# Patient Record
Sex: FEMALE | Race: WHITE | Employment: FULL TIME | URBAN - METROPOLITAN AREA
[De-identification: names, ages, dates, MRNs, and addresses within clinical notes are randomized per-mention and may not be internally consistent; named-entity substitution may affect disease eponyms.]

---

## 2017-01-18 ENCOUNTER — HOSPITAL ENCOUNTER (OUTPATIENT)
Dept: OTHER | Age: 52
Discharge: HOME | End: 2017-01-31
Attending: PSYCHIATRY & NEUROLOGY | Admitting: PSYCHIATRY & NEUROLOGY

## 2017-01-23 ENCOUNTER — HOSPITAL ENCOUNTER (OUTPATIENT)
Dept: PHYSICAL THERAPY | Age: 52
Discharge: HOME | End: 2017-01-23
Admitting: PSYCHIATRY & NEUROLOGY

## 2017-01-25 ENCOUNTER — HOSPITAL ENCOUNTER (OUTPATIENT)
Dept: PHYSICAL THERAPY | Age: 52
Discharge: HOME | End: 2017-01-25
Admitting: PSYCHIATRY & NEUROLOGY

## 2017-01-30 ENCOUNTER — HOSPITAL ENCOUNTER (OUTPATIENT)
Dept: PHYSICAL THERAPY | Age: 52
Discharge: HOME | End: 2017-01-30
Admitting: PSYCHIATRY & NEUROLOGY

## 2017-02-06 ENCOUNTER — HOSPITAL ENCOUNTER (OUTPATIENT)
Dept: PHYSICAL THERAPY | Age: 52
Discharge: HOME | End: 2017-02-06
Admitting: PSYCHIATRY & NEUROLOGY

## 2017-02-08 ENCOUNTER — HOSPITAL ENCOUNTER (OUTPATIENT)
Dept: PHYSICAL THERAPY | Age: 52
Discharge: HOME | End: 2017-02-08
Admitting: PSYCHIATRY & NEUROLOGY

## 2017-02-17 ENCOUNTER — HOSPITAL ENCOUNTER (OUTPATIENT)
Dept: NON INVASIVE DIAGNOSTICS | Age: 52
Discharge: HOME | End: 2017-02-17
Attending: INTERNAL MEDICINE | Admitting: INTERNAL MEDICINE

## 2017-02-18 LAB — VITAMIN D 25-HYDROXY: 18.9 NG/ML

## 2017-02-20 ENCOUNTER — HOSPITAL ENCOUNTER (OUTPATIENT)
Dept: NON INVASIVE DIAGNOSTICS | Age: 52
Discharge: HOME | End: 2017-02-20
Attending: FAMILY MEDICINE | Admitting: FAMILY MEDICINE

## 2017-02-20 ENCOUNTER — OFFICE VISIT (OUTPATIENT)
Dept: FAMILY MEDICINE CLINIC | Age: 52
End: 2017-02-20

## 2017-02-20 VITALS
HEART RATE: 92 BPM | SYSTOLIC BLOOD PRESSURE: 110 MMHG | TEMPERATURE: 98 F | BODY MASS INDEX: 37.15 KG/M2 | DIASTOLIC BLOOD PRESSURE: 72 MMHG | WEIGHT: 250.8 LBS | HEIGHT: 69 IN

## 2017-02-20 DIAGNOSIS — G89.29 CHRONIC PAIN OF RIGHT KNEE: ICD-10-CM

## 2017-02-20 DIAGNOSIS — M25.561 CHRONIC PAIN OF RIGHT KNEE: Primary | ICD-10-CM

## 2017-02-20 DIAGNOSIS — M25.561 CHRONIC PAIN OF RIGHT KNEE: ICD-10-CM

## 2017-02-20 DIAGNOSIS — G89.29 CHRONIC PAIN OF RIGHT KNEE: Primary | ICD-10-CM

## 2017-02-20 PROCEDURE — 99213 OFFICE O/P EST LOW 20 MIN: CPT | Performed by: FAMILY MEDICINE

## 2017-02-20 RX ORDER — PANTOPRAZOLE SODIUM 40 MG/1
TABLET, DELAYED RELEASE ORAL
Refills: 5 | COMMUNITY
Start: 2017-02-14 | End: 2018-09-21

## 2017-02-20 RX ORDER — GABAPENTIN 300 MG/1
CAPSULE ORAL
Refills: 1 | COMMUNITY
Start: 2017-02-01 | End: 2021-07-01 | Stop reason: ALTCHOICE

## 2017-02-20 RX ORDER — TRAMADOL HYDROCHLORIDE 50 MG/1
TABLET ORAL
Refills: 0 | COMMUNITY
Start: 2017-01-16 | End: 2017-12-21 | Stop reason: HOSPADM

## 2017-02-20 ASSESSMENT — ENCOUNTER SYMPTOMS
CHEST TIGHTNESS: 0
ABDOMINAL PAIN: 0
SHORTNESS OF BREATH: 0

## 2017-02-22 ENCOUNTER — TELEPHONE (OUTPATIENT)
Dept: ORTHOPEDIC SURGERY | Age: 52
End: 2017-02-22

## 2017-02-24 ENCOUNTER — OFFICE VISIT (OUTPATIENT)
Dept: ORTHOPEDIC SURGERY | Age: 52
End: 2017-02-24

## 2017-02-24 VITALS
BODY MASS INDEX: 37.44 KG/M2 | HEART RATE: 68 BPM | WEIGHT: 247 LBS | SYSTOLIC BLOOD PRESSURE: 90 MMHG | HEIGHT: 68 IN | DIASTOLIC BLOOD PRESSURE: 61 MMHG

## 2017-02-24 DIAGNOSIS — M17.11 PRIMARY OSTEOARTHRITIS OF RIGHT KNEE: Primary | ICD-10-CM

## 2017-02-24 PROCEDURE — 20611 DRAIN/INJ JOINT/BURSA W/US: CPT | Performed by: ORTHOPAEDIC SURGERY

## 2017-02-24 PROCEDURE — 99203 OFFICE O/P NEW LOW 30 MIN: CPT | Performed by: ORTHOPAEDIC SURGERY

## 2017-03-14 RX ORDER — LEVOTHYROXINE SODIUM 0.1 MG/1
TABLET ORAL
Qty: 90 TABLET | Refills: 0 | Status: SHIPPED | OUTPATIENT
Start: 2017-03-14 | End: 2017-06-18 | Stop reason: SDUPTHER

## 2017-03-15 ENCOUNTER — OFFICE VISIT (OUTPATIENT)
Dept: FAMILY MEDICINE CLINIC | Age: 52
End: 2017-03-15

## 2017-03-15 VITALS
DIASTOLIC BLOOD PRESSURE: 70 MMHG | TEMPERATURE: 97.6 F | WEIGHT: 265 LBS | HEART RATE: 72 BPM | SYSTOLIC BLOOD PRESSURE: 96 MMHG | HEIGHT: 68 IN | BODY MASS INDEX: 40.16 KG/M2

## 2017-03-15 DIAGNOSIS — J01.00 ACUTE MAXILLARY SINUSITIS, RECURRENCE NOT SPECIFIED: Primary | ICD-10-CM

## 2017-03-15 PROCEDURE — 99213 OFFICE O/P EST LOW 20 MIN: CPT | Performed by: FAMILY MEDICINE

## 2017-03-15 RX ORDER — AMOXICILLIN AND CLAVULANATE POTASSIUM 875; 125 MG/1; MG/1
1 TABLET, FILM COATED ORAL 2 TIMES DAILY
Qty: 20 TABLET | Refills: 0 | Status: SHIPPED | OUTPATIENT
Start: 2017-03-15 | End: 2017-10-11 | Stop reason: SDUPTHER

## 2017-03-15 ASSESSMENT — ENCOUNTER SYMPTOMS
COUGH: 0
WHEEZING: 0
SHORTNESS OF BREATH: 0
RHINORRHEA: 1
SINUS PRESSURE: 1
CHEST TIGHTNESS: 0

## 2017-04-28 ENCOUNTER — TELEPHONE (OUTPATIENT)
Dept: FAMILY MEDICINE CLINIC | Age: 52
End: 2017-04-28

## 2017-05-08 RX ORDER — FUROSEMIDE 20 MG/1
TABLET ORAL
Qty: 90 TABLET | Refills: 0 | Status: SHIPPED | OUTPATIENT
Start: 2017-05-08 | End: 2018-08-09

## 2017-06-19 RX ORDER — LEVOTHYROXINE SODIUM 0.1 MG/1
TABLET ORAL
Qty: 90 TABLET | Refills: 0 | Status: SHIPPED | OUTPATIENT
Start: 2017-06-19 | End: 2017-09-20 | Stop reason: SDUPTHER

## 2017-09-20 DIAGNOSIS — E78.5 HYPERLIPIDEMIA, UNSPECIFIED HYPERLIPIDEMIA TYPE: Primary | ICD-10-CM

## 2017-09-20 DIAGNOSIS — R73.9 HYPERGLYCEMIA: ICD-10-CM

## 2017-09-20 DIAGNOSIS — E03.9 ACQUIRED HYPOTHYROIDISM: ICD-10-CM

## 2017-09-20 DIAGNOSIS — Z11.4 ENCOUNTER FOR SCREENING FOR HIV: ICD-10-CM

## 2017-09-20 RX ORDER — LEVOTHYROXINE SODIUM 0.1 MG/1
TABLET ORAL
Qty: 30 TABLET | Refills: 0 | Status: SHIPPED | OUTPATIENT
Start: 2017-09-20 | End: 2017-09-25 | Stop reason: SDUPTHER

## 2017-09-21 DIAGNOSIS — R73.9 HYPERGLYCEMIA: ICD-10-CM

## 2017-09-21 DIAGNOSIS — E78.5 HYPERLIPIDEMIA, UNSPECIFIED HYPERLIPIDEMIA TYPE: ICD-10-CM

## 2017-09-21 DIAGNOSIS — E03.9 ACQUIRED HYPOTHYROIDISM: ICD-10-CM

## 2017-09-21 DIAGNOSIS — Z11.4 ENCOUNTER FOR SCREENING FOR HIV: ICD-10-CM

## 2017-09-21 LAB
A/G RATIO: 1.3 (ref 1.1–2.2)
ALBUMIN SERPL-MCNC: 4 G/DL (ref 3.4–5)
ALP BLD-CCNC: 99 U/L (ref 40–129)
ALT SERPL-CCNC: 49 U/L (ref 10–40)
ANION GAP SERPL CALCULATED.3IONS-SCNC: 15 MMOL/L (ref 3–16)
AST SERPL-CCNC: 34 U/L (ref 15–37)
BILIRUB SERPL-MCNC: 0.4 MG/DL (ref 0–1)
BUN BLDV-MCNC: 12 MG/DL (ref 7–20)
CALCIUM SERPL-MCNC: 10.3 MG/DL (ref 8.3–10.6)
CHLORIDE BLD-SCNC: 99 MMOL/L (ref 99–110)
CHOLESTEROL, TOTAL: 226 MG/DL (ref 0–199)
CO2: 25 MMOL/L (ref 21–32)
CREAT SERPL-MCNC: 0.7 MG/DL (ref 0.6–1.1)
ESTIMATED AVERAGE GLUCOSE: 114 MG/DL
GFR AFRICAN AMERICAN: >60
GFR NON-AFRICAN AMERICAN: >60
GLOBULIN: 3.2 G/DL
GLUCOSE BLD-MCNC: 88 MG/DL (ref 70–99)
HBA1C MFR BLD: 5.6 %
HDLC SERPL-MCNC: 50 MG/DL (ref 40–60)
LDL CHOLESTEROL CALCULATED: 138 MG/DL
POTASSIUM SERPL-SCNC: 4.1 MMOL/L (ref 3.5–5.1)
SODIUM BLD-SCNC: 139 MMOL/L (ref 136–145)
TOTAL PROTEIN: 7.2 G/DL (ref 6.4–8.2)
TRIGL SERPL-MCNC: 190 MG/DL (ref 0–150)
TSH REFLEX: 3.53 UIU/ML (ref 0.27–4.2)
VLDLC SERPL CALC-MCNC: 38 MG/DL

## 2017-09-22 LAB — HIV-1 AND HIV-2 ANTIBODIES: NORMAL

## 2017-09-25 ENCOUNTER — OFFICE VISIT (OUTPATIENT)
Dept: FAMILY MEDICINE CLINIC | Age: 52
End: 2017-09-25

## 2017-09-25 VITALS
SYSTOLIC BLOOD PRESSURE: 100 MMHG | HEIGHT: 68 IN | TEMPERATURE: 97.8 F | HEART RATE: 60 BPM | BODY MASS INDEX: 40.22 KG/M2 | WEIGHT: 265.4 LBS | DIASTOLIC BLOOD PRESSURE: 66 MMHG

## 2017-09-25 DIAGNOSIS — Z00.00 ANNUAL PHYSICAL EXAM: Primary | ICD-10-CM

## 2017-09-25 DIAGNOSIS — Z23 NEED FOR PNEUMOCOCCAL VACCINATION: ICD-10-CM

## 2017-09-25 DIAGNOSIS — J45.909 UNCOMPLICATED ASTHMA, UNSPECIFIED ASTHMA SEVERITY: ICD-10-CM

## 2017-09-25 DIAGNOSIS — Z23 NEED FOR TDAP VACCINATION: ICD-10-CM

## 2017-09-25 DIAGNOSIS — M79.7 FIBROMYALGIA: ICD-10-CM

## 2017-09-25 DIAGNOSIS — E03.4 HYPOTHYROIDISM DUE TO ACQUIRED ATROPHY OF THYROID: ICD-10-CM

## 2017-09-25 PROCEDURE — 90732 PPSV23 VACC 2 YRS+ SUBQ/IM: CPT | Performed by: FAMILY MEDICINE

## 2017-09-25 PROCEDURE — 90472 IMMUNIZATION ADMIN EACH ADD: CPT | Performed by: FAMILY MEDICINE

## 2017-09-25 PROCEDURE — 99396 PREV VISIT EST AGE 40-64: CPT | Performed by: FAMILY MEDICINE

## 2017-09-25 PROCEDURE — 90715 TDAP VACCINE 7 YRS/> IM: CPT | Performed by: FAMILY MEDICINE

## 2017-09-25 PROCEDURE — 90471 IMMUNIZATION ADMIN: CPT | Performed by: FAMILY MEDICINE

## 2017-09-25 RX ORDER — LEVOTHYROXINE SODIUM 0.1 MG/1
TABLET ORAL
Qty: 90 TABLET | Refills: 1 | Status: SHIPPED | OUTPATIENT
Start: 2017-09-25 | End: 2018-06-20 | Stop reason: SDUPTHER

## 2017-09-25 RX ORDER — ONDANSETRON 4 MG/1
24 TABLET, FILM COATED ORAL EVERY 12 HOURS PRN
Refills: 0 | COMMUNITY
Start: 2017-07-31 | End: 2018-06-20 | Stop reason: DRUGHIGH

## 2017-09-25 ASSESSMENT — ENCOUNTER SYMPTOMS
RHINORRHEA: 0
BLOOD IN STOOL: 0
EYE PAIN: 0
CONSTIPATION: 0
ABDOMINAL PAIN: 0
COUGH: 0
EYE DISCHARGE: 0
WHEEZING: 0
CHEST TIGHTNESS: 0
COLOR CHANGE: 0
VOMITING: 0
EYE REDNESS: 0
SHORTNESS OF BREATH: 0
NAUSEA: 0
DIARRHEA: 0
SINUS PRESSURE: 0

## 2017-09-25 ASSESSMENT — PATIENT HEALTH QUESTIONNAIRE - PHQ9
2. FEELING DOWN, DEPRESSED OR HOPELESS: 0
SUM OF ALL RESPONSES TO PHQ9 QUESTIONS 1 & 2: 0
SUM OF ALL RESPONSES TO PHQ QUESTIONS 1-9: 0
1. LITTLE INTEREST OR PLEASURE IN DOING THINGS: 0

## 2017-10-11 ENCOUNTER — OFFICE VISIT (OUTPATIENT)
Dept: FAMILY MEDICINE CLINIC | Age: 52
End: 2017-10-11

## 2017-10-11 VITALS
HEART RATE: 76 BPM | TEMPERATURE: 97.6 F | HEIGHT: 68 IN | SYSTOLIC BLOOD PRESSURE: 132 MMHG | DIASTOLIC BLOOD PRESSURE: 82 MMHG | BODY MASS INDEX: 40.89 KG/M2 | WEIGHT: 269.8 LBS

## 2017-10-11 DIAGNOSIS — J02.9 SORE THROAT: Primary | ICD-10-CM

## 2017-10-11 LAB — S PYO AG THROAT QL: NORMAL

## 2017-10-11 PROCEDURE — 87880 STREP A ASSAY W/OPTIC: CPT | Performed by: FAMILY MEDICINE

## 2017-10-11 PROCEDURE — 99213 OFFICE O/P EST LOW 20 MIN: CPT | Performed by: FAMILY MEDICINE

## 2017-10-11 RX ORDER — AMOXICILLIN AND CLAVULANATE POTASSIUM 875; 125 MG/1; MG/1
1 TABLET, FILM COATED ORAL 2 TIMES DAILY
Qty: 20 TABLET | Refills: 0 | Status: SHIPPED | OUTPATIENT
Start: 2017-10-11 | End: 2017-12-13 | Stop reason: SDUPTHER

## 2017-10-11 ASSESSMENT — ENCOUNTER SYMPTOMS
VOICE CHANGE: 1
COUGH: 1
RHINORRHEA: 1
SINUS PRESSURE: 1
WHEEZING: 0
SHORTNESS OF BREATH: 0
CHEST TIGHTNESS: 0
SORE THROAT: 1

## 2017-10-13 ENCOUNTER — OFFICE VISIT (OUTPATIENT)
Dept: ORTHOPEDIC SURGERY | Age: 52
End: 2017-10-13

## 2017-10-13 VITALS
HEART RATE: 86 BPM | HEIGHT: 68 IN | WEIGHT: 260 LBS | SYSTOLIC BLOOD PRESSURE: 114 MMHG | DIASTOLIC BLOOD PRESSURE: 70 MMHG | BODY MASS INDEX: 39.4 KG/M2

## 2017-10-13 DIAGNOSIS — M65.9 SYNOVITIS OF LEFT KNEE: ICD-10-CM

## 2017-10-13 DIAGNOSIS — M25.562 ACUTE PAIN OF LEFT KNEE: ICD-10-CM

## 2017-10-13 DIAGNOSIS — M17.12 PRIMARY OSTEOARTHRITIS OF LEFT KNEE: Primary | ICD-10-CM

## 2017-10-13 PROBLEM — M65.962 SYNOVITIS OF LEFT KNEE: Status: ACTIVE | Noted: 2017-10-13

## 2017-10-13 PROCEDURE — 20610 DRAIN/INJ JOINT/BURSA W/O US: CPT | Performed by: ORTHOPAEDIC SURGERY

## 2017-10-13 PROCEDURE — 99213 OFFICE O/P EST LOW 20 MIN: CPT | Performed by: ORTHOPAEDIC SURGERY

## 2017-10-13 NOTE — PATIENT INSTRUCTIONS
Impression:   1. Synovitis with aggravation of pre-existing moderate arthritis. No exam evidence of ligamentous injury  2. Lack of blood within her left knee makes avulsion injury highly unlikely. 3. Obesity      Plan/Treatment:   1. 1. Aspiration  was recommended and Laurel agreed. The site of aspiration was cleaned with alcohol, anesthetized with  2 cc of 1% Carbocaine. After waiting sufficient time for analgesia  the left knee was aspirated obtaining  8 c ml of  Yellow clear fluid. 2. This was followed by an injection of 2 cc of 40 mg Depomedrol. 3. Physical therapy was recommended. Prescription was done. 4. Weight loss was recommended. 5. If she shows no improvement MRI scan would be indicated. Jessika Casillas returned as an emergency  approximately 5 hours after leaving the office. She states that she had sudden onset of pain and swelling in the left knee soon after she left the office. She had difficulty weightbearing. She called the office and was told to return immediately. Since then she did take a tramadol for pain and states that her pain has been improving and she is now able to weight-bear. She complained of pain radiating from her knee down to her ankle and for knee up to her thigh. Examination shows mild swelling at the aspiration site and mild intra-articular effusion. There is no erythema. There is no drainage and no apparent vascular compromise. I did do a limited ultrasound evaluation of the left knee with the [employer]  linear 18/4 ultrasound transducer. There was noted to be a mild intra-articular effusion present there was a normal-appearing quadriceps tendon and tendon insertion at the patella. There was noted be no evidence of hematoma at the injection site. She was told that she had a flareup from the injection possibly reacting to the Depo-Medrol. She was told to use ice elevation and limited use of her ibuprofen which she takes occasionally.  She will return here

## 2017-10-13 NOTE — PROGRESS NOTES
Right                                       Left  Swelling  moderate   Effusion   mild   Ecchymosis  neg   Errythemia  neg   Warmth  neg   Deformity  neg   Crepitus  neg   Patellar Subluxation  neg   Tenderness   Medial joint   Log Roll  neg   Patellar Apprehension  neg   Patellar Grind  positive   Extension Lag neg neg     Neurovascular Status: intact  Range of Motion Extension Flexion Pules (0-4) Dorsalis  Pedis Posterior  Tibialis   Right 0     134     Degrees Right       Left   0     106     Degrees Left  3+                         Test Including Ligamentous Stability/ Positive or Negative                                       Test          Right         Left   Valgus                  neg                 Varus  neg   Lachman  neg   Anterior Drawer  neg   Posterior Drawer  neg   Fernando     Pivot Shift     Quadraceps Active     Atrophy                    cm                   cm       X-Ray Findings taken in Office: 3 views of the left knee were read by myself and shows Moderate osteoarthritic changes tricompartmental in the left knee. There is questionable small defect superior pole of her patella of the left knee which could represent cystic changes. No evidence of acute fracture        Impression:   1. Synovitis with aggravation of pre-existing moderate arthritis. No exam evidence of ligamentous injury  2. Lack of blood within her left knee makes avulsion injury highly unlikely. 3. Obesity      Plan/Treatment:   1. 1. Aspiration  was recommended and Laurel agreed. The site of aspiration was cleaned with alcohol, anesthetized with  2 cc of 1% Carbocaine. After waiting sufficient time for analgesia  the left knee was aspirated obtaining  8 c ml of  Yellow clear fluid. 2. This was followed by an injection of 2 cc of 40 mg Depomedrol. 3. Physical therapy was recommended. Prescription was done. 4. Weight loss was recommended.   5. If she shows no improvement MRI scan would be

## 2017-10-16 ENCOUNTER — TELEPHONE (OUTPATIENT)
Dept: SURGERY | Age: 52
End: 2017-10-16

## 2017-10-16 ENCOUNTER — HOSPITAL ENCOUNTER (OUTPATIENT)
Dept: WOMENS IMAGING | Age: 52
Discharge: HOME | End: 2017-10-16
Attending: FAMILY MEDICINE | Admitting: FAMILY MEDICINE

## 2017-10-16 DIAGNOSIS — N63.10 BREAST MASS, RIGHT: Primary | ICD-10-CM

## 2017-10-16 DIAGNOSIS — N63.0 LUMP OR MASS IN BREAST: ICD-10-CM

## 2017-10-16 DIAGNOSIS — N64.4 BREAST PAIN: ICD-10-CM

## 2017-10-16 DIAGNOSIS — Z12.31 VISIT FOR SCREENING MAMMOGRAM: ICD-10-CM

## 2017-10-16 DIAGNOSIS — R92.8 ABNORMAL MAMMOGRAM: ICD-10-CM

## 2017-10-16 NOTE — PROGRESS NOTES
[facility] AT [address]. [place] [address]  [phone]        ULTRASOUND BIOPSY EDUCATION    NAME:  Dorota Tavera   YOB: 1965   MEDICAL RECORD NUMBER:  [id number]   TODAY'S DATE:  10/16/2017    TODAY'S DATE:      What is an Ultrasound Guided Breast Biopsy? Ultrasound guided breast biopsy is a test that uses ultrasound to find an area of your breast where a tissue sample will be taken. The sample is then looked at under a microscope to check for signs of breast cancer. Why is it done? An Ultrasound biopsy is usually done to check for cancer in a lump or cyst found during a mammogram or ultrasound. Preparing for the test?    · Take your medications as prescribed    You may eat and drink fluids before the test    Take a shower the evening or morning before the biopsy. What happens before the test?  Images are taken to find the exact site to be biopsied.   Your skin is washed with an alcohol prep.   You will be given an injection of medication to numb your breast.     What happens during the test?     Once your breast is numb, a small cut (incision) is made.   Using the imaging, the doctor will guide the needle into the biopsy area.   A sample of breast tissue is taken through the needle.   A small \"Clip\" or Marker is inserted into your breast to cesario the biopsy site.   The needle is removed and pressure put on the needle site to stop any bleeding.   A bandage will be placed over the site.   A post mammogram picture will be taken to document the clip placement. How long does the test take? Approximately 60 minutes. Most of the time is spent finding the area for the biopsy. What happens after the test? Lab results take about 2-3 business days. You may ask the Nurse Navigator or your doctor to call you.      The nurse will review your post biopsy instructions which include:        Placing an ice pack in your bra.    Please wear a firm fitting bra.   You may use Tylenol (Acetaminiphen) for discomfort. What are the risks?   Bleeding: You may have some bleeding which can cause bruising, swelling,    or a bleeding under your skin.   Infection:   Signs of infection are redness, swelling, heat, or increasing pain    at the biopsy Site.   Sample size not adequate: This would require repeating the biopsy                   ULTRASOUND BIOPSY PROCEDURE:      DATE:     [x] Take all your other medications on your normal routine schedule. [x] You may eat and drink as normal before your biopsy. [x] You may drive yourself. [x] Take a shower the night before or the morning of the biopsy. [x] No heavy lifting or exercise for 48 hours after the biopsy. [x] A printed Pre Ultrasound Guided Biopsy Instructions were given and reviewed with patient and all questions were answered. [x] A list of 911 [facility] has been provided to the patient. [facility] is open:  Monday - Friday  8:00 am - 4:30 pm.  Should you experience any significant changes in your health or have questions about your care, please contact the Nurse Navigator at [phone]  During our regular business hours. If you need help with your care outside these hours and cannot wait until the next business day, when we are available,  please contact your physician or go to the hospital emergency room.

## 2017-10-16 NOTE — PLAN OF CARE
Dr. Parr spoke with pt concerning recommendation for U/S right breast biopsy. Procedure and risk expalined, pt verbalized understanding. Dr. Monreal Bound office called for order.

## 2017-10-18 ENCOUNTER — HOSPITAL ENCOUNTER (OUTPATIENT)
Dept: WOMENS IMAGING | Age: 52
Discharge: HOME | End: 2017-10-18

## 2017-10-18 VITALS
OXYGEN SATURATION: 97 % | RESPIRATION RATE: 18 BRPM | SYSTOLIC BLOOD PRESSURE: 115 MMHG | DIASTOLIC BLOOD PRESSURE: 66 MMHG | HEART RATE: 86 BPM

## 2017-10-18 DIAGNOSIS — N63.10 BREAST MASS, RIGHT: ICD-10-CM

## 2017-10-18 DIAGNOSIS — R92.8 ABNORMAL MAMMOGRAM: ICD-10-CM

## 2017-10-18 NOTE — PROGRESS NOTES
Breast Biopsy Dressing Change    NAME:  Chon Vann  YOB: 1965  GENDER: female  MEDICAL RECORD NUMBER:  [id number]  EPISODE DATE:  10/18/2017    Hemostasis achieved:  pressure     Biopsy site cleansed with alcohol      Dressing applied to right breast biopsy site. Location of biopsy 9 o'clock site. Applied with small amount of bacitracin-neomycin polymixin then Coverderm     Response to treatment:  Well tolerated by patient.      Electronically signed by Candelario Sears RN on 10/18/2017 at 2:09 PM

## 2017-10-20 ENCOUNTER — OFFICE VISIT (OUTPATIENT)
Dept: SURGERY | Age: 52
End: 2017-10-20

## 2017-10-20 VITALS
WEIGHT: 259 LBS | DIASTOLIC BLOOD PRESSURE: 78 MMHG | HEIGHT: 69 IN | BODY MASS INDEX: 38.36 KG/M2 | HEART RATE: 80 BPM | SYSTOLIC BLOOD PRESSURE: 128 MMHG

## 2017-10-20 DIAGNOSIS — N63.10 BREAST MASS, RIGHT: Primary | ICD-10-CM

## 2017-10-20 PROCEDURE — 1036F TOBACCO NON-USER: CPT | Performed by: SURGERY

## 2017-10-20 PROCEDURE — 3017F COLORECTAL CA SCREEN DOC REV: CPT | Performed by: SURGERY

## 2017-10-20 PROCEDURE — G8427 DOCREV CUR MEDS BY ELIG CLIN: HCPCS | Performed by: SURGERY

## 2017-10-20 PROCEDURE — G8417 CALC BMI ABV UP PARAM F/U: HCPCS | Performed by: SURGERY

## 2017-10-20 PROCEDURE — 3014F SCREEN MAMMO DOC REV: CPT | Performed by: SURGERY

## 2017-10-20 PROCEDURE — 99213 OFFICE O/P EST LOW 20 MIN: CPT | Performed by: SURGERY

## 2017-10-20 PROCEDURE — G8484 FLU IMMUNIZE NO ADMIN: HCPCS | Performed by: SURGERY

## 2017-10-20 ASSESSMENT — ENCOUNTER SYMPTOMS
COLOR CHANGE: 0
DIARRHEA: 0
RECTAL PAIN: 0
NAUSEA: 0
COUGH: 0
CONSTIPATION: 0
BACK PAIN: 0
ABDOMINAL DISTENTION: 0
SHORTNESS OF BREATH: 0
TROUBLE SWALLOWING: 0
BLOOD IN STOOL: 0
VOMITING: 0
ABDOMINAL PAIN: 0

## 2017-10-20 NOTE — PROGRESS NOTES
Subjective:      Patient ID: Jordan Ritter is a 47 y.o. female. HPI   Chief Complaint   Patient presents with    Follow Up After Procedure     Patient presents follow up right breast biopsy. She had felt a possible right retroareolar mass, also has some right upper breast discomfort. She was noted to have a 5 mm oval mass 9:00 right breast, 4 cm from the nipple. No retroareolar abnormalities noted. Had core biopsy of the mass, benign. Review of Systems   Constitutional: Negative for activity change, appetite change, chills, fatigue, fever and unexpected weight change. HENT: Negative for trouble swallowing. Eyes: Negative for visual disturbance. Respiratory: Negative for cough and shortness of breath. Cardiovascular: Negative for chest pain and leg swelling. Gastrointestinal: Negative for abdominal distention, abdominal pain, blood in stool, constipation, diarrhea, nausea, rectal pain and vomiting. Genitourinary: Negative for difficulty urinating, dysuria, flank pain, frequency and hematuria. Musculoskeletal: Negative for arthralgias, back pain, neck pain and neck stiffness. Skin: Negative for color change, rash and wound. Neurological: Negative for dizziness, weakness, numbness and headaches. Hematological: Negative for adenopathy. Does not bruise/bleed easily. Psychiatric/Behavioral: Negative for confusion and sleep disturbance. All other systems reviewed and are negative. Objective:   Physical Exam   Constitutional: She is oriented to person, place, and time. She appears well-developed and well-nourished. No distress. HENT:   Head: Normocephalic and atraumatic. Right Ear: External ear normal.   Left Ear: External ear normal.   Nose: Nose normal.   Mouth/Throat: No oropharyngeal exudate. Eyes: Conjunctivae and EOM are normal.   Neck: Normal range of motion. Neck supple. No tracheal deviation present. No thyromegaly present. Cardiovascular: Normal rate. Pulmonary/Chest: Effort normal. No respiratory distress. Abdominal: Soft. She exhibits no distension. Musculoskeletal: Normal range of motion. She exhibits no edema or tenderness. Lymphadenopathy:     She has no cervical adenopathy. She has no axillary adenopathy. Neurological: She is alert and oriented to person, place, and time. Coordination normal.   Skin: Skin is warm and dry. No rash noted. She is not diaphoretic. No erythema. Psychiatric: She has a normal mood and affect. Her behavior is normal. Judgment and thought content normal.   Vitals reviewed. right breast - ecchymosis at biopsy site, no masses, no axillary adenopathy  Left breast - no masses    Assessment:      1. Breast mass, right             Plan:      Biopsy ok. No masses on exam. Continue monthly SBE, yearly mammogram. F/u as needed.

## 2017-10-24 ENCOUNTER — TELEPHONE (OUTPATIENT)
Dept: SURGERY | Age: 52
End: 2017-10-24

## 2017-11-01 ENCOUNTER — OFFICE VISIT (OUTPATIENT)
Dept: FAMILY MEDICINE CLINIC | Age: 52
End: 2017-11-01

## 2017-11-01 VITALS
BODY MASS INDEX: 40.32 KG/M2 | HEART RATE: 84 BPM | SYSTOLIC BLOOD PRESSURE: 130 MMHG | TEMPERATURE: 97.4 F | HEIGHT: 69 IN | DIASTOLIC BLOOD PRESSURE: 82 MMHG | WEIGHT: 272.2 LBS

## 2017-11-01 DIAGNOSIS — Z01.419 ENCOUNTER FOR GYNECOLOGICAL EXAMINATION WITHOUT ABNORMAL FINDING: Primary | ICD-10-CM

## 2017-11-01 DIAGNOSIS — Z12.4 SCREENING FOR MALIGNANT NEOPLASM OF CERVIX: ICD-10-CM

## 2017-11-01 PROCEDURE — 99396 PREV VISIT EST AGE 40-64: CPT | Performed by: FAMILY MEDICINE

## 2017-11-01 ASSESSMENT — ENCOUNTER SYMPTOMS
CHEST TIGHTNESS: 0
EYE REDNESS: 0
EYE DISCHARGE: 0
COUGH: 0
SINUS PRESSURE: 0
RHINORRHEA: 0
VOMITING: 0
ABDOMINAL PAIN: 0
EYE PAIN: 0
DIARRHEA: 0
BLOOD IN STOOL: 0
CONSTIPATION: 0
SHORTNESS OF BREATH: 0
WHEEZING: 0

## 2017-11-01 NOTE — PROGRESS NOTES
Respiratory Therapy Supplies (NEBULIZER/TUBING/MOUTHPIECE) KIT 1 kit by Does not apply route daily as needed 1 kit 0    budesonide (PULMICORT) 0.5 MG/2ML nebulizer suspension TAKE 1 VIAL VIA NEBULIZER TWICE DAILY 360 mL 6     No current facility-administered medications for this visit. Facility-Administered Medications Ordered in Other Visits   Medication Dose Route Frequency Provider Last Rate Last Dose    technetium sulfur colloid egg (NYCOMED-SC) solution 500 micro curie  500 micro curie Intravenous ONCE PRN Inell MD Brooklynn         Allergies:  Other  Past Medical History:   Diagnosis Date    Anxiety     Asthma     currently was instructed to stop inhalers d/t to collapse of vocal cords    Breast mass in female 7-    right    Chronic back pain     COPD (chronic obstructive pulmonary disease) (HCC)     Diaphragm paralysis     right side    Dysphonia, spasmodic     Enlarged liver     Fibromyalgia     GERD (gastroesophageal reflux disease)     Headache(784.0)     History of ulcer disease     Kidney infection     Osteoarthritis     Rheumatoid arthritis (HCC)     Spasmodic dysphonia     voice very hoarse    Thyroid disease     2015 found spot on left side of thyroid, right side thyroid was removed     Past Surgical History:   Procedure Laterality Date    APPENDECTOMY  age 17   Rosibel Reddish BREAST SURGERY Right needle localization right breast mass    BREAST SURGERY Right 10/22/15    riught breast mass excision/needle loc    CARPAL TUNNEL RELEASE Right 5/7/15    CARPAL TUNNEL RELEASE Left 5/28/15    Left carpal tunnel release      COLONOSCOPY      ENDOSCOPY, COLON, DIAGNOSTIC      THYROID SURGERY      removed right side of thyroid    TUBAL LIGATION  1992     Family History   Problem Relation Age of Onset    Asthma Father     Heart Disease Father     Emphysema Father     Breast Cancer Maternal Aunt      breast    Breast Cancer Maternal Aunt      breast    Migraines Mother     Cancer Mother 61     multiple mylenoma    Diabetes Sister     Migraines Sister     Breast Cancer Paternal Uncle      colon    Breast Cancer Maternal Cousin      breast     Social History   Substance Use Topics    Smoking status: Former Smoker     Packs/day: 1.00     Years: 15.00     Types: Cigarettes     Quit date: 1/1/2003    Smokeless tobacco: Never Used    Alcohol use No     Vitals:    11/01/17 0834   BP: 130/82   Pulse: 84   Temp: 97.4 °F (36.3 °C)   TempSrc: Oral   Weight: 272 lb 3.2 oz (123.5 kg)   Height: 5' 8.5\" (1.74 m)     Body mass index is 40.79 kg/m². Wt Readings from Last 3 Encounters:   11/01/17 272 lb 3.2 oz (123.5 kg)   10/20/17 259 lb (117.5 kg)   10/13/17 260 lb (117.9 kg)     BP Readings from Last 3 Encounters:   11/01/17 130/82   10/20/17 128/78   10/18/17 115/66        Review of Systems   Constitutional: Negative for fatigue, fever and unexpected weight change. HENT: Negative for ear discharge, ear pain, hearing loss, rhinorrhea, sinus pressure and tinnitus. Eyes: Negative for pain, discharge, redness and visual disturbance. Respiratory: Negative for cough, chest tightness, shortness of breath and wheezing. Cardiovascular: Negative for chest pain and palpitations. Gastrointestinal: Negative for abdominal pain, blood in stool, constipation, diarrhea and vomiting. Genitourinary: Negative for difficulty urinating, dysuria and hematuria. Neurological: Negative for dizziness, seizures, syncope and headaches. Psychiatric/Behavioral: Negative for dysphoric mood and sleep disturbance. The patient is not nervous/anxious. Objective:   Physical Exam   Constitutional: She is oriented to person, place, and time. She appears well-developed and well-nourished. No distress. HENT:   Head: Normocephalic. Mouth/Throat: Oropharynx is clear and moist. No oropharyngeal exudate. Neck: Neck supple. Cardiovascular: Normal rate, regular rhythm and normal heart sounds. Used                           Order Specific Question:   Collection Type     Answer: Thin Prep     Order Specific Question:   Prior Abnormal Pap Test     Answer:   No     Order Specific Question:   Screening or Diagnostic     Answer:   Screening     Order Specific Question:   HPV Requested?      Answer:   Yes - If Abnormal Reflex HPV     Order Specific Question:   High Risk Patient     Answer:   N/A

## 2017-11-06 ENCOUNTER — HOSPITAL ENCOUNTER (OUTPATIENT)
Dept: OTHER | Age: 52
Discharge: HOME | End: 2017-11-06
Attending: INTERNAL MEDICINE | Admitting: INTERNAL MEDICINE

## 2017-11-09 NOTE — PROGRESS NOTES
kg)   06/12/15 251 lb (113.9 kg)   06/05/15 251 lb (113.9 kg)   05/28/15 247 lb (112 kg)   05/28/15 251 lb 6.4 oz (114 kg)   05/27/15 247 lb (112 kg)   05/15/15 256 lb (116.1 kg)   05/13/15 250 lb 12.8 oz (113.8 kg)   05/07/15 247 lb (112 kg)   05/06/15 256 lb 6.4 oz (116.3 kg)   05/06/15 257 lb (116.6 kg)   04/20/15 257 lb (116.6 kg)   03/23/15 257 lb 6.4 oz (116.8 kg)   03/04/15 252 lb 3.2 oz (114.4 kg)   01/29/15 253 lb 3.2 oz (114.9 kg)   01/28/15 247 lb (112 kg)   01/15/15 246 lb 3.2 oz (111.7 kg)   11/20/14 247 lb 12.8 oz (112.4 kg)   09/10/14 258 lb 9.6 oz (117.3 kg)   08/08/14 251 lb (113.9 kg)   07/29/14 254 lb (115.2 kg)   07/24/14 247 lb (112 kg)   07/21/14 254 lb 3.2 oz (115.3 kg)   07/17/14 245 lb (111.1 kg)   07/14/14 245 lb (111.1 kg)   07/09/14 258 lb (117 kg)   07/01/14 256 lb (116.1 kg)   06/26/14 263 lb 12.8 oz (119.7 kg)   06/23/14 256 lb 3.2 oz (116.2 kg)   06/11/14 265 lb 6.4 oz (120.4 kg)   05/23/14 260 lb (117.9 kg)       Comparative Standards   Weight Used: 64 kg, IBW  Estimated Calorie Needs: 5856-5283 kcal (based on 25 - 30 kcal/kg)  Estimated Protein Needs: 118-142 gm (based on 1.0 - 1.2 gm/kg of actual bw, 118 kg)  Estimated Fluid Needs: 0740-2451 ml ( based on 30-35 ml/kg, IBW)     Nutrition-focused Physical Findings           Bowel Pattern: once/day, small loose,but formed yellow  Skin:  no issues noted  Chewing / Swallowing:   no issues reported  Edema: issues with fluid retention, when has swelling takes lasix  Sleep: Goes to sleep between 7-9 am, wakes up 4pm-9pm. Wakes up 1-2 times to pee and drink water.      Food/Nutrition-Related History  Home Diet: general   Home Supplements / Herbals: none reported   Food Restrictions / Cultural Requests: avoids chocolate-\"burns chest\", reflux   Food Allergies: none    Smoker: no  Consumes Alcohol: no     Physical Activity  None, limited currently SOB and arthritis, back pain    24 Hour Diet Recall    She stated she often skips 4pm snack and 7 pm dinner (will grab a banana before goes to work) because she is sleeping)    This day she stated she had only eating 5 popcorn chicken bites (appointment was at 3 pm on 11/6)    Usual Breakfast 5:92IF  4-5 slices rayo or sausage  2 eggs  Water  Occasionally coffee  Or turkey sandwich, wheat bread with cardoza 1- 2 tbsp    4pm snack (often skips because she is sleeping)  Pretzels with peanut buttter  Or 1/2 sandwich turkey wheat bread with 1-2 Tbsp may, occasionally cheese or a banana  Water    Dinner 7 pm  Roast, 3 oz, carrots 1/2 cup or 3 oz steak, 1/2 cup fried potatoes or baked chicken (have this often)     Lunch 12:30-1am  Work provides meal (hot and cold buffet)    Salad with light ranch dressing  Or hot meat and vegetable or turkey and cheese and crackers  Always eats a vegetable    Drinks 5-6 17 oz bottled grimaldo per day ( oz)  Likes sweet tea   Likes vegetables, some fruits banana, oranges, peaches, sometimes strawberries   Eats chicken often     NUTRITION DIAGNOSIS and GOAL  P: Overweight/obesity  E Limited physical activity/excess of calories   S: BMI greater than 40.0        Nutrition Intervention:  - Nutrition Education/counseling: Importance of eating regular meals, eating every 4-5 hours. Discussed foods to include (f/v, lean protein, chicken, eggs, fish-likes tilapia, salmon, shrimp, low fat dairy. Also discussed portion control. Discussed limiting high fat, high sugar foods related to fatty liver. Discussed cutting back on added salt to foods and flavoring foods with spices/herbs instead. Importance of regular physical activity when breathing improved. Education Methods used: Gave handouts  From [employer] on weight loss tips, Heart healthy eating foods to include/avoid, seasoning tips, Every step counts, simple yoga stretch's for home. Patient understanding: [employer]      Individualized Treament Goals:  1. Will eat at least 3 times per day  2. Will cut back on added salt to food  3.  Snack ideas:

## 2017-11-21 ENCOUNTER — TELEPHONE (OUTPATIENT)
Dept: ORTHOPEDIC SURGERY | Age: 52
End: 2017-11-21

## 2017-11-21 ENCOUNTER — OFFICE VISIT (OUTPATIENT)
Dept: ORTHOPEDIC SURGERY | Age: 52
End: 2017-11-21

## 2017-11-21 VITALS
DIASTOLIC BLOOD PRESSURE: 73 MMHG | HEART RATE: 76 BPM | BODY MASS INDEX: 38.66 KG/M2 | HEIGHT: 69 IN | SYSTOLIC BLOOD PRESSURE: 115 MMHG | WEIGHT: 261 LBS

## 2017-11-21 DIAGNOSIS — M65.9 SYNOVITIS OF LEFT KNEE: Primary | ICD-10-CM

## 2017-11-21 DIAGNOSIS — M17.12 PRIMARY OSTEOARTHRITIS OF LEFT KNEE: ICD-10-CM

## 2017-11-21 PROCEDURE — G8427 DOCREV CUR MEDS BY ELIG CLIN: HCPCS | Performed by: ORTHOPAEDIC SURGERY

## 2017-11-21 PROCEDURE — 3014F SCREEN MAMMO DOC REV: CPT | Performed by: ORTHOPAEDIC SURGERY

## 2017-11-21 PROCEDURE — 1036F TOBACCO NON-USER: CPT | Performed by: ORTHOPAEDIC SURGERY

## 2017-11-21 PROCEDURE — G8417 CALC BMI ABV UP PARAM F/U: HCPCS | Performed by: ORTHOPAEDIC SURGERY

## 2017-11-21 PROCEDURE — 99213 OFFICE O/P EST LOW 20 MIN: CPT | Performed by: ORTHOPAEDIC SURGERY

## 2017-11-21 PROCEDURE — G8484 FLU IMMUNIZE NO ADMIN: HCPCS | Performed by: ORTHOPAEDIC SURGERY

## 2017-11-21 PROCEDURE — 3017F COLORECTAL CA SCREEN DOC REV: CPT | Performed by: ORTHOPAEDIC SURGERY

## 2017-11-21 RX ORDER — DIAZEPAM 5 MG/1
TABLET ORAL
Qty: 2 TABLET | Refills: 0 | Status: SHIPPED | OUTPATIENT
Start: 2017-11-21 | End: 2017-12-13 | Stop reason: ALTCHOICE

## 2017-11-21 RX ORDER — METHYLPREDNISOLONE 4 MG/1
TABLET ORAL
Qty: 1 KIT | Refills: 0 | Status: SHIPPED | OUTPATIENT
Start: 2017-11-21 | End: 2017-11-27

## 2017-11-21 NOTE — TELEPHONE ENCOUNTER
Pt is calling because she would like for you to give her some pain medication so she can go to work  pls call to advise

## 2017-12-01 ENCOUNTER — HOSPITAL ENCOUNTER (OUTPATIENT)
Dept: MRI IMAGING | Age: 52
Discharge: HOME | End: 2017-12-01
Attending: ORTHOPAEDIC SURGERY | Admitting: ORTHOPAEDIC SURGERY

## 2017-12-01 DIAGNOSIS — M17.12 PRIMARY OSTEOARTHRITIS OF LEFT KNEE: ICD-10-CM

## 2017-12-01 DIAGNOSIS — M65.9 SYNOVITIS OF LEFT KNEE: ICD-10-CM

## 2017-12-08 ENCOUNTER — OFFICE VISIT (OUTPATIENT)
Dept: ORTHOPEDIC SURGERY | Age: 52
End: 2017-12-08

## 2017-12-08 VITALS
BODY MASS INDEX: 39.71 KG/M2 | DIASTOLIC BLOOD PRESSURE: 66 MMHG | HEART RATE: 70 BPM | WEIGHT: 262 LBS | SYSTOLIC BLOOD PRESSURE: 109 MMHG | HEIGHT: 68 IN

## 2017-12-08 DIAGNOSIS — S83.242A OTHER TEAR OF MEDIAL MENISCUS OF LEFT KNEE AS CURRENT INJURY, INITIAL ENCOUNTER: Primary | ICD-10-CM

## 2017-12-08 PROCEDURE — G8484 FLU IMMUNIZE NO ADMIN: HCPCS | Performed by: ORTHOPAEDIC SURGERY

## 2017-12-08 PROCEDURE — G8427 DOCREV CUR MEDS BY ELIG CLIN: HCPCS | Performed by: ORTHOPAEDIC SURGERY

## 2017-12-08 PROCEDURE — 3014F SCREEN MAMMO DOC REV: CPT | Performed by: ORTHOPAEDIC SURGERY

## 2017-12-08 PROCEDURE — 1036F TOBACCO NON-USER: CPT | Performed by: ORTHOPAEDIC SURGERY

## 2017-12-08 PROCEDURE — 99213 OFFICE O/P EST LOW 20 MIN: CPT | Performed by: ORTHOPAEDIC SURGERY

## 2017-12-08 PROCEDURE — G8417 CALC BMI ABV UP PARAM F/U: HCPCS | Performed by: ORTHOPAEDIC SURGERY

## 2017-12-08 PROCEDURE — 3017F COLORECTAL CA SCREEN DOC REV: CPT | Performed by: ORTHOPAEDIC SURGERY

## 2017-12-08 NOTE — PROGRESS NOTES
FOLLOW-UP EVALUATION OF KNEE    12/8/2017    Rusty Cabrera Neno      1965      Joao Griffin is seen for Follow-up (Left knee LOV 11/21/17  Here to go over the MRI done on 12/2/17)        She returns for review of her MRI scan of her left knee. She continues to be markedly symptomatic for left knee pain including sharp catching pain which occurs with any twisting movement. Reviewing history, she did injure the knee stepping out of a pickup truck approximately 2 months ago. Her pain is sharp pain which is 6 at rest and up to 10 with activities aggravated by walking or prolonged standing. The pain is primarily along the medial aspect of her knee. Despite her pain she is able to continue her job at [address] items are noted in HPI  Review of systems reviewed from Patient History Form dated on 2/24/17 and available in the patient's chart under the Media tab. The patient's past medical history, medications, allergies, family history, social history, HPI have been updated reviewed, dated, and recorded in the chart.      /[birth year]   Pulse 70   Ht 5' 8\" (1.727 m)   Wt 262 lb (118.8 kg)   LMP 02/18/2016 (Exact Date)   BMI 39.84 kg/m²     Physical examination:  General Appearance: no acute distress, alert, oriented x 3  Limps when walking: yes - marked,                                                                                                                                                              Right                                         Left       Swelling  mild   Effusion  mild   Ecchymosis  neg   Errythemia  neg   Warmth   neg   Deformity  neg   Crepitus  neg   Patellar Subluxation  neg   Tenderness  Medial joint   Log Roll  neg   Patellar Apprehension  neg   Patellar Grind  positive   Extension Lag  neg     Neurovascular Status:  intact    Range of Motion Extension Flexion Pules (0-4) Dorsalis  Pedis Posterior  Tibialis   Right 0 128     Degrees Right       Left   0    115      Degrees Left         Test Including Ligamentous Stability/ Positive or Negative                                       Test          Right         Left   Valgus  neg   Varus                    neg             Lachman  neg   Anterior Drawer  neg   Posterior Drawer  neg   Fernando     Pivot Shift     Quadraceps Active     Atrophy           MRI Findings: of left knee done on 12/2/17 showsA tear of her medial meniscus with a fragment subluxed into the medial gutter. There is associated mild chondral damage of the medial femoral condyle. She has more significant chondral damage associated with cystic formation of the patella. In addition there is a cleavage type tear of the lateral meniscus without displaced fragment. Anterior cruciate ligament and PCL appear intact. Impression:   1. Tear of medial meniscus with subluxation of fragment of meniscus. This MRI scan finding is the primary cause of her current pain related to her injury 2 months ago. 2. 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 There is no evidence of avulsion of articular cartilage from the patella. 3. Prednisone cleavage tear of lateral meniscus. This does not appear to be symptomatic at this time. Plan/Treatment:   1. I've discussed the meniscal tear with her indicating that she will require arthroscopic surgery for removal of the loose fragment. 2.She is referred to Dr. Jacque Britton for consideration for the operation. 3. She will need to see her primary care for H&P   4. She continues to work her regular job for now. Beverly Thomas MD  12/8/2017    This dictation was done with Dragon dictation and may contain mechanical errors related to translation.

## 2017-12-08 NOTE — PATIENT INSTRUCTIONS
Impression:   1. Tear of medial meniscus with subluxation of fragment of meniscus. This MRI scan finding is the primary cause of her current pain related to her injury 2 months ago. 2. 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 There is no evidence of avulsion of articular cartilage from the patella. 3. Prednisone cleavage tear of lateral meniscus. This does not appear to be symptomatic at this time. Plan/Treatment:   1. I've discussed the meniscal tear with her indicating that she will require arthroscopic surgery for removal of the loose fragment. 2.She is referred to Dr. Laya Ventura for consideration for the operation. 3. She will need to see her primary care for H&P   4. She continues to work her regular job for now.         Tiesha Cancino MD  12/8/2017

## 2017-12-12 ENCOUNTER — OFFICE VISIT (OUTPATIENT)
Dept: ORTHOPEDIC SURGERY | Age: 52
End: 2017-12-12

## 2017-12-12 VITALS
RESPIRATION RATE: 16 BRPM | WEIGHT: 262 LBS | HEART RATE: 78 BPM | DIASTOLIC BLOOD PRESSURE: 72 MMHG | SYSTOLIC BLOOD PRESSURE: 108 MMHG | HEIGHT: 68 IN | BODY MASS INDEX: 39.71 KG/M2

## 2017-12-12 DIAGNOSIS — S83.272D COMPLEX TEAR OF LATERAL MENISCUS OF LEFT KNEE AS CURRENT INJURY, SUBSEQUENT ENCOUNTER: ICD-10-CM

## 2017-12-12 DIAGNOSIS — M17.12 PRIMARY LOCALIZED OSTEOARTHROSIS OF LEFT LOWER LEG: ICD-10-CM

## 2017-12-12 DIAGNOSIS — S83.232D COMPLEX TEAR OF MEDIAL MENISCUS OF LEFT KNEE AS CURRENT INJURY, SUBSEQUENT ENCOUNTER: Primary | ICD-10-CM

## 2017-12-12 PROCEDURE — 1036F TOBACCO NON-USER: CPT | Performed by: ORTHOPAEDIC SURGERY

## 2017-12-12 PROCEDURE — G8427 DOCREV CUR MEDS BY ELIG CLIN: HCPCS | Performed by: ORTHOPAEDIC SURGERY

## 2017-12-12 PROCEDURE — G8484 FLU IMMUNIZE NO ADMIN: HCPCS | Performed by: ORTHOPAEDIC SURGERY

## 2017-12-12 PROCEDURE — G8417 CALC BMI ABV UP PARAM F/U: HCPCS | Performed by: ORTHOPAEDIC SURGERY

## 2017-12-12 PROCEDURE — 3017F COLORECTAL CA SCREEN DOC REV: CPT | Performed by: ORTHOPAEDIC SURGERY

## 2017-12-12 PROCEDURE — 99214 OFFICE O/P EST MOD 30 MIN: CPT | Performed by: ORTHOPAEDIC SURGERY

## 2017-12-12 PROCEDURE — 3014F SCREEN MAMMO DOC REV: CPT | Performed by: ORTHOPAEDIC SURGERY

## 2017-12-12 NOTE — PROGRESS NOTES
ORTHOPAEDIC CONSULTATION NOTE    Chief Complaint   Patient presents with    Knee Pain     left knee pain        HPI  47 y.o. female seen in consultation at the request of Dr Caitlyn Huddleston MD for evaluation of left knee meniscus tear. Adele Busby reports intermittent symptoms over the year, including hx of steroid injections. She was stepping down from her truck a month ago when she twisted her left knee. She reports pain is getting worse, described medially and anterior. She described locking and giving way. Repeat injection did not help. Right knee is baseline painful, holding up for now. I have reviewed and discussed the below pain assessment findings with the patient. Pain Assessment  Location of Pain: Knee  Location Modifiers: Left  Severity of Pain: 6  Quality of Pain:  (shooting)  Duration of Pain: Persistent  Frequency of Pain: Constant  Date Pain First Started:  (nov 2017)  Aggravating Factors: Walking, Stairs, Kneeling, Squatting  Limiting Behavior: Some  Relieving Factors: Rest  Result of Injury: No  Work-Related Injury: No  Are there other pain locations you wish to document?: No    ROS  I have read over the ROS from the Patient History Form dated on 12/12/2017  Pertinent positives include headaches, sinus trouble, hx of thyroidectomy, SOB, peripheral edema, poor circulation, fatty liver, hemorrhoids, breat masses, back pain, chronic pain, fibromyalgia  Rest of 13 point ROS otherwise negative except per HPI, and scanned into the patient's chart under the Media tab. Allergies   Allergen Reactions    Other Hives     With using gloves at work. She is not sure what they are made out of. Pt states is a cloth glove not latex        Current Outpatient Prescriptions   Medication Sig Dispense Refill    diazepam (VALIUM) 5 MG tablet Take 1 tablet 30 minutes before procedure and take the other one if still needed.  2 tablet 0    ondansetron (ZOFRAN) 4 MG tablet   0    levothyroxine (SYNTHROID) 100 MCG tablet TAKE 1 TABLET BY MOUTH EVERY DAY 90 tablet 1    furosemide (LASIX) 20 MG tablet TAKE 1 TABLET BY MOUTH DAILY 90 tablet 0    gabapentin (NEURONTIN) 300 MG capsule TWICE IN AM ONCE IN PM  1    traMADol (ULTRAM) 50 MG tablet TK 1 T PO  TID PRN  0    pantoprazole (PROTONIX) 40 MG tablet TK 1 T PO BID  5    nortriptyline (PAMELOR) 25 MG capsule Take 1 capsule by mouth nightly 30 capsule 3    Respiratory Therapy Supplies (NEBULIZER/TUBING/MOUTHPIECE) KIT 1 kit by Does not apply route daily as needed 1 kit 0    budesonide (PULMICORT) 0.5 MG/2ML nebulizer suspension TAKE 1 VIAL VIA NEBULIZER TWICE DAILY 360 mL 6     No current facility-administered medications for this visit.       Facility-Administered Medications Ordered in Other Visits   Medication Dose Route Frequency Provider Last Rate Last Dose    technetium sulfur colloid egg (NYCOMED-SC) solution 500 micro curie  500 micro curie Intravenous ONCE PRN Ancelmo Perez MD           Past Medical History:   Diagnosis Date    Anxiety     Asthma     currently was instructed to stop inhalers d/t to collapse of vocal cords    Breast mass in female 7-    right    Chronic back pain     COPD (chronic obstructive pulmonary disease) (HCC)     Diaphragm paralysis     right side    Dysphonia, spasmodic     Enlarged liver     Fibromyalgia     GERD (gastroesophageal reflux disease)     Headache(784.0)     History of ulcer disease     Kidney infection     Osteoarthritis     Rheumatoid arthritis (HCC)     Spasmodic dysphonia     voice very hoarse    Thyroid disease     2015 found spot on left side of thyroid, right side thyroid was removed        Past Surgical History:   Procedure Laterality Date    APPENDECTOMY  age 17   [employer] BREAST SURGERY Right needle localization right breast mass    BREAST SURGERY Right 10/22/15    [facility] breast mass excision/needle loc    CARPAL TUNNEL RELEASE Right 5/7/15    CARPAL TUNNEL RELEASE Left 5/28/15    Left carpal tunnel release      COLONOSCOPY      ENDOSCOPY, COLON, DIAGNOSTIC      THYROID SURGERY      removed right side of thyroid    TUBAL LIGATION  1992       Family History   Problem Relation Age of Onset    Asthma Father     Heart Disease Father     Emphysema Father     Breast Cancer Maternal Aunt      breast    Breast Cancer Maternal Aunt      breast    Migraines Mother     Cancer Mother 61     multiple mylenoma    Diabetes Sister     Migraines Sister     Breast Cancer Paternal Uncle      colon    Breast Cancer Maternal Cousin      breast       Social History     Social History    Marital status:      Spouse name: N/A    Number of children: N/A    Years of education: N/A     Occupational History    room leader      [employer]     Social History Main Topics    Smoking status: Former Smoker     Packs/day: 1.00     Years: 15.00     Types: Cigarettes     Quit date: 1/1/2003    Smokeless tobacco: Never Used    Alcohol use No    Drug use: No    Sexual activity: Yes     Partners: Male     Other Topics Concern    Not on file     Social History Narrative    No narrative on file        Vitals:    12/12/17 0916   BP: 108/72   Pulse: 78   Resp: 16   Weight: 262 lb (118.8 kg)   Height: 5' 8\" (1.727 m)       Physical Exam  Constitutional  well-groomed, well-nourished, obese  Psychiatric  pleasant, normal mood & affect, oriented to place, person, and situation  Cardiovascular  RRR, positive peripheral edema  Respiratory  respirations even and unlabored  Gastrointestinal  abdomen soft NDNT  Skin  no rashes, wounds, or lesions seen  Neck/Spine - negative SLR  Neurological - SILT SP/DP/T/sural/saphenous nerve distributions; EHL/TA/GS intact  Left knee:   mild varus alignment    moderate effusion noted   Severe tenderness to palpation medial joint line   Mild tenderness to palpation lateral joint line   Range of Motion:    Extension:  0    Flexion:  90   Special tests:    positive Fernando exam positive crepitus with ROM    positive patellar grind, painful palpable click   Ligamentous testing:    Varus stress stable    Valgus stress stable    Lachman stable    Posterior Drawer stable        Imaging:  Images were personally reviewed by myself and discussed with the patient  Bilateral knee 1 views performed today in clinic supplementing previously performed knee plain films  - moderate to severe patellofemoral osteoarthritis, right greater than left, with associated osteophytes and lateral tilt. EXAMINATION:   MRI OF THE LEFT KNEE WITHOUT CONTRAST, 12/1/2017 10:34 am       TECHNIQUE:   Multiplanar multisequence MRI of the left knee was performed without the   administration of intravenous contrast.       COMPARISON:   None.       HISTORY:   ORDERING PHYSICIAN PROVIDED HISTORY: Synovitis of left knee   TECHNOLOGIST PROVIDED HISTORY:   Technologist Provided Reason for Exam: Primary osteoarthritis of left knee   Acuity: Acute   Type of Encounter: Initial   Mechanism of Injury: She returns after 5 weeks for reevaluation of her left   knee. At last office visit she was seen for a twisting injury to her left   knee. X-rays taken on that date showed moderate osteoarthritic changes with a   small defect appearance at the superior third of the articular surface of her   patella. Aspiration of her left knee was performed showing no blood so she   was treated with a steroid injection for synovitis. She states that she did   have about 3 weeks of partial relief of her left knee pain but the pain is   now returned and is quite severe. She complains of constant sharp pain 6 at   rest and 10 with activities. The pain is aggravated by getting up and   walking. She complains of catching sensation in her left knee with any   twisting movements. The pain is primarily anteromedial. She states that   tramadol has not been relieving her pain.  She is unable to take nonsteroidal   anti-inflammatory medications because of a pre-existing mild liver elevated   enzymes. Despite her pain she continues to work night shift at [employer]. Because of her schedule she was unable to attend physical therapy.       Initial evaluation of acute traumatic left knee pain after twisting injury.       FINDINGS:   MENISCI: Truncated appearance of the inner margin of the body medial meniscus   is compatible with a tear.  There is likely a displaced fragment into the   medial gutter.  The tear extends into the body-posterior horn junction.  A   horizontal tear is present extending to the undersurface of the   body-posterior horn of the lateral meniscus, without displaced fragment.       CRUCIATE LIGAMENTS:  ACL and PCL are intact.       EXTENSOR MECHANISM:  Quadriceps and patellar tendons are intact.       LATERAL COLLATERAL LIGAMENT COMPLEX: The popliteus tendon, biceps femoris   tendon, fibular collateral ligament and iliotibial band are intact.       MEDIAL COLLATERAL LIGAMENT COMPLEX: Medial collateral ligament is intact.       KNEE JOINT: Extensive partial and full-thickness cartilage loss in the   patella predominantly involving the apex and lateral patellar facet.  Medial   and lateral compartment articular cartilage is maintained.  No joint   effusion.  No popliteal cyst or intra-articular body.       BONE MARROW: No fracture or marrow replacing lesion.           Impression   1. Moderate patellofemoral degenerative changes. 2. Medial meniscus tear with fragment in the medial gutter. 3. Lateral meniscus tear. Assessment & Plan:  47 y.o. female who presents with   1. Complex tear of medial meniscus of left knee as current injury, subsequent encounter     2. Complex tear of lateral meniscus of left knee as current injury, subsequent encounter     3. Primary localized osteoarthrosis of left lower leg  XR KNEE LEFT (1-2 VIEWS)       No orders of the defined types were placed in this encounter.     I explained the role of the menisci as it

## 2017-12-12 NOTE — ADDENDUM NOTE
Encounter addended by: Berhane Shah MA on: 12/12/2017 10:37 AM<BR>    Actions taken: Letter status changed

## 2017-12-12 NOTE — LETTER
Dr Gross [place] [phone] F: [phone]  Surgery Scheduling Form:  DEMOGRAPHICS:                                                                                                              .  Patient Name:  Mathew Jett    Patient :  1965   Patient SS#:  [id number]      Patient Phone:  [phone] (home) [phone] (work)     Patient Address:  [address] [place] [address]  Insurance:   Payor/Plan Subscr  Sex Relation Sub. Ins. ID Effective Group Num   1. [address] 1965 Female  [id number] 1/1/15 [id number]                                   [id number]     DIAGNOSIS & PROCEDURE:                                                                                            .    Diagnosis:  Medial and lateral meniscus tear- left knee S83.232D, S83.272D    Operation:  Arthroscopy medial and lateral meniscectomy & chondroplasty- left knee    Location:  [facility]    Surgeon:  Diann Hodgkin    SCHEDULING INFORMATION:                                                                                         .    Requested Date: 17  OR Time: 7:30am  Patient Arrival Time: 6:00am    OR Time Required:  45  Minutes    Anesthesia:  General    SA Required:  Yes    Equipment:  none    Status:  outpatient PAT Required:  Yes    Latex Allergy:  no Defibrilator or Pacemaker:  no    Isolation Precautions:  no                       Shawn Taylor MD     17   BILLING INFORMATION:                                                                                                    .                                 CPT Code Modifier                                                                Pre-Admission Testing Order Set   In accordance with our formulary system, a generic equivalent drug may be dispensed and administered unless a D.A. W. is written with the medication order  All orders without checkboxes will processed automatically unless crossed out.  Orders with checkboxes MUST be checked in order to be carried out. Ian Lazcano                                                        1965  Date of Procedure/Surgery: 12/21/17  1. H & P for ALL procedure/surgery completed within 30 days, to be updated day of procedure/surgery  2. [ ]Consults: PT/OT evaluation  3. [X ]Defer to Anesthesia for Pre-Admission testing orders  4. Diagnostic Tests:  [ ]EKG (if not completed in last 3 months) IF: (check all that apply)   Other:  [ Selvin Dillon (if not completed in last 6 months) IF: (check all that apply)   Hx Malignancy   Hx CVS/Thoracic Surg   CVS Disease   Hx Pneumonia last 3 months   Chronic Pulm Disease  Other:  [ ]Radiology   Knee Right Left Standing AP knee, hip to ankle on scoliosis film   Other:  5. Labs  [ ]Basic Metabolic Profile  IF: (check all that apply)  [ ]Albumin    Other:     __________________________________________________________________  [ ]CBC without diff   Pre op exam      __________________________________________________________________  [ ]PT/INR  PTT   Pre op exam         __________________________________________________________________  [ ]Type & Screen   Surg with potiential for signif.  blood loss  [ ]Type & cross match 2 units         __________________________________________________________________  [ ]Urine routine reflex to culture (UAR) If cultures positive, repeat on                  admission [ Natalie Tony catch urine  [ ]Nasal culture for MRSA   [ ]Nasal MRSA culture  __________________________________________________________________  6. [ ]Other:      TO: ___________________________________________ Date: ____________ Time: _________    Physician Signature:            12/12/2017 10:36 AM                   Pre-operative Physician Prophylaxis Orders      Ian Lazcano  1965    All orders without checkboxes will be processed automatically unless

## 2017-12-13 ENCOUNTER — OFFICE VISIT (OUTPATIENT)
Dept: FAMILY MEDICINE CLINIC | Age: 52
End: 2017-12-13

## 2017-12-13 VITALS
TEMPERATURE: 97.3 F | BODY MASS INDEX: 41.43 KG/M2 | HEART RATE: 76 BPM | DIASTOLIC BLOOD PRESSURE: 82 MMHG | WEIGHT: 273.4 LBS | OXYGEN SATURATION: 98 % | HEIGHT: 68 IN | SYSTOLIC BLOOD PRESSURE: 116 MMHG

## 2017-12-13 DIAGNOSIS — J01.00 ACUTE MAXILLARY SINUSITIS, RECURRENCE NOT SPECIFIED: Primary | ICD-10-CM

## 2017-12-13 PROCEDURE — G8417 CALC BMI ABV UP PARAM F/U: HCPCS | Performed by: FAMILY MEDICINE

## 2017-12-13 PROCEDURE — G8427 DOCREV CUR MEDS BY ELIG CLIN: HCPCS | Performed by: FAMILY MEDICINE

## 2017-12-13 PROCEDURE — 99213 OFFICE O/P EST LOW 20 MIN: CPT | Performed by: FAMILY MEDICINE

## 2017-12-13 PROCEDURE — G8484 FLU IMMUNIZE NO ADMIN: HCPCS | Performed by: FAMILY MEDICINE

## 2017-12-13 PROCEDURE — 3017F COLORECTAL CA SCREEN DOC REV: CPT | Performed by: FAMILY MEDICINE

## 2017-12-13 PROCEDURE — 1036F TOBACCO NON-USER: CPT | Performed by: FAMILY MEDICINE

## 2017-12-13 PROCEDURE — 3014F SCREEN MAMMO DOC REV: CPT | Performed by: FAMILY MEDICINE

## 2017-12-13 RX ORDER — AMOXICILLIN AND CLAVULANATE POTASSIUM 875; 125 MG/1; MG/1
1 TABLET, FILM COATED ORAL 2 TIMES DAILY
Qty: 20 TABLET | Refills: 0 | Status: SHIPPED | OUTPATIENT
Start: 2017-12-13 | End: 2017-12-23

## 2017-12-13 ASSESSMENT — ENCOUNTER SYMPTOMS
CHEST TIGHTNESS: 0
SHORTNESS OF BREATH: 0
WHEEZING: 0
SINUS PRESSURE: 1
RHINORRHEA: 1
COUGH: 0

## 2017-12-13 NOTE — PROGRESS NOTES
Subjective:      Patient ID: Fina Carlos is a 47 y.o. female. HPI  Chief Complaint   Patient presents with    URI     c/o cough, chest/nasal congestion, sinus pressure, headaches, hoarse, and sore throat x 2 days. has taken otc allergy relief and nasal spray. Here for c/o cough, chest congestion and sinus drainage and ST x 2 days  No fever. Used otc meds. + yellow drainage  States has surgery next week  Vitals:    12/13/17 0829   BP: 116/82   Pulse: 76   Temp: 97.3 °F (36.3 °C)   TempSrc: Oral   SpO2: 98%   Weight: 273 lb 6.4 oz (124 kg)   Height: 5' 8\" (1.727 m)     Body mass index is 41.57 kg/m². Wt Readings from Last 3 Encounters:   12/13/17 273 lb 6.4 oz (124 kg)   12/12/17 262 lb (118.8 kg)   12/08/17 262 lb (118.8 kg)     BP Readings from Last 3 Encounters:   12/13/17 116/82   12/12/17 108/72   12/08/17 109/66        Review of Systems   Constitutional: Negative for chills and fever. HENT: Positive for congestion, postnasal drip, rhinorrhea and sinus pressure. Respiratory: Negative for cough, chest tightness, shortness of breath and wheezing. Cardiovascular: Negative for chest pain. Objective:   Physical Exam   Constitutional: She is oriented to person, place, and time. She appears well-developed and well-nourished. No distress. HENT:   Head: Normocephalic. Right Ear: External ear normal.   Left Ear: External ear normal.   ++nasal edema and erythema   Post pharynx red   Eyes: EOM are normal. Pupils are equal, round, and reactive to light. Right eye exhibits no discharge. Left eye exhibits no discharge. Neck: Neck supple. Cardiovascular: Normal rate, regular rhythm and normal heart sounds. Pulmonary/Chest: Effort normal and breath sounds normal. No respiratory distress. She has no wheezes. Musculoskeletal: Normal range of motion. Neurological: She is alert and oriented to person, place, and time.    Psychiatric: Judgment normal.         Assessment:      Sinusitis

## 2017-12-19 ENCOUNTER — OFFICE VISIT (OUTPATIENT)
Dept: FAMILY MEDICINE CLINIC | Age: 52
End: 2017-12-19

## 2017-12-19 ENCOUNTER — PAT TELEPHONE (OUTPATIENT)
Dept: PREADMISSION TESTING | Age: 52
End: 2017-12-19

## 2017-12-19 VITALS
HEART RATE: 80 BPM | DIASTOLIC BLOOD PRESSURE: 74 MMHG | BODY MASS INDEX: 40.68 KG/M2 | TEMPERATURE: 98.3 F | WEIGHT: 268.4 LBS | HEIGHT: 68 IN | SYSTOLIC BLOOD PRESSURE: 118 MMHG

## 2017-12-19 VITALS — WEIGHT: 268 LBS | BODY MASS INDEX: 39.69 KG/M2 | HEIGHT: 69 IN

## 2017-12-19 DIAGNOSIS — S83.242A OTHER TEAR OF MEDIAL MENISCUS OF LEFT KNEE AS CURRENT INJURY, INITIAL ENCOUNTER: ICD-10-CM

## 2017-12-19 DIAGNOSIS — Z01.818 PREOP EXAMINATION: Primary | ICD-10-CM

## 2017-12-19 PROCEDURE — G8427 DOCREV CUR MEDS BY ELIG CLIN: HCPCS | Performed by: FAMILY MEDICINE

## 2017-12-19 PROCEDURE — 99213 OFFICE O/P EST LOW 20 MIN: CPT | Performed by: FAMILY MEDICINE

## 2017-12-19 PROCEDURE — 3017F COLORECTAL CA SCREEN DOC REV: CPT | Performed by: FAMILY MEDICINE

## 2017-12-19 PROCEDURE — G8417 CALC BMI ABV UP PARAM F/U: HCPCS | Performed by: FAMILY MEDICINE

## 2017-12-19 PROCEDURE — 3014F SCREEN MAMMO DOC REV: CPT | Performed by: FAMILY MEDICINE

## 2017-12-19 PROCEDURE — G8484 FLU IMMUNIZE NO ADMIN: HCPCS | Performed by: FAMILY MEDICINE

## 2017-12-19 ASSESSMENT — ENCOUNTER SYMPTOMS
COUGH: 0
EYE REDNESS: 0
CHEST TIGHTNESS: 0
ABDOMINAL PAIN: 0
EYE DISCHARGE: 0
COLOR CHANGE: 0
SINUS PRESSURE: 0
VOMITING: 0
WHEEZING: 0
RHINORRHEA: 0
NAUSEA: 0
CONSTIPATION: 0
EYE PAIN: 0
SHORTNESS OF BREATH: 0
BLOOD IN STOOL: 0
DIARRHEA: 0

## 2017-12-19 ASSESSMENT — PAIN DESCRIPTION - LOCATION: LOCATION: KNEE

## 2017-12-19 ASSESSMENT — PAIN DESCRIPTION - DESCRIPTORS: DESCRIPTORS: ACHING;CONSTANT

## 2017-12-19 ASSESSMENT — PAIN DESCRIPTION - ORIENTATION: ORIENTATION: LEFT

## 2017-12-19 ASSESSMENT — PAIN - FUNCTIONAL ASSESSMENT: PAIN_FUNCTIONAL_ASSESSMENT: 0-10

## 2017-12-19 ASSESSMENT — PAIN SCALES - GENERAL: PAINLEVEL_OUTOF10: 6

## 2017-12-19 NOTE — PATIENT INSTRUCTIONS
Please call your pharmacy if you need any refills of your medication(s). Please call our office at [phone] if you don't hear from us about your test results. Bring an accurate list of your medications with you at every appointment to ensure that we have the correct information. Our office hours are: Monday - Friday 7 am- 5 pm; Saturdays vary on doctor working.     Phone lines turn on at 8 am.

## 2017-12-19 NOTE — PROGRESS NOTES
TUBAL LIGATION  1992     Family History   Problem Relation Age of Onset    Asthma Father     Heart Disease Father     Emphysema Father     Breast Cancer Maternal Aunt      breast    Breast Cancer Maternal Aunt      breast    Migraines Mother     Cancer Mother 61     multiple mylenoma    Diabetes Sister     Migraines Sister     Breast Cancer Paternal Uncle      colon    Breast Cancer Maternal Cousin      breast     Social History   Substance Use Topics    Smoking status: Former Smoker     Packs/day: 1.00     Years: 15.00     Types: Cigarettes     Quit date: 1/1/2003    Smokeless tobacco: Never Used    Alcohol use No     Vitals:    12/19/17 1132   BP: 118/74   Pulse: 80   Temp: 98.3 °F (36.8 °C)   TempSrc: Oral   Weight: 268 lb 6.4 oz (121.7 kg)   Height: 5' 8\" (1.727 m)     Body mass index is 40.81 kg/m². Wt Readings from Last 3 Encounters:   12/19/17 268 lb 6.4 oz (121.7 kg)   12/13/17 273 lb 6.4 oz (124 kg)   12/12/17 262 lb (118.8 kg)     BP Readings from Last 3 Encounters:   12/19/17 118/74   12/13/17 116/82   12/12/17 108/72        Review of Systems   Constitutional: Negative for chills, fatigue, fever and unexpected weight change. HENT: Negative for ear discharge, ear pain, hearing loss, rhinorrhea, sinus pressure and tinnitus. Eyes: Negative for pain, discharge, redness and visual disturbance. Respiratory: Negative for cough, chest tightness, shortness of breath and wheezing. Cardiovascular: Negative for chest pain and palpitations. Gastrointestinal: Negative for abdominal pain, blood in stool, constipation, diarrhea, nausea and vomiting. Genitourinary: Negative for difficulty urinating, dysuria and hematuria. Musculoskeletal: Positive for arthralgias. Negative for joint swelling and myalgias. Skin: Negative for color change and rash. Neurological: Negative for dizziness, seizures, syncope and headaches.    Psychiatric/Behavioral: Negative for dysphoric mood and sleep disturbance. The patient is not nervous/anxious. Objective:   Physical Exam   Constitutional: She is oriented to person, place, and time. She appears well-developed and well-nourished. No distress. HENT:   Head: Normocephalic. Mouth/Throat: Oropharynx is clear and moist. No oropharyngeal exudate. Eyes: Conjunctivae and EOM are normal. Pupils are equal, round, and reactive to light. No scleral icterus. Neck: Neck supple. No thyromegaly present. Cardiovascular: Normal rate, regular rhythm, normal heart sounds and intact distal pulses. No murmur heard. Pulmonary/Chest: Effort normal and breath sounds normal. She has no wheezes. Abdominal: Soft. Bowel sounds are normal. She exhibits no distension. There is no tenderness. There is no rebound and no guarding. Musculoskeletal: Normal range of motion. She exhibits no edema or tenderness. Lymphadenopathy:     She has no cervical adenopathy. Neurological: She is alert and oriented to person, place, and time. No cranial nerve deficit. Coordination normal.   Skin: Skin is warm and dry. Psychiatric: She has a normal mood and affect.  Her behavior is normal. Judgment and thought content normal.       Assessment:      preop  Left knee meniscal tear      Plan:      Patient appears to low risk for planned procedure and may proceed without further evaluation  preop note will be faxed to Dr. Peng Noguera and [facility]

## 2017-12-19 NOTE — PRE-PROCEDURE INSTRUCTIONS
[facility] time____0600________        Surgery time___0730_________    Take the following medications with a sip of water: PULMICORT, PROTONIX, LEVOTHYROXINE    Do not eat or drink anything after 12:00 midnight prior to your surgery. This includes water chewing gum, mints and ice chips. You may brush your teeth and gargle the morning of your surgery, but do not swallow the water     Please see your family doctor/pediatrician for a history and physical and/or concerning medications. Bring any test results/reports from your physicians office. If you are under the care of a heart doctor or specialist doctor, please be aware that you may be asked to them for clearance    You may be asked to stop blood thinners such as Coumadin, Plavix, Fragmin, Lovenox, etc., or any anti-inflammatories such as:  Aspirin, Ibuprofen, Advil, Naproxen prior to your surgery. We also ask that you stop any OTC medications such as fish oil, vitamin E, glucosamine, garlic, Multivitamins, COQ 10, etc.    We ask that you do not smoke 24 hours prior to surgery  We ask that you do not  drink any alcoholic beverages 24 hours prior to surgery     You must make arrangements for a responsible adult to take you home after your surgery. For your safety you will not be allowed to leave alone or drive yourself home. Your surgery will be cancelled if you do not have a ride home. Also for your safety, it is strongly suggested that someone stay with you the first 24 hours after your surgery. A parent or legal guardian must accompany a child scheduled for surgery and plan to stay at the hospital until the child is discharged. Please do not bring other children with you. For your comfort, please wear simple loose fitting clothing to the hospital.  Please do not bring valuables.     Do not wear any make-up or nail polish on your fingers or toes      For your safety, please do not wear any jewelry or body piercing's on the day of surgery. All jewelry must be removed. If you have dentures, they will be removed before going to operating room. For your convenience, we will provide you with a container. If you wear contact lenses or glasses, they will be removed, please bring a case for them. If you have a living will and a durable power of  for healthcare, please bring in a copy. As part of our patient safety program to minimize surgical site infections, we ask you to do the following:    · Please notify your surgeon if you develop any illness between         now and the  day of your surgery. · This includes a cough, cold, fever, sore throat, nausea,         or vomiting, and diarrhea, etc.  ·  Please notify your surgeon if you experience dizziness, shortness         of breath or blurred vision between now and the time of your surgery. Do not shave your operative site 96 hours prior to surgery. For face and neck surgery, men may use an electric razor 48 hours   prior to surgery. You may shower the night before surgery or the morning of   your surgery with an antibacterial soap. You will need to bring a photo ID and insurance card    [facility] has an onsite pharmacy, would you like to utilize our pharmacy     If you will be staying overnight and use a C-pap machine, please bring   your C-pap to hospital     Our goal is to provide you with excellent care, therefore, visitors will be limited to two(2) in the room at a time so that we may focus on providing this care for you. Please contact pre-admission testing if you have any further questions. [facility] phone number:  [phone] [facility] fax number:  [phone]  Please note these are generalized instructions for all surgical cases, you may be provided with more specific instructions according to your surgery.

## 2017-12-21 ENCOUNTER — HOSPITAL ENCOUNTER (OUTPATIENT)
Dept: SURGERY | Age: 52
Discharge: HOME | End: 2017-12-21
Attending: ORTHOPAEDIC SURGERY | Admitting: ORTHOPAEDIC SURGERY

## 2017-12-21 VITALS
OXYGEN SATURATION: 96 % | RESPIRATION RATE: 20 BRPM | SYSTOLIC BLOOD PRESSURE: 116 MMHG | HEART RATE: 66 BPM | TEMPERATURE: 97.6 F | DIASTOLIC BLOOD PRESSURE: 77 MMHG

## 2017-12-21 DIAGNOSIS — S83.242D OTHER TEAR OF MEDIAL MENISCUS OF LEFT KNEE AS CURRENT INJURY, SUBSEQUENT ENCOUNTER: Primary | ICD-10-CM

## 2017-12-21 DIAGNOSIS — M17.12 PRIMARY OSTEOARTHRITIS OF LEFT KNEE: ICD-10-CM

## 2017-12-21 LAB
ANION GAP SERPL CALCULATED.3IONS-SCNC: 9 MMOL/L (ref 3–16)
BUN BLDV-MCNC: 13 MG/DL (ref 7–20)
CALCIUM SERPL-MCNC: 10 MG/DL (ref 8.3–10.6)
CHLORIDE BLD-SCNC: 105 MMOL/L (ref 99–110)
CO2: 25 MMOL/L (ref 21–32)
CREAT SERPL-MCNC: <0.5 MG/DL (ref 0.6–1.1)
GFR AFRICAN AMERICAN: >60
GFR NON-AFRICAN AMERICAN: >60
GLUCOSE BLD-MCNC: 106 MG/DL (ref 70–99)
POTASSIUM SERPL-SCNC: 4.6 MMOL/L (ref 3.5–5.1)
SODIUM BLD-SCNC: 139 MMOL/L (ref 136–145)

## 2017-12-21 PROCEDURE — 29880 ARTHRS KNE SRG MNISECTMY M&L: CPT | Performed by: ORTHOPAEDIC SURGERY

## 2017-12-21 RX ORDER — FENTANYL CITRATE 50 UG/ML
25 INJECTION, SOLUTION INTRAMUSCULAR; INTRAVENOUS EVERY 5 MIN PRN
Status: DISCONTINUED | OUTPATIENT
Start: 2017-12-21 | End: 2017-12-22 | Stop reason: HOSPADM

## 2017-12-21 RX ORDER — OXYCODONE HYDROCHLORIDE AND ACETAMINOPHEN 5; 325 MG/1; MG/1
1 TABLET ORAL EVERY 6 HOURS PRN
Qty: 25 TABLET | Refills: 0 | Status: SHIPPED | OUTPATIENT
Start: 2017-12-21 | End: 2017-12-28

## 2017-12-21 RX ORDER — OXYCODONE HYDROCHLORIDE AND ACETAMINOPHEN 5; 325 MG/1; MG/1
1 TABLET ORAL PRN
Status: COMPLETED | OUTPATIENT
Start: 2017-12-21 | End: 2017-12-21

## 2017-12-21 RX ORDER — SODIUM CHLORIDE 9 MG/ML
INJECTION, SOLUTION INTRAVENOUS CONTINUOUS
Status: DISCONTINUED | OUTPATIENT
Start: 2017-12-21 | End: 2017-12-22 | Stop reason: HOSPADM

## 2017-12-21 RX ORDER — ONDANSETRON 2 MG/ML
4 INJECTION INTRAMUSCULAR; INTRAVENOUS
Status: ACTIVE | OUTPATIENT
Start: 2017-12-21 | End: 2017-12-21

## 2017-12-21 RX ORDER — MORPHINE SULFATE 2 MG/ML
2 INJECTION, SOLUTION INTRAMUSCULAR; INTRAVENOUS EVERY 5 MIN PRN
Status: DISCONTINUED | OUTPATIENT
Start: 2017-12-21 | End: 2017-12-22 | Stop reason: HOSPADM

## 2017-12-21 RX ORDER — SODIUM CHLORIDE 0.9 % (FLUSH) 0.9 %
10 SYRINGE (ML) INJECTION EVERY 12 HOURS SCHEDULED
Status: DISCONTINUED | OUTPATIENT
Start: 2017-12-21 | End: 2017-12-22 | Stop reason: HOSPADM

## 2017-12-21 RX ORDER — MEPERIDINE HYDROCHLORIDE 25 MG/ML
12.5 INJECTION INTRAMUSCULAR; INTRAVENOUS; SUBCUTANEOUS EVERY 5 MIN PRN
Status: DISCONTINUED | OUTPATIENT
Start: 2017-12-21 | End: 2017-12-22 | Stop reason: HOSPADM

## 2017-12-21 RX ORDER — FENTANYL CITRATE 50 UG/ML
50 INJECTION, SOLUTION INTRAMUSCULAR; INTRAVENOUS EVERY 5 MIN PRN
Status: DISCONTINUED | OUTPATIENT
Start: 2017-12-21 | End: 2017-12-22 | Stop reason: HOSPADM

## 2017-12-21 RX ORDER — MORPHINE SULFATE 2 MG/ML
1 INJECTION, SOLUTION INTRAMUSCULAR; INTRAVENOUS EVERY 5 MIN PRN
Status: DISCONTINUED | OUTPATIENT
Start: 2017-12-21 | End: 2017-12-22 | Stop reason: HOSPADM

## 2017-12-21 RX ORDER — OXYCODONE HYDROCHLORIDE AND ACETAMINOPHEN 5; 325 MG/1; MG/1
2 TABLET ORAL PRN
Status: COMPLETED | OUTPATIENT
Start: 2017-12-21 | End: 2017-12-21

## 2017-12-21 RX ORDER — SODIUM CHLORIDE 0.9 % (FLUSH) 0.9 %
10 SYRINGE (ML) INJECTION PRN
Status: DISCONTINUED | OUTPATIENT
Start: 2017-12-21 | End: 2017-12-22 | Stop reason: HOSPADM

## 2017-12-21 RX ADMIN — OXYCODONE HYDROCHLORIDE AND ACETAMINOPHEN 1 TABLET: 5; 325 TABLET ORAL at 09:57

## 2017-12-21 RX ADMIN — FENTANYL CITRATE 50 MCG: 50 INJECTION, SOLUTION INTRAMUSCULAR; INTRAVENOUS at 08:47

## 2017-12-21 RX ADMIN — FENTANYL CITRATE 25 MCG: 50 INJECTION, SOLUTION INTRAMUSCULAR; INTRAVENOUS at 08:52

## 2017-12-21 ASSESSMENT — PAIN SCALES - GENERAL
PAINLEVEL_OUTOF10: 5
PAINLEVEL_OUTOF10: 6
PAINLEVEL_OUTOF10: 9

## 2017-12-21 ASSESSMENT — LIFESTYLE VARIABLES: SMOKING_STATUS: 0

## 2017-12-21 ASSESSMENT — PAIN - FUNCTIONAL ASSESSMENT: PAIN_FUNCTIONAL_ASSESSMENT: 0-10

## 2017-12-21 ASSESSMENT — ENCOUNTER SYMPTOMS: SHORTNESS OF BREATH: 1

## 2017-12-21 NOTE — BRIEF OP NOTE
Brief Postoperative Note    Marquis Alvarado  YOB: 1965  [id number]    Pre-operative Diagnosis:  Left knee medial and lateral menisci tears, osteoarthritis    Post-operative Diagnosis: Same    Procedure:  Left knee arthroscopy, partial medial and lateral meniscectomy, chondroplasty of the medial femoral condyle, lateral plateau, patella    Anesthesia: General    Surgeons/Assistants: Reinaldo    Estimated Blood Loss: minimal    Complications: None    Specimens: Was Not Obtained    Tourniquet time:  25 minutes at 300 mmHg    Findings: per formal op report    Electronically signed by Chiquita Altman MD on 12/21/2017 at 7:40 AM

## 2017-12-21 NOTE — ANESTHESIA PRE-OP
APPENDECTOMY  age 17    BREAST SURGERY Right needle localization right breast mass    BREAST SURGERY Right 10/22/15    riught breast mass excision/needle loc    CARPAL TUNNEL RELEASE Right 5/7/15    CARPAL TUNNEL RELEASE Left 5/28/15    Left carpal tunnel release      COLONOSCOPY      ENDOSCOPY, COLON, DIAGNOSTIC      THYROID SURGERY      removed right side of thyroid    TUBAL LIGATION  1992     Social History   Substance Use Topics    Smoking status: Former Smoker     Packs/day: 1.00     Years: 15.00     Types: Cigarettes     Quit date: 1/1/2003    Smokeless tobacco: Never Used    Alcohol use No     Medications  Current Outpatient Prescriptions on File Prior to Encounter   Medication Sig Dispense Refill    amoxicillin-clavulanate (AUGMENTIN) 875-125 MG per tablet Take 1 tablet by mouth 2 times daily for 10 days 20 tablet 0    levothyroxine (SYNTHROID) 100 MCG tablet TAKE 1 TABLET BY MOUTH EVERY DAY 90 tablet 1    pantoprazole (PROTONIX) 40 MG tablet TK 1 T PO BID  5    budesonide (PULMICORT) 0.5 MG/2ML nebulizer suspension TAKE 1 VIAL VIA NEBULIZER TWICE DAILY 360 mL 6    ondansetron (ZOFRAN) 4 MG tablet Take 24 mg by mouth every 12 hours as needed   0    furosemide (LASIX) 20 MG tablet TAKE 1 TABLET BY MOUTH DAILY (Patient taking differently: TAKE 1 TABLET BY MOUTH DAILYAS NEEDED FOR SWELLING) 90 tablet 0    gabapentin (NEURONTIN) 300 MG capsule TWICE IN AM ONCE IN PM  1    traMADol (ULTRAM) 50 MG tablet TK 1 T PO  TID PRN  0    nortriptyline (PAMELOR) 25 MG capsule Take 1 capsule by mouth nightly (Patient taking differently: Take 25 mg by mouth nightly as needed ) 30 capsule 3    Respiratory Therapy Supplies (NEBULIZER/TUBING/MOUTHPIECE) KIT 1 kit by Does not apply route daily as needed 1 kit 0     Current Facility-Administered Medications on File Prior to Encounter   Medication Dose Route Frequency Provider Last Rate Last Dose    technetium sulfur colloid egg (NYCOMED-SC) solution 500 micro curie  500 micro curie Intravenous ONCE PRN Mingo Martinez MD         Current Outpatient Prescriptions   Medication Sig Dispense Refill    amoxicillin-clavulanate (AUGMENTIN) 875-125 MG per tablet Take 1 tablet by mouth 2 times daily for 10 days 20 tablet 0    levothyroxine (SYNTHROID) 100 MCG tablet TAKE 1 TABLET BY MOUTH EVERY DAY 90 tablet 1    pantoprazole (PROTONIX) 40 MG tablet TK 1 T PO BID  5    budesonide (PULMICORT) 0.5 MG/2ML nebulizer suspension TAKE 1 VIAL VIA NEBULIZER TWICE DAILY 360 mL 6    ondansetron (ZOFRAN) 4 MG tablet Take 24 mg by mouth every 12 hours as needed   0    furosemide (LASIX) 20 MG tablet TAKE 1 TABLET BY MOUTH DAILY (Patient taking differently: TAKE 1 TABLET BY MOUTH DAILYAS NEEDED FOR SWELLING) 90 tablet 0    gabapentin (NEURONTIN) 300 MG capsule TWICE IN AM ONCE IN PM  1    traMADol (ULTRAM) 50 MG tablet TK 1 T PO  TID PRN  0    nortriptyline (PAMELOR) 25 MG capsule Take 1 capsule by mouth nightly (Patient taking differently: Take 25 mg by mouth nightly as needed ) 30 capsule 3    Respiratory Therapy Supplies (NEBULIZER/TUBING/MOUTHPIECE) KIT 1 kit by Does not apply route daily as needed 1 kit 0     Current Facility-Administered Medications   Medication Dose Route Frequency Provider Last Rate Last Dose    0.9 % sodium chloride infusion   Intravenous Continuous Nirali Garza MD        sodium chloride flush 0.9 % injection 10 mL  10 mL Intravenous 2 times per day Nirali Garza MD        sodium chloride flush 0.9 % injection 10 mL  10 mL Intravenous PRN Nirali Garza MD        ceFAZolin (ANCEF) 3 g in dextrose 5 % 100 mL IVPB  3 g Intravenous Once Cassi Cazares MD         Facility-Administered Medications Ordered in Other Encounters   Medication Dose Route Frequency Provider Last Rate Last Dose    technetium sulfur colloid egg (NYCOMED-SC) solution 500 micro curie  500 micro curie Intravenous ONCE PRN Mingo Martinez MD         Vital Signs (Current)   Vitals:    17   BP: 118/73   Pulse: 62   Resp: 14   Temp: 96.9 °F (36.1 °C)   SpO2: 97%     Vital Signs Statistics (for past 48 hrs)     Temp  Av.9 °F (36.1 °C)  Min: 96.9 °F (36.1 °C)   Min taken time: 17  Max: 96.9 °F (36.1 °C)   Max taken time: 17  Pulse  Av  Min: 58   Min taken time: 17  Max: 62   Max taken time: 17  Resp  Av  Min: 14   Min taken time: 17  Max: 14   Max taken time: 17  BP  Min: 118/73   Min taken time: 17  Max: 118/73   Max taken time: 17  SpO2  Av %  Min: 97 %   Min taken time: 17  Max: 97 %   Max taken time: 17    BP Readings from Last 3 Encounters:   17 118/73   17 118/74   17 116/82     BMI  There is no height or weight on file to calculate BMI. Estimated body mass index is 40.16 kg/m² as calculated from the following:    Height as of 17: 5' 8.5\" (1.74 m). Weight as of 17: 268 lb (121.6 kg).     CBC   Lab Results   Component Value Date    WBC 7.6 2016    RBC 4.81 2016    HGB 12.7 2016    HCT 39.2 2016    MCV 81.4 2016    RDW 15.6 2016     2016     CMP    Lab Results   Component Value Date     2017    K 4.1 2017    CL 99 2017    CO2 25 2017    BUN 12 2017    CREATININE 0.7 2017    GFRAA >60 2017    GFRAA >60 2012    AGRATIO 1.3 2017    LABGLOM >60 2017    GLUCOSE 88 2017    PROT 7.2 2017    CALCIUM 10.3 2017    BILITOT 0.4 2017    ALKPHOS 99 2017    AST 34 2017    ALT 49 2017     BMP    Lab Results   Component Value Date     2017    K 4.1 2017    CL 99 2017    CO2 25 2017    BUN 12 2017    CREATININE 0.7 2017    CALCIUM 10.3 2017    GFRAA >60 2017    GFRAA >60 2012    LABGLOM >60 2017 GLUCOSE 88 09/21/2017     POCGlucose  No results for input(s): GLUCOSE in the last 72 hours. Coags    Lab Results   Component Value Date    PROTIME 10.7 04/04/2014    INR 0.95 93/58/4964     HCG (If Applicable)   Lab Results   Component Value Date    PREGTESTUR Negative 06/23/2016      ABGs No results found for: PHART, PO2ART, MAM6RHR, GRU9GLW, BEART, Z8XQYLPP   Type & Screen (If Applicable)  No results found for: LABABO, [address]  Surgeon:    Procedure:    Medications prior to admission:   Prior to Admission medications    Medication Sig Start Date End Date Taking?  Authorizing Provider   amoxicillin-clavulanate (AUGMENTIN) 875-125 MG per tablet Take 1 tablet by mouth 2 times daily for 10 days 12/13/17 12/23/17 Yes Elisabet Quinones DO   levothyroxine (SYNTHROID) 100 MCG tablet TAKE 1 TABLET BY MOUTH EVERY DAY 9/25/17  Yes Elisabet Quinones DO   pantoprazole (PROTONIX) 40 MG tablet TK 1 T PO BID 2/14/17  Yes Historical Provider, MD   budesonide (PULMICORT) 0.5 MG/2ML nebulizer suspension TAKE 1 VIAL VIA NEBULIZER TWICE DAILY 12/3/15  Yes Gaetano Beasley MD   ondansetron (ZOFRAN) 4 MG tablet Take 24 mg by mouth every 12 hours as needed  7/31/17   Historical Provider, MD   furosemide (LASIX) 20 MG tablet TAKE 1 TABLET BY MOUTH DAILY  Patient taking differently: TAKE 1 TABLET BY MOUTH DAILYAS NEEDED FOR SWELLING 5/8/17   Elisabet Quinones DO   gabapentin (NEURONTIN) 300 MG capsule TWICE IN AM ONCE IN PM 2/1/17   Historical Provider, MD   traMADol (ULTRAM) 50 MG tablet TK 1 T PO  TID PRN 1/16/17   Historical Provider, MD   nortriptyline (PAMELOR) 25 MG capsule Take 1 capsule by mouth nightly  Patient taking differently: Take 25 mg by mouth nightly as needed  6/23/16   Fabien Anne MD   Respiratory Therapy Supplies (NEBULIZER/TUBING/MOUTHPIECE) KIT 1 kit by Does not apply route daily as needed 12/11/15   Gaetano Beasley MD       Current medications:    Current Outpatient Prescriptions   Medication Sig Dispense Refill    amoxicillin-clavulanate (AUGMENTIN) 875-125 MG per tablet Take 1 tablet by mouth 2 times daily for 10 days 20 tablet 0    levothyroxine (SYNTHROID) 100 MCG tablet TAKE 1 TABLET BY MOUTH EVERY DAY 90 tablet 1    pantoprazole (PROTONIX) 40 MG tablet TK 1 T PO BID  5    budesonide (PULMICORT) 0.5 MG/2ML nebulizer suspension TAKE 1 VIAL VIA NEBULIZER TWICE DAILY 360 mL 6    ondansetron (ZOFRAN) 4 MG tablet Take 24 mg by mouth every 12 hours as needed   0    furosemide (LASIX) 20 MG tablet TAKE 1 TABLET BY MOUTH DAILY (Patient taking differently: TAKE 1 TABLET BY MOUTH DAILYAS NEEDED FOR SWELLING) 90 tablet 0    gabapentin (NEURONTIN) 300 MG capsule TWICE IN AM ONCE IN PM  1    traMADol (ULTRAM) 50 MG tablet TK 1 T PO  TID PRN  0    nortriptyline (PAMELOR) 25 MG capsule Take 1 capsule by mouth nightly (Patient taking differently: Take 25 mg by mouth nightly as needed ) 30 capsule 3    Respiratory Therapy Supplies (NEBULIZER/TUBING/MOUTHPIECE) KIT 1 kit by Does not apply route daily as needed 1 kit 0     Current Facility-Administered Medications   Medication Dose Route Frequency Provider Last Rate Last Dose    0.9 % sodium chloride infusion   Intravenous Continuous Hailee Phillips MD        sodium chloride flush 0.9 % injection 10 mL  10 mL Intravenous 2 times per day Hailee Phillips MD        sodium chloride flush 0.9 % injection 10 mL  10 mL Intravenous PRN Hailee Phillips MD        ceFAZolin (ANCEF) 3 g in dextrose 5 % 100 mL IVPB  3 g Intravenous Once Pamala Boast, MD         Facility-Administered Medications Ordered in Other Encounters   Medication Dose Route Frequency Provider Last Rate Last Dose    technetium sulfur colloid egg (NYCOMED-SC) solution 500 micro curie  500 micro curie Intravenous ONCE PRN Eulogio Webster MD           Allergies:     Allergies   Allergen Reactions    Other Hives     Cloth gloves---       Problem List:    Patient Active Problem List side of thyroid    TUBAL LIGATION  1992       Social History:    Social History   Substance Use Topics    Smoking status: Former Smoker     Packs/day: 1.00     Years: 15.00     Types: Cigarettes     Quit date: 1/1/2003    Smokeless tobacco: Never Used    Alcohol use No                                Counseling given: Not Answered      Vital Signs (Current):   Vitals:    12/21/17 0626   BP: 118/73   Pulse: 62   Resp: 14   Temp: 96.9 °F (36.1 °C)   TempSrc: Temporal   SpO2: 97%                                              BP Readings from Last 3 Encounters:   12/21/17 118/73   12/19/17 118/74   12/13/17 116/82       NPO Status:  mn+, see mar for am meds                                                                               BMI:   Wt Readings from Last 3 Encounters:   12/19/17 268 lb (121.6 kg)   12/19/17 268 lb 6.4 oz (121.7 kg)   12/13/17 273 lb 6.4 oz (124 kg)     There is no height or weight on file to calculate BMI. Anesthesia Evaluation  Patient summary reviewed no history of anesthetic complications:   Airway: Mallampati: II  TM distance: >3 FB   Neck ROM: full  Mouth opening: > = 3 FB Dental:    (+) upper dentures and lower dentures      Pulmonary: breath sounds clear to auscultation  (+) shortness of breath (paralyzes r diaphram):  asthma:     (-) recent URI, sleep apnea and not a current smoker                           Cardiovascular:    (+) BORGES:,       ECG reviewed  Rhythm: regular  Rate: normal           Beta Blocker:  Not on Beta Blocker         Neuro/Psych:   (+) neuromuscular disease (b cts):, headaches:, depression/anxiety  (anx.)            GI/Hepatic/Renal:   (+) GERD:, PUD, liver disease (fatty):, morbid obesity          Endo/Other:    (+) hypothyroidism (s/p surg.): arthritis: rheumatoid. , .                 Abdominal:   (+) obese,     Abdomen: soft. Vascular:                                        Anesthesia Plan      general     ASA 3       Induction: intravenous.     MIPS:

## 2017-12-21 NOTE — OP NOTE
fragments. Next, we entered the lateral compartment in a figure-of-four position. There was extensive degeneration in the lateral compartment, more advanced  than the medial joint, with complex tearing of the lateral meniscus. Using  the arthroscopic shaver and biter, the lateral meniscus was also debrided  down to stable edges and a gentle chondroplasty was performed on the  lateral plateau. Finally, we entered the patellofemoral compartment and a  gentle chondroplasty of the undersurface of the patella was performed. At this point, the knee was thoroughly irrigated with normal saline to  remove any loose debris and the knee was then infiltrated with Marcaine  without epinephrine to provide postoperative analgesia. The arthroscopic  instruments were then removed, and the portal sites were then closed with  inverted 3-0 Monocryl stitches, cleaned with wet and dry's, and then  dressed with Steri-Strips, Xeroform, sterile gauze, ABD pads, sterile  Webril. The drapes were removed and a double 6-inch Ace wrap was then  applied. She was then extubated and transferred onto the hospital stretcher to the  PACU.         Pardeep Baig MD    D: 12/21/2017 8:42:56       T: 12/21/2017 8:46:27     [email or web address]  Job#: [id number]     Doc#: [id number]    CC:

## 2017-12-26 ENCOUNTER — HOSPITAL ENCOUNTER (OUTPATIENT)
Dept: PHYSICAL THERAPY | Age: 52
Discharge: HOME | End: 2017-12-31
Admitting: ORTHOPAEDIC SURGERY

## 2017-12-26 NOTE — PROGRESS NOTES
when she will return to work. Pt will have to walk a lot. Leisure & Hobbies: none  Objective  Observation: Gait: with 1 crutch, crutch on L side (incorrect side), slow and guarded, step to pattern, knee remained flexed. (PT educated proper gait with 1 crutch on the R side and PT adjusted crutch to proper height.)  Body Mechanics: Stair climbin rails, ascended with R LE first and descended with L LE first.  Edema: Superior patellar pole L 51.7 cm, R 49.5; inferior patellar pole L 44.0, R 41.8. ROM L knee flexion: AROM 3-0-61; PROM 4-0-63. Tone L LE: quads poor, VMO poor. Strength hip flexors 3-/5, quads 3-/5, hamstrings 4/5. B ankles 5/5. HEP was educated: sitting HS stretch and towel gastroc stretch; heel slides, add set, quad set, AAROM LAQ. Assessment   Conditions Requiring Skilled Therapeutic Intervention  Body structures, Functions, Activity limitations: Decreased ADL status; Decreased functional mobility ; Decreased ROM; Decreased strength  Assessment: Pt presented 5 days post op A scope L knee. Treatment Diagnosis: Decreased functional mobility  Prognosis: Good  Decision Making: Medium Complexity  History: Prior level of function: independent and active. Fall risk: minimal.  REQUIRES PT FOLLOW UP: Yes  Activity Tolerance  Activity Tolerance: Patient limited by pain         Plan   Plan  Times per week: PT 2-3x/week for 4-6 weeks    G-Code  PT G-Codes  Functional Assessment Tool Used: LEFS  Score: Raw = 19; 66.25% of full function  Functional Limitation: Mobility: Walking and moving around  Mobility: Walking and Moving Around Current Status (): At least 60 percent but less than 80 percent impaired, limited or restricted  Mobility: Walking and Moving Around Goal Status ():  At least 20 percent but less than 40 percent impaired, limited or restricted    OutComes Score  LEFS Total Score: 19    Goals  Short term goals  Time Frame for Short term goals: 1-2 weeks  Short term goal 1: Pt will be independent with HEP. Long term goals  Time Frame for Long term goals : 4-6 weeks  Long term goal 1: Pt's goal: to walk far enough to return to work. Long term goal 2: Pt's goal: to walk up and down stairs with reciprocal pattern. Long term goal 3: AROM L knee flexion 5-0-130 degrees. Long term goal 4: Strength L hip flexors and quads 5/5. Long term goal 5: Pain will reduce from3-8/10 to 0-4/10.        Therapy Time   Individual Concurrent Group Co-treatment   Time In 6968         Time Out 1455         Minutes 60         Timed Code Treatment Minutes: [address] [facility] Pod

## 2017-12-27 ENCOUNTER — HOSPITAL ENCOUNTER (OUTPATIENT)
Dept: OTHER | Age: 52
Discharge: HOME | End: 2018-01-03
Attending: ORTHOPAEDIC SURGERY | Admitting: ORTHOPAEDIC SURGERY

## 2018-01-01 ENCOUNTER — HOSPITAL ENCOUNTER (OUTPATIENT)
Dept: OTHER | Age: 53
Discharge: HOME | End: 2018-01-31
Attending: ORTHOPAEDIC SURGERY | Admitting: ORTHOPAEDIC SURGERY

## 2018-01-03 ENCOUNTER — OFFICE VISIT (OUTPATIENT)
Dept: ORTHOPEDIC SURGERY | Age: 53
End: 2018-01-03

## 2018-01-03 ENCOUNTER — HOSPITAL ENCOUNTER (OUTPATIENT)
Dept: PHYSICAL THERAPY | Age: 53
Discharge: HOME | End: 2018-01-04
Admitting: ORTHOPAEDIC SURGERY

## 2018-01-03 VITALS — RESPIRATION RATE: 16 BRPM | HEIGHT: 68 IN | WEIGHT: 268 LBS | BODY MASS INDEX: 40.62 KG/M2

## 2018-01-03 DIAGNOSIS — Z98.890 STATUS POST ARTHROSCOPIC PARTIAL MEDIAL MENISCECTOMY: Primary | ICD-10-CM

## 2018-01-03 DIAGNOSIS — Z98.890 STATUS POST ARTHROSCOPIC PARTIAL LATERAL MENISCECTOMY: ICD-10-CM

## 2018-01-03 DIAGNOSIS — M17.12 PRIMARY OSTEOARTHRITIS OF LEFT KNEE: ICD-10-CM

## 2018-01-03 PROCEDURE — 99024 POSTOP FOLLOW-UP VISIT: CPT | Performed by: ORTHOPAEDIC SURGERY

## 2018-01-08 ENCOUNTER — HOSPITAL ENCOUNTER (OUTPATIENT)
Dept: PHYSICAL THERAPY | Age: 53
Discharge: HOME | End: 2018-01-09
Admitting: ORTHOPAEDIC SURGERY

## 2018-01-08 NOTE — FLOWSHEET NOTE
Physical Therapy Daily Treatment Note  Date:  2018    Patient Name:  Abdulaziz Jung    :  1965  MRN: [id number]  Restrictions/Precautions:    Pertinent Medical History:  Medical/Treatment Diagnosis Information:  · Diagnosis: Primary OA of L knee (M17.12); Other tear of medial meniscus of L knee as current injury, subsequent encounter (V46.597Q)  · Treatment Diagnosis: Decreased functional mobility  Insurance/Certification information:  PT Insurance Information: [facility]  Physician Information:  Referring Practitioner: Dr. Oni Perry of care signed (Y/N):  Sent to in basket on 17  Visit# / total visits:    Pain level: 3-8/10     G-Code (if applicable):      Date / Visit # G-Code Applied:  17  PT G-Codes  Functional Assessment Tool Used: LEFS  Score: Raw = 19; 66.25% of full function  Functional Limitation: Mobility: Walking and moving around  Mobility: Walking and Moving Around Current Status (): At least 60 percent but less than 80 percent impaired, limited or restricted  Mobility: Walking and Moving Around Goal Status (): At least 20 percent but less than 40 percent impaired, limited or restricted    Progress Note: []  Yes  []  No  Next due by: Visit #10      History of Injury:Surgery performed per Dr. Unruly Dee operative note on 17: Left knee arthroscopy; partial medial and lateral meniscectomies; chondroplasty of the medial femoral condyle, lateral tibial plateau, and undersurface of the patella. Pt had L knee scope on 17. Pt stated she removed her bandages yesterday. Pt stated she was using 2 crutches until yesterday and started using 1 crutch last night. Pt rated her L knee pain 3-8/10 \"until it locks up and then its about a 15. \"  Knee locked up one time \"when I tried to stand up without crutches. .. but it was locking up all the time before I had surgery. \"  Pt has difficulty sleeping due to knee pain and bursitis in both hips, \"but the right one's

## 2018-01-10 ENCOUNTER — HOSPITAL ENCOUNTER (OUTPATIENT)
Dept: PHYSICAL THERAPY | Age: 53
Discharge: HOME | End: 2018-01-11
Admitting: ORTHOPAEDIC SURGERY

## 2018-01-10 NOTE — FLOWSHEET NOTE
the worst.\"  Pt stated she has not tested herself to see how far she can walk, but primarily walks \"from the chair to the bedroom. \"      Subjective:   No new c/o's. It has been doing a lot better  18 Pt stated she will return to work 18 and does not plan on returning to MD for follow up. Pt stated her doctor does not need to see her again unless she has problems. Pt has minimal medial knee pain and inferior patellar pain. 1/10/18  Pt reports pain with bending knee and descending stairs  Objective:  Observation:     Stair climbing: ascended with R LE first, descended with L LE first  PF [facility]  Test measurements:    17  Observation: Gait: with 1 crutch, crutch on L side (incorrect side), slow and guarded, step to pattern, knee remained flexed. Body Mechanics: Stair climbin rails, ascended with R LE first and descended with L LE first.  Edema: Superior patellar pole L 51.7 cm, R 49.5; inferior patellar pole L 44.0, R 41.8. Knee ROM L LE 17   AROM 3-0-61 3-0-122   PROM 4-0-63 4-0-125   Strength     Quads 3-/5 4/5   Hamstrings 4/5 5/5   Hip flexors 3-/5 4/5   Tone     quads poor fair   VMO poor fair   Swelling (cm)     Superior patellar pole 51.7 49.5   Inferior patellar pole 44.0 43.8   Flexibility     Hamstrings fair    Gastrocs fair          Exercises:PMH: partially paralyzed diaphragm, per pt report, and chronic low back pain. Pt does not prefer supine.     Exercise/Equipment Resistance/Repetitions Other comments   Nu Step 5', R = 0 Seat/arm #11   KENIA  Knee flexion  Knee extension   Setting 8 and 9, 10x  Setting 8 and 9, 10x    Stretches  Gastroc incline  Hamstring   2x30\"  2x30\"    Concentric  Quads  Hamstrings   Bar only, 10x  Bar only, 10x    Leg Press          Step ups 4\", 2x10                    PROM L knee  Flexion  Extension   10x  10x Pt reclined on wedge                    Other Therapeutic Activities:     17 PT educated proper gait

## 2018-01-17 ENCOUNTER — HOSPITAL ENCOUNTER (OUTPATIENT)
Dept: PHYSICAL THERAPY | Age: 53
Discharge: HOME | End: 2018-01-18
Admitting: ORTHOPAEDIC SURGERY

## 2018-01-17 NOTE — FLOWSHEET NOTE
the worst.\"  Pt stated she has not tested herself to see how far she can walk, but primarily walks \"from the chair to the bedroom. \"      Subjective:   No new c/o's. It has been doing a lot better  18 Pt stated she will return to work 18 and does not plan on returning to MD for follow up. Pt stated her doctor does not need to see her again unless she has problems. Pt has minimal medial knee pain and inferior patellar pain. 1/10/18  Pt reports pain with bending knee and descending stairs  18  Just gets really stiff if she doesn't keep it moving  Objective:  Observation:     Stair climbing: ascended with R LE first, descended with L LE first  PF joint accessory motion [facility]  Test measurements:    17  Observation: Gait: with 1 crutch, crutch on L side (incorrect side), slow and guarded, step to pattern, knee remained flexed. Body Mechanics: Stair climbin rails, ascended with R LE first and descended with L LE first.  Edema: Superior patellar pole L 51.7 cm, R 49.5; inferior patellar pole L 44.0, R 41.8. Knee ROM L LE 17   AROM 3-0-61 3-0-122   PROM 4-0-63 4-0-125   Strength     Quads 3-/5 4/5   Hamstrings 4/5 5/5   Hip flexors 3-/5 4/5   Tone     quads poor fair   VMO poor fair   Swelling (cm)     Superior patellar pole 51.7 49.5   Inferior patellar pole 44.0 43.8   Flexibility     Hamstrings fair    Gastrocs fair          Exercises:PMH: partially paralyzed diaphragm, per pt report, and chronic low back pain. Pt does not prefer supine.     Exercise/Equipment Resistance/Repetitions Other comments   Nu Step 5', R = 0 Seat/arm #11   KENIA  Knee flexion  Knee extension   Setting 8 and 9, 10x  Setting 8 and 9, 10x    Stretches  Gastroc incline  Hamstring   2x30\"  2x30\"    Concentric  Quads  Hamstrings   Bar only, 10x  Bar only, 10x    Leg Press 60# 2x15 B         Step ups 4\", 2x10                    PROM L knee  Flexion  Extension   10x  10x Pt reclined on wedge Other Therapeutic Activities:     12/26/17 PT educated proper gait with 1 crutch on the R side and PT adjusted crutch to proper height    Home Exercise Program:    12/26/17 HEP was instructed and included sitting HS stretch and towel gastroc stretch; heel slides, add set, quad set, AAROM LAQ. Pt was given written hand outs and demonstrated exercises correctly. Pt expressed understanding of exercises. Manual Treatments:  Patellar mobs    Modalities:  Pt sitting, leg supported on a second chair with pillow and CP under knee- IFC L knee 15' with CP.     Timed Code Treatment Minutes:  30    Total Treatment Minutes:  50    Treatment/Activity Tolerance:  [] Patient tolerated treatment well [] Patient limited by fatigue  [x] Patient limited by pain  [] Patient limited by other medical complications  [] Other:     Prognosis: [x] Good [] Fair  [] Poor    Patient Requires Follow-up: [x] Yes  [] No    Plan:   [] Continue per plan of care [] Alter current plan (see comments)  [x] Plan of care initiated [] Hold pending MD visit [] Discharge  Plan for Next Session:   Begin KENIA, patellar mobs, stretching    Electronically signed by:  Umberto Grimes PTA [id number]

## 2018-01-31 ENCOUNTER — HOSPITAL ENCOUNTER (OUTPATIENT)
Dept: PHYSICAL THERAPY | Age: 53
Discharge: HOME | End: 2018-02-01
Admitting: ORTHOPAEDIC SURGERY

## 2018-02-01 ENCOUNTER — HOSPITAL ENCOUNTER (OUTPATIENT)
Dept: OTHER | Age: 53
Discharge: HOME | End: 2018-02-14
Attending: ORTHOPAEDIC SURGERY | Admitting: ORTHOPAEDIC SURGERY

## 2018-02-05 ENCOUNTER — HOSPITAL ENCOUNTER (OUTPATIENT)
Dept: PHYSICAL THERAPY | Age: 53
Discharge: HOME | End: 2018-02-06
Admitting: ORTHOPAEDIC SURGERY

## 2018-02-06 ENCOUNTER — OFFICE VISIT (OUTPATIENT)
Dept: PULMONOLOGY | Age: 53
End: 2018-02-06

## 2018-02-06 VITALS
SYSTOLIC BLOOD PRESSURE: 122 MMHG | HEART RATE: 76 BPM | RESPIRATION RATE: 16 BRPM | BODY MASS INDEX: 41.83 KG/M2 | HEIGHT: 68 IN | WEIGHT: 276 LBS | TEMPERATURE: 97.4 F | DIASTOLIC BLOOD PRESSURE: 76 MMHG | OXYGEN SATURATION: 99 %

## 2018-02-06 DIAGNOSIS — R06.02 SOB (SHORTNESS OF BREATH): Primary | ICD-10-CM

## 2018-02-06 DIAGNOSIS — J98.6 DIAPHRAGM PARALYSIS: ICD-10-CM

## 2018-02-06 DIAGNOSIS — J98.4 RESTRICTIVE AIRWAY DISEASE: ICD-10-CM

## 2018-02-06 DIAGNOSIS — E66.01 OBESITY, CLASS III, BMI 40-49.9 (MORBID OBESITY) (HCC): ICD-10-CM

## 2018-02-06 PROCEDURE — 3014F SCREEN MAMMO DOC REV: CPT | Performed by: INTERNAL MEDICINE

## 2018-02-06 PROCEDURE — 3017F COLORECTAL CA SCREEN DOC REV: CPT | Performed by: INTERNAL MEDICINE

## 2018-02-06 PROCEDURE — G8484 FLU IMMUNIZE NO ADMIN: HCPCS | Performed by: INTERNAL MEDICINE

## 2018-02-06 PROCEDURE — G8417 CALC BMI ABV UP PARAM F/U: HCPCS | Performed by: INTERNAL MEDICINE

## 2018-02-06 PROCEDURE — 1036F TOBACCO NON-USER: CPT | Performed by: INTERNAL MEDICINE

## 2018-02-06 PROCEDURE — 99213 OFFICE O/P EST LOW 20 MIN: CPT | Performed by: INTERNAL MEDICINE

## 2018-02-06 PROCEDURE — G8427 DOCREV CUR MEDS BY ELIG CLIN: HCPCS | Performed by: INTERNAL MEDICINE

## 2018-02-06 RX ORDER — FLUTICASONE FUROATE AND VILANTEROL 100; 25 UG/1; UG/1
1 POWDER RESPIRATORY (INHALATION) DAILY
Qty: 1 EACH | Refills: 5 | Status: SHIPPED | OUTPATIENT
Start: 2018-02-06 | End: 2018-08-09

## 2018-02-06 NOTE — PROGRESS NOTES
(SYNTHROID) 100 MCG tablet TAKE 1 TABLET BY MOUTH EVERY DAY 90 tablet 1    furosemide (LASIX) 20 MG tablet TAKE 1 TABLET BY MOUTH DAILY (Patient taking differently: TAKE 1 TABLET BY MOUTH DAILYAS NEEDED FOR SWELLING) 90 tablet 0    gabapentin (NEURONTIN) 300 MG capsule TWICE IN AM ONCE IN PM  1    pantoprazole (PROTONIX) 40 MG tablet TK 1 T PO BID  5    nortriptyline (PAMELOR) 25 MG capsule Take 1 capsule by mouth nightly (Patient taking differently: Take 25 mg by mouth nightly as needed ) 30 capsule 3    Respiratory Therapy Supplies (NEBULIZER/TUBING/MOUTHPIECE) KIT 1 kit by Does not apply route daily as needed 1 kit 0    budesonide (PULMICORT) 0.5 MG/2ML nebulizer suspension TAKE 1 VIAL VIA NEBULIZER TWICE DAILY 360 mL 6     Current Facility-Administered Medications on File Prior to Visit   Medication Dose Route Frequency Provider Last Rate Last Dose    technetium sulfur colloid egg (NYCOMED-SC) solution 500 micro curie  500 micro curie Intravenous ONCE PRN Ran Medina MD         Data Reviewed:   CBC and Renal reviewed  Last CBC  Lab Results   Component Value Date    WBC 7.6 09/27/2016    RBC 4.81 09/27/2016    HGB 12.7 09/27/2016    MCV 81.4 09/27/2016     09/27/2016     Last Renal  Lab Results   Component Value Date     12/21/2017    K 4.6 12/21/2017     12/21/2017    CO2 25 12/21/2017    CO2 25 09/21/2017    CO2 23 09/27/2016    BUN 13 12/21/2017    CREATININE <0.5 12/21/2017    GLUCOSE 106 12/21/2017    CALCIUM 10.0 12/21/2017       Last ABG  POC Blood Gas:   No results found for: POCPH  No results for input(s): PH, PCO2, PO2, HCO3, BE, O2SAT in the last 72 hours. Assessment:     ·  Obstructive airways disease  · Obesity  · Dyspnea  · Dysphonia, hx spasmatic dysphonia   · Paralyzed right diaphram  · Allergic Rhinitis  · Thyroid nodule    Plan:       1. SOB (shortness of breath)  shortness of breath is likely secondary to spasmatic dysphonia but will try Breo.    She is to

## 2018-02-07 ENCOUNTER — HOSPITAL ENCOUNTER (OUTPATIENT)
Dept: PHYSICAL THERAPY | Age: 53
Discharge: HOME | End: 2018-02-08
Admitting: ORTHOPAEDIC SURGERY

## 2018-02-07 RX ORDER — FLUTICASONE FUROATE AND VILANTEROL 100; 25 UG/1; UG/1
1 POWDER RESPIRATORY (INHALATION) DAILY
Qty: 1 EACH | Refills: 0 | COMMUNITY
Start: 2018-02-07 | End: 2018-06-20 | Stop reason: SDUPTHER

## 2018-02-12 ENCOUNTER — HOSPITAL ENCOUNTER (OUTPATIENT)
Dept: PHYSICAL THERAPY | Age: 53
Discharge: HOME | End: 2018-02-13
Admitting: ORTHOPAEDIC SURGERY

## 2018-02-14 ENCOUNTER — HOSPITAL ENCOUNTER (OUTPATIENT)
Dept: PHYSICAL THERAPY | Age: 53
Discharge: HOME | End: 2018-02-15
Admitting: ORTHOPAEDIC SURGERY

## 2018-02-14 NOTE — FLOWSHEET NOTE
Physical Therapy Daily Treatment Note  Date:  2018    Patient Name:  Jacek Mathew    :  1965  MRN: [id number]  Restrictions/Precautions:    Pertinent Medical History:  Medical/Treatment Diagnosis Information:  · Diagnosis: Primary OA of L knee (M17.12); Other tear of medial meniscus of L knee as current injury, subsequent encounter (F23.505R)  · Treatment Diagnosis: Decreased functional mobility  Insurance/Certification information:  PT Insurance Information: [facility]  Physician Information:  Referring Practitioner: Dr. Terrence Martin of care signed (Y/N):  Sent to in basket on 17  Visit# / total visits:      Pain level: 3-8/10     G-Code (if applicable):      Date / Visit # G-Code Applied:  17  PT G-Codes  Functional Assessment Tool Used: LEFS  Score: Raw = 19; 66.25% of full function  Functional Limitation: Mobility: Walking and moving around  Mobility: Walking and Moving Around Current Status (): At least 60 percent but less than 80 percent impaired, limited or restricted  Mobility: Walking and Moving Around Goal Status (): At least 20 percent but less than 40 percent impaired, limited or restricted    Progress Note: []  Yes  []  No  Next due by: Visit #10      History of Injury:Surgery performed per Dr. Colón Reading operative note on 17: Left knee arthroscopy; partial medial and lateral meniscectomies; chondroplasty of the medial femoral condyle, lateral tibial plateau, and undersurface of the patella. Pt had L knee scope on 17. Pt stated she removed her bandages yesterday. Pt stated she was using 2 crutches until yesterday and started using 1 crutch last night. Pt rated her L knee pain 3-8/10 \"until it locks up and then its about a 15. \"  Knee locked up one time \"when I tried to stand up without crutches. .. but it was locking up all the time before I had surgery. \"  Pt has difficulty sleeping due to knee pain and bursitis in both hips, \"but the right met  Long term goal 2: Pt's goal: to walk up and down stairs with reciprocal pattern. met  Long term goal 3: AROM L knee flexion 5-0-130 degrees. met  Long term goal 4: Strength L hip flexors and quads 5/5. met  Long term goal 5: Pain will reduce from3-8/10 to 0-4/10. Partially met  Long term goal 3: AROM L knee flexion 5-0-130 degrees. met  Long term goal 4: Strength L hip flexors and quads 5/5. met  Long term goal 5: Pain will reduce from 3-8/10 to 0-4/10. Partially met    Home Exercise Program:    12/26/17 HEP was instructed and included sitting HS stretch and towel gastroc stretch; heel slides, add set, quad set, AAROM LAQ. Pt was given written hand outs and demonstrated exercises correctly. Pt expressed understanding of exercises.       Manual Treatments:      Modalities:      Timed Code Treatment Minutes:  30    Total Treatment Minutes:  30    Treatment/Activity Tolerance:  [] Patient tolerated treatment well [] Patient limited by fatigue  [x] Patient limited by pain  [] Patient limited by other medical complications  [] Other:     Prognosis: [x] Good [] Fair  [] Poor    Patient Requires Follow-up: [x] Yes  [] No    Plan:   [] Continue per plan of care [] Alter current plan (see comments)  [] Plan of care initiated [] Hold pending MD visit [x] Discharge  Plan for Next Session:       Electronically signed by:  Max March PT

## 2018-06-20 ENCOUNTER — OFFICE VISIT (OUTPATIENT)
Dept: INTERNAL MEDICINE CLINIC | Age: 53
End: 2018-06-20

## 2018-06-20 VITALS
TEMPERATURE: 97.6 F | SYSTOLIC BLOOD PRESSURE: 110 MMHG | WEIGHT: 248 LBS | DIASTOLIC BLOOD PRESSURE: 64 MMHG | HEIGHT: 68 IN | BODY MASS INDEX: 37.59 KG/M2

## 2018-06-20 DIAGNOSIS — R73.03 PRE-DIABETES: ICD-10-CM

## 2018-06-20 DIAGNOSIS — M79.7 FIBROMYALGIA: ICD-10-CM

## 2018-06-20 DIAGNOSIS — G62.9 NEUROPATHY: Primary | ICD-10-CM

## 2018-06-20 DIAGNOSIS — K75.81 STEATOHEPATITIS, NONALCOHOLIC: ICD-10-CM

## 2018-06-20 DIAGNOSIS — E89.0 POSTOPERATIVE HYPOTHYROIDISM: ICD-10-CM

## 2018-06-20 DIAGNOSIS — R29.898 WEAKNESS OF BOTH LOWER EXTREMITIES: ICD-10-CM

## 2018-06-20 DIAGNOSIS — E55.9 VITAMIN D DEFICIENCY: ICD-10-CM

## 2018-06-20 DIAGNOSIS — R11.0 NAUSEA: ICD-10-CM

## 2018-06-20 DIAGNOSIS — E78.5 BORDERLINE HYPERLIPIDEMIA: ICD-10-CM

## 2018-06-20 PROBLEM — S83.242A TEAR OF MEDIAL MENISCUS OF LEFT KNEE, CURRENT: Status: RESOLVED | Noted: 2017-12-08 | Resolved: 2018-06-20

## 2018-06-20 PROBLEM — M65.962 SYNOVITIS OF LEFT KNEE: Status: RESOLVED | Noted: 2017-10-13 | Resolved: 2018-06-20

## 2018-06-20 PROBLEM — M65.9 SYNOVITIS OF LEFT KNEE: Status: RESOLVED | Noted: 2017-10-13 | Resolved: 2018-06-20

## 2018-06-20 PROCEDURE — 99203 OFFICE O/P NEW LOW 30 MIN: CPT | Performed by: INTERNAL MEDICINE

## 2018-06-20 PROCEDURE — 3017F COLORECTAL CA SCREEN DOC REV: CPT | Performed by: INTERNAL MEDICINE

## 2018-06-20 PROCEDURE — G8427 DOCREV CUR MEDS BY ELIG CLIN: HCPCS | Performed by: INTERNAL MEDICINE

## 2018-06-20 PROCEDURE — 1036F TOBACCO NON-USER: CPT | Performed by: INTERNAL MEDICINE

## 2018-06-20 PROCEDURE — G8417 CALC BMI ABV UP PARAM F/U: HCPCS | Performed by: INTERNAL MEDICINE

## 2018-06-20 RX ORDER — ONDANSETRON 4 MG/1
4 TABLET, FILM COATED ORAL DAILY PRN
Qty: 30 TABLET | Refills: 0 | Status: SHIPPED | OUTPATIENT
Start: 2018-06-20 | End: 2018-12-05 | Stop reason: SDUPTHER

## 2018-06-20 RX ORDER — LEVOTHYROXINE SODIUM 0.1 MG/1
TABLET ORAL
Qty: 90 TABLET | Refills: 1 | Status: SHIPPED | OUTPATIENT
Start: 2018-06-20 | End: 2018-10-24 | Stop reason: DRUGHIGH

## 2018-06-20 RX ORDER — MULTIVIT-MIN/IRON/FOLIC ACID/K 18-600-40
CAPSULE ORAL DAILY
COMMUNITY

## 2018-06-20 ASSESSMENT — ENCOUNTER SYMPTOMS
VOMITING: 0
WHEEZING: 0
COUGH: 0
BLOOD IN STOOL: 0
NAUSEA: 0
CONSTIPATION: 0
SHORTNESS OF BREATH: 0
ROS SKIN COMMENTS: NO SKIN LESIONS SHE IS CONCERNED ABOUT
ABDOMINAL PAIN: 1
DIARRHEA: 0
BACK PAIN: 1

## 2018-07-18 ENCOUNTER — TELEPHONE (OUTPATIENT)
Dept: INTERNAL MEDICINE CLINIC | Age: 53
End: 2018-07-18

## 2018-07-18 RX ORDER — AZITHROMYCIN 250 MG/1
TABLET, FILM COATED ORAL
Qty: 1 PACKET | Refills: 0 | Status: SHIPPED | OUTPATIENT
Start: 2018-07-18 | End: 2018-07-22

## 2018-07-31 ENCOUNTER — HOSPITAL ENCOUNTER (OUTPATIENT)
Dept: NEUROLOGY | Age: 53
Discharge: HOME | End: 2018-07-31
Attending: INTERNAL MEDICINE | Admitting: INTERNAL MEDICINE

## 2018-07-31 DIAGNOSIS — G62.9 POLYNEUROPATHY: ICD-10-CM

## 2018-07-31 NOTE — PROCEDURES
Electrodiagnostic Report  [address] Radha Sullivan MD, Ambika oV DO, Marie Prieto MD  Phone: [phone]  Fax: [phone]    07/31/18    Mary Lazcano  47 y.o.  1965  [id number]  Referring Provider: Pérez Crespo MD    Clinical Problem:  46 y/o with history of bilateral leg pain, weakness, onset years ago worse over past years, worse with walking up stairs.  PMH: + hypothyroidism, + paralyzed diaphragm no back or leg surgery PE: Reflexes trace, normal strength    EMG SUMMARY TABLE RIGHT/LEFT LOWER       Spontaneous     MUAP   Recruitment    Insertional Activity Fibrillation Potential Positive Sharp Waves   Fasiculation High Frequency Potentials   Amp Duration PPP Pattern   Vastus Medialis L2-4 Femoral normal none none none none normal normal normal normal   Anterior Tibialis L4,5 Deep Peroneal normal none none none none normal normal normal normal   Gluteus Medius L4-S1 Superior Gut normal none none none none normal normal normal normal   Peroneus Longus L5-S1 Sup Peroneal normal none none none none normal normal normal normal   Posterior Tibialis L5-S1 Tibial normal none none none none normal normal normal normal   Medial Gastroc S1,2 Tibial normal none none none none normal normal normal normal   Extensor Hallicis Z2-U0 Peroneal normal none none none none normal normal normal normal   Extensor Dig Brevis L5-S1 Peroneal normal none none none none normal normal normal normal   LS Paraspinals Posterior Rami       normal none none none none normal normal normal normal       Nerve Conduction Studies     Nerve Sensory Distal Latency (msec) Sensory Distal Latency (msec) Amp uv Amp uv Motor Distal Latency (msec) Motor Distal Latency (msec) Amp uv Amp uv Motor NCV (m/sec) Motor NCV (m/sec)    Right Left Right Left Right Left Right Left Right Left   Sural 4.0 (n<4.2) 4.1 (n<4.2) 5 5         Peroneal     5.1 (n<5.5) 4.8 (n<5.5)

## 2018-08-10 ENCOUNTER — HOSPITAL ENCOUNTER (OUTPATIENT)
Dept: ENDOSCOPY | Age: 53
Discharge: HOME | End: 2018-08-13
Attending: ANESTHESIOLOGY | Admitting: ANESTHESIOLOGY

## 2018-08-10 VITALS
HEART RATE: 67 BPM | HEIGHT: 69 IN | RESPIRATION RATE: 14 BRPM | OXYGEN SATURATION: 97 % | BODY MASS INDEX: 36.58 KG/M2 | TEMPERATURE: 97 F | SYSTOLIC BLOOD PRESSURE: 107 MMHG | DIASTOLIC BLOOD PRESSURE: 60 MMHG | WEIGHT: 247 LBS

## 2018-08-10 DIAGNOSIS — M54.13 RADICULOPATHY OF CERVICOTHORACIC REGION: ICD-10-CM

## 2018-08-10 LAB — PREGNANCY, URINE: NEGATIVE

## 2018-08-10 RX ORDER — LIDOCAINE HYDROCHLORIDE 10 MG/ML
5 INJECTION, SOLUTION EPIDURAL; INFILTRATION; INTRACAUDAL; PERINEURAL ONCE
Status: DISCONTINUED | OUTPATIENT
Start: 2018-08-10 | End: 2018-08-14 | Stop reason: HOSPADM

## 2018-08-10 RX ORDER — METHYLPREDNISOLONE ACETATE 80 MG/ML
80 INJECTION, SUSPENSION INTRA-ARTICULAR; INTRALESIONAL; INTRAMUSCULAR; SOFT TISSUE ONCE
Status: COMPLETED | OUTPATIENT
Start: 2018-08-10 | End: 2018-08-10

## 2018-08-10 RX ADMIN — METHYLPREDNISOLONE ACETATE 80 MG: 80 INJECTION, SUSPENSION INTRA-ARTICULAR; INTRALESIONAL; INTRAMUSCULAR; SOFT TISSUE at 10:59

## 2018-08-10 ASSESSMENT — PAIN DESCRIPTION - LOCATION
LOCATION: BACK;SHOULDER
LOCATION: BACK;SHOULDER

## 2018-08-10 ASSESSMENT — PAIN DESCRIPTION - PAIN TYPE
TYPE: CHRONIC PAIN
TYPE: CHRONIC PAIN

## 2018-08-10 ASSESSMENT — PAIN DESCRIPTION - DESCRIPTORS: DESCRIPTORS: STABBING;SHOOTING

## 2018-08-10 ASSESSMENT — PAIN - FUNCTIONAL ASSESSMENT: PAIN_FUNCTIONAL_ASSESSMENT: 0-10

## 2018-08-10 ASSESSMENT — PAIN SCALES - GENERAL
PAINLEVEL_OUTOF10: 4
PAINLEVEL_OUTOF10: 4

## 2018-08-10 NOTE — OP NOTE
Patient:  Tiffanie Elizabeth  YOB: 1965  Medical Record #:  [id number]   Place: [address] [facility]  Date:  8/10/2018   Physician:  Paul Sotelo MD      CERVICAL EPIDURAL STEROID INJECTION (68507)    PRE-PROCEDURE DIAGNOSIS:  M54.13    POST-PROCEDURE DIAGNOSIS:  M54.13    PROCEDURE:  Midline interlaminar Cervical Epidural Steroid Injection with fluoroscopy and epidurography. BRIEF HISTORY:  The patient presents today to [facility] for a scheduled cervical epidural steroid injection procedure. The patient was re-evaluated today and is clinically unchanged as compared to my previous evaluation, the clinical details of which are documented on the pre-procedure assessment sheet which is considered an integral portion of this dictation. The patient is clinically stable to proceed with the procedure. PROCEDURE NOTE:  The procedure was again explained to the patient, and the previously distributed pre-procedure literature was reviewed. The options, rationale, and benefits of the procedure including pain relief, functional improvement, and increase mobility, as well as the risks of the procedure including but not limited to infection, bleeding, paresthesia, pain, failure to relieve pain, increased pain, headache, allergic reaction and neurological impairment, local anesthetic toxicity, and side effects and the potential side effects of corticosteroids were discussed with the patient and informed written consent was obtained from the patient. The patient was positioned in the prone position on the fluoroscopy table. The skin overlying the cervical vertebrae was prepped using Chloraprep and draped in the usual sterile fashion. The C7-T1 cervical intervertebral level was identified using intermittent AP fluoroscopy. The previously identified projection of overlying skin was anesthetized using 2 cc of buffered 1% lidocaine with a 27 gauge needle.   A 3 1/2 inch 22 gauge Tuohy needle was advanced through a small skin nick in the AP view towards the interlaminar and epidura. The epidural space was easily identified using loss of resistance to saline technique. No difficulty, paresthesia or occurrence of pain was encountered. Careful aspiration was negative for CSF and blood. A total of 1 cc Isovue 300 was injected yielding an epidurogram.      FLUOROSCOPY:  A fluoroscopy unit was utilized to obtained fluoroscopic images for intra-procedural use and assistance. Fluoroscopy was utilized to identify anatomic and radiographic landmarks for the accompanying procedure. The anatomic radiographic landmarks were visualized, as outlined in the accompanying procedure note. No abnormalities were noted otherwise, although it should be noted that fluoroscopy was utilized for assistance in this procedure and not as a primary diagnostic tool. After negative aspiration 4 cc of therapeutic injectate containing 1 cc of Depo-Medrol 80 mg/ml for a total of 80 mg 3 ml of preservative free normal saline was injected slowly in aliquots while clinically observing and monitoring the patient with negative aspiration demonstrated between aliquots of injections. At this time no paresthesia or occurrence of pain was present. The needle was then removed, the area was cleansed and a Band-Aid was placed over the injection site. There were no complications. The patient tolerated the procedure well. The procedure was performed using local anesthesia. The patient was transferred by wheelchair with accompaniment to the recovery area and was monitored per protocol. The vital signs remained stable. There was no sensory or motor blockade in the upper or lower extremities and the patient was discharged in stable condition accompanied by an escort with written instructions after fulfilling the standard discharge criteria.   Written follow-up instructions were given to the patient and are as

## 2018-09-21 ENCOUNTER — HOSPITAL ENCOUNTER (OUTPATIENT)
Dept: ENDOSCOPY | Age: 53
Discharge: HOME | End: 2018-09-22
Attending: ANESTHESIOLOGY | Admitting: ANESTHESIOLOGY

## 2018-09-21 VITALS
HEART RATE: 66 BPM | DIASTOLIC BLOOD PRESSURE: 59 MMHG | HEIGHT: 68 IN | BODY MASS INDEX: 37.44 KG/M2 | RESPIRATION RATE: 14 BRPM | SYSTOLIC BLOOD PRESSURE: 112 MMHG | WEIGHT: 247 LBS | OXYGEN SATURATION: 94 % | TEMPERATURE: 97.6 F

## 2018-09-21 DIAGNOSIS — M25.562 PAIN IN LEFT KNEE: ICD-10-CM

## 2018-09-21 RX ORDER — METHYLPREDNISOLONE ACETATE 80 MG/ML
80 INJECTION, SUSPENSION INTRA-ARTICULAR; INTRALESIONAL; INTRAMUSCULAR; SOFT TISSUE ONCE
Status: COMPLETED | OUTPATIENT
Start: 2018-09-21 | End: 2018-09-21

## 2018-09-21 RX ORDER — LIDOCAINE HYDROCHLORIDE 10 MG/ML
5 INJECTION, SOLUTION EPIDURAL; INFILTRATION; INTRACAUDAL; PERINEURAL ONCE
Status: COMPLETED | OUTPATIENT
Start: 2018-09-21 | End: 2018-09-21

## 2018-09-21 RX ADMIN — LIDOCAINE HYDROCHLORIDE 5 ML: 10 INJECTION, SOLUTION EPIDURAL; INFILTRATION; INTRACAUDAL; PERINEURAL at 11:26

## 2018-09-21 RX ADMIN — METHYLPREDNISOLONE ACETATE 80 MG: 80 INJECTION, SUSPENSION INTRA-ARTICULAR; INTRALESIONAL; INTRAMUSCULAR; SOFT TISSUE at 11:27

## 2018-09-21 ASSESSMENT — PAIN SCALES - GENERAL
PAINLEVEL_OUTOF10: 0
PAINLEVEL_OUTOF10: 0

## 2018-09-21 NOTE — OP NOTE
increments while monitoring the patient. The patient tolerated the procedure well. There were no complications. The patient complained of no paresthesia during the injection. The needle was removed, the area was cleansed, and a Band-Aid was placed over the injection site. There were no complications. The patient tolerated the procedure well. The procedure was performed using local anesthesia. The patient was transferred with accompaniment to the ecovery area where the vital signs remained stable. There were no sensory or motor changes in the lower extremity. The patient was discharged accompanied with an escort with written instructions after fulfilling standard discharge criteria. Follow-up instructions were given to the patient and are as follows:        Plan:  Follow up in 2-4 weeks. Has lumbar radicular symptoms. Cervical radicular pain >50% ongoing improvement.     Office: [phone]

## 2018-09-28 ENCOUNTER — HOSPITAL ENCOUNTER (OUTPATIENT)
Dept: INTERVENTIONAL RADIOLOGY/VASCULAR | Age: 53
Discharge: HOME | End: 2018-09-28
Attending: ANESTHESIOLOGY
Payer: COMMERCIAL

## 2018-09-28 ENCOUNTER — HOSPITAL ENCOUNTER (OUTPATIENT)
Age: 53
Setting detail: OUTPATIENT SURGERY
Discharge: HOME | End: 2018-09-28
Attending: ANESTHESIOLOGY | Admitting: ANESTHESIOLOGY
Payer: COMMERCIAL

## 2018-09-28 VITALS
DIASTOLIC BLOOD PRESSURE: 88 MMHG | SYSTOLIC BLOOD PRESSURE: 139 MMHG | TEMPERATURE: 97.1 F | RESPIRATION RATE: 18 BRPM | HEART RATE: 65 BPM | OXYGEN SATURATION: 99 % | WEIGHT: 246 LBS | HEIGHT: 69 IN | BODY MASS INDEX: 36.43 KG/M2

## 2018-09-28 PROCEDURE — 6360000002 HC RX W HCPCS: Performed by: ANESTHESIOLOGY

## 2018-09-28 PROCEDURE — 3610000054 HC PAIN LEVEL 3 BASE (NON-OR): Performed by: ANESTHESIOLOGY

## 2018-09-28 PROCEDURE — 3600000054 HC PAIN LEVEL 3 BASE: Performed by: ANESTHESIOLOGY

## 2018-09-28 RX ORDER — SODIUM CHLORIDE 9 MG/ML
INJECTION, SOLUTION INTRAVENOUS CONTINUOUS
Status: DISCONTINUED | OUTPATIENT
Start: 2018-09-28 | End: 2018-09-28

## 2018-09-28 RX ORDER — TRAMADOL HYDROCHLORIDE 50 MG/1
50 TABLET ORAL 2 TIMES DAILY
COMMUNITY
End: 2020-11-26

## 2018-09-28 RX ORDER — SODIUM CHLORIDE 0.9 % (FLUSH) 0.9 %
10 SYRINGE (ML) INJECTION PRN
Status: DISCONTINUED | OUTPATIENT
Start: 2018-09-28 | End: 2018-09-28

## 2018-09-28 RX ORDER — METHYLPREDNISOLONE ACETATE 80 MG/ML
80 INJECTION, SUSPENSION INTRA-ARTICULAR; INTRALESIONAL; INTRAMUSCULAR; SOFT TISSUE ONCE
Status: COMPLETED | OUTPATIENT
Start: 2018-09-28 | End: 2018-09-28

## 2018-09-28 RX ORDER — LIDOCAINE HYDROCHLORIDE 10 MG/ML
1 INJECTION, SOLUTION EPIDURAL; INFILTRATION; INTRACAUDAL; PERINEURAL
Status: DISCONTINUED | OUTPATIENT
Start: 2018-09-28 | End: 2018-09-28

## 2018-09-28 RX ORDER — LIDOCAINE HYDROCHLORIDE 10 MG/ML
5 INJECTION, SOLUTION EPIDURAL; INFILTRATION; INTRACAUDAL; PERINEURAL ONCE
Status: DISCONTINUED | OUTPATIENT
Start: 2018-09-28 | End: 2018-09-28 | Stop reason: HOSPADM

## 2018-09-28 RX ORDER — SODIUM CHLORIDE 0.9 % (FLUSH) 0.9 %
10 SYRINGE (ML) INJECTION EVERY 12 HOURS SCHEDULED
Status: DISCONTINUED | OUTPATIENT
Start: 2018-09-28 | End: 2018-09-28

## 2018-09-28 ASSESSMENT — PAIN - FUNCTIONAL ASSESSMENT: PAIN_FUNCTIONAL_ASSESSMENT: 0-10

## 2018-09-28 ASSESSMENT — PAIN DESCRIPTION - DESCRIPTORS
DESCRIPTORS: TINGLING;BURNING

## 2018-09-28 ASSESSMENT — PAIN DESCRIPTION - LOCATION
LOCATION: NECK
LOCATION: NECK

## 2018-09-28 ASSESSMENT — PAIN DESCRIPTION - ORIENTATION
ORIENTATION: MID
ORIENTATION: MID

## 2018-09-28 ASSESSMENT — PAIN DESCRIPTION - ONSET
ONSET: AWAKENED FROM SLEEP
ONSET: AWAKENED FROM SLEEP

## 2018-09-28 ASSESSMENT — PAIN DESCRIPTION - PAIN TYPE
TYPE: CHRONIC PAIN
TYPE: CHRONIC PAIN

## 2018-09-28 ASSESSMENT — PAIN SCALES - GENERAL
PAINLEVEL_OUTOF10: 4
PAINLEVEL_OUTOF10: 4

## 2018-09-28 ASSESSMENT — PAIN DESCRIPTION - FREQUENCY
FREQUENCY: CONTINUOUS
FREQUENCY: CONTINUOUS

## 2018-09-28 NOTE — OP NOTE
identified using loss of resistance to saline technique. No difficulty, paresthesia or occurrence of pain was encountered. Careful aspiration was negative for CSF and blood. A total of 1 cc Isovue 300 was injected yielding an epidurogram.      FLUOROSCOPY:  A fluoroscopy unit was utilized to obtained fluoroscopic images for intra-procedural use and assistance. Fluoroscopy was utilized to identify anatomic and radiographic landmarks for the accompanying procedure. The anatomic radiographic landmarks were visualized, as outlined in the accompanying procedure note. No abnormalities were noted otherwise, although it should be noted that fluoroscopy was utilized for assistance in this procedure and not as a primary diagnostic tool. After negative aspiration 4 cc of therapeutic injectate containing 1 cc of Depo-Medrol 80 mg/ml for a total of 80 mg 3 ml of preservative free normal saline was injected slowly in aliquots while clinically observing and monitoring the patient with negative aspiration demonstrated between aliquots of injections. At this time no paresthesia or occurrence of pain was present. The needle was then removed, the area was cleansed and a Band-Aid was placed over the injection site. There were no complications. The patient tolerated the procedure well. The procedure was performed using local anesthesia. The patient was transferred by wheelchair with accompaniment to the recovery area and was monitored per protocol. The vital signs remained stable. There was no sensory or motor blockade in the upper or lower extremities and the patient was discharged in stable condition accompanied by an escort with written instructions after fulfilling the standard discharge criteria.   Written follow-up instructions were given to the patient and are as follows    PLAN  Repeat injection in 3 months

## 2018-10-03 ENCOUNTER — OFFICE VISIT (OUTPATIENT)
Dept: ORTHOPEDIC SURGERY | Age: 53
End: 2018-10-03
Payer: COMMERCIAL

## 2018-10-03 VITALS
HEIGHT: 69 IN | SYSTOLIC BLOOD PRESSURE: 111 MMHG | HEART RATE: 74 BPM | WEIGHT: 247 LBS | BODY MASS INDEX: 36.58 KG/M2 | DIASTOLIC BLOOD PRESSURE: 69 MMHG

## 2018-10-03 DIAGNOSIS — D36.10 NEUROMA: Primary | ICD-10-CM

## 2018-10-03 DIAGNOSIS — M25.561 RIGHT KNEE PAIN, UNSPECIFIED CHRONICITY: ICD-10-CM

## 2018-10-03 DIAGNOSIS — M17.11 ARTHRITIS OF RIGHT KNEE: ICD-10-CM

## 2018-10-03 PROCEDURE — G8427 DOCREV CUR MEDS BY ELIG CLIN: HCPCS | Performed by: ORTHOPAEDIC SURGERY

## 2018-10-03 PROCEDURE — 3017F COLORECTAL CA SCREEN DOC REV: CPT | Performed by: ORTHOPAEDIC SURGERY

## 2018-10-03 PROCEDURE — G8484 FLU IMMUNIZE NO ADMIN: HCPCS | Performed by: ORTHOPAEDIC SURGERY

## 2018-10-03 PROCEDURE — G8417 CALC BMI ABV UP PARAM F/U: HCPCS | Performed by: ORTHOPAEDIC SURGERY

## 2018-10-03 PROCEDURE — 1036F TOBACCO NON-USER: CPT | Performed by: ORTHOPAEDIC SURGERY

## 2018-10-03 PROCEDURE — 99213 OFFICE O/P EST LOW 20 MIN: CPT | Performed by: ORTHOPAEDIC SURGERY

## 2018-10-03 PROCEDURE — 20610 DRAIN/INJ JOINT/BURSA W/O US: CPT | Performed by: ORTHOPAEDIC SURGERY

## 2018-10-03 NOTE — PROGRESS NOTES
INITIAL EVALUATION OF KNEE                                                                    10/3/2018  Danette Lazcano     1965       History of Present Illness:    Sophie Hall is seen for Knee Pain (RT Knee pain)    Returns for new problem, now 47years of age she is seen for a painful knot on the anterior aspect of her right knee. The pain started a few weeks ago without trauma. She has an intermittent ache and occasional sharp pain over the anterior aspect of her knee aggravated by anything touching the area. When this happens she'll feel a tingling the anterolateral aspect of her leg. She does complain of some associated weakness and is limited in her ambulatory distances. She states about 3 weeks ago she saw her pain management physician and had a steroid injection in both knees. Significant past history includes a left knee medial meniscus tear which I diagnosed on 12/8/17. She was sent to Dr. Codie Allen and underwent arthroscopic surgery on 12/21/17. Pertinent items are noted in HPI  Review of systems reviewed from Patient History Form dated on 12/12/17 and available in the patient's chart under the Media tab. The patient's past medical history, medications, allergies, family history, social history, HPI have been reviewed, dated, and recorded in the chart.      /69   Pulse 74   Ht 5' 8.5\" (1.74 m)   Wt 247 lb (112 kg)   LMP 02/18/2016 (Exact Date)   BMI 37.01 kg/m²     Physical examination:    General Appearance: no acute distress, alert, oriented x 3, appropriate mood and affect, overweight  Limps when walking: yes - moderate gluteus medias lurch,  [place]: Neg Bilateral                                           + Tinel over mass                       Right                                       Left  Swelling mild    Effusion  neg    Ecchymosis neg    Errythemia neg    Warmth neg    Deformity Small mass 2

## 2018-10-19 ENCOUNTER — HOSPITAL ENCOUNTER (OUTPATIENT)
Dept: MRI IMAGING | Age: 53
Discharge: HOME | End: 2018-10-19
Payer: COMMERCIAL

## 2018-10-19 DIAGNOSIS — M54.16 LUMBAR RADICULOPATHY: ICD-10-CM

## 2018-10-19 PROCEDURE — 72148 MRI LUMBAR SPINE W/O DYE: CPT

## 2018-10-24 ENCOUNTER — OFFICE VISIT (OUTPATIENT)
Dept: INTERNAL MEDICINE CLINIC | Age: 53
End: 2018-10-24
Payer: COMMERCIAL

## 2018-10-24 VITALS
BODY MASS INDEX: 40.21 KG/M2 | WEIGHT: 256.2 LBS | HEIGHT: 67 IN | DIASTOLIC BLOOD PRESSURE: 70 MMHG | TEMPERATURE: 97.6 F | RESPIRATION RATE: 16 BRPM | OXYGEN SATURATION: 96 % | HEART RATE: 70 BPM | SYSTOLIC BLOOD PRESSURE: 108 MMHG

## 2018-10-24 DIAGNOSIS — E78.5 BORDERLINE HYPERLIPIDEMIA: ICD-10-CM

## 2018-10-24 DIAGNOSIS — M18.11 DEGENERATIVE ARTHRITIS OF THUMB, RIGHT: ICD-10-CM

## 2018-10-24 DIAGNOSIS — K75.81 STEATOHEPATITIS, NONALCOHOLIC: ICD-10-CM

## 2018-10-24 DIAGNOSIS — E55.9 VITAMIN D DEFICIENCY: ICD-10-CM

## 2018-10-24 DIAGNOSIS — Z00.00 ROUTINE GENERAL MEDICAL EXAMINATION AT A HEALTH CARE FACILITY: Primary | ICD-10-CM

## 2018-10-24 DIAGNOSIS — E89.0 POSTOPERATIVE HYPOTHYROIDISM: ICD-10-CM

## 2018-10-24 DIAGNOSIS — R73.03 PRE-DIABETES: ICD-10-CM

## 2018-10-24 DIAGNOSIS — G62.9 NEUROPATHY: ICD-10-CM

## 2018-10-24 DIAGNOSIS — N63.10 BREAST MASS, RIGHT: ICD-10-CM

## 2018-10-24 DIAGNOSIS — E66.01 CLASS 3 SEVERE OBESITY DUE TO EXCESS CALORIES WITHOUT SERIOUS COMORBIDITY WITH BODY MASS INDEX (BMI) OF 40.0 TO 44.9 IN ADULT (HCC): ICD-10-CM

## 2018-10-24 DIAGNOSIS — R10.11 RIGHT UPPER QUADRANT ABDOMINAL PAIN: ICD-10-CM

## 2018-10-24 LAB
A/G RATIO: 1.3 (ref 1.1–2.2)
ALBUMIN SERPL-MCNC: 4.2 G/DL (ref 3.4–5)
ALP BLD-CCNC: 92 U/L (ref 40–129)
ALT SERPL-CCNC: 55 U/L (ref 10–40)
ANION GAP SERPL CALCULATED.3IONS-SCNC: 16 MMOL/L (ref 3–16)
AST SERPL-CCNC: 36 U/L (ref 15–37)
BILIRUB SERPL-MCNC: 0.4 MG/DL (ref 0–1)
BUN BLDV-MCNC: 20 MG/DL (ref 7–20)
CALCIUM SERPL-MCNC: 11.1 MG/DL (ref 8.3–10.6)
CHLORIDE BLD-SCNC: 101 MMOL/L (ref 99–110)
CHOLESTEROL, TOTAL: 205 MG/DL (ref 0–199)
CO2: 21 MMOL/L (ref 21–32)
CREAT SERPL-MCNC: 0.6 MG/DL (ref 0.6–1.1)
ESTIMATED AVERAGE GLUCOSE: 114 MG/DL
GFR AFRICAN AMERICAN: >60
GFR NON-AFRICAN AMERICAN: >60
GLOBULIN: 3.2 G/DL
GLUCOSE BLD-MCNC: 94 MG/DL (ref 70–99)
HBA1C MFR BLD: 5.6 %
HCT VFR BLD CALC: 41.3 % (ref 36–48)
HDLC SERPL-MCNC: 51 MG/DL (ref 40–60)
HEMOGLOBIN: 13.4 G/DL (ref 12–16)
LDL CHOLESTEROL CALCULATED: 130 MG/DL
MCH RBC QN AUTO: 28.1 PG (ref 26–34)
MCHC RBC AUTO-ENTMCNC: 32.4 G/DL (ref 31–36)
MCV RBC AUTO: 86.8 FL (ref 80–100)
PDW BLD-RTO: 15.1 % (ref 12.4–15.4)
PLATELET # BLD: 277 K/UL (ref 135–450)
PMV BLD AUTO: 9.4 FL (ref 5–10.5)
POTASSIUM SERPL-SCNC: 4.1 MMOL/L (ref 3.5–5.1)
RBC # BLD: 4.75 M/UL (ref 4–5.2)
SODIUM BLD-SCNC: 138 MMOL/L (ref 136–145)
TOTAL PROTEIN: 7.4 G/DL (ref 6.4–8.2)
TRIGL SERPL-MCNC: 121 MG/DL (ref 0–150)
TSH SERPL DL<=0.05 MIU/L-ACNC: 6.46 UIU/ML (ref 0.27–4.2)
VITAMIN B-12: 494 PG/ML (ref 211–911)
VITAMIN D 25-HYDROXY: 25.7 NG/ML
VLDLC SERPL CALC-MCNC: 24 MG/DL
WBC # BLD: 8.3 K/UL (ref 4–11)

## 2018-10-24 PROCEDURE — G8482 FLU IMMUNIZE ORDER/ADMIN: HCPCS | Performed by: INTERNAL MEDICINE

## 2018-10-24 PROCEDURE — 36415 COLL VENOUS BLD VENIPUNCTURE: CPT | Performed by: INTERNAL MEDICINE

## 2018-10-24 PROCEDURE — 99396 PREV VISIT EST AGE 40-64: CPT | Performed by: INTERNAL MEDICINE

## 2018-10-24 RX ORDER — POLYETHYLENE GLYCOL 3350 17 G/17G
17 POWDER, FOR SOLUTION ORAL DAILY PRN
Qty: 527 G | Refills: 1 | Status: SHIPPED | OUTPATIENT
Start: 2018-10-24 | End: 2018-11-23

## 2018-10-24 RX ORDER — LEVOTHYROXINE SODIUM 0.12 MG/1
125 TABLET ORAL DAILY
Qty: 90 TABLET | Refills: 1 | Status: SHIPPED | OUTPATIENT
Start: 2018-10-24 | End: 2019-08-06 | Stop reason: SDUPTHER

## 2018-10-24 ASSESSMENT — ENCOUNTER SYMPTOMS
COUGH: 0
SHORTNESS OF BREATH: 0
NAUSEA: 1
BACK PAIN: 1
BLOOD IN STOOL: 0
ROS SKIN COMMENTS: NO CONCERNING SKIN LESIONS
WHEEZING: 0
VOMITING: 0
SINUS PRESSURE: 0
CONSTIPATION: 1
ABDOMINAL PAIN: 1
DIARRHEA: 1

## 2018-10-24 ASSESSMENT — PATIENT HEALTH QUESTIONNAIRE - PHQ9
1. LITTLE INTEREST OR PLEASURE IN DOING THINGS: 0
SUM OF ALL RESPONSES TO PHQ QUESTIONS 1-9: 0
SUM OF ALL RESPONSES TO PHQ QUESTIONS 1-9: 0
SUM OF ALL RESPONSES TO PHQ9 QUESTIONS 1 & 2: 0
2. FEELING DOWN, DEPRESSED OR HOPELESS: 0

## 2018-10-24 NOTE — PROGRESS NOTES
levthyroizSUBJECTIVE:  Urszula Fischer is a 47 y.o. female being evaluated for:    Chief Complaint   Patient presents with    Annual Exam     Patient here for an Annual Exam. Blood work was done this morning at the lab. HPI   Here for annual exam  Feeling tired  Forgets to take her thyroid pills at time Asthma doing well  Short winded if walks too far but better  Stomach issues Pain across upper stomach  Nauseated Constipation and diarrhea intermittanly Hemhroroids. Leg pain is getting worse  Seeing Dr Annalee Mar feel worn out and wont stop Lots of back pain Just had mri and has appointment to discuss with him. Pain in right wrist and thumb  TOld has bad arthritis there and has seeen Dr Gina Cervantes remotely and wishes to return     Allergies   Allergen Reactions    Other Hives     Cloth gloves---     Current Outpatient Prescriptions   Medication Sig Dispense Refill    levothyroxine (SYNTHROID) 125 MCG tablet Take 1 tablet by mouth Daily 90 tablet 1    polyethylene glycol (GLYCOLAX) packet Take 17 g by mouth daily as needed for Constipation 527 g 1    traMADol (ULTRAM) 50 MG tablet Take 50 mg by mouth 2 times daily. Hina Pollock Cholecalciferol (VITAMIN D) 2000 units CAPS capsule Take by mouth      ondansetron (ZOFRAN) 4 MG tablet Take 1 tablet by mouth daily as needed for Nausea or Vomiting 30 tablet 0    gabapentin (NEURONTIN) 300 MG capsule TWICE IN AM ONCE IN PM  1    Respiratory Therapy Supplies (NEBULIZER/TUBING/MOUTHPIECE) KIT 1 kit by Does not apply route daily as needed 1 kit 0    budesonide (PULMICORT) 0.5 MG/2ML nebulizer suspension TAKE 1 VIAL VIA NEBULIZER TWICE DAILY 360 mL 6    vitamin D (ERGOCALCIFEROL) 48452 units CAPS capsule Take 1 capsule by mouth once a week 12 capsule 0     No current facility-administered medications for this visit.       Facility-Administered Medications Ordered in Other Visits   Medication Dose Route Frequency Provider Last Rate Last Dose    technetium sulfur colloid egg (NYCOMED-SC) solution 500 micro curie  500 micro curie Intravenous ONCE PRN Bridget Gomez MD             Social History     Social History    Marital status:      Spouse name: N/A    Number of children: N/A    Years of education: N/A     Occupational History    room leader      [employer]     Social History Main Topics    Smoking status: Former Smoker     Packs/day: 1.00     Years: 15.00     Types: Cigarettes     Quit date: 1/1/2003    Smokeless tobacco: Never Used    Alcohol use No    Drug use: No    Sexual activity: No     Other Topics Concern    Not on file     Social History Narrative    No narrative on file      Past Medical History:   Diagnosis Date    Anxiety     Asthma     currently was instructed to stop inhalers d/t to collapse of vocal cords    Diaphragm paralysis     right side    Enlarged liver     Fibromyalgia     GERD (gastroesophageal reflux disease)     Headache(784.0)     neck pain     History of ulcer disease     Kidney infection     Osteoarthritis     CHRONIC BACK PAIN/FIBROMYALGIA    Rheumatoid arthritis (HCC)     Spasmodic dysphonia     voice very hoarse    Thyroid disease     2015 found spot on left side of thyroid, right side thyroid was removed     Past Surgical History:   Procedure Laterality Date    APPENDECTOMY  age 17   [employer] BREAST BIOPSY Right     x3  benign lesions     BREAST SURGERY Right needle localization right breast mass    BREAST SURGERY Right 10/22/15    riught breast mass excision/needle loc    CARPAL TUNNEL RELEASE Right 5/7/15    CARPAL TUNNEL RELEASE Left 5/28/15    Left carpal tunnel release      COLONOSCOPY      ENDOSCOPY, COLON, DIAGNOSTIC      KNEE ARTHROSCOPY Left 12/21/2017    KS NJX DX/THER SBST INTRLMNR CRV/THRC W/IMG GDN N/A 9/28/2018    EPIDURAL STEROID INJECTION C7-T1 performed by Oni Friedman MD at [facility]      removed right side of thyroid    THYROIDECTOMY, PARTIAL  TUBAL LIGATION  1992       Review of Systems   Constitutional: Positive for fatigue and unexpected weight change (lost and gaining again ). Negative for activity change. HENT: Negative for congestion, nosebleeds, postnasal drip and sinus pressure. Respiratory: Negative for cough, shortness of breath and wheezing. Cardiovascular: Positive for palpitations (heart palpates ). Negative for chest pain and leg swelling. Gastrointestinal: Positive for abdominal pain, constipation, diarrhea and nausea. Negative for blood in stool and vomiting. Endocrine: Negative for cold intolerance and heat intolerance. Genitourinary: Negative for dysuria and vaginal bleeding. Musculoskeletal: Positive for arthralgias, back pain, myalgias, neck pain and neck stiffness. Skin:        No concerning skin lesions    Neurological: Positive for dizziness (with standing up to quickly ) and headaches. Negative for light-headedness. Psychiatric/Behavioral: Positive for sleep disturbance. Negative for dysphoric mood and suicidal ideas. OBJECTIVE:  /70 (Site: Right Upper Arm, Position: Sitting, Cuff Size: Large Adult)   Pulse 70   Temp 97.6 °F (36.4 °C) (Oral)   Resp 16   Ht 5' 7\" (1.702 m)   Wt 256 lb 3.2 oz (116.2 kg)   LMP 02/18/2016 (Exact Date)   SpO2 96%   BMI 40.13 kg/m²      Body mass index is 40.13 kg/m². Physical Exam   Constitutional: She is oriented to person, place, and time. She appears well-developed. No distress. obese   HENT:   Head: Normocephalic and atraumatic. Right Ear: External ear normal.   Left Ear: External ear normal.   Mouth/Throat: Oropharynx is clear and moist.   Tympanic membranes clear, throat clear   Eyes: Pupils are equal, round, and reactive to light. Conjunctivae and EOM are normal.   Neck: Neck supple. No thyromegaly present. Cardiovascular: Normal rate, regular rhythm and normal heart sounds. Exam reveals no gallop and no friction rub.     No murmur

## 2018-10-24 NOTE — PATIENT INSTRUCTIONS
Please call your pharmacy if you need any refills of your medication(s). Please call our office at [phone] if you don't hear from us about your test results. Bring an accurate list of your medications with you at every appointment to ensure that we have the correct information.     Our office hours are: Monday - Friday 8:30 am- 5 pm    Phone lines turn on at 8:30 am

## 2018-10-25 DIAGNOSIS — E03.9 HYPOTHYROIDISM, UNSPECIFIED TYPE: Primary | ICD-10-CM

## 2018-10-25 DIAGNOSIS — E55.9 VITAMIN D DEFICIENCY: ICD-10-CM

## 2018-10-25 RX ORDER — ERGOCALCIFEROL 1.25 MG/1
50000 CAPSULE ORAL WEEKLY
Qty: 12 CAPSULE | Refills: 0 | Status: SHIPPED | OUTPATIENT
Start: 2018-10-25 | End: 2019-05-07

## 2018-10-29 ENCOUNTER — OFFICE VISIT (OUTPATIENT)
Dept: ORTHOPEDIC SURGERY | Age: 53
End: 2018-10-29
Payer: COMMERCIAL

## 2018-10-29 ENCOUNTER — HOSPITAL ENCOUNTER (OUTPATIENT)
Dept: ULTRASOUND IMAGING | Age: 53
Discharge: HOME | End: 2018-10-29
Payer: COMMERCIAL

## 2018-10-29 VITALS — HEIGHT: 67 IN | RESPIRATION RATE: 16 BRPM | WEIGHT: 256 LBS | BODY MASS INDEX: 40.18 KG/M2

## 2018-10-29 DIAGNOSIS — R10.11 RIGHT UPPER QUADRANT ABDOMINAL PAIN: ICD-10-CM

## 2018-10-29 DIAGNOSIS — M79.645 BILATERAL THUMB PAIN: Primary | ICD-10-CM

## 2018-10-29 DIAGNOSIS — M19.049 LOCALIZED PRIMARY CARPOMETACARPAL OSTEOARTHROSIS, UNSPECIFIED LATERALITY: ICD-10-CM

## 2018-10-29 DIAGNOSIS — M79.644 BILATERAL THUMB PAIN: Primary | ICD-10-CM

## 2018-10-29 PROCEDURE — 3017F COLORECTAL CA SCREEN DOC REV: CPT | Performed by: PHYSICIAN ASSISTANT

## 2018-10-29 PROCEDURE — 1036F TOBACCO NON-USER: CPT | Performed by: PHYSICIAN ASSISTANT

## 2018-10-29 PROCEDURE — 90688 IIV4 VACCINE SPLT 0.5 ML IM: CPT | Performed by: INTERNAL MEDICINE

## 2018-10-29 PROCEDURE — MISCCMC2 OTS BREG CMC THUMB GUARD: Performed by: PHYSICIAN ASSISTANT

## 2018-10-29 PROCEDURE — G8417 CALC BMI ABV UP PARAM F/U: HCPCS | Performed by: PHYSICIAN ASSISTANT

## 2018-10-29 PROCEDURE — 76705 ECHO EXAM OF ABDOMEN: CPT

## 2018-10-29 PROCEDURE — 90471 IMMUNIZATION ADMIN: CPT | Performed by: INTERNAL MEDICINE

## 2018-10-29 PROCEDURE — G8427 DOCREV CUR MEDS BY ELIG CLIN: HCPCS | Performed by: PHYSICIAN ASSISTANT

## 2018-10-29 PROCEDURE — G8482 FLU IMMUNIZE ORDER/ADMIN: HCPCS | Performed by: PHYSICIAN ASSISTANT

## 2018-10-29 PROCEDURE — 99243 OFF/OP CNSLTJ NEW/EST LOW 30: CPT | Performed by: PHYSICIAN ASSISTANT

## 2018-10-29 PROCEDURE — L3908 WHO COCK-UP NONMOLDE PRE OTS: HCPCS | Performed by: PHYSICIAN ASSISTANT

## 2018-11-09 ENCOUNTER — HOSPITAL ENCOUNTER (OUTPATIENT)
Age: 53
Setting detail: OUTPATIENT SURGERY
Discharge: HOME | End: 2018-11-09
Attending: ANESTHESIOLOGY | Admitting: ANESTHESIOLOGY
Payer: COMMERCIAL

## 2018-11-09 ENCOUNTER — APPOINTMENT (OUTPATIENT)
Dept: INTERVENTIONAL RADIOLOGY/VASCULAR | Age: 53
End: 2018-11-09
Attending: ANESTHESIOLOGY
Payer: COMMERCIAL

## 2018-11-09 VITALS
RESPIRATION RATE: 16 BRPM | WEIGHT: 258 LBS | HEART RATE: 57 BPM | SYSTOLIC BLOOD PRESSURE: 115 MMHG | OXYGEN SATURATION: 96 % | DIASTOLIC BLOOD PRESSURE: 62 MMHG | BODY MASS INDEX: 39.1 KG/M2 | HEIGHT: 68 IN | TEMPERATURE: 97 F

## 2018-11-09 PROCEDURE — 2709999900 HC NON-CHARGEABLE SUPPLY: Performed by: ANESTHESIOLOGY

## 2018-11-09 PROCEDURE — 3610000054 HC PAIN LEVEL 3 BASE (NON-OR): Performed by: ANESTHESIOLOGY

## 2018-11-09 PROCEDURE — 6360000002 HC RX W HCPCS: Performed by: ANESTHESIOLOGY

## 2018-11-09 PROCEDURE — 6360000004 HC RX CONTRAST MEDICATION: Performed by: ANESTHESIOLOGY

## 2018-11-09 RX ORDER — IBUPROFEN 200 MG
400 TABLET ORAL EVERY 6 HOURS PRN
COMMUNITY
End: 2020-06-17 | Stop reason: SINTOL

## 2018-11-09 RX ORDER — METHYLPREDNISOLONE ACETATE 80 MG/ML
80 INJECTION, SUSPENSION INTRA-ARTICULAR; INTRALESIONAL; INTRAMUSCULAR; SOFT TISSUE ONCE
Status: COMPLETED | OUTPATIENT
Start: 2018-11-09 | End: 2018-11-09

## 2018-11-09 ASSESSMENT — PAIN DESCRIPTION - LOCATION
LOCATION: NECK
LOCATION: BACK

## 2018-11-09 ASSESSMENT — PAIN DESCRIPTION - DESCRIPTORS: DESCRIPTORS: ACHING

## 2018-11-09 ASSESSMENT — PAIN - FUNCTIONAL ASSESSMENT: PAIN_FUNCTIONAL_ASSESSMENT: 0-10

## 2018-11-09 ASSESSMENT — PAIN DESCRIPTION - PAIN TYPE
TYPE: CHRONIC PAIN
TYPE: CHRONIC PAIN

## 2018-11-09 ASSESSMENT — PAIN SCALES - GENERAL
PAINLEVEL_OUTOF10: 4
PAINLEVEL_OUTOF10: 4

## 2018-11-09 NOTE — OP NOTE
paresthesia or occurrence of pain was encountered. Careful aspiration was negative for CSF and blood. A total of 1 cc Isovue 300 was injected yielding an epidurogram.    FLUOROSCOPY:  A fluoroscopy unit was utilized to obtain fluoroscopic images for intra-procedural use and assistance. Fluoroscopy was utilized to identify anatomic and radiographic landmarks for the accompanying procedure guidance and not for diagnostic purposes. After negative aspiration 4 cc of therapeutic injectate containing 1 cc of Depo-Medrol 80 mg/cc and 3 ml of lidocaine 1% was injected slowly in aliquots while clinically observing and monitoring the patient with negative aspiration demonstrated between aliquots of injections. At this time no paresthesia or occurrence of pain was present. The needle was then removed, the area was cleansed and a Band-Aid was placed over the injection site. There were no complications. The patient tolerated the procedure well. The procedure was performed using local anesthesia. The patient was transferred by wheelchair with accompaniment to the  Recovery Area and was monitored per protocol. The vital signs remained stable. There were no sensory or motor blockades in the lower extremities and the patient was discharged in stable condition accompanied by an escort with written instructions after fulfilling the standard discharge criteria. Written follow up instructions were given to the patient and are as follows:    Plan:  Repeat VIJAY in 6 weeks. Prior VIJAY provided >50% reduction in pain lasting >6 weeks. A repeat injection is medically appropriate and necessary.       Office: [phone]

## 2018-11-09 NOTE — H&P
Resp 16   Ht 5' 8\" (1.727 m)   Wt 258 lb (117 kg)   LMP 02/18/2016 (Exact Date)   SpO2 97%   BMI 39.23 kg/m²  I            ASSESSMENT AND PLAN:    1. Procedure. options, risks and benefits reviewed with patient and expresses understanding.

## 2018-11-13 ENCOUNTER — TELEPHONE (OUTPATIENT)
Dept: INTERNAL MEDICINE CLINIC | Age: 53
End: 2018-11-13

## 2018-11-13 RX ORDER — AMOXICILLIN 875 MG/1
875 TABLET, COATED ORAL 2 TIMES DAILY
Qty: 20 TABLET | Refills: 0 | Status: SHIPPED | OUTPATIENT
Start: 2018-11-13 | End: 2018-11-23

## 2018-11-13 NOTE — TELEPHONE ENCOUNTER
Jenniffer (mom/on [facility]) calling b/c pt is hoarse,has a cough and yellow/green phlegm. Pt wants to know if meds can be prescribed? Ari Garay doesn't know which pharmacy pt uses.

## 2018-12-17 ENCOUNTER — HOSPITAL ENCOUNTER (OUTPATIENT)
Dept: WOMENS IMAGING | Age: 53
Discharge: HOME | End: 2018-12-17
Payer: COMMERCIAL

## 2018-12-17 ENCOUNTER — HOSPITAL ENCOUNTER (OUTPATIENT)
Dept: ULTRASOUND IMAGING | Age: 53
Discharge: HOME | End: 2018-12-17
Payer: COMMERCIAL

## 2018-12-17 DIAGNOSIS — N63.10 BREAST MASS, RIGHT: ICD-10-CM

## 2018-12-17 DIAGNOSIS — N64.4 BREAST TENDERNESS: ICD-10-CM

## 2018-12-17 PROCEDURE — 76642 ULTRASOUND BREAST LIMITED: CPT

## 2018-12-17 PROCEDURE — G0279 TOMOSYNTHESIS, MAMMO: HCPCS

## 2018-12-18 DIAGNOSIS — R92.0 ABNORMAL MAMMOGRAM WITH MICROCALCIFICATION: ICD-10-CM

## 2018-12-18 DIAGNOSIS — R92.8 ABNORMAL MAMMOGRAM OF RIGHT BREAST: Primary | ICD-10-CM

## 2019-01-04 ENCOUNTER — HOSPITAL ENCOUNTER (OUTPATIENT)
Dept: WOMENS IMAGING | Age: 54
Discharge: HOME | End: 2019-01-04
Payer: COMMERCIAL

## 2019-01-04 VITALS — HEART RATE: 81 BPM | OXYGEN SATURATION: 97 % | SYSTOLIC BLOOD PRESSURE: 98 MMHG | DIASTOLIC BLOOD PRESSURE: 47 MMHG

## 2019-01-04 DIAGNOSIS — R92.8 ABNORMAL MAMMOGRAM: ICD-10-CM

## 2019-01-04 PROCEDURE — 88305 TISSUE EXAM BY PATHOLOGIST: CPT

## 2019-01-04 PROCEDURE — 19081 BX BREAST 1ST LESION STRTCTC: CPT

## 2019-01-04 PROCEDURE — 88342 IMHCHEM/IMCYTCHM 1ST ANTB: CPT

## 2019-01-04 PROCEDURE — 77065 DX MAMMO INCL CAD UNI: CPT

## 2019-01-04 PROCEDURE — 76098 X-RAY EXAM SURGICAL SPECIMEN: CPT

## 2019-01-08 ENCOUNTER — TELEPHONE (OUTPATIENT)
Dept: SURGERY | Age: 54
End: 2019-01-08

## 2019-01-08 ENCOUNTER — TELEPHONE (OUTPATIENT)
Dept: INTERNAL MEDICINE CLINIC | Age: 54
End: 2019-01-08

## 2019-01-08 RX ORDER — AZITHROMYCIN 250 MG/1
250 TABLET, FILM COATED ORAL SEE ADMIN INSTRUCTIONS
Qty: 6 TABLET | Refills: 0 | Status: SHIPPED | OUTPATIENT
Start: 2019-01-08 | End: 2019-01-13

## 2019-01-10 ENCOUNTER — TELEPHONE (OUTPATIENT)
Dept: INTERNAL MEDICINE CLINIC | Age: 54
End: 2019-01-10

## 2019-01-14 ENCOUNTER — OFFICE VISIT (OUTPATIENT)
Dept: INTERNAL MEDICINE CLINIC | Age: 54
End: 2019-01-14
Payer: COMMERCIAL

## 2019-01-14 VITALS
HEART RATE: 72 BPM | SYSTOLIC BLOOD PRESSURE: 118 MMHG | RESPIRATION RATE: 18 BRPM | TEMPERATURE: 97.7 F | HEIGHT: 68 IN | BODY MASS INDEX: 40.13 KG/M2 | WEIGHT: 264.8 LBS | DIASTOLIC BLOOD PRESSURE: 78 MMHG

## 2019-01-14 DIAGNOSIS — J45.21 MILD INTERMITTENT ASTHMATIC BRONCHITIS WITH ACUTE EXACERBATION: Primary | ICD-10-CM

## 2019-01-14 PROCEDURE — G8482 FLU IMMUNIZE ORDER/ADMIN: HCPCS | Performed by: INTERNAL MEDICINE

## 2019-01-14 PROCEDURE — G8427 DOCREV CUR MEDS BY ELIG CLIN: HCPCS | Performed by: INTERNAL MEDICINE

## 2019-01-14 PROCEDURE — 99213 OFFICE O/P EST LOW 20 MIN: CPT | Performed by: INTERNAL MEDICINE

## 2019-01-14 PROCEDURE — 3017F COLORECTAL CA SCREEN DOC REV: CPT | Performed by: INTERNAL MEDICINE

## 2019-01-14 PROCEDURE — 1036F TOBACCO NON-USER: CPT | Performed by: INTERNAL MEDICINE

## 2019-01-14 PROCEDURE — 3014F SCREEN MAMMO DOC REV: CPT | Performed by: INTERNAL MEDICINE

## 2019-01-14 PROCEDURE — G8417 CALC BMI ABV UP PARAM F/U: HCPCS | Performed by: INTERNAL MEDICINE

## 2019-01-14 RX ORDER — PREDNISONE 20 MG/1
20 TABLET ORAL DAILY
Qty: 10 TABLET | Refills: 0 | Status: SHIPPED | OUTPATIENT
Start: 2019-01-14 | End: 2019-01-24

## 2019-01-14 RX ORDER — DIAZEPAM 5 MG/1
5 TABLET ORAL
COMMUNITY
End: 2019-01-30

## 2019-01-14 RX ORDER — AZITHROMYCIN 250 MG/1
250 TABLET, FILM COATED ORAL SEE ADMIN INSTRUCTIONS
Qty: 6 TABLET | Refills: 0 | Status: SHIPPED | OUTPATIENT
Start: 2019-01-14 | End: 2019-01-19

## 2019-01-16 ENCOUNTER — TELEPHONE (OUTPATIENT)
Dept: BREAST CENTER | Age: 54
End: 2019-01-16

## 2019-01-16 ENCOUNTER — OFFICE VISIT (OUTPATIENT)
Dept: BREAST CENTER | Age: 54
End: 2019-01-16
Payer: COMMERCIAL

## 2019-01-16 VITALS
BODY MASS INDEX: 40.16 KG/M2 | DIASTOLIC BLOOD PRESSURE: 74 MMHG | HEIGHT: 68 IN | SYSTOLIC BLOOD PRESSURE: 129 MMHG | HEART RATE: 88 BPM | WEIGHT: 265 LBS

## 2019-01-16 DIAGNOSIS — R92.8 ABNORMAL MAMMOGRAM OF RIGHT BREAST: Primary | ICD-10-CM

## 2019-01-16 PROCEDURE — 3014F SCREEN MAMMO DOC REV: CPT | Performed by: SURGERY

## 2019-01-16 PROCEDURE — G8427 DOCREV CUR MEDS BY ELIG CLIN: HCPCS | Performed by: SURGERY

## 2019-01-16 PROCEDURE — 3017F COLORECTAL CA SCREEN DOC REV: CPT | Performed by: SURGERY

## 2019-01-16 PROCEDURE — G8482 FLU IMMUNIZE ORDER/ADMIN: HCPCS | Performed by: SURGERY

## 2019-01-16 PROCEDURE — G8417 CALC BMI ABV UP PARAM F/U: HCPCS | Performed by: SURGERY

## 2019-01-16 PROCEDURE — 99213 OFFICE O/P EST LOW 20 MIN: CPT | Performed by: SURGERY

## 2019-01-16 PROCEDURE — 1036F TOBACCO NON-USER: CPT | Performed by: SURGERY

## 2019-01-19 ASSESSMENT — ENCOUNTER SYMPTOMS
VOICE CHANGE: 1
VOMITING: 0
WHEEZING: 1
BLOOD IN STOOL: 0
NAUSEA: 0
SHORTNESS OF BREATH: 1
CONSTIPATION: 0
COUGH: 1
ABDOMINAL PAIN: 0
SORE THROAT: 1
SINUS PRESSURE: 0
DIARRHEA: 0

## 2019-01-22 ENCOUNTER — HOSPITAL ENCOUNTER (EMERGENCY)
Age: 54
Discharge: HOME | End: 2019-01-22
Attending: EMERGENCY MEDICINE
Payer: COMMERCIAL

## 2019-01-22 VITALS
OXYGEN SATURATION: 96 % | WEIGHT: 270.73 LBS | HEART RATE: 77 BPM | TEMPERATURE: 98 F | HEIGHT: 68 IN | BODY MASS INDEX: 41.03 KG/M2 | DIASTOLIC BLOOD PRESSURE: 67 MMHG | RESPIRATION RATE: 16 BRPM | SYSTOLIC BLOOD PRESSURE: 124 MMHG

## 2019-01-22 DIAGNOSIS — S61.210D LACERATION OF RIGHT INDEX FINGER WITHOUT FOREIGN BODY WITHOUT DAMAGE TO NAIL, SUBSEQUENT ENCOUNTER: Primary | ICD-10-CM

## 2019-01-22 PROCEDURE — 99282 EMERGENCY DEPT VISIT SF MDM: CPT

## 2019-01-22 PROCEDURE — 4500000022 HC ED LEVEL 2 PROCEDURE

## 2019-01-22 ASSESSMENT — PAIN DESCRIPTION - PAIN TYPE: TYPE: ACUTE PAIN

## 2019-01-22 ASSESSMENT — PAIN DESCRIPTION - ORIENTATION: ORIENTATION: RIGHT

## 2019-01-22 ASSESSMENT — PAIN DESCRIPTION - DESCRIPTORS: DESCRIPTORS: BURNING

## 2019-01-22 ASSESSMENT — PAIN SCALES - GENERAL: PAINLEVEL_OUTOF10: 4

## 2019-01-22 ASSESSMENT — PAIN DESCRIPTION - LOCATION: LOCATION: HAND

## 2019-02-01 ENCOUNTER — APPOINTMENT (OUTPATIENT)
Dept: INTERVENTIONAL RADIOLOGY/VASCULAR | Age: 54
End: 2019-02-01
Attending: ANESTHESIOLOGY
Payer: COMMERCIAL

## 2019-02-01 ENCOUNTER — HOSPITAL ENCOUNTER (OUTPATIENT)
Age: 54
Setting detail: OUTPATIENT SURGERY
Discharge: HOME | End: 2019-02-01
Attending: ANESTHESIOLOGY | Admitting: ANESTHESIOLOGY
Payer: COMMERCIAL

## 2019-02-01 ENCOUNTER — HOSPITAL ENCOUNTER (EMERGENCY)
Age: 54
Discharge: HOME | End: 2019-02-01
Attending: EMERGENCY MEDICINE

## 2019-02-01 VITALS
HEIGHT: 68 IN | BODY MASS INDEX: 39.99 KG/M2 | RESPIRATION RATE: 18 BRPM | WEIGHT: 263.89 LBS | HEART RATE: 91 BPM | SYSTOLIC BLOOD PRESSURE: 115 MMHG | OXYGEN SATURATION: 96 % | DIASTOLIC BLOOD PRESSURE: 72 MMHG | TEMPERATURE: 97.6 F

## 2019-02-01 VITALS
SYSTOLIC BLOOD PRESSURE: 102 MMHG | HEART RATE: 65 BPM | HEIGHT: 68 IN | BODY MASS INDEX: 40.01 KG/M2 | WEIGHT: 264 LBS | TEMPERATURE: 97.2 F | RESPIRATION RATE: 18 BRPM | OXYGEN SATURATION: 98 % | DIASTOLIC BLOOD PRESSURE: 60 MMHG

## 2019-02-01 PROCEDURE — 2709999900 HC NON-CHARGEABLE SUPPLY: Performed by: ANESTHESIOLOGY

## 2019-02-01 PROCEDURE — 4500000002 HC ER NO CHARGE

## 2019-02-01 PROCEDURE — 6360000004 HC RX CONTRAST MEDICATION: Performed by: ANESTHESIOLOGY

## 2019-02-01 PROCEDURE — 6360000002 HC RX W HCPCS: Performed by: ANESTHESIOLOGY

## 2019-02-01 PROCEDURE — 3610000054 HC PAIN LEVEL 3 BASE (NON-OR): Performed by: ANESTHESIOLOGY

## 2019-02-01 RX ORDER — METHYLPREDNISOLONE ACETATE 80 MG/ML
INJECTION, SUSPENSION INTRA-ARTICULAR; INTRALESIONAL; INTRAMUSCULAR; SOFT TISSUE
Status: COMPLETED | OUTPATIENT
Start: 2019-02-01 | End: 2019-02-01

## 2019-02-01 ASSESSMENT — PAIN SCALES - GENERAL
PAINLEVEL_OUTOF10: 0
PAINLEVEL_OUTOF10: 0
PAINLEVEL_OUTOF10: 4
PAINLEVEL_OUTOF10: 0

## 2019-02-01 ASSESSMENT — PAIN - FUNCTIONAL ASSESSMENT: PAIN_FUNCTIONAL_ASSESSMENT: 0-10

## 2019-02-01 ASSESSMENT — ENCOUNTER SYMPTOMS
VOICE CHANGE: 0
TROUBLE SWALLOWING: 0
COLOR CHANGE: 0

## 2019-02-01 ASSESSMENT — PAIN DESCRIPTION - ORIENTATION: ORIENTATION: MID;LOWER;UPPER

## 2019-02-01 ASSESSMENT — PAIN DESCRIPTION - PAIN TYPE: TYPE: CHRONIC PAIN

## 2019-02-01 ASSESSMENT — PAIN DESCRIPTION - LOCATION: LOCATION: BACK

## 2019-02-19 ENCOUNTER — TELEPHONE (OUTPATIENT)
Dept: INTERNAL MEDICINE CLINIC | Age: 54
End: 2019-02-19

## 2019-03-04 ENCOUNTER — OFFICE VISIT (OUTPATIENT)
Dept: INTERNAL MEDICINE CLINIC | Age: 54
End: 2019-03-04
Payer: COMMERCIAL

## 2019-03-04 VITALS
RESPIRATION RATE: 16 BRPM | SYSTOLIC BLOOD PRESSURE: 118 MMHG | DIASTOLIC BLOOD PRESSURE: 74 MMHG | BODY MASS INDEX: 41.39 KG/M2 | OXYGEN SATURATION: 98 % | WEIGHT: 272.2 LBS | TEMPERATURE: 97.6 F | HEART RATE: 78 BPM

## 2019-03-04 DIAGNOSIS — J45.41 MODERATE PERSISTENT ASTHMA WITH EXACERBATION: ICD-10-CM

## 2019-03-04 DIAGNOSIS — E03.9 HYPOTHYROIDISM, UNSPECIFIED TYPE: ICD-10-CM

## 2019-03-04 DIAGNOSIS — J20.9 ACUTE BRONCHITIS, UNSPECIFIED ORGANISM: Primary | ICD-10-CM

## 2019-03-04 DIAGNOSIS — R04.2 HEMOPTYSIS: ICD-10-CM

## 2019-03-04 DIAGNOSIS — K21.9 GASTROESOPHAGEAL REFLUX DISEASE WITHOUT ESOPHAGITIS: ICD-10-CM

## 2019-03-04 LAB
A/G RATIO: 1.2 (ref 1.1–2.2)
ALBUMIN SERPL-MCNC: 3.9 G/DL (ref 3.4–5)
ALP BLD-CCNC: 84 U/L (ref 40–129)
ALT SERPL-CCNC: 66 U/L (ref 10–40)
ANION GAP SERPL CALCULATED.3IONS-SCNC: 15 MMOL/L (ref 3–16)
AST SERPL-CCNC: 32 U/L (ref 15–37)
BASOPHILS ABSOLUTE: 0 K/UL (ref 0–0.2)
BASOPHILS RELATIVE PERCENT: 0.3 %
BILIRUB SERPL-MCNC: <0.2 MG/DL (ref 0–1)
BUN BLDV-MCNC: 11 MG/DL (ref 7–20)
CALCIUM SERPL-MCNC: 10.3 MG/DL (ref 8.3–10.6)
CHLORIDE BLD-SCNC: 98 MMOL/L (ref 99–110)
CO2: 24 MMOL/L (ref 21–32)
CREAT SERPL-MCNC: 0.6 MG/DL (ref 0.6–1.1)
EOSINOPHILS ABSOLUTE: 0.3 K/UL (ref 0–0.6)
EOSINOPHILS RELATIVE PERCENT: 4 %
GFR AFRICAN AMERICAN: >60
GFR NON-AFRICAN AMERICAN: >60
GLOBULIN: 3.3 G/DL
GLUCOSE BLD-MCNC: 109 MG/DL (ref 70–99)
HCT VFR BLD CALC: 44.9 % (ref 36–48)
HEMOGLOBIN: 14.4 G/DL (ref 12–16)
LYMPHOCYTES ABSOLUTE: 3.4 K/UL (ref 1–5.1)
LYMPHOCYTES RELATIVE PERCENT: 50.4 %
MCH RBC QN AUTO: 27.7 PG (ref 26–34)
MCHC RBC AUTO-ENTMCNC: 32.1 G/DL (ref 31–36)
MCV RBC AUTO: 86.3 FL (ref 80–100)
MONOCYTES ABSOLUTE: 1 K/UL (ref 0–1.3)
MONOCYTES RELATIVE PERCENT: 14.8 %
NEUTROPHILS ABSOLUTE: 2.1 K/UL (ref 1.7–7.7)
NEUTROPHILS RELATIVE PERCENT: 30.5 %
PDW BLD-RTO: 14.4 % (ref 12.4–15.4)
PLATELET # BLD: 300 K/UL (ref 135–450)
PMV BLD AUTO: 9.8 FL (ref 5–10.5)
POTASSIUM SERPL-SCNC: 4.2 MMOL/L (ref 3.5–5.1)
RBC # BLD: 5.2 M/UL (ref 4–5.2)
SODIUM BLD-SCNC: 137 MMOL/L (ref 136–145)
TOTAL PROTEIN: 7.2 G/DL (ref 6.4–8.2)
TSH SERPL DL<=0.05 MIU/L-ACNC: 3.82 UIU/ML (ref 0.27–4.2)
WBC # BLD: 6.7 K/UL (ref 4–11)

## 2019-03-04 PROCEDURE — 99214 OFFICE O/P EST MOD 30 MIN: CPT | Performed by: INTERNAL MEDICINE

## 2019-03-04 PROCEDURE — 3014F SCREEN MAMMO DOC REV: CPT | Performed by: INTERNAL MEDICINE

## 2019-03-04 PROCEDURE — 36415 COLL VENOUS BLD VENIPUNCTURE: CPT | Performed by: INTERNAL MEDICINE

## 2019-03-04 PROCEDURE — G8417 CALC BMI ABV UP PARAM F/U: HCPCS | Performed by: INTERNAL MEDICINE

## 2019-03-04 PROCEDURE — G8482 FLU IMMUNIZE ORDER/ADMIN: HCPCS | Performed by: INTERNAL MEDICINE

## 2019-03-04 PROCEDURE — G8427 DOCREV CUR MEDS BY ELIG CLIN: HCPCS | Performed by: INTERNAL MEDICINE

## 2019-03-04 PROCEDURE — 3017F COLORECTAL CA SCREEN DOC REV: CPT | Performed by: INTERNAL MEDICINE

## 2019-03-04 PROCEDURE — 1036F TOBACCO NON-USER: CPT | Performed by: INTERNAL MEDICINE

## 2019-03-04 RX ORDER — DOXYCYCLINE HYCLATE 100 MG
100 TABLET ORAL 2 TIMES DAILY
Qty: 20 TABLET | Refills: 0 | Status: SHIPPED | OUTPATIENT
Start: 2019-03-04 | End: 2019-03-14

## 2019-03-04 RX ORDER — OMEPRAZOLE 20 MG/1
20 CAPSULE, DELAYED RELEASE ORAL
Qty: 30 CAPSULE | Refills: 2 | Status: SHIPPED | OUTPATIENT
Start: 2019-03-04 | End: 2021-06-29 | Stop reason: ALTCHOICE

## 2019-03-04 RX ORDER — PREDNISONE 20 MG/1
TABLET ORAL
Qty: 13 TABLET | Refills: 0 | Status: SHIPPED | OUTPATIENT
Start: 2019-03-04 | End: 2019-03-18 | Stop reason: ALTCHOICE

## 2019-03-04 RX ORDER — AMOXICILLIN AND CLAVULANATE POTASSIUM 875; 125 MG/1; MG/1
TABLET, FILM COATED ORAL 2 TIMES DAILY
Refills: 0 | COMMUNITY
Start: 2019-03-01 | End: 2019-03-10 | Stop reason: ALTCHOICE

## 2019-03-05 ENCOUNTER — TELEPHONE (OUTPATIENT)
Dept: INTERNAL MEDICINE CLINIC | Age: 54
End: 2019-03-05

## 2019-03-10 ASSESSMENT — ENCOUNTER SYMPTOMS
WHEEZING: 1
ABDOMINAL PAIN: 0
SHORTNESS OF BREATH: 1
COUGH: 1
VOICE CHANGE: 1
BLOOD IN STOOL: 0
SINUS PRESSURE: 0
RHINORRHEA: 0
NAUSEA: 1
DIARRHEA: 1

## 2019-03-18 ENCOUNTER — OFFICE VISIT (OUTPATIENT)
Dept: INTERNAL MEDICINE CLINIC | Age: 54
End: 2019-03-18
Payer: COMMERCIAL

## 2019-03-18 VITALS
HEART RATE: 81 BPM | SYSTOLIC BLOOD PRESSURE: 106 MMHG | WEIGHT: 275 LBS | OXYGEN SATURATION: 99 % | DIASTOLIC BLOOD PRESSURE: 74 MMHG | BODY MASS INDEX: 41.81 KG/M2 | TEMPERATURE: 97.9 F | RESPIRATION RATE: 16 BRPM

## 2019-03-18 DIAGNOSIS — R06.02 SOB (SHORTNESS OF BREATH): ICD-10-CM

## 2019-03-18 PROCEDURE — G8482 FLU IMMUNIZE ORDER/ADMIN: HCPCS | Performed by: INTERNAL MEDICINE

## 2019-03-18 PROCEDURE — G8427 DOCREV CUR MEDS BY ELIG CLIN: HCPCS | Performed by: INTERNAL MEDICINE

## 2019-03-18 PROCEDURE — 3014F SCREEN MAMMO DOC REV: CPT | Performed by: INTERNAL MEDICINE

## 2019-03-18 PROCEDURE — G8417 CALC BMI ABV UP PARAM F/U: HCPCS | Performed by: INTERNAL MEDICINE

## 2019-03-18 PROCEDURE — 3017F COLORECTAL CA SCREEN DOC REV: CPT | Performed by: INTERNAL MEDICINE

## 2019-03-18 PROCEDURE — 99213 OFFICE O/P EST LOW 20 MIN: CPT | Performed by: INTERNAL MEDICINE

## 2019-03-18 PROCEDURE — 1036F TOBACCO NON-USER: CPT | Performed by: INTERNAL MEDICINE

## 2019-03-18 RX ORDER — BUDESONIDE 0.5 MG/2ML
INHALANT ORAL
Qty: 360 ML | Refills: 6 | Status: SHIPPED | OUTPATIENT
Start: 2019-03-18 | End: 2021-05-28

## 2019-03-18 RX ORDER — ALBUTEROL SULFATE 90 UG/1
2 AEROSOL, METERED RESPIRATORY (INHALATION) 4 TIMES DAILY PRN
Qty: 3 INHALER | Refills: 1 | Status: SHIPPED | OUTPATIENT
Start: 2019-03-18 | End: 2021-05-25

## 2019-03-24 ASSESSMENT — ENCOUNTER SYMPTOMS
COUGH: 1
WHEEZING: 0
NAUSEA: 0
SINUS PRESSURE: 0
DIARRHEA: 0
VOMITING: 0
SHORTNESS OF BREATH: 1
ABDOMINAL PAIN: 0

## 2019-03-29 ENCOUNTER — APPOINTMENT (OUTPATIENT)
Dept: INTERVENTIONAL RADIOLOGY/VASCULAR | Age: 54
End: 2019-03-29
Attending: ANESTHESIOLOGY
Payer: COMMERCIAL

## 2019-03-29 ENCOUNTER — HOSPITAL ENCOUNTER (OUTPATIENT)
Age: 54
Setting detail: OUTPATIENT SURGERY
Discharge: HOME | End: 2019-03-29
Attending: ANESTHESIOLOGY | Admitting: ANESTHESIOLOGY
Payer: COMMERCIAL

## 2019-03-29 VITALS
SYSTOLIC BLOOD PRESSURE: 118 MMHG | HEIGHT: 68 IN | DIASTOLIC BLOOD PRESSURE: 59 MMHG | OXYGEN SATURATION: 97 % | HEART RATE: 74 BPM | TEMPERATURE: 97.2 F | RESPIRATION RATE: 16 BRPM | WEIGHT: 270 LBS | BODY MASS INDEX: 40.92 KG/M2

## 2019-03-29 PROCEDURE — 6360000002 HC RX W HCPCS: Performed by: ANESTHESIOLOGY

## 2019-03-29 PROCEDURE — 2709999900 HC NON-CHARGEABLE SUPPLY: Performed by: ANESTHESIOLOGY

## 2019-03-29 PROCEDURE — 3610000056 HC PAIN LEVEL 4 BASE (NON-OR): Performed by: ANESTHESIOLOGY

## 2019-03-29 PROCEDURE — 2500000003 HC RX 250 WO HCPCS: Performed by: ANESTHESIOLOGY

## 2019-03-29 RX ORDER — METHYLPREDNISOLONE ACETATE 80 MG/ML
INJECTION, SUSPENSION INTRA-ARTICULAR; INTRALESIONAL; INTRAMUSCULAR; SOFT TISSUE
Status: COMPLETED | OUTPATIENT
Start: 2019-03-29 | End: 2019-03-29

## 2019-03-29 RX ORDER — BUPIVACAINE HYDROCHLORIDE 5 MG/ML
INJECTION, SOLUTION EPIDURAL; INTRACAUDAL
Status: COMPLETED | OUTPATIENT
Start: 2019-03-29 | End: 2019-03-29

## 2019-03-29 RX ORDER — LIDOCAINE HYDROCHLORIDE 10 MG/ML
INJECTION, SOLUTION INFILTRATION; PERINEURAL
Status: COMPLETED | OUTPATIENT
Start: 2019-03-29 | End: 2019-03-29

## 2019-03-29 ASSESSMENT — PAIN - FUNCTIONAL ASSESSMENT: PAIN_FUNCTIONAL_ASSESSMENT: 0-10

## 2019-03-29 ASSESSMENT — PAIN DESCRIPTION - DESCRIPTORS: DESCRIPTORS: ACHING;CONSTANT

## 2019-03-29 ASSESSMENT — PAIN SCALES - GENERAL
PAINLEVEL_OUTOF10: 0
PAINLEVEL_OUTOF10: 0

## 2019-03-29 NOTE — OP NOTE
Patient:  Macie Mendes  YOB: 1965  Medical Record #:  [id number]   Place: [address] [facility]  Date:  3/29/2019   Physician:  Olivia Bobo MD      KNEE JOINT INJECTION    PRE-PROCEDURE DIAGNOSIS: bilateral knee joint generated pain (M53.3)    POST-PROCEDURE DIAGNOSIS:  bilateral knee joint generated pain (M53.3)    PROCEDURE:  bilateral knee joint injection with fluoroscopy. BRIEF HISTORY:  The patient returns today to the [facility] for scheduled knee joint injection procedure. The patient is clinically unchanged from my previous evaluation. The procedure was explained to the patient, and the previously distributed pre-procedure literature was reviewed. The options, rationale and benefits of the procedure, including functional improvement, pain relief, and increased mobility, as well as the risks of the procedure including but not limited to infection, bleeding, paresthesia, pain, failure to relieve pain, increased pain, headache, allergic reaction and neurologic impairment were discussed with the patient. Risks of corticosteroids were discussed with the patient and informed written consent was obtained from the patient. PROCEDURE NOTE:  The patient was positioned supine on the fluoroscopy table. The bilateral knee joint(s) was identified using AP fluoroscopy. The skin overlying thesacroiliac joint was widely prepped with chloraprep and draped in the usual sterile fashion. A 3.5  inch 22 gauge spinal needle was advanced in the AP view towards the knee joint. This was done under fluoroscopic guidance. Entry into the joint capsule was felt as well as verified via fluoroscopy in multiple views. Careful aspiration was negative for fluid and blood prior to that injection and afterwards. A total of 3 cc of injectate was injected. The injectate contained 1 cc of depomedrol 40 mg per cc and 2 cc of Bupivacaine 0.5%.  The injectate was injected in small increments while monitoring the patient. The patient tolerated the procedure well. There were no complications. The patient complained of no paresthesia during the injection. The needle was removed, the area was cleansed, and a Band-Aid was placed over the injection site. There were no complications. The patient tolerated the procedure well. The procedure was performed using local anesthesia. The patient was transferred with accompaniment to the ecovery area where the vital signs remained stable. There were no sensory or motor changes in the lower extremity. The patient was discharged accompanied with an escort with written instructions after fulfilling standard discharge criteria.   Follow-up instructions were given to the patient and are as follows:        Plan:  Return for Bilateral Hip Injections in 6 weeks    Office: [phone]

## 2019-03-29 NOTE — PROGRESS NOTES
Patient and responsible adult verbalized understanding of discharge instructions, sedation medication, and potential complications including pain. Patient instructed to call Doctor if complications occur.

## 2019-04-10 ENCOUNTER — HOSPITAL ENCOUNTER (OUTPATIENT)
Dept: NON INVASIVE DIAGNOSTICS | Age: 54
Discharge: HOME | End: 2019-04-10
Payer: COMMERCIAL

## 2019-04-10 DIAGNOSIS — R06.02 SOB (SHORTNESS OF BREATH): ICD-10-CM

## 2019-04-10 LAB
LV EF: 63 %
LVEF MODALITY: NORMAL

## 2019-04-10 PROCEDURE — 93306 TTE W/DOPPLER COMPLETE: CPT

## 2019-04-28 ENCOUNTER — HOSPITAL ENCOUNTER (OUTPATIENT)
Dept: GENERAL RADIOLOGY | Age: 54
Discharge: HOME | End: 2019-04-28
Payer: COMMERCIAL

## 2019-04-28 ENCOUNTER — HOSPITAL ENCOUNTER (OUTPATIENT)
Age: 54
Discharge: HOME | End: 2019-04-28
Payer: COMMERCIAL

## 2019-04-28 DIAGNOSIS — M25.559 PAIN IN JOINT INVOLVING PELVIC REGION AND THIGH, UNSPECIFIED LATERALITY: ICD-10-CM

## 2019-04-28 PROCEDURE — 73522 X-RAY EXAM HIPS BI 3-4 VIEWS: CPT

## 2019-05-07 RX ORDER — HYDROCODONE BITARTRATE AND ACETAMINOPHEN 5; 325 MG/1; MG/1
1 TABLET ORAL EVERY 6 HOURS PRN
COMMUNITY
End: 2020-08-20 | Stop reason: ALTCHOICE

## 2019-05-10 ENCOUNTER — HOSPITAL ENCOUNTER (OUTPATIENT)
Age: 54
Setting detail: OUTPATIENT SURGERY
Discharge: HOME | End: 2019-05-10
Attending: ANESTHESIOLOGY | Admitting: ANESTHESIOLOGY
Payer: COMMERCIAL

## 2019-05-10 ENCOUNTER — APPOINTMENT (OUTPATIENT)
Dept: INTERVENTIONAL RADIOLOGY/VASCULAR | Age: 54
End: 2019-05-10
Attending: ANESTHESIOLOGY
Payer: COMMERCIAL

## 2019-05-10 VITALS
BODY MASS INDEX: 39.25 KG/M2 | HEART RATE: 60 BPM | TEMPERATURE: 97.5 F | DIASTOLIC BLOOD PRESSURE: 63 MMHG | OXYGEN SATURATION: 95 % | HEIGHT: 69 IN | RESPIRATION RATE: 16 BRPM | WEIGHT: 265 LBS | SYSTOLIC BLOOD PRESSURE: 115 MMHG

## 2019-05-10 PROCEDURE — 2500000003 HC RX 250 WO HCPCS: Performed by: ANESTHESIOLOGY

## 2019-05-10 PROCEDURE — 6360000002 HC RX W HCPCS: Performed by: ANESTHESIOLOGY

## 2019-05-10 PROCEDURE — 3610000056 HC PAIN LEVEL 4 BASE (NON-OR): Performed by: ANESTHESIOLOGY

## 2019-05-10 PROCEDURE — 2709999900 HC NON-CHARGEABLE SUPPLY: Performed by: ANESTHESIOLOGY

## 2019-05-10 RX ORDER — METHYLPREDNISOLONE ACETATE 80 MG/ML
INJECTION, SUSPENSION INTRA-ARTICULAR; INTRALESIONAL; INTRAMUSCULAR; SOFT TISSUE
Status: COMPLETED | OUTPATIENT
Start: 2019-05-10 | End: 2019-05-10

## 2019-05-10 RX ORDER — LIDOCAINE HYDROCHLORIDE 10 MG/ML
INJECTION, SOLUTION INFILTRATION; PERINEURAL
Status: COMPLETED | OUTPATIENT
Start: 2019-05-10 | End: 2019-05-10

## 2019-05-10 RX ORDER — BUPIVACAINE HYDROCHLORIDE 5 MG/ML
INJECTION, SOLUTION EPIDURAL; INTRACAUDAL
Status: COMPLETED | OUTPATIENT
Start: 2019-05-10 | End: 2019-05-10

## 2019-05-10 ASSESSMENT — PAIN DESCRIPTION - ORIENTATION
ORIENTATION: RIGHT;LOWER
ORIENTATION: RIGHT;LOWER

## 2019-05-10 ASSESSMENT — PAIN - FUNCTIONAL ASSESSMENT: PAIN_FUNCTIONAL_ASSESSMENT: 0-10

## 2019-05-10 ASSESSMENT — PAIN SCALES - GENERAL
PAINLEVEL_OUTOF10: 3
PAINLEVEL_OUTOF10: 5

## 2019-05-10 ASSESSMENT — PAIN DESCRIPTION - DESCRIPTORS
DESCRIPTORS: SHARP

## 2019-05-10 ASSESSMENT — PAIN DESCRIPTION - PAIN TYPE
TYPE: CHRONIC PAIN
TYPE: CHRONIC PAIN

## 2019-05-10 ASSESSMENT — PAIN DESCRIPTION - LOCATION
LOCATION: BACK
LOCATION: BACK

## 2019-05-10 ASSESSMENT — PAIN DESCRIPTION - FREQUENCY: FREQUENCY: INTERMITTENT

## 2019-05-10 NOTE — H&P
Patient:  Mindy Martines  YOB: 1965  Medical Record #:  [id number]   Place: [address] [facility] [address]  Date:  5/10/2019   Physician:  Alicia Jacome MD    History Obtained From: electronic medical record    HISTORY OF PRESENT ILLNESS    Past Medical History:        Diagnosis Date    Anxiety     Asthma     currently was instructed to stop inhalers d/t to collapse of vocal cords    Diaphragm paralysis     right side    Enlarged liver     Fibromyalgia     GERD (gastroesophageal reflux disease)     Headache(784.0)     neck pain     History of ulcer disease     Kidney infection     Osteoarthritis     CHRONIC BACK PAIN/FIBROMYALGIA    Rheumatoid arthritis ([address]     Spasmodic dysphonia     voice very hoarse    Thyroid disease     2015 found spot on left side of thyroid, right side thyroid was removed     Past Surgical History:        Procedure Laterality Date    APPENDECTOMY  age 17   [facility] BREAST BIOPSY Right     x3  benign lesions     BREAST SURGERY Right needle localization right breast mass    BREAST SURGERY Right 10/22/15    riught breast mass excision/needle loc    CARPAL TUNNEL RELEASE Right 5/7/15    CARPAL TUNNEL RELEASE Left 5/28/15    Left carpal tunnel release      COLONOSCOPY      ENDOSCOPY, COLON, DIAGNOSTIC      EPIDURAL STEROID INJECTION N/A 2/1/2019    LUMBAR EPIDURAL STEROID INJECTION L3/4 performed by Vitor Ruiz MD at [address] [facility] NERV Bilateral 3/29/2019    INTRA ARTICULAR KNEE INJECTIONS performed by Vitor Ruiz MD at [address] [facility] ARTHROSCOPY Left 12/21/2017    LA NJX DX/THER SBST INTRLMNR CRV/THRC W/IMG GDN N/A 9/28/2018    EPIDURAL STEROID INJECTION C7-T1 [facility] DX/THER SBST INTRLMNR LMBR/SAC W/IMG GDN Right 11/9/2018    LUMBAR EPIDURAL STEROID INJECTION L3-4 performed by Vitor Ruiz MD at [address] Financial resource strain: Not on file    Food insecurity:     Worry: Not on file     Inability: Not on file    Transportation needs:     Medical: Not on file     Non-medical: Not on file   Tobacco Use    Smoking status: Former Smoker     Packs/day: 1.00     Years: 15.00     Pack years: 15.00     Types: Cigarettes     Last attempt to quit: 2003     Years since quittin.3    Smokeless tobacco: Never Used   Substance and Sexual Activity    Alcohol use: No     Alcohol/week: 0.0 oz    Drug use: No    Sexual activity: Never   Lifestyle    Physical activity:     Days per week: Not on file     Minutes per session: Not on file    Stress: Not on file   Relationships    Social connections:     Talks on phone: Not on file     Gets together: Not on file     Attends Mandaeism service: Not on file     Active member of club or organization: Not on file     Attends meetings of clubs or organizations: Not on file     Relationship status: Not on file    Intimate partner violence:     Fear of current or ex partner: Not on file     Emotionally abused: Not on file     Physically abused: Not on file     Forced sexual activity: Not on file   Other Topics Concern    Not on file   Social History Narrative    Not on file     Family History   Problem Relation Age of Onset    Asthma Father     Heart Disease Father     Emphysema Father     Breast Cancer Maternal Aunt         breast    Breast Cancer Maternal Aunt         breast    Migraines Mother     Cancer Mother 61        multiple mylenoma    Diabetes Sister     Migraines Sister     Breast Cancer Paternal Uncle         colon    Breast Cancer Maternal Cousin         breast         PHYSICAL EXAM:      /67   Pulse 61   Temp 97.6 °F (36.4 °C) (Temporal)   Resp 16   Ht 5' 8.5\" (1.74 m)   Wt 265 lb (120.2 kg)   LMP 2016 (Exact Date)   SpO2 97%   BMI 39.71 kg/m²  I            ASSESSMENT AND PLAN:    1. Procedure.  options, risks and benefits reviewed with patient and expresses understanding.

## 2019-05-10 NOTE — OP NOTE
Patient:  Shaheen Escalante  YOB: 1965  Medical Record #:  [id number]   Place:  [facility]  Date:  5/10/2019   Physician:  Anthony Fitzgerald MD      [facility] INJECTION    PRE-PROCEDURE DIAGNOSIS: Bilateral Trochanteric Bursitis (M70.61, M70.62)     POST-PROCEDURE DIAGNOSIS:  Bilateral Trochanteric Bursitis (M70.61, M70.62)     PROCEDURE:  bilateral trochanteric bursa injection with fluoroscopy. BRIEF HISTORY:  The patient returns today to the  [facility] for scheduled hip joint injection procedure. The patient is clinically unchanged from my previous evaluation. The procedure was explained to the patient, and the previously distributed pre-procedure literature was reviewed. The options, rationale and benefits of the procedure, including functional improvement, pain relief, and increased mobility, as well as the risks of the procedure including but not limited to infection, bleeding, paresthesia, pain, failure to relieve pain, increased pain, headache, allergic reaction and neurologic impairment were discussed with the patient. Risks of corticosteroids were discussed with the patient and informed written consent was obtained from the patient. PROCEDURE NOTE:  The patient was positioned supine on the fluoroscopy table. The bilateral  bilateral trochanteric bursas were  identified using AP fluoroscopy. The skin overlying the joint was widely prepped with chloraprep and draped in the usual sterile fashion. An AP projection was utilized. A 3.5  inch 22 gauge spinal needle was advanced in the AP view towards the bursa(s). This was done under fluoroscopic guidance. Entry into the bursa was felt as well as verified via fluoroscopy in multiple views. Careful aspiration was negativeprior to that injection and afterwards. A total of 3 cc of injectate was injected. The injectate contained 1 cc of depomedrol 40 mg per cc and 2 cc of Bupivacaine 0.5%.

## 2019-05-15 ENCOUNTER — HOSPITAL ENCOUNTER (OUTPATIENT)
Age: 54
Discharge: HOME | End: 2019-05-15
Payer: COMMERCIAL

## 2019-05-15 ENCOUNTER — HOSPITAL ENCOUNTER (OUTPATIENT)
Dept: GENERAL RADIOLOGY | Age: 54
Discharge: HOME | End: 2019-05-15
Payer: COMMERCIAL

## 2019-05-15 DIAGNOSIS — M25.562 CHRONIC PAIN OF LEFT KNEE: ICD-10-CM

## 2019-05-15 DIAGNOSIS — G89.29 CHRONIC PAIN OF LEFT KNEE: ICD-10-CM

## 2019-05-15 DIAGNOSIS — M25.561 CHRONIC PAIN OF RIGHT KNEE: ICD-10-CM

## 2019-05-15 DIAGNOSIS — G89.29 CHRONIC PAIN OF RIGHT KNEE: ICD-10-CM

## 2019-05-15 PROCEDURE — 73560 X-RAY EXAM OF KNEE 1 OR 2: CPT

## 2019-07-08 ENCOUNTER — OFFICE VISIT (OUTPATIENT)
Dept: ORTHOPEDIC SURGERY | Age: 54
End: 2019-07-08
Payer: COMMERCIAL

## 2019-07-08 VITALS — BODY MASS INDEX: 39.25 KG/M2 | TEMPERATURE: 97.4 F | HEIGHT: 69 IN | WEIGHT: 265 LBS | RESPIRATION RATE: 16 BRPM

## 2019-07-08 DIAGNOSIS — M25.562 PAIN IN BOTH KNEES, UNSPECIFIED CHRONICITY: Primary | ICD-10-CM

## 2019-07-08 DIAGNOSIS — M25.561 PAIN IN BOTH KNEES, UNSPECIFIED CHRONICITY: Primary | ICD-10-CM

## 2019-07-08 PROCEDURE — 3014F SCREEN MAMMO DOC REV: CPT | Performed by: PHYSICIAN ASSISTANT

## 2019-07-08 PROCEDURE — 3017F COLORECTAL CA SCREEN DOC REV: CPT | Performed by: PHYSICIAN ASSISTANT

## 2019-07-08 PROCEDURE — G8417 CALC BMI ABV UP PARAM F/U: HCPCS | Performed by: PHYSICIAN ASSISTANT

## 2019-07-08 PROCEDURE — 1036F TOBACCO NON-USER: CPT | Performed by: PHYSICIAN ASSISTANT

## 2019-07-08 PROCEDURE — G8427 DOCREV CUR MEDS BY ELIG CLIN: HCPCS | Performed by: PHYSICIAN ASSISTANT

## 2019-07-08 PROCEDURE — 99213 OFFICE O/P EST LOW 20 MIN: CPT | Performed by: PHYSICIAN ASSISTANT

## 2019-07-09 ENCOUNTER — OFFICE VISIT (OUTPATIENT)
Dept: ORTHOPEDIC SURGERY | Age: 54
End: 2019-07-09
Payer: COMMERCIAL

## 2019-07-09 ENCOUNTER — TELEPHONE (OUTPATIENT)
Dept: ORTHOPEDIC SURGERY | Age: 54
End: 2019-07-09

## 2019-07-09 VITALS
WEIGHT: 265 LBS | SYSTOLIC BLOOD PRESSURE: 118 MMHG | HEIGHT: 68 IN | DIASTOLIC BLOOD PRESSURE: 66 MMHG | BODY MASS INDEX: 40.16 KG/M2

## 2019-07-09 DIAGNOSIS — M21.162 ACQUIRED VARUS DEFORMITY KNEE, LEFT: ICD-10-CM

## 2019-07-09 DIAGNOSIS — M25.561 PAIN IN BOTH KNEES, UNSPECIFIED CHRONICITY: ICD-10-CM

## 2019-07-09 DIAGNOSIS — M25.562 PAIN IN BOTH KNEES, UNSPECIFIED CHRONICITY: ICD-10-CM

## 2019-07-09 DIAGNOSIS — F11.90 CHRONIC NARCOTIC USE: ICD-10-CM

## 2019-07-09 DIAGNOSIS — M17.0 PRIMARY OSTEOARTHRITIS OF BOTH KNEES: Primary | ICD-10-CM

## 2019-07-09 DIAGNOSIS — M94.9 CHONDRAL LESION: ICD-10-CM

## 2019-07-09 PROCEDURE — G8417 CALC BMI ABV UP PARAM F/U: HCPCS | Performed by: INTERNAL MEDICINE

## 2019-07-09 PROCEDURE — G8427 DOCREV CUR MEDS BY ELIG CLIN: HCPCS | Performed by: INTERNAL MEDICINE

## 2019-07-09 PROCEDURE — 3014F SCREEN MAMMO DOC REV: CPT | Performed by: INTERNAL MEDICINE

## 2019-07-09 PROCEDURE — 99243 OFF/OP CNSLTJ NEW/EST LOW 30: CPT | Performed by: INTERNAL MEDICINE

## 2019-07-09 NOTE — TELEPHONE ENCOUNTER
7/9/19  DME    -  NO PRECERT REQUIRED - BASED ON MEDICAL NECESSITY - $3500 DED/ MET - 90/10 AFTER DED MET - $0253 OOP/ $4527 MET - PER MISAEL - REF #[id number] -  NDS

## 2019-07-09 NOTE — PROGRESS NOTES
Chief Complaint:   Chief Complaint   Patient presents with    Knee Pain     NEW B KNEE PAIN          History of Present Illness:       Patient is a 49 y.o. female presents with the above complaint. The symptoms began 5 plus yearsago and started without an injury. The patient describes a dull, aching, stiffness pain that does not radiate. The symptoms are intermittent  and are are worsening since the onset. She is on chronic narcotic analgesia supervised by neurology related to cervical and lumbar spine conditions. She is seen in consultation at the request of Dr. Marilee Hairston and Leelee GARCIA for evaluation for biologic orthopedic injection in the management of knee osteoarthritis. Left knee is more problematic than right knee and she is actively limping at times favoring the left knee. Most recent work-up inclusive of MRI evaluation bilateral knees outlined below in detail. Of significance there is notation of high-grade partial-thickness cartilage defect involving the weightbearing medial femoral condyle and along the medial aspect of the lateral femoral condyle and a full-thickness cartilage defect along the medial aspect of the lateral femoral condyle on the left. Pain localizes to the front and medial side of the knee on the left and more generalized on the right does not seems to follow a typical patella femoral provacative pattern. There are mechanical symptoms of catching on the left that is sporadic associated with pain and localizing to the joint line region. The patient denies subjective instability about the knee and denies new onset weakness of the lower extremity. Pain level 10    The patient admits to a pattern of activity related swelling. Treatment to date: Surgery left knee partial meniscectomy 2017 with mild improvement. There is no prior history of knee trauma.     There is no prior history of autoimmune disease, crystal arthropathy, or crystal

## 2019-07-12 ENCOUNTER — TELEPHONE (OUTPATIENT)
Dept: ORTHOPEDIC SURGERY | Age: 54
End: 2019-07-12

## 2019-07-24 ENCOUNTER — TELEPHONE (OUTPATIENT)
Dept: ORTHOPEDIC SURGERY | Age: 54
End: 2019-07-24

## 2019-08-06 ENCOUNTER — OFFICE VISIT (OUTPATIENT)
Dept: ORTHOPEDIC SURGERY | Age: 54
End: 2019-08-06
Payer: COMMERCIAL

## 2019-08-06 VITALS — BODY MASS INDEX: 40.16 KG/M2 | HEIGHT: 68 IN | WEIGHT: 265 LBS

## 2019-08-06 DIAGNOSIS — E89.0 POSTOPERATIVE HYPOTHYROIDISM: ICD-10-CM

## 2019-08-06 DIAGNOSIS — M17.0 PRIMARY OSTEOARTHRITIS OF BOTH KNEES: Primary | ICD-10-CM

## 2019-08-06 DIAGNOSIS — M21.162 ACQUIRED VARUS DEFORMITY KNEE, LEFT: ICD-10-CM

## 2019-08-06 DIAGNOSIS — M94.9 CHONDRAL LESION: ICD-10-CM

## 2019-08-06 PROCEDURE — 3014F SCREEN MAMMO DOC REV: CPT | Performed by: INTERNAL MEDICINE

## 2019-08-06 PROCEDURE — G8417 CALC BMI ABV UP PARAM F/U: HCPCS | Performed by: INTERNAL MEDICINE

## 2019-08-06 PROCEDURE — 99212 OFFICE O/P EST SF 10 MIN: CPT | Performed by: INTERNAL MEDICINE

## 2019-08-06 PROCEDURE — 20611 DRAIN/INJ JOINT/BURSA W/US: CPT | Performed by: INTERNAL MEDICINE

## 2019-08-06 PROCEDURE — 3017F COLORECTAL CA SCREEN DOC REV: CPT | Performed by: INTERNAL MEDICINE

## 2019-08-06 PROCEDURE — G8427 DOCREV CUR MEDS BY ELIG CLIN: HCPCS | Performed by: INTERNAL MEDICINE

## 2019-08-06 PROCEDURE — 1036F TOBACCO NON-USER: CPT | Performed by: INTERNAL MEDICINE

## 2019-08-06 RX ORDER — LEVOTHYROXINE SODIUM 0.12 MG/1
125 TABLET ORAL DAILY
Qty: 90 TABLET | Refills: 1 | Status: SHIPPED | OUTPATIENT
Start: 2019-08-06 | End: 2020-02-14

## 2019-08-06 NOTE — PROGRESS NOTES
Administrations This Visit     Hylan injection 48 mg     Admin Date  08/06/2019  11:52 Action  Given Dose  48 mg Route  Intra-articular Site  Knee Right Administered By  Sahara Jimenes ATC    Ordering Provider:  Jenny Almaraz MD    NDC:  [address]-1    Lot#:  [id number]    :  Meme Ramos    Patient Supplied?:  No    Admin Date  08/06/2019  11:52 Action  Given Dose  48 mg Route  Intra-articular Site  Knee Left Administered By  Sahara Jimenes ATC    Ordering Provider:  Jenny Almaraz MD    NDC:  [address]-5    Lot#:  [id number]    :  Valentine Calbc    Patient Supplied?:  No

## 2019-09-29 ENCOUNTER — APPOINTMENT (OUTPATIENT)
Dept: GENERAL RADIOLOGY | Age: 54
End: 2019-09-29
Payer: COMMERCIAL

## 2019-09-29 ENCOUNTER — HOSPITAL ENCOUNTER (EMERGENCY)
Age: 54
Discharge: HOME | End: 2019-09-29
Attending: EMERGENCY MEDICINE
Payer: COMMERCIAL

## 2019-09-29 VITALS
WEIGHT: 276.9 LBS | OXYGEN SATURATION: 97 % | RESPIRATION RATE: 16 BRPM | BODY MASS INDEX: 41.97 KG/M2 | HEIGHT: 68 IN | TEMPERATURE: 97.6 F | HEART RATE: 95 BPM | DIASTOLIC BLOOD PRESSURE: 59 MMHG | SYSTOLIC BLOOD PRESSURE: 113 MMHG
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
## 2019-09-29 PROCEDURE — 6370000000 HC RX 637 (ALT 250 FOR IP): Performed by: EMERGENCY MEDICINE

## 2019-09-29 PROCEDURE — 99283 EMERGENCY DEPT VISIT LOW MDM: CPT

## 2019-09-29 PROCEDURE — 73120 X-RAY EXAM OF HAND: CPT

## 2019-09-29 RX ORDER — AMOXICILLIN AND CLAVULANATE POTASSIUM 875; 125 MG/1; MG/1
1 TABLET, FILM COATED ORAL ONCE
Status: COMPLETED | OUTPATIENT
Start: 2019-09-29 | End: 2019-09-29

## 2019-09-29 RX ORDER — AMOXICILLIN AND CLAVULANATE POTASSIUM 875; 125 MG/1; MG/1
1 TABLET, FILM COATED ORAL 2 TIMES DAILY
Qty: 20 TABLET | Refills: 0 | Status: SHIPPED | OUTPATIENT
Start: 2019-09-29 | End: 2019-10-09

## 2019-09-29 RX ORDER — AMOXICILLIN AND CLAVULANATE POTASSIUM 562.5; 437.5; 62.5 MG/1; MG/1; MG/1
1 TABLET, FILM COATED, EXTENDED RELEASE ORAL ONCE
Status: DISCONTINUED | OUTPATIENT
Start: 2019-09-29 | End: 2019-09-29

## 2019-09-29 RX ADMIN — AMOXICILLIN AND CLAVULANATE POTASSIUM 1 TABLET: 875; 125 TABLET, FILM COATED ORAL at 02:30

## 2019-09-29 ASSESSMENT — PAIN DESCRIPTION - PROGRESSION: CLINICAL_PROGRESSION: GRADUALLY IMPROVING

## 2019-09-29 ASSESSMENT — PAIN DESCRIPTION - PAIN TYPE: TYPE: ACUTE PAIN

## 2019-09-29 ASSESSMENT — ENCOUNTER SYMPTOMS
ABDOMINAL DISTENTION: 0
PHOTOPHOBIA: 0
SHORTNESS OF BREATH: 0
COUGH: 0
STRIDOR: 0
EYE ITCHING: 0
EYE DISCHARGE: 0
APNEA: 0
CHOKING: 0
EYE PAIN: 0
CHEST TIGHTNESS: 0
WHEEZING: 0
EYE REDNESS: 0

## 2019-09-29 ASSESSMENT — PAIN DESCRIPTION - FREQUENCY: FREQUENCY: CONTINUOUS

## 2019-09-29 ASSESSMENT — PAIN DESCRIPTION - DESCRIPTORS: DESCRIPTORS: TINGLING

## 2019-09-29 ASSESSMENT — PAIN DESCRIPTION - ORIENTATION: ORIENTATION: RIGHT

## 2019-09-29 ASSESSMENT — PAIN SCALES - GENERAL
PAINLEVEL_OUTOF10: 1
PAINLEVEL_OUTOF10: 0

## 2019-10-08 ENCOUNTER — TELEPHONE (OUTPATIENT)
Dept: INTERNAL MEDICINE CLINIC | Age: 54
End: 2019-10-08

## 2019-10-14 ENCOUNTER — OFFICE VISIT (OUTPATIENT)
Dept: INTERNAL MEDICINE CLINIC | Age: 54
End: 2019-10-14
Payer: COMMERCIAL

## 2019-10-14 VITALS
TEMPERATURE: 97.8 F | HEIGHT: 68 IN | OXYGEN SATURATION: 98 % | SYSTOLIC BLOOD PRESSURE: 126 MMHG | DIASTOLIC BLOOD PRESSURE: 80 MMHG | BODY MASS INDEX: 40.47 KG/M2 | WEIGHT: 267 LBS | HEART RATE: 84 BPM
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
## 2019-10-14 DIAGNOSIS — G62.9 NEUROPATHY: ICD-10-CM

## 2019-10-14 PROCEDURE — 3014F SCREEN MAMMO DOC REV: CPT | Performed by: INTERNAL MEDICINE

## 2019-10-14 PROCEDURE — 99213 OFFICE O/P EST LOW 20 MIN: CPT | Performed by: INTERNAL MEDICINE

## 2019-10-14 PROCEDURE — G8417 CALC BMI ABV UP PARAM F/U: HCPCS | Performed by: INTERNAL MEDICINE

## 2019-10-14 PROCEDURE — 3017F COLORECTAL CA SCREEN DOC REV: CPT | Performed by: INTERNAL MEDICINE

## 2019-10-14 PROCEDURE — G8484 FLU IMMUNIZE NO ADMIN: HCPCS | Performed by: INTERNAL MEDICINE

## 2019-10-14 PROCEDURE — 1036F TOBACCO NON-USER: CPT | Performed by: INTERNAL MEDICINE

## 2019-10-14 PROCEDURE — G8427 DOCREV CUR MEDS BY ELIG CLIN: HCPCS | Performed by: INTERNAL MEDICINE

## 2019-10-14 RX ORDER — METHYLPREDNISOLONE 4 MG/1
TABLET ORAL
Qty: 1 KIT | Refills: 0 | Status: SHIPPED | OUTPATIENT
Start: 2019-10-14 | End: 2019-10-20

## 2019-10-14 ASSESSMENT — PATIENT HEALTH QUESTIONNAIRE - PHQ9
SUM OF ALL RESPONSES TO PHQ QUESTIONS 1-9: 0
SUM OF ALL RESPONSES TO PHQ QUESTIONS 1-9: 0
1. LITTLE INTEREST OR PLEASURE IN DOING THINGS: 0
2. FEELING DOWN, DEPRESSED OR HOPELESS: 0
SUM OF ALL RESPONSES TO PHQ9 QUESTIONS 1 & 2: 0

## 2019-10-20 ASSESSMENT — ENCOUNTER SYMPTOMS
ABDOMINAL PAIN: 0
COUGH: 0
NAUSEA: 0
VOMITING: 0
DIARRHEA: 0
SHORTNESS OF BREATH: 0

## 2019-11-08 ENCOUNTER — OFFICE VISIT (OUTPATIENT)
Dept: ORTHOPEDIC SURGERY | Age: 54
End: 2019-11-08
Payer: COMMERCIAL

## 2019-11-08 VITALS
WEIGHT: 267 LBS | RESPIRATION RATE: 16 BRPM | BODY MASS INDEX: 40.47 KG/M2 | SYSTOLIC BLOOD PRESSURE: 125 MMHG | HEIGHT: 68 IN | DIASTOLIC BLOOD PRESSURE: 71 MMHG | HEART RATE: 58 BPM

## 2019-11-08 DIAGNOSIS — M19.049 LOCALIZED PRIMARY CARPOMETACARPAL OSTEOARTHROSIS, UNSPECIFIED LATERALITY: Primary | ICD-10-CM

## 2019-11-08 PROCEDURE — 3017F COLORECTAL CA SCREEN DOC REV: CPT | Performed by: ORTHOPAEDIC SURGERY

## 2019-11-08 PROCEDURE — 99214 OFFICE O/P EST MOD 30 MIN: CPT | Performed by: ORTHOPAEDIC SURGERY

## 2019-11-08 PROCEDURE — G8484 FLU IMMUNIZE NO ADMIN: HCPCS | Performed by: ORTHOPAEDIC SURGERY

## 2019-11-08 PROCEDURE — G9899 SCRN MAM PERF RSLTS DOC: HCPCS | Performed by: ORTHOPAEDIC SURGERY

## 2019-11-08 PROCEDURE — G8417 CALC BMI ABV UP PARAM F/U: HCPCS | Performed by: ORTHOPAEDIC SURGERY

## 2019-11-08 PROCEDURE — 1036F TOBACCO NON-USER: CPT | Performed by: ORTHOPAEDIC SURGERY

## 2019-11-08 PROCEDURE — G8427 DOCREV CUR MEDS BY ELIG CLIN: HCPCS | Performed by: ORTHOPAEDIC SURGERY

## 2019-11-22 ENCOUNTER — APPOINTMENT (OUTPATIENT)
Dept: INTERVENTIONAL RADIOLOGY/VASCULAR | Age: 54
End: 2019-11-22
Attending: ANESTHESIOLOGY
Payer: COMMERCIAL

## 2019-11-22 ENCOUNTER — HOSPITAL ENCOUNTER (OUTPATIENT)
Age: 54
Setting detail: OUTPATIENT SURGERY
Discharge: HOME | End: 2019-11-22
Attending: ANESTHESIOLOGY | Admitting: ANESTHESIOLOGY
Payer: COMMERCIAL

## 2019-11-22 VITALS
DIASTOLIC BLOOD PRESSURE: 67 MMHG | TEMPERATURE: 97 F | HEART RATE: 57 BPM | RESPIRATION RATE: 16 BRPM | SYSTOLIC BLOOD PRESSURE: 119 MMHG | OXYGEN SATURATION: 98 %

## 2019-11-22 PROCEDURE — 6360000004 HC RX CONTRAST MEDICATION: Performed by: ANESTHESIOLOGY

## 2019-11-22 PROCEDURE — 2709999900 HC NON-CHARGEABLE SUPPLY: Performed by: ANESTHESIOLOGY

## 2019-11-22 PROCEDURE — 6360000002 HC RX W HCPCS: Performed by: ANESTHESIOLOGY

## 2019-11-22 PROCEDURE — 3610000054 HC PAIN LEVEL 3 BASE (NON-OR): Performed by: ANESTHESIOLOGY

## 2019-11-22 RX ORDER — METHYLPREDNISOLONE ACETATE 80 MG/ML
INJECTION, SUSPENSION INTRA-ARTICULAR; INTRALESIONAL; INTRAMUSCULAR; SOFT TISSUE
Status: COMPLETED | OUTPATIENT
Start: 2019-11-22 | End: 2019-11-22

## 2019-11-22 ASSESSMENT — PAIN DESCRIPTION - DESCRIPTORS
DESCRIPTORS: ACHING
DESCRIPTORS: ACHING
DESCRIPTORS: BURNING;TINGLING
DESCRIPTORS: ACHING;NAGGING;THROBBING

## 2019-11-22 ASSESSMENT — PAIN - FUNCTIONAL ASSESSMENT
PAIN_FUNCTIONAL_ASSESSMENT: PREVENTS OR INTERFERES SOME ACTIVE ACTIVITIES AND ADLS
PAIN_FUNCTIONAL_ASSESSMENT: 0-10
PAIN_FUNCTIONAL_ASSESSMENT: PREVENTS OR INTERFERES SOME ACTIVE ACTIVITIES AND ADLS
PAIN_FUNCTIONAL_ASSESSMENT: PREVENTS OR INTERFERES SOME ACTIVE ACTIVITIES AND ADLS

## 2019-11-22 ASSESSMENT — PAIN DESCRIPTION - PAIN TYPE
TYPE: CHRONIC PAIN
TYPE: CHRONIC PAIN

## 2019-11-22 ASSESSMENT — PAIN DESCRIPTION - PROGRESSION
CLINICAL_PROGRESSION: GRADUALLY IMPROVING
CLINICAL_PROGRESSION: GRADUALLY IMPROVING

## 2019-11-22 ASSESSMENT — PAIN DESCRIPTION - ONSET
ONSET: ON-GOING
ONSET: ON-GOING

## 2019-11-22 ASSESSMENT — PAIN DESCRIPTION - LOCATION
LOCATION: NECK
LOCATION: NECK

## 2019-11-22 ASSESSMENT — PAIN DESCRIPTION - FREQUENCY
FREQUENCY: CONTINUOUS
FREQUENCY: CONTINUOUS

## 2019-11-22 ASSESSMENT — PAIN SCALES - GENERAL
PAINLEVEL_OUTOF10: 6
PAINLEVEL_OUTOF10: 4

## 2019-11-22 ASSESSMENT — PAIN DESCRIPTION - ORIENTATION
ORIENTATION: MID
ORIENTATION: MID

## 2019-12-06 ENCOUNTER — APPOINTMENT (OUTPATIENT)
Dept: INTERVENTIONAL RADIOLOGY/VASCULAR | Age: 54
End: 2019-12-06
Attending: ANESTHESIOLOGY
Payer: COMMERCIAL

## 2019-12-06 ENCOUNTER — HOSPITAL ENCOUNTER (OUTPATIENT)
Age: 54
Setting detail: OUTPATIENT SURGERY
Discharge: HOME | End: 2019-12-06
Attending: ANESTHESIOLOGY | Admitting: ANESTHESIOLOGY
Payer: COMMERCIAL

## 2019-12-06 VITALS
SYSTOLIC BLOOD PRESSURE: 113 MMHG | TEMPERATURE: 97.1 F | DIASTOLIC BLOOD PRESSURE: 59 MMHG | HEART RATE: 64 BPM | OXYGEN SATURATION: 97 % | WEIGHT: 267 LBS | BODY MASS INDEX: 40.47 KG/M2 | HEIGHT: 68 IN | RESPIRATION RATE: 16 BRPM

## 2019-12-06 PROCEDURE — 2709999900 HC NON-CHARGEABLE SUPPLY: Performed by: ANESTHESIOLOGY

## 2019-12-06 PROCEDURE — 6360000004 HC RX CONTRAST MEDICATION: Performed by: ANESTHESIOLOGY

## 2019-12-06 PROCEDURE — 3610000056 HC PAIN LEVEL 4 BASE (NON-OR): Performed by: ANESTHESIOLOGY

## 2019-12-06 PROCEDURE — 2500000003 HC RX 250 WO HCPCS: Performed by: ANESTHESIOLOGY

## 2019-12-06 PROCEDURE — 6360000002 HC RX W HCPCS: Performed by: ANESTHESIOLOGY

## 2019-12-06 RX ORDER — LIDOCAINE HYDROCHLORIDE 10 MG/ML
INJECTION, SOLUTION EPIDURAL; INFILTRATION; INTRACAUDAL; PERINEURAL
Status: COMPLETED | OUTPATIENT
Start: 2019-12-06 | End: 2019-12-06

## 2019-12-06 RX ORDER — BUPIVACAINE HYDROCHLORIDE 5 MG/ML
INJECTION, SOLUTION EPIDURAL; INTRACAUDAL
Status: COMPLETED | OUTPATIENT
Start: 2019-12-06 | End: 2019-12-06

## 2019-12-06 RX ORDER — METHYLPREDNISOLONE ACETATE 80 MG/ML
INJECTION, SUSPENSION INTRA-ARTICULAR; INTRALESIONAL; INTRAMUSCULAR; SOFT TISSUE
Status: COMPLETED | OUTPATIENT
Start: 2019-12-06 | End: 2019-12-06

## 2019-12-06 ASSESSMENT — PAIN DESCRIPTION - PAIN TYPE: TYPE: CHRONIC PAIN

## 2019-12-06 ASSESSMENT — PAIN SCALES - GENERAL
PAINLEVEL_OUTOF10: 0
PAINLEVEL_OUTOF10: 3

## 2019-12-06 ASSESSMENT — PAIN DESCRIPTION - DESCRIPTORS: DESCRIPTORS: ACHING

## 2019-12-06 ASSESSMENT — PAIN - FUNCTIONAL ASSESSMENT: PAIN_FUNCTIONAL_ASSESSMENT: 0-10

## 2020-01-02 ENCOUNTER — HOSPITAL ENCOUNTER (OUTPATIENT)
Age: 55
Discharge: HOME | End: 2020-01-02
Payer: COMMERCIAL

## 2020-01-02 ENCOUNTER — HOSPITAL ENCOUNTER (OUTPATIENT)
Dept: GENERAL RADIOLOGY | Age: 55
Discharge: HOME | End: 2020-01-02
Payer: COMMERCIAL

## 2020-01-02 PROCEDURE — 73502 X-RAY EXAM HIP UNI 2-3 VIEWS: CPT

## 2020-01-07 ENCOUNTER — HOSPITAL ENCOUNTER (OUTPATIENT)
Dept: WOMENS IMAGING | Age: 55
Discharge: HOME | End: 2020-01-07
Payer: COMMERCIAL

## 2020-01-07 PROCEDURE — 77067 SCR MAMMO BI INCL CAD: CPT

## 2020-01-29 ENCOUNTER — OFFICE VISIT (OUTPATIENT)
Dept: PULMONOLOGY | Age: 55
End: 2020-01-29
Payer: COMMERCIAL

## 2020-01-29 VITALS
SYSTOLIC BLOOD PRESSURE: 124 MMHG | DIASTOLIC BLOOD PRESSURE: 70 MMHG | RESPIRATION RATE: 16 BRPM | HEART RATE: 68 BPM | TEMPERATURE: 97.4 F | BODY MASS INDEX: 41.22 KG/M2 | HEIGHT: 68 IN | OXYGEN SATURATION: 97 % | WEIGHT: 272 LBS

## 2020-01-29 PROCEDURE — 3017F COLORECTAL CA SCREEN DOC REV: CPT | Performed by: INTERNAL MEDICINE

## 2020-01-29 PROCEDURE — G8427 DOCREV CUR MEDS BY ELIG CLIN: HCPCS | Performed by: INTERNAL MEDICINE

## 2020-01-29 PROCEDURE — 99214 OFFICE O/P EST MOD 30 MIN: CPT | Performed by: INTERNAL MEDICINE

## 2020-01-29 PROCEDURE — G8484 FLU IMMUNIZE NO ADMIN: HCPCS | Performed by: INTERNAL MEDICINE

## 2020-01-29 PROCEDURE — G9899 SCRN MAM PERF RSLTS DOC: HCPCS | Performed by: INTERNAL MEDICINE

## 2020-01-29 PROCEDURE — G8417 CALC BMI ABV UP PARAM F/U: HCPCS | Performed by: INTERNAL MEDICINE

## 2020-01-29 PROCEDURE — 1036F TOBACCO NON-USER: CPT | Performed by: INTERNAL MEDICINE

## 2020-01-29 NOTE — LETTER
[facility]   [address] [facility] [facility]  Phone: [phone]  Fax: [phone]     1/30/2020    Dr. Pauly Tello MD    I have seen your patient, Mayra Gao on follow up. Here is the assessment and plan for your patient. Any question, please do not hesitate to call. Problem List Items Addressed This Visit     SOB (shortness of breath) - Primary    Relevant Orders    XR CHEST STANDARD (2 VW)    Moderate persistent asthma without complication     She is having increasing shortness of breath with exertion. Last pulmonary function test demonstrated some restriction. This is thought to be secondary to paralyzed diaphragm. No signs of interstitial lung disease at that time. Repeat chest x-ray now to assess shortness of breath. Reviewed medications, she is using budesonide only 3 times weekly instead of twice daily. It is not clear where this confusion occurred and why this regimen. She will now increase her budesonide to 2 treatments in the day. Before proceeding with more extensive work-up, assess effect of taking proper medication regimen. Last pulmonary function test was not consistent with asthma. Therefore, if shortness of breath does not improve with twice daily budesonide,  Methacholine challenge will be ordered. We discussed the effect of obesity on paralyzed diaphragm and shortness of breath. She expressed understanding for this. Diaphragm paralysis     Assess further with chest x-ray. No intervention. Again, discussed correlation between shortness of breath obesity and paralyzed diaphragm. AR (allergic rhinitis)     Currently controlled.   She can also use the nebulized budesonide through the nose to help control her allergic rhinitis                 Sincerely,    Ryan Liu [facility]  [phone]

## 2020-01-29 NOTE — PROGRESS NOTES
REASON FOR CONSULTATION/CC: shortness of breath       CONSULTING PHYSICIAN: Cait Márquez MD   PCP: Cait Márquez MD    HISTORY OF PRESENT ILLNESS: Naty Foley is a 48y.o. year old female with a history of smoking. who presents increase dyspnea for the last year. She complains of increased shortness of breath. States not able to climb the stairs. No wheezing cough or phlegm       Has nebulizer but not clear what is in it. Only using 2-3 times per week. She believes it is budesonide. No sinus drainage / congestion.            PAST MEDICAL HISTORY:  Past Medical History:   Diagnosis Date    Anxiety     Asthma     currently was instructed to stop inhalers d/t to collapse of vocal cords    Diaphragm paralysis     right side    Enlarged liver     Fibromyalgia     GERD (gastroesophageal reflux disease)     Headache(784.0)     neck pain     History of ulcer disease     Kidney infection     Osteoarthritis     CHRONIC BACK PAIN/FIBROMYALGIA    Rheumatoid arthritis (HCC)     Spasmodic dysphonia     voice very hoarse    Thyroid disease     2015 found spot on left side of thyroid, right side thyroid was removed       PAST SURGICAL HISTORY:  Past Surgical History:   Procedure Laterality Date    APPENDECTOMY  age 17   [facility] BREAST BIOPSY Right     x3  benign lesions     BREAST SURGERY Right needle localization right breast mass    BREAST SURGERY Right 10/22/15    [facility] breast mass excision/needle loc    CARPAL TUNNEL RELEASE Right 5/7/15    CARPAL TUNNEL RELEASE Left 5/28/15    Left carpal tunnel release      COLONOSCOPY      ENDOSCOPY, COLON, DIAGNOSTIC      EPIDURAL STEROID INJECTION N/A 2/1/2019    LUMBAR EPIDURAL STEROID INJECTION L3/4 performed by Brianna Duvall MD at [address] [place] [address] N/A 11/22/2019    CERVICAL EPIDURAL STEROID INJECTION C7-T1 performed by Brianna Duvall MD at [address] Bilateral 3/29/2019    INTRA ARTICULAR KNEE INJECTIONS performed by Elvira Chapin MD at [address] [facility] NERV Bilateral 12/6/2019    BILATERAL INTRAARTICULAR KNEE INJECTION performed by El[facility]ira Chapin MD at [facility] ARTHROSCOPY Left 12/21/2017    PA ARTHROCENTESIS ASPIR&/INJ MAJOR JT/BURSA W/O US Bilateral 5/10/2019    GREATER TROCHANTERIC BURSA INJECTIONS performed by Elvira Chapin MD at [facility] DX/THER SBST INTRLMNR CRV/THRC W/IMG GDN N/A 9/28/2018    EPIDURAL STEROID INJECTION C7-T1 performed by Elvira Chapin MD at [facility] 96 DX/THER SBST INTRLMNR LMBR/SAC W/IMG GDN Right 11/9/2018    LUMBAR EPIDURAL STEROID INJECTION L3-4 performed by Elvira Chapin MD at [facility]      removed right side of thyroid    THYROIDECTOMY, PARTIAL      TUBAL LIGATION  1992       FAMILY HISTORY:  family history includes Asthma in her father; Breast Cancer in her maternal aunt, maternal aunt, maternal cousin, and paternal uncle; Cancer (age of onset: 61) in her mother; Diabetes in her sister; Emphysema in her father; Heart Disease in her father; Migraines in her mother and sister. SOCIAL HISTORY:   reports that she quit smoking about 17 years ago. Her smoking use included cigarettes. She has a 15.00 pack-year smoking history. She has never used smokeless tobacco.    ALLERGIES:  Patient is allergic to other.     REVIEW OF SYSTEMS:  Constitutional: Negative for fever  HENT: - for sore throat, hoarseness with exertion   Eyes: Negative for redness   Respiratory: + for dyspnea, no cough no wheezing   Cardiovascular: Negative for chest pain (back pain)  Gastrointestinal: Negative for vomiting, diarrhea      Skin: Negative for rash  Neurological: Negative for syncope  Hematological: Negative for adenopathy  Psychiatric/Behavorial: Negative for anxiety    Objective:   PHYSICAL EXAM:  Blood pressure 124/70, pulse 68, temperature 97.4 °F (36.3 °C), temperature source Oral, resp. rate 16, height 5' 8\" (1.727 m), weight 272 lb (123.4 kg), last menstrual period 02/18/2016, SpO2 97 %, not currently breastfeeding.'  Gen: No distress. ENT:   Resp: No accessory muscle use. No crackles. No wheezes. No rhonchi. CV: Regular rate. Regular rhythm. No murmur or rub. No edema. Skin: Warm, dry, normal texture and turgor. No nodule on exposed extremities. M/S: No cyanosis. No clubbing. No joint deformity. Psych: Oriented x 3. No anxiety. Awake. Alert. Intact judgement and insight. Good Mood / Affect. Memory appears in tact     Current Outpatient Medications on File Prior to Visit   Medication Sig Dispense Refill    levothyroxine (SYNTHROID) 125 MCG tablet TAKE 1 TABLET BY MOUTH DAILY 90 tablet 1    diclofenac sodium (VOLTAREN) 1 % GEL Apply 2-4 gm to affected area tid for 2 weeks then bid prn thereafter 3 Tube 1    HYDROcodone-acetaminophen (NORCO) 5-325 MG per tablet Take 1 tablet by mouth every 6 hours as needed for Pain.  budesonide (PULMICORT) 0.5 MG/2ML nebulizer suspension TAKE 1 VIAL VIA NEBULIZER TWICE DAILY 360 mL 6    albuterol sulfate  (90 Base) MCG/ACT inhaler Inhale 2 puffs into the lungs 4 times daily as needed for Wheezing 3 Inhaler 1    omeprazole (PRILOSEC) 20 MG delayed release capsule Take 1 capsule by mouth every morning (before breakfast) 30 capsule 2    ondansetron (ZOFRAN) 4 MG tablet TAKE 1 TABLET BY MOUTH DAILY AS NEEDED FOR NAUSEA OR VOMITING 30 tablet 0    ibuprofen (ADVIL;MOTRIN) 200 MG tablet Take 400 mg by mouth every 6 hours as needed for Pain      traMADol (ULTRAM) 50 MG tablet Take 50 mg by mouth 2 times daily.  Matias Matthews Cholecalciferol (VITAMIN D) 2000 units CAPS capsule Take by mouth      gabapentin (NEURONTIN) 300 MG capsule TWICE IN AM ONCE IN PM  1    Respiratory Therapy Supplies (NEBULIZER/TUBING/MOUTHPIECE) KIT 1 kit by Does not apply route daily as needed 1 kit 0     Current Facility-Administered Medications on File Prior to Visit   Medication Dose Route Frequency Provider Last Rate Last Dose    technetium sulfur colloid egg (NYCOMED-SC) solution 500 micro curie  500 micro curie Intravenous ONCE PRN Alexi Quijano MD         Data Reviewed:   CBC and Renal reviewed  Last CBC  Lab Results   Component Value Date    WBC 6.7 03/04/2019    RBC 5.20 03/04/2019    HGB 14.4 03/04/2019    MCV 86.3 03/04/2019     03/04/2019     Last Renal  Lab Results   Component Value Date     03/04/2019    K 4.2 03/04/2019    CL 98 03/04/2019    CO2 24 03/04/2019    CO2 21 10/24/2018    CO2 25 12/21/2017    BUN 11 03/04/2019    CREATININE 0.6 03/04/2019    GLUCOSE 109 03/04/2019    CALCIUM 10.3 03/04/2019       Last ABG  POC Blood Gas:   No results found for: POCPH  No results for input(s): PH, PCO2, PO2, HCO3, BE, O2SAT in the last 72 hours. Assessment:     ·  Obstructive airways disease  · Obesity  · Dyspnea  · Dysphonia, hx spasmatic dysphonia   · Paralyzed right diaphram  · Allergic Rhinitis  · Thyroid nodule    Plan:            Problem List Items Addressed This Visit     SOB (shortness of breath) - Primary    Relevant Orders    XR CHEST STANDARD (2 VW)    Moderate persistent asthma without complication     She is having increasing shortness of breath with exertion. Last pulmonary function test demonstrated some restriction. This is thought to be secondary to paralyzed diaphragm. No signs of interstitial lung disease at that time. Repeat chest x-ray now to assess shortness of breath. Reviewed medications, she is using budesonide only 3 times weekly instead of twice daily. It is not clear where this confusion occurred and why this regimen. She will now increase her budesonide to 2 treatments in the day. Before proceeding with more extensive work-up, assess effect of taking proper medication regimen.   Last pulmonary function test was not consistent with asthma. Therefore, if shortness of breath does not improve with twice daily budesonide,  Methacholine challenge will be ordered. We discussed the effect of obesity on paralyzed diaphragm and shortness of breath. She expressed understanding for this. Diaphragm paralysis     Assess further with chest x-ray. No intervention. Again, discussed correlation between shortness of breath obesity and paralyzed diaphragm. AR (allergic rhinitis)     Currently controlled. She can also use the nebulized budesonide through the nose to help control her allergic rhinitis             This note was transcribed using [employer]. Please disregard any translational errors.

## 2020-01-30 NOTE — ASSESSMENT & PLAN NOTE
Currently controlled.   She can also use the nebulized budesonide through the nose to help control her allergic rhinitis

## 2020-01-30 NOTE — ASSESSMENT & PLAN NOTE
Assess further with chest x-ray. No intervention. Again, discussed correlation between shortness of breath obesity and paralyzed diaphragm.

## 2020-02-06 ENCOUNTER — HOSPITAL ENCOUNTER (OUTPATIENT)
Dept: GENERAL RADIOLOGY | Age: 55
Discharge: HOME | End: 2020-02-06
Payer: COMMERCIAL

## 2020-02-06 ENCOUNTER — HOSPITAL ENCOUNTER (OUTPATIENT)
Age: 55
Discharge: HOME | End: 2020-02-06
Payer: COMMERCIAL

## 2020-02-06 PROCEDURE — 71046 X-RAY EXAM CHEST 2 VIEWS: CPT

## 2020-02-14 RX ORDER — LEVOTHYROXINE SODIUM 0.12 MG/1
125 TABLET ORAL DAILY
Qty: 90 TABLET | Refills: 0 | Status: SHIPPED | OUTPATIENT
Start: 2020-02-14 | End: 2020-05-14

## 2020-02-25 ENCOUNTER — TELEPHONE (OUTPATIENT)
Dept: INTERNAL MEDICINE CLINIC | Age: 55
End: 2020-02-25

## 2020-02-25 RX ORDER — AZITHROMYCIN 250 MG/1
250 TABLET, FILM COATED ORAL SEE ADMIN INSTRUCTIONS
Qty: 6 TABLET | Refills: 0 | Status: SHIPPED | OUTPATIENT
Start: 2020-02-25 | End: 2020-03-01

## 2020-02-25 NOTE — TELEPHONE ENCOUNTER
Patient is requesting a Z pack to be called into Pharmacy ,[address] [id number], for poss Sinus infection.  Please advise

## 2020-02-26 LAB
AMPHETAMINES SCREEN BLOOD: NEGATIVE NG/ML
BARBITURATES SCREEN BLOOD: NEGATIVE NG/ML
BENZODIAZEPINES SCREEN BLOOD: NEGATIVE NG/ML
BUPRENORPHINE: NEGATIVE NG/ML
CANNABINOID SCREEN BLOOD: NEGATIVE NG/ML
COCAINE SCREEN BLOOD: NEGATIVE NG/ML
Lab: NORMAL
METHADONE SCREEN BLOOD: NEGATIVE NG/ML
METHAMPHETAMINES SERUM/ PLASMA: NEGATIVE NG/ML
OPIATES SCREEN BLOOD: NEGATIVE NG/ML
OXYCODONE: NEGATIVE NG/ML
PHENCYCLIDINE SCREEN BLOOD: NEGATIVE NG/ML

## 2020-05-14 RX ORDER — LEVOTHYROXINE SODIUM 0.12 MG/1
125 TABLET ORAL DAILY
Qty: 90 TABLET | Refills: 0 | Status: SHIPPED | OUTPATIENT
Start: 2020-05-14 | End: 2020-08-26

## 2020-06-17 ENCOUNTER — OFFICE VISIT (OUTPATIENT)
Dept: PULMONOLOGY | Age: 55
End: 2020-06-17
Payer: COMMERCIAL

## 2020-06-17 VITALS
SYSTOLIC BLOOD PRESSURE: 116 MMHG | HEART RATE: 78 BPM | OXYGEN SATURATION: 96 % | WEIGHT: 260 LBS | RESPIRATION RATE: 16 BRPM | BODY MASS INDEX: 39.4 KG/M2 | DIASTOLIC BLOOD PRESSURE: 70 MMHG | TEMPERATURE: 98.1 F | HEIGHT: 68 IN

## 2020-06-17 PROCEDURE — 1036F TOBACCO NON-USER: CPT | Performed by: INTERNAL MEDICINE

## 2020-06-17 PROCEDURE — G8427 DOCREV CUR MEDS BY ELIG CLIN: HCPCS | Performed by: INTERNAL MEDICINE

## 2020-06-17 PROCEDURE — G9899 SCRN MAM PERF RSLTS DOC: HCPCS | Performed by: INTERNAL MEDICINE

## 2020-06-17 PROCEDURE — 3017F COLORECTAL CA SCREEN DOC REV: CPT | Performed by: INTERNAL MEDICINE

## 2020-06-17 PROCEDURE — G8417 CALC BMI ABV UP PARAM F/U: HCPCS | Performed by: INTERNAL MEDICINE

## 2020-06-17 PROCEDURE — 99213 OFFICE O/P EST LOW 20 MIN: CPT | Performed by: INTERNAL MEDICINE

## 2020-06-17 NOTE — PROGRESS NOTES
breastfeeding.'  Gen: No distress. ENT:   Resp: No accessory muscle use. No crackles. No wheezes. No rhonchi. CV: Regular rate. Regular rhythm. No murmur or rub. No edema. Skin: Warm, dry, normal texture and turgor. No nodule on exposed extremities. M/S: No cyanosis. No clubbing. No joint deformity. Psych: Oriented x 3. No anxiety. Awake. Alert. Intact judgement and insight. Good Mood / Affect. Memory appears in tact     Current Outpatient Medications on File Prior to Visit   Medication Sig Dispense Refill    levothyroxine (SYNTHROID) 125 MCG tablet TAKE 1 TABLET BY MOUTH DAILY 90 tablet 0    diclofenac sodium (VOLTAREN) 1 % GEL Apply 2-4 gm to affected area tid for 2 weeks then bid prn thereafter 3 Tube 1    HYDROcodone-acetaminophen (NORCO) 5-325 MG per tablet Take 1 tablet by mouth every 6 hours as needed for Pain.  budesonide (PULMICORT) 0.5 MG/2ML nebulizer suspension TAKE 1 VIAL VIA NEBULIZER TWICE DAILY 360 mL 6    albuterol sulfate  (90 Base) MCG/ACT inhaler Inhale 2 puffs into the lungs 4 times daily as needed for Wheezing 3 Inhaler 1    omeprazole (PRILOSEC) 20 MG delayed release capsule Take 1 capsule by mouth every morning (before breakfast) 30 capsule 2    ondansetron (ZOFRAN) 4 MG tablet TAKE 1 TABLET BY MOUTH DAILY AS NEEDED FOR NAUSEA OR VOMITING 30 tablet 0    traMADol (ULTRAM) 50 MG tablet Take 50 mg by mouth 2 times daily.  Pasty Pete Cholecalciferol (VITAMIN D) 2000 units CAPS capsule Take by mouth      gabapentin (NEURONTIN) 300 MG capsule TWICE IN AM ONCE IN PM  1    Respiratory Therapy Supplies (NEBULIZER/TUBING/MOUTHPIECE) KIT 1 kit by Does not apply route daily as needed 1 kit 0     Current Facility-Administered Medications on File Prior to Visit   Medication Dose Route Frequency Provider Last Rate Last Dose    technetium sulfur colloid egg (NYCOMED-SC) solution 500 micro curie  500 micro curie Intravenous ONCE PRN Josue Corrigan MD         Data Reviewed:

## 2020-06-18 NOTE — ASSESSMENT & PLAN NOTE
Shortness of breath significant improved with weight loss and using budesonide twice daily along with albuterol as needed.

## 2020-08-17 RX ORDER — LEVOTHYROXINE SODIUM 0.12 MG/1
125 TABLET ORAL DAILY
Qty: 90 TABLET | Refills: 0 | OUTPATIENT
Start: 2020-08-17

## 2020-08-20 ENCOUNTER — OFFICE VISIT (OUTPATIENT)
Dept: FAMILY MEDICINE CLINIC | Age: 55
End: 2020-08-20
Payer: COMMERCIAL

## 2020-08-20 VITALS
TEMPERATURE: 97.9 F | WEIGHT: 270.8 LBS | OXYGEN SATURATION: 97 % | HEIGHT: 68 IN | DIASTOLIC BLOOD PRESSURE: 62 MMHG | SYSTOLIC BLOOD PRESSURE: 104 MMHG | HEART RATE: 82 BPM | BODY MASS INDEX: 41.04 KG/M2

## 2020-08-20 DIAGNOSIS — K21.9 GASTROESOPHAGEAL REFLUX DISEASE, ESOPHAGITIS PRESENCE NOT SPECIFIED: ICD-10-CM

## 2020-08-20 DIAGNOSIS — R14.0 ABDOMINAL BLOATING: ICD-10-CM

## 2020-08-20 DIAGNOSIS — E89.0 POSTOPERATIVE HYPOTHYROIDISM: ICD-10-CM

## 2020-08-20 LAB
A/G RATIO: 1.2 (ref 1.1–2.2)
ALBUMIN SERPL-MCNC: 3.9 G/DL (ref 3.4–5)
ALP BLD-CCNC: 90 U/L (ref 40–129)
ALT SERPL-CCNC: 48 U/L (ref 10–40)
ANION GAP SERPL CALCULATED.3IONS-SCNC: 9 MMOL/L (ref 3–16)
AST SERPL-CCNC: 36 U/L (ref 15–37)
BASOPHILS ABSOLUTE: 0 K/UL (ref 0–0.2)
BASOPHILS RELATIVE PERCENT: 0.4 %
BILIRUB SERPL-MCNC: 0.3 MG/DL (ref 0–1)
BUN BLDV-MCNC: 12 MG/DL (ref 7–20)
CALCIUM SERPL-MCNC: 10.2 MG/DL (ref 8.3–10.6)
CHLORIDE BLD-SCNC: 105 MMOL/L (ref 99–110)
CO2: 24 MMOL/L (ref 21–32)
CREAT SERPL-MCNC: 0.5 MG/DL (ref 0.6–1.1)
EOSINOPHILS ABSOLUTE: 0.3 K/UL (ref 0–0.6)
EOSINOPHILS RELATIVE PERCENT: 3.3 %
GFR AFRICAN AMERICAN: >60
GFR NON-AFRICAN AMERICAN: >60
GLOBULIN: 3.3 G/DL
GLUCOSE BLD-MCNC: 88 MG/DL (ref 70–99)
HCT VFR BLD CALC: 40.7 % (ref 36–48)
HEMOGLOBIN: 13.1 G/DL (ref 12–16)
LYMPHOCYTES ABSOLUTE: 2.6 K/UL (ref 1–5.1)
LYMPHOCYTES RELATIVE PERCENT: 34.1 %
MCH RBC QN AUTO: 27.7 PG (ref 26–34)
MCHC RBC AUTO-ENTMCNC: 32.3 G/DL (ref 31–36)
MCV RBC AUTO: 85.9 FL (ref 80–100)
MONOCYTES ABSOLUTE: 0.8 K/UL (ref 0–1.3)
MONOCYTES RELATIVE PERCENT: 9.9 %
NEUTROPHILS ABSOLUTE: 4 K/UL (ref 1.7–7.7)
NEUTROPHILS RELATIVE PERCENT: 52.3 %
PDW BLD-RTO: 14.3 % (ref 12.4–15.4)
PLATELET # BLD: 263 K/UL (ref 135–450)
PMV BLD AUTO: 9.3 FL (ref 5–10.5)
POTASSIUM SERPL-SCNC: 4.4 MMOL/L (ref 3.5–5.1)
RBC # BLD: 4.73 M/UL (ref 4–5.2)
SODIUM BLD-SCNC: 138 MMOL/L (ref 136–145)
TOTAL PROTEIN: 7.2 G/DL (ref 6.4–8.2)
TSH SERPL DL<=0.05 MIU/L-ACNC: 1.72 UIU/ML (ref 0.27–4.2)
WBC # BLD: 7.7 K/UL (ref 4–11)

## 2020-08-20 PROCEDURE — G9899 SCRN MAM PERF RSLTS DOC: HCPCS | Performed by: INTERNAL MEDICINE

## 2020-08-20 PROCEDURE — 3017F COLORECTAL CA SCREEN DOC REV: CPT | Performed by: INTERNAL MEDICINE

## 2020-08-20 PROCEDURE — 99213 OFFICE O/P EST LOW 20 MIN: CPT | Performed by: INTERNAL MEDICINE

## 2020-08-20 PROCEDURE — G8427 DOCREV CUR MEDS BY ELIG CLIN: HCPCS | Performed by: INTERNAL MEDICINE

## 2020-08-20 PROCEDURE — G8417 CALC BMI ABV UP PARAM F/U: HCPCS | Performed by: INTERNAL MEDICINE

## 2020-08-20 PROCEDURE — 1036F TOBACCO NON-USER: CPT | Performed by: INTERNAL MEDICINE

## 2020-08-20 RX ORDER — OXYCODONE HYDROCHLORIDE AND ACETAMINOPHEN 5; 325 MG/1; MG/1
TABLET ORAL EVERY 8 HOURS PRN
COMMUNITY
Start: 2020-07-24

## 2020-08-20 ASSESSMENT — PATIENT HEALTH QUESTIONNAIRE - PHQ9
1. LITTLE INTEREST OR PLEASURE IN DOING THINGS: 0
SUM OF ALL RESPONSES TO PHQ9 QUESTIONS 1 & 2: 0
2. FEELING DOWN, DEPRESSED OR HOPELESS: 0
SUM OF ALL RESPONSES TO PHQ QUESTIONS 1-9: 0
SUM OF ALL RESPONSES TO PHQ QUESTIONS 1-9: 0

## 2020-08-20 NOTE — PROGRESS NOTES
SUBJECTIVE:  Raji Landers is a 48 y.o. female being evaluated for:    Chief Complaint   Patient presents with    Check-Up       HPI   Pain in low back       Asthma doing okay  Mask for 12 hours mildly short winded    Lots of bloating and gas in her stomach no diarrhea some constipation  Nausea  Tried simethicon and has not helped  Goes to rest room daily  Had Nerves burn in back  Diagnosed with scoliosis and arthritis  Continuing to get injections  Back is doing betterr   Allergies   Allergen Reactions    Other Hives     Cloth gloves at my work     Current Outpatient Medications   Medication Sig Dispense Refill    diclofenac sodium (VOLTAREN) 1 % GEL Apply 2-4 gm to affected area tid for 2 weeks then bid prn thereafter 3 Tube 1    budesonide (PULMICORT) 0.5 MG/2ML nebulizer suspension TAKE 1 VIAL VIA NEBULIZER TWICE DAILY 360 mL 6    albuterol sulfate  (90 Base) MCG/ACT inhaler Inhale 2 puffs into the lungs 4 times daily as needed for Wheezing 3 Inhaler 1    omeprazole (PRILOSEC) 20 MG delayed release capsule Take 1 capsule by mouth every morning (before breakfast) 30 capsule 2    ondansetron (ZOFRAN) 4 MG tablet TAKE 1 TABLET BY MOUTH DAILY AS NEEDED FOR NAUSEA OR VOMITING 30 tablet 0    traMADol (ULTRAM) 50 MG tablet Take 50 mg by mouth 2 times daily. Khoa Keep Cholecalciferol (VITAMIN D) 2000 units CAPS capsule Take by mouth daily       gabapentin (NEURONTIN) 300 MG capsule TWICE IN AM ONCE IN PM  1    levothyroxine (SYNTHROID) 125 MCG tablet TAKE 1 TABLET BY MOUTH DAILY 90 tablet 1    oxyCODONE-acetaminophen (PERCOCET) 5-325 MG per tablet every 8 hours as needed. No current facility-administered medications for this visit.       Facility-Administered Medications Ordered in Other Visits   Medication Dose Route Frequency Provider Last Rate Last Dose    technetium sulfur colloid egg (NYCOMED-SC) solution 500 micro curie  500 micro curie Intravenous ONCE PRN Ken Nichole MD infection     Osteoarthritis     CHRONIC BACK PAIN/FIBROMYALGIA    Rheumatoid arthritis (HCC)     Spasmodic dysphonia     voice very hoarse    Thyroid disease     2015 found spot on left side of thyroid, right side thyroid was removed     Past Surgical History:   Procedure Laterality Date    APPENDECTOMY  age 17   Rosario Dhaliwal BREAST BIOPSY Right     x3  benign lesions     BREAST SURGERY Right needle localization right breast mass    BREAST SURGERY Right 10/22/15    rivianney breast mass excision/needle loc    CARPAL TUNNEL RELEASE Right 5/7/15    CARPAL TUNNEL RELEASE Left 5/28/15    Left carpal tunnel release      COLONOSCOPY      ENDOSCOPY, COLON, DIAGNOSTIC      EPIDURAL STEROID INJECTION N/A 2/1/2019    LUMBAR EPIDURAL STEROID INJECTION L3/4 performed by Erlinda Verduzco MD at [address] [place] [address] N/A 11/22/2019    CERVICAL EPIDURAL STEROID INJECTION C7-T1 performed by Erlinda Verduzco MD at [address] [place] [address] NERV Bilateral 3/29/2019    INTRA ARTICULAR KNEE INJECTIONS performed by Erlinda Verduzco MD at [address] [place] [address] NERV Bilateral 12/6/2019    BILATERAL INTRAARTICULAR KNEE INJECTION performed by Erlinda Verduzco MD at [address] [place] [address] ARTHROSCOPY Left 12/21/2017    WA ARTHROCENTESIS ASPIR&/INJ MAJOR JT/BURSA W/O US Bilateral 5/10/2019    GREATER TROCHANTERIC BURSA INJECTIONS performed by Erlinda Verduzco MD at [address] DX/THER SBST INTRLMNR CRV/THRC W/IMG GDN N/A 9/28/2018    EPIDURAL STEROID INJECTION C7-T1 performed by Erlinda Verduzco MD at [address] DX/THER SBST INTRLMNR LMBR/SAC W/IMG GDN Right 11/9/2018    LUMBAR EPIDURAL STEROID INJECTION L3-4 performed by Erlinda [facility]zco MD at [facility]      removed right side of thyroid    THYROIDECTOMY, PARTIAL     [facility]       Review of Systems Constitutional: Negative for chills and fever. Respiratory: Negative for cough, shortness of breath and wheezing. Cardiovascular: Negative for chest pain, palpitations and leg swelling. Gastrointestinal: Positive for abdominal distention, abdominal pain (cramping) and constipation. Negative for diarrhea, nausea and vomiting. Endocrine: Negative for cold intolerance and heat intolerance. Genitourinary: Negative for dysuria. Musculoskeletal: Positive for back pain. Neurological: Negative for dizziness, light-headedness and headaches. OBJECTIVE:  /62   Pulse 82   Temp 97.9 °F (36.6 °C) (Temporal)   Ht 5' 8\" (1.727 m)   Wt 270 lb 12.8 oz (122.8 kg)   LMP 02/25/2016 (Exact Date)   SpO2 97%   Breastfeeding No   BMI 41.17 kg/m²      Body mass index is 41.17 kg/m². Physical Exam  Vitals signs and nursing note reviewed. Constitutional:       General: She is not in acute distress. Appearance: Normal appearance. She is well-developed. Comments: obese   HENT:      Head: Normocephalic and atraumatic. Right Ear: External ear normal.      Left Ear: External ear normal.      Mouth/Throat:      Pharynx: Oropharynx is clear. Eyes:      Conjunctiva/sclera: Conjunctivae normal.   Neck:      Musculoskeletal: Neck supple. Thyroid: No thyromegaly. Vascular: No carotid bruit. Cardiovascular:      Rate and Rhythm: Normal rate and regular rhythm. Heart sounds: Normal heart sounds. No murmur. No friction rub. No gallop. Pulmonary:      Effort: Pulmonary effort is normal.      Breath sounds: Normal breath sounds. No wheezing. Chest:      Chest wall: No tenderness. Abdominal:      General: There is no distension. Palpations: Abdomen is soft. Tenderness: There is no abdominal tenderness. Comments: No HSM   Musculoskeletal:         General: No swelling. Lymphadenopathy:      Cervical: No cervical adenopathy.    Skin:     General: Skin is warm and dry.   Neurological:      General: No focal deficit present. Mental Status: She is alert. Gait: Gait normal.   Psychiatric:         Behavior: Behavior normal.         Thought Content: Thought content normal.         ASSESSMENT/PLAN:    Cruzito Walden was seen today for check-up. Diagnoses and all orders for this visit:    Postoperative hypothyroidism  -     TSH without Reflex; Future    Moderate persistent asthma without complication ok     Abdominal bloating miralax   -     Comprehensive Metabolic Panel; Future    Gastroesophageal reflux disease, esophagitis presence not specified  -     CBC Auto Differential; Future    Obesity (BMI 30-39. 9)        No orders of the defined types were placed in this encounter. Return in about 6 months (around 2/20/2021), or if symptoms worsen or fail to improve. There are no Patient Instructions on file for this visit.

## 2020-08-26 RX ORDER — LEVOTHYROXINE SODIUM 0.12 MG/1
125 TABLET ORAL DAILY
Qty: 90 TABLET | Refills: 1 | Status: SHIPPED | OUTPATIENT
Start: 2020-08-26 | End: 2021-03-14 | Stop reason: SDUPTHER

## 2020-08-30 ASSESSMENT — ENCOUNTER SYMPTOMS
CONSTIPATION: 1
BACK PAIN: 1
ABDOMINAL PAIN: 1
WHEEZING: 0
SHORTNESS OF BREATH: 0
NAUSEA: 0
DIARRHEA: 0
ABDOMINAL DISTENTION: 1
COUGH: 0
VOMITING: 0

## 2020-09-04 DIAGNOSIS — Z13.220 SCREENING CHOLESTEROL LEVEL: ICD-10-CM

## 2020-09-04 DIAGNOSIS — Z13.1 DIABETES MELLITUS SCREENING: ICD-10-CM

## 2020-09-04 LAB
CHOLESTEROL, TOTAL: 194 MG/DL (ref 0–199)
GLUCOSE FASTING: 102 MG/DL (ref 70–99)
HDLC SERPL-MCNC: 42 MG/DL (ref 40–60)
LDL CHOLESTEROL CALCULATED: 124 MG/DL
TRIGL SERPL-MCNC: 141 MG/DL (ref 0–150)
VLDLC SERPL CALC-MCNC: 28 MG/DL

## 2020-11-06 ENCOUNTER — OFFICE VISIT (OUTPATIENT)
Dept: PRIMARY CARE CLINIC | Age: 55
End: 2020-11-06
Payer: COMMERCIAL

## 2020-11-06 PROCEDURE — 99211 OFF/OP EST MAY X REQ PHY/QHP: CPT | Performed by: NURSE PRACTITIONER

## 2020-11-06 NOTE — PROGRESS NOTES
Laurel BAH Hernandokevin received a viral test for COVID-19. They were educated on isolation and quarantine as appropriate. For any symptoms, they were directed to seek care from their PCP, given contact information to establish with a doctor, directed to an urgent care or the emergency room.

## 2020-11-09 LAB — SARS-COV-2, NAA: NOT DETECTED

## 2020-11-25 ENCOUNTER — HOSPITAL ENCOUNTER (OUTPATIENT)
Dept: GENERAL RADIOLOGY | Age: 55
Discharge: HOME | End: 2020-11-25
Payer: COMMERCIAL

## 2020-11-25 ENCOUNTER — TELEPHONE (OUTPATIENT)
Dept: FAMILY MEDICINE CLINIC | Age: 55
End: 2020-11-25

## 2020-11-25 ENCOUNTER — HOSPITAL ENCOUNTER (OUTPATIENT)
Age: 55
Discharge: HOME | End: 2020-11-25
Payer: COMMERCIAL

## 2020-11-25 PROCEDURE — 73560 X-RAY EXAM OF KNEE 1 OR 2: CPT

## 2020-11-25 NOTE — TELEPHONE ENCOUNTER
Pt was in [place] and she fell on the walkway (tile) and it rained and she fell. Pt hit her head and leg, pt just got her leg xray last night b/c it is messed up. Pt is having awful headaches (she hit her head on concrete) and would like to know if she could get some testing done for this? Advised pt if she gets any worse she is needing to go to the ED. Pl advise.    [phone] (home)

## 2020-11-26 ENCOUNTER — APPOINTMENT (OUTPATIENT)
Dept: GENERAL RADIOLOGY | Age: 55
End: 2020-11-26
Payer: COMMERCIAL

## 2020-11-26 ENCOUNTER — HOSPITAL ENCOUNTER (EMERGENCY)
Age: 55
Discharge: HOME | End: 2020-11-26
Attending: EMERGENCY MEDICINE
Payer: COMMERCIAL

## 2020-11-26 VITALS
HEART RATE: 74 BPM | OXYGEN SATURATION: 95 % | DIASTOLIC BLOOD PRESSURE: 54 MMHG | TEMPERATURE: 98.6 F | SYSTOLIC BLOOD PRESSURE: 107 MMHG | WEIGHT: 281.31 LBS | BODY MASS INDEX: 42.77 KG/M2 | RESPIRATION RATE: 16 BRPM

## 2020-11-26 LAB
ANION GAP SERPL CALCULATED.3IONS-SCNC: 11 MMOL/L (ref 3–16)
BASOPHILS ABSOLUTE: 0.1 K/UL (ref 0–0.2)
BASOPHILS RELATIVE PERCENT: 0.6 %
BUN BLDV-MCNC: 17 MG/DL (ref 7–20)
CALCIUM SERPL-MCNC: 10.5 MG/DL (ref 8.3–10.6)
CHLORIDE BLD-SCNC: 102 MMOL/L (ref 99–110)
CO2: 27 MMOL/L (ref 21–32)
CREAT SERPL-MCNC: 0.7 MG/DL (ref 0.6–1.1)
D DIMER: 0.53 UG/ML FEU
EOSINOPHILS ABSOLUTE: 0.2 K/UL (ref 0–0.6)
EOSINOPHILS RELATIVE PERCENT: 2.3 %
GFR AFRICAN AMERICAN: >60
GFR NON-AFRICAN AMERICAN: >60
GLUCOSE BLD-MCNC: 99 MG/DL (ref 70–99)
HCT VFR BLD CALC: 38.4 % (ref 36–48)
HEMOGLOBIN: 12.3 G/DL (ref 12–16)
LYMPHOCYTES ABSOLUTE: 3.6 K/UL (ref 1–5.1)
LYMPHOCYTES RELATIVE PERCENT: 37.5 %
MCH RBC QN AUTO: 26.5 PG (ref 26–34)
MCHC RBC AUTO-ENTMCNC: 31.9 G/DL (ref 31–36)
MCV RBC AUTO: 82.9 FL (ref 80–100)
MONOCYTES ABSOLUTE: 1 K/UL (ref 0–1.3)
MONOCYTES RELATIVE PERCENT: 10 %
NEUTROPHILS ABSOLUTE: 4.8 K/UL (ref 1.7–7.7)
NEUTROPHILS RELATIVE PERCENT: 49.6 %
PDW BLD-RTO: 14 % (ref 12.4–15.4)
PLATELET # BLD: 247 K/UL (ref 135–450)
PMV BLD AUTO: 8.4 FL (ref 5–10.5)
POTASSIUM SERPL-SCNC: 3.9 MMOL/L (ref 3.5–5.1)
RBC # BLD: 4.63 M/UL (ref 4–5.2)
SODIUM BLD-SCNC: 140 MMOL/L (ref 136–145)
WBC # BLD: 9.7 K/UL (ref 4–11)

## 2020-11-26 PROCEDURE — 99284 EMERGENCY DEPT VISIT MOD MDM: CPT

## 2020-11-26 PROCEDURE — 85379 FIBRIN DEGRADATION QUANT: CPT

## 2020-11-26 PROCEDURE — 36415 COLL VENOUS BLD VENIPUNCTURE: CPT

## 2020-11-26 PROCEDURE — 85025 COMPLETE CBC W/AUTO DIFF WBC: CPT

## 2020-11-26 PROCEDURE — 6370000000 HC RX 637 (ALT 250 FOR IP): Performed by: EMERGENCY MEDICINE

## 2020-11-26 PROCEDURE — 80048 BASIC METABOLIC PNL TOTAL CA: CPT

## 2020-11-26 PROCEDURE — 73590 X-RAY EXAM OF LOWER LEG: CPT

## 2020-11-26 RX ADMIN — APIXABAN 10 MG: 5 TABLET, FILM COATED ORAL at 02:35

## 2020-11-26 RX ADMIN — APIXABAN 80 MG: 5 TABLET, FILM COATED ORAL at 02:36

## 2020-11-26 ASSESSMENT — PAIN DESCRIPTION - ORIENTATION: ORIENTATION: LEFT;LOWER

## 2020-11-26 ASSESSMENT — PAIN SCALES - GENERAL
PAINLEVEL_OUTOF10: 7
PAINLEVEL_OUTOF10: 6

## 2020-11-26 ASSESSMENT — PAIN DESCRIPTION - DESCRIPTORS: DESCRIPTORS: BURNING

## 2020-11-26 ASSESSMENT — PAIN DESCRIPTION - PAIN TYPE
TYPE: ACUTE PAIN
TYPE: ACUTE PAIN

## 2020-11-26 ASSESSMENT — PAIN - FUNCTIONAL ASSESSMENT: PAIN_FUNCTIONAL_ASSESSMENT: 0-10

## 2020-11-26 ASSESSMENT — PAIN DESCRIPTION - FREQUENCY: FREQUENCY: CONTINUOUS

## 2020-11-26 ASSESSMENT — PAIN DESCRIPTION - LOCATION
LOCATION: LEG
LOCATION: LEG

## 2020-11-26 NOTE — ED PROVIDER NOTES
[facility]  eMERGENCY dEPARTMENT eNCOUnter      Pt Name: Dinesh Rogers  MRN: [id number]  [place] 1965  Date of evaluation: 11/26/2020  Provider: Anyi Irwin MD    [address] [place] [address]       Chief Complaint   Patient presents with    Leg Injury     Pt states left lower leg/ knee pain after 2 falls 8 days ago         HISTORY OF PRESENT ILLNESS  (Location/Symptom, Timing/Onset, Context/Setting, Quality, Duration, Modifying Factors, Severity.)   Dinesh Rogers is a 48 y.o. female who presents to the emergency department planing of pain in her left leg. She states she initially injured her ankle when she hit it while she was in [place] 8 days ago. Later that day she slipped and injured her left knee. She has pain in her left anterior knee and her left ankle. She has some swelling in her ankle and foot. She states she has been taking her usual pain medications without relief. She states she called her pain management doctor yesterday and they sent her over for an outpatient x-ray of her left knee. Results of those x-rays were reviewed and are negative for fracture. Nursing Notes were reviewed and I agree. REVIEW OF SYSTEMS    (2-9 systems for level 4, 10 or more for level 5)     Musculoskeletal: Left knee and left lower leg pain. Skin: Bruising the left knee area. Cardiovascular: No chest pain. Pulmonary: No shortness of breath. Neuro: No extremity weakness numbness or tingling. Except as noted above the remainder of the review of systems was reviewed and negative.        PAST MEDICAL HISTORY         Diagnosis Date    Anxiety     Asthma     currently was instructed to stop inhalers d/t to collapse of vocal cords    Diaphragm paralysis     right side    Enlarged liver     Fatty liver     Fibromyalgia     GERD (gastroesophageal reflux disease)     Headache(784.0)     neck pain     History of ulcer disease     Kidney infection     Previous Medications    ALBUTEROL SULFATE  (90 BASE) MCG/ACT INHALER    Inhale 2 puffs into the lungs 4 times daily as needed for Wheezing    BUDESONIDE (PULMICORT) 0.5 MG/2ML NEBULIZER SUSPENSION    TAKE 1 VIAL VIA NEBULIZER TWICE DAILY    CHOLECALCIFEROL (VITAMIN D) 2000 UNITS CAPS CAPSULE    Take by mouth daily     DICLOFENAC SODIUM (VOLTAREN) 1 % GEL    Apply 2-4 gm to affected area tid for 2 weeks then bid prn thereafter    GABAPENTIN (NEURONTIN) 300 MG CAPSULE    TWICE IN AM ONCE IN PM    LEVOTHYROXINE (SYNTHROID) 125 MCG TABLET    TAKE 1 TABLET BY MOUTH DAILY    OMEPRAZOLE (PRILOSEC) 20 MG DELAYED RELEASE CAPSULE    Take 1 capsule by mouth every morning (before breakfast)    ONDANSETRON (ZOFRAN) 4 MG TABLET    TAKE 1 TABLET BY MOUTH DAILY AS NEEDED FOR NAUSEA OR VOMITING    OXYCODONE-ACETAMINOPHEN (PERCOCET) 5-325 MG PER TABLET    every 8 hours as needed. ALLERGIES     Other    FAMILY HISTORY           Problem Relation Age of Onset    Asthma Father     Heart Disease Father     Emphysema Father     Breast Cancer Maternal Aunt         breast    Breast Cancer Maternal Aunt         breast    Migraines Mother     Cancer Mother 61        multiple mylenoma    Diabetes Sister     Migraines Sister     Breast Cancer Paternal Uncle         colon    Breast Cancer Maternal Cousin         breast    No Known Problems Maternal Grandmother     No Known Problems Maternal Grandfather     No Known Problems Paternal Grandmother      Family Status   Relation Name Status    Father      [facility]  Alive    Mother  Alive    Sister  Alive    Brother  Alive    PUnc      MCousin  Alive    Brother  Alive    MGM      MGF      PGM          SOCIAL HISTORY      reports that she quit smoking about 17 years ago. Her smoking use included cigarettes. She has a 15.00 pack-year smoking history.  She has never used smokeless tobacco. She reports previous the following components:       Result Value    D-Dimer, Quant 0.53 (*)     All other components within normal limits    Narrative:     Performed at:  [facility]) [facility]  [address] [facility]   Phone [phone]   CBC WITH AUTO DIFFERENTIAL    Narrative:     Performed at:  [address] [place] [address]  [address] [facility]   Phone [phone]   BASIC METABOLIC PANEL    Narrative:     Performed at:  [address] [place] [address]  [address] [facility]   Phone [phone]       All other labs were within normal range or not returned as of this dictation. EMERGENCY DEPARTMENT COURSE and DIFFERENTIAL DIAGNOSIS/MDM:   Vitals:    Vitals:    11/26/20 0012 11/26/20 0155   BP: 123/73 (!) 107/54   Pulse: 83 74   Resp: 17 16   Temp: 98.6 °F (37 °C)    TempSrc: Oral    SpO2: 97% 95%   Weight: 281 lb 4.9 oz (127.6 kg)        This patient has some left leg pain and swelling. She injured it 8 days ago in [place]. She was obviously just out of the country and was on a plane. She has pain just below her knee and in her left ankle area. X-rays show no evidence of fracture or acute bony abnormality. Her D-dimer is elevated. I cannot rule out the possibility of a deep vein thrombosis. She is not having any chest pain or shortness of breath. Her vital signs are stable. There is no erythema or signs of infection. She will be started on Eliquis. She will be given an outpatient order for venous Doppler. We will continue her current medication for pain. She will follow-up for the venous Doppler and then with her primary care provider. She will return if worse or new symptoms develop. Test results, diagnosis, and treatment plan were discussed with the patient. She understands the treatment plan and follow-up as discussed. PROCEDURES:  None    FINAL IMPRESSION      1.  Leg edema, left    2. Left leg pain          DISPOSITION/PLAN   DISPOSITION Decision To Discharge 11/26/2020 02:25:09 AM      PATIENT REFERRED TO:  No follow-up provider specified.     DISCHARGE MEDICATIONS:  New Prescriptions    No medications on file       (Please note that portions of this note were completed with a voice recognition program.  Efforts were made to edit the dictations but occasionally words are mis-transcribed.)    Santiago Pillai MD  Attending Emergency Physician        Connie Springer MD  11/26/20 2396

## 2020-11-26 NOTE — ED TRIAGE NOTES
Pt to ED with complaint of left lower leg pain and knee pain since a fall 8 days ago. States she was on vacation and she tripped getting into a boat. States she had her leg \"looked at\" but continues to have pain and difficulty walking due to the pain. Ecchymosis and raised area noted directly below anterior knee. Left leg warm to touch with brisk capillary refill, + dorsalis pedal pulse.

## 2020-11-27 NOTE — ED PROVIDER NOTES
I added an order to this chart to change the priority of this DVT ultrasound to STAT from ROUTINE to expedite scheduling of this venous doppler ultrasound of the LLE. Unable to cancel the ROUTINE order due to it already being scheduled for 12/3. I did not face-to-face evaluate this patient.       Kathleen Jacinto MD  11/27/20 3877

## 2020-11-30 ENCOUNTER — HOSPITAL ENCOUNTER (OUTPATIENT)
Dept: VASCULAR LAB | Age: 55
Discharge: HOME | End: 2020-11-30
Payer: COMMERCIAL

## 2020-11-30 PROCEDURE — 93971 EXTREMITY STUDY: CPT

## 2020-12-04 ENCOUNTER — OFFICE VISIT (OUTPATIENT)
Dept: ORTHOPEDIC SURGERY | Age: 55
End: 2020-12-04
Payer: COMMERCIAL

## 2020-12-04 VITALS — TEMPERATURE: 96.6 F

## 2020-12-04 PROCEDURE — 99213 OFFICE O/P EST LOW 20 MIN: CPT | Performed by: ORTHOPAEDIC SURGERY

## 2020-12-04 PROCEDURE — G9899 SCRN MAM PERF RSLTS DOC: HCPCS | Performed by: ORTHOPAEDIC SURGERY

## 2020-12-04 PROCEDURE — G8417 CALC BMI ABV UP PARAM F/U: HCPCS | Performed by: ORTHOPAEDIC SURGERY

## 2020-12-04 PROCEDURE — G8484 FLU IMMUNIZE NO ADMIN: HCPCS | Performed by: ORTHOPAEDIC SURGERY

## 2020-12-04 PROCEDURE — G8427 DOCREV CUR MEDS BY ELIG CLIN: HCPCS | Performed by: ORTHOPAEDIC SURGERY

## 2020-12-04 PROCEDURE — 3017F COLORECTAL CA SCREEN DOC REV: CPT | Performed by: ORTHOPAEDIC SURGERY

## 2020-12-04 PROCEDURE — 1036F TOBACCO NON-USER: CPT | Performed by: ORTHOPAEDIC SURGERY

## 2020-12-04 NOTE — PROGRESS NOTES
ROS:  Constitutional: denies fever, chills, weight loss  MSK: denies pain in other joints, muscle aches  Neurological: denies numbness, tingling, weakness    Exam:  Temperature 96.6 °F (35.9 °C), temperature source Infrared, last menstrual period 02/25/2016, not currently breastfeeding. Appearance: sitting in exam room chair, appears to be in no acute distress, awake and alert  Resp: unlabored breathing on room air  Skin: warm, dry and intact with out erythema or significant increased temperature  Neuro: grossly intact both lower extremities. Intact sensation to light touch. Motor exam 4+ to 5/5 in all major motor groups. MSK: LLE - Examination reveals that range of motion is 0 to 120 degrees. There is varus deformity, positive crepitus. She has a large area of swelling anteriorly over the tibial tubercle with surrounding ecchymosis. She has generalized swelling of the leg. She reports muscular pain with ankle plantarflexion and dorsiflexion. Neurologically, plantar flexion and dorsiflexion is intact. Pulses palpable distally. 5/5 strength. Imaging:  Prior left tibia/fibula and knee radiographs were reviewed. The knee radiographs are significant for severe tricompartmental osteoarthritis. There are no other fractures or dislocations. Assessment:  Left leg hematoma we discussed the diagnosis and treatment options. She is currently experiencing symptoms different than her left knee osteoarthritis. Plan:  She has a large subcutaneous and intramuscular hematoma of her lower leg. I discussed with her that this could take a long time to resolve. I recommended wearing a knee sleeve or compression stockings and to continue with ankle pumps to help resolve this. She may continue taking NSAIDs as needed. I will see her back in 1 month for reassessment. This dictation was done with Azuro dictation and may contain mechanical errors related to translation.

## 2021-02-04 ENCOUNTER — HOSPITAL ENCOUNTER (OUTPATIENT)
Dept: WOMENS IMAGING | Age: 56
Discharge: HOME | End: 2021-02-04
Payer: COMMERCIAL

## 2021-02-04 DIAGNOSIS — Z12.31 BREAST CANCER SCREENING BY MAMMOGRAM: ICD-10-CM

## 2021-02-04 PROCEDURE — 77067 SCR MAMMO BI INCL CAD: CPT

## 2021-02-24 ENCOUNTER — OFFICE VISIT (OUTPATIENT)
Dept: PULMONOLOGY | Age: 56
End: 2021-02-24
Payer: COMMERCIAL

## 2021-02-24 VITALS
OXYGEN SATURATION: 93 % | HEIGHT: 68 IN | WEIGHT: 281.6 LBS | TEMPERATURE: 97.3 F | BODY MASS INDEX: 42.68 KG/M2 | HEART RATE: 70 BPM | SYSTOLIC BLOOD PRESSURE: 118 MMHG | RESPIRATION RATE: 12 BRPM | DIASTOLIC BLOOD PRESSURE: 60 MMHG

## 2021-02-24 DIAGNOSIS — J45.40 MODERATE PERSISTENT ASTHMA WITHOUT COMPLICATION: Primary | ICD-10-CM

## 2021-02-24 DIAGNOSIS — E66.9 OBESITY (BMI 30-39.9): ICD-10-CM

## 2021-02-24 PROCEDURE — G8484 FLU IMMUNIZE NO ADMIN: HCPCS | Performed by: INTERNAL MEDICINE

## 2021-02-24 PROCEDURE — 3017F COLORECTAL CA SCREEN DOC REV: CPT | Performed by: INTERNAL MEDICINE

## 2021-02-24 PROCEDURE — 1036F TOBACCO NON-USER: CPT | Performed by: INTERNAL MEDICINE

## 2021-02-24 PROCEDURE — 99213 OFFICE O/P EST LOW 20 MIN: CPT | Performed by: INTERNAL MEDICINE

## 2021-02-24 PROCEDURE — G8427 DOCREV CUR MEDS BY ELIG CLIN: HCPCS | Performed by: INTERNAL MEDICINE

## 2021-02-24 PROCEDURE — G8417 CALC BMI ABV UP PARAM F/U: HCPCS | Performed by: INTERNAL MEDICINE

## 2021-02-24 RX ORDER — NEBULIZER ACCESSORIES
1 KIT MISCELLANEOUS DAILY
Qty: 1 KIT | Refills: 0 | Status: SHIPPED | OUTPATIENT
Start: 2021-02-24 | End: 2022-03-03 | Stop reason: ALTCHOICE

## 2021-02-24 ASSESSMENT — ASTHMA QUESTIONNAIRES
ACT_TOTALSCORE: 19
QUESTION_3 LAST FOUR WEEKS HOW OFTEN DID YOUR ASTHMA SYMPTOMS (WHEEZING, COUGHING, SHORTNESS OF BREATH, CHEST TIGHTNESS OR PAIN) WAKE YOU UP AT NIGHT OR EARLIER THAN USUAL IN THE MORNING: 5

## 2021-02-24 NOTE — PROGRESS NOTES
REASON FOR CONSULTATION/CC: shortness of breath       CONSULTING PHYSICIAN: Jill Jacobsen MD   PCP: Jill Jacobsen MD    HISTORY OF PRESENT ILLNESS: Wilfredo Smith is a 55y.o. year old female with a history of smoking. who presents increase dyspnea for the last year. Asthma    Coverall, doing well. Typically in the 95%. Using albuterol ~ daily. symptoms improved with weight loss. Using budesonide bid via nebulizer        ASTHMA CONTROL TEST 2/24/2021   In the past 4 weeks, how much of the time did your asthma keep you from getting as much done at work, school or at home? 4   During the past 4 weeks, how often have you had shortness of breath? 1   During the past 4 weeks, how often did your asthma symptoms (wheezing, coughing, shortness of breath, chest tightness or pain) wake you up at night or earlier than usual in the morning? 5   During the past 4 weeks, how often have you used your rescue inhaler or nebulizer medication (such as albuterol)? 4   How would you rate your asthma control during the past 4 weeks?  5   Asthma Control Test Total Score 19              PAST MEDICAL HISTORY:  Past Medical History:   Diagnosis Date    Anxiety     Asthma     currently was instructed to stop inhalers d/t to collapse of vocal cords    Diaphragm paralysis     right side    Enlarged liver     Fatty liver     Fibromyalgia     GERD (gastroesophageal reflux disease)     Headache(784.0)     neck pain     History of ulcer disease     Kidney infection     Osteoarthritis     CHRONIC BACK PAIN/FIBROMYALGIA    Rheumatoid arthritis (HCC)     Spasmodic dysphonia     voice very hoarse    Thyroid disease     2015 found spot on left side of thyroid, right side thyroid was removed       PAST SURGICAL HISTORY:  Past Surgical History:   Procedure Laterality Date    APPENDECTOMY  age 17   Nimo Abler BREAST BIOPSY Right     x3  benign lesions     BREAST SURGERY Right needle localization right breast mass    BREAST SURGERY Right 10/22/15    RUST breast mass excision/needle loc    CARPAL TUNNEL RELEASE Right 5/7/15    CARPAL TUNNEL RELEASE Left 5/28/15    Left carpal tunnel release      COLONOSCOPY      ENDOSCOPY, COLON, DIAGNOSTIC      EPIDURAL STEROID INJECTION N/A 2/1/2019    LUMBAR EPIDURAL STEROID INJECTION L3/4 performed by Shelly Ortega MD at [address] [place] [address] N/A 11/22/2019    CERVICAL EPIDURAL STEROID INJECTION C7-T1 performed by Shelly Ortega MD at [address] [place] [address] NERV Bilateral 3/29/2019    INTRA ARTICULAR KNEE INJECTIONS performed by Shelly Ortega MD at [address] [place] [address] Bilateral 12/6/2019    BILATERAL INTRAARTICULAR KNEE INJECTION performed [facility] ARTHROSCOPY Left 12/21/2017    [place] ARTHROCENTESIS ASPIR&/INJ MAJOR JT/BURSA W/O US Bilateral 5/10/2019    GREATER TROCHANTERIC BURSA INJECTIONS performed by Shelly Ortega MD at [place] [address] DX/THER SBST INTRLMNR CRV/THRC W/IMG GDN N/A 9/28/2018    EPIDURAL STEROID INJECTION C7-T1 performed by Shelly Ortega MD at [place] [address] DX/THER SBST INTRLMNR LMBR/SAC W/IMG GDN Right 11/9/2018    LUMBAR EPIDURAL STEROID INJECTION L3-4 performed by Shelly Ortega MD at [facility]      removed right side of thyroid    THYROIDECTOMY, PARTIAL      TUBAL LIGATION  1992       FAMILY HISTORY:  family history includes Asthma in her father; Breast Cancer in her maternal aunt, maternal aunt, maternal cousin, and paternal uncle; Cancer (age of onset: 61) in her mother; Diabetes in her sister; Emphysema in her father; Heart Disease in her father; Migraines in her mother and sister; No Known Problems in her maternal grandfather, maternal grandmother, and paternal grandmother.     SOCIAL HISTORY:   reports that she quit smoking about 18 years ago. Her smoking use included cigarettes. She has a 15.00 pack-year smoking history. She has never used smokeless tobacco.    ALLERGIES:  Patient is allergic to other. Objective:   PHYSICAL EXAM:  Blood pressure 118/60, pulse 70, temperature 97.3 °F (36.3 °C), temperature source Temporal, resp. rate 12, height 5' 8\" (1.727 m), weight 281 lb 9.6 oz (127.7 kg), last menstrual period 02/25/2016, SpO2 93 %, not currently breastfeeding.'  Body mass index is 42.82 kg/m². Gen: No distress. Eyes:    ENT:    Neck:    Resp: No accessory muscle use. No crackles. No wheezes. No rhonchi. CV: Regular rate. Regular rhythm. No murmur or rub. No edema. GI:    Skin:    Lymph:    M/S: No cyanosis. No clubbing. Neuro: Moves all four extremities. Psych: Oriented x 3. No anxiety. Awake. Alert. Intact judgement and insight. Current Outpatient Medications on File Prior to Visit   Medication Sig Dispense Refill    levothyroxine (SYNTHROID) 125 MCG tablet TAKE 1 TABLET BY MOUTH DAILY 90 tablet 1    oxyCODONE-acetaminophen (PERCOCET) 5-325 MG per tablet every 8 hours as needed.       diclofenac sodium (VOLTAREN) 1 % GEL Apply 2-4 gm to affected area tid for 2 weeks then bid prn thereafter 3 Tube 1    budesonide (PULMICORT) 0.5 MG/2ML nebulizer suspension TAKE 1 VIAL VIA NEBULIZER TWICE DAILY 360 mL 6    omeprazole (PRILOSEC) 20 MG delayed release capsule Take 1 capsule by mouth every morning (before breakfast) 30 capsule 2    ondansetron (ZOFRAN) 4 MG tablet TAKE 1 TABLET BY MOUTH DAILY AS NEEDED FOR NAUSEA OR VOMITING 30 tablet 0    Cholecalciferol (VITAMIN D) 2000 units CAPS capsule Take by mouth daily       gabapentin (NEURONTIN) 300 MG capsule TWICE IN AM ONCE IN PM  1    albuterol sulfate  (90 Base) MCG/ACT inhaler Inhale 2 puffs into the lungs 4 times daily as needed for Wheezing (Patient not taking: Reported on 2/24/2021) 3 Inhaler 1     Current Facility-Administered Medications on File Prior to Visit   Medication Dose Route Frequency Provider Last Rate Last Admin    technetium sulfur colloid egg (NYCOMED-SC) solution 500 micro curie  500 micro curie Intravenous ONCE PRN Tracy Pitts MD         Data Reviewed:   CBC and Renal reviewed  Last CBC  Lab Results   Component Value Date    WBC 9.7 11/26/2020    RBC 4.63 11/26/2020    HGB 12.3 11/26/2020    MCV 82.9 11/26/2020     11/26/2020     Last Renal  Lab Results   Component Value Date     11/26/2020    K 3.9 11/26/2020     11/26/2020    CO2 27 11/26/2020    CO2 24 08/20/2020    CO2 24 03/04/2019    BUN 17 11/26/2020    CREATININE 0.7 11/26/2020    GLUCOSE 99 11/26/2020    CALCIUM 10.5 11/26/2020       Last ABG  POC Blood Gas:   No results found for: POCPH  No results for input(s): PH, PCO2, PO2, HCO3, BE, O2SAT in the last 72 hours. Assessment:     ·  Obstructive airways disease  · Obesity  · Dyspnea  · Dysphonia, hx spasmatic dysphonia   · Paralyzed right diaphram  · Allergic Rhinitis  · Thyroid nodule    Plan:            Problem List Items Addressed This Visit     Obesity (BMI 30-39.9)     shortness of breath improved with weight loss. She is working on weight loss. Moderate persistent asthma without complication - Primary     Controlled with budesonide via neb and albuterol as needed. No change. Relevant Medications    Respiratory Therapy Supplies (NEBULIZER/TUBING/MOUTHPIECE) KIT        This note was transcribed using [id number] [facility]. Please disregard any translational errors.

## 2021-03-04 ENCOUNTER — TELEPHONE (OUTPATIENT)
Dept: FAMILY MEDICINE CLINIC | Age: 56
End: 2021-03-04

## 2021-03-04 DIAGNOSIS — C69.30 MALIGNANT MELANOMA OF CHOROID, UNSPECIFIED LATERALITY (HCC): Primary | ICD-10-CM

## 2021-03-04 DIAGNOSIS — E89.0 POSTOPERATIVE HYPOTHYROIDISM: ICD-10-CM

## 2021-03-05 NOTE — TELEPHONE ENCOUNTER
Will order some routine basic labs but we should call the eye doctor Dr. Yamileth Asencio and let me talk to him.   Not sure if he wants me to order scans question if he is planning on taking it out etc.

## 2021-03-08 ENCOUNTER — TELEPHONE (OUTPATIENT)
Dept: FAMILY MEDICINE CLINIC | Age: 56
End: 2021-03-08

## 2021-03-08 NOTE — TELEPHONE ENCOUNTER
Received progress notes from pts last visit 3/4/21 w/Dr. Kassandra Wolf, Dr. Juarez Quintanilla is requesting a call back from Dr. Kassandra Wolf to discuss pts condition.      I called Dr. Quentin Barbosa office @ [phone] and left a message for him to return call

## 2021-03-09 ENCOUNTER — TELEPHONE (OUTPATIENT)
Dept: FAMILY MEDICINE CLINIC | Age: 56
End: 2021-03-09

## 2021-03-09 DIAGNOSIS — E89.0 POSTOPERATIVE HYPOTHYROIDISM: ICD-10-CM

## 2021-03-09 DIAGNOSIS — C69.30 MALIGNANT MELANOMA OF CHOROID, UNSPECIFIED LATERALITY (HCC): ICD-10-CM

## 2021-03-09 LAB
BASOPHILS ABSOLUTE: 0 K/UL (ref 0–0.2)
BASOPHILS RELATIVE PERCENT: 0.2 %
EOSINOPHILS ABSOLUTE: 0.2 K/UL (ref 0–0.6)
EOSINOPHILS RELATIVE PERCENT: 2.7 %
HCT VFR BLD CALC: 39.9 % (ref 36–48)
HEMOGLOBIN: 13.1 G/DL (ref 12–16)
LYMPHOCYTES ABSOLUTE: 2.9 K/UL (ref 1–5.1)
LYMPHOCYTES RELATIVE PERCENT: 41.5 %
MCH RBC QN AUTO: 27.9 PG (ref 26–34)
MCHC RBC AUTO-ENTMCNC: 32.9 G/DL (ref 31–36)
MCV RBC AUTO: 85 FL (ref 80–100)
MONOCYTES ABSOLUTE: 0.5 K/UL (ref 0–1.3)
MONOCYTES RELATIVE PERCENT: 7.5 %
NEUTROPHILS ABSOLUTE: 3.3 K/UL (ref 1.7–7.7)
NEUTROPHILS RELATIVE PERCENT: 48.1 %
PDW BLD-RTO: 15.1 % (ref 12.4–15.4)
PLATELET # BLD: 279 K/UL (ref 135–450)
PMV BLD AUTO: 9.2 FL (ref 5–10.5)
RBC # BLD: 4.69 M/UL (ref 4–5.2)
WBC # BLD: 6.9 K/UL (ref 4–11)

## 2021-03-09 NOTE — TELEPHONE ENCOUNTER
Pt informed Dr Carli Patel talked with Dr Violet Merino regarding her eyes and recent office visit and that Dr Violet Merino put in some lab orders for her to have done.

## 2021-03-10 LAB
A/G RATIO: 1.5 (ref 1.1–2.2)
ALBUMIN SERPL-MCNC: 4.1 G/DL (ref 3.4–5)
ALP BLD-CCNC: 96 U/L (ref 40–129)
ALT SERPL-CCNC: 70 U/L (ref 10–40)
ANION GAP SERPL CALCULATED.3IONS-SCNC: 11 MMOL/L (ref 3–16)
AST SERPL-CCNC: 38 U/L (ref 15–37)
BILIRUB SERPL-MCNC: 0.3 MG/DL (ref 0–1)
BUN BLDV-MCNC: 12 MG/DL (ref 7–20)
CALCIUM SERPL-MCNC: 10 MG/DL (ref 8.3–10.6)
CHLORIDE BLD-SCNC: 105 MMOL/L (ref 99–110)
CO2: 26 MMOL/L (ref 21–32)
CREAT SERPL-MCNC: 0.6 MG/DL (ref 0.6–1.1)
GFR AFRICAN AMERICAN: >60
GFR NON-AFRICAN AMERICAN: >60
GLOBULIN: 2.8 G/DL
GLUCOSE BLD-MCNC: 113 MG/DL (ref 70–99)
POTASSIUM SERPL-SCNC: 4.2 MMOL/L (ref 3.5–5.1)
SODIUM BLD-SCNC: 142 MMOL/L (ref 136–145)
TOTAL PROTEIN: 6.9 G/DL (ref 6.4–8.2)
TSH SERPL DL<=0.05 MIU/L-ACNC: 1.15 UIU/ML (ref 0.27–4.2)

## 2021-03-12 ENCOUNTER — TELEPHONE (OUTPATIENT)
Dept: FAMILY MEDICINE CLINIC | Age: 56
End: 2021-03-12

## 2021-03-12 DIAGNOSIS — E89.0 POSTOPERATIVE HYPOTHYROIDISM: ICD-10-CM

## 2021-03-12 NOTE — TELEPHONE ENCOUNTER
Pt lost her rx for levothyroxine (SYNTHROID) 125 MCG tablet     She got them filled on Feb. 26, 2021 so she will be without this med til March 26, 2021 and would like to know if this is ok. Pl advise.    [phone]

## 2021-03-14 RX ORDER — LEVOTHYROXINE SODIUM 0.12 MG/1
125 TABLET ORAL DAILY
Qty: 90 TABLET | Refills: 1 | Status: SHIPPED | OUTPATIENT
Start: 2021-03-14 | End: 2021-08-23

## 2021-03-15 NOTE — TELEPHONE ENCOUNTER
No it is not okay to go without. I did send it into the pharmacy, if it is lost her insurance will not pay for but should not be that expensive.  She can go on [facility] and get a coupon

## 2021-03-17 ENCOUNTER — TELEPHONE (OUTPATIENT)
Dept: PULMONOLOGY | Age: 56
End: 2021-03-17

## 2021-03-17 NOTE — TELEPHONE ENCOUNTER
Patient calling because she wants to know if she can get the COVID vaccine with having the diaphragm paralysis. Please advise. She also wants to inform Dr. Alexi Perez that she was diagnosed with melanoma in her right eye. They let her know this could spread to the lungs and she wanted to keep him appraised of the situation.

## 2021-03-23 ENCOUNTER — TELEPHONE (OUTPATIENT)
Dept: FAMILY MEDICINE CLINIC | Age: 56
End: 2021-03-23

## 2021-03-24 ENCOUNTER — OFFICE VISIT (OUTPATIENT)
Dept: FAMILY MEDICINE CLINIC | Age: 56
End: 2021-03-24
Payer: COMMERCIAL

## 2021-03-24 VITALS
TEMPERATURE: 98.8 F | HEIGHT: 68 IN | DIASTOLIC BLOOD PRESSURE: 82 MMHG | WEIGHT: 287 LBS | SYSTOLIC BLOOD PRESSURE: 120 MMHG | BODY MASS INDEX: 43.5 KG/M2 | HEART RATE: 62 BPM | OXYGEN SATURATION: 95 %

## 2021-03-24 DIAGNOSIS — E89.0 POSTOPERATIVE HYPOTHYROIDISM: ICD-10-CM

## 2021-03-24 DIAGNOSIS — K75.81 STEATOHEPATITIS: ICD-10-CM

## 2021-03-24 DIAGNOSIS — J45.40 MODERATE PERSISTENT ASTHMA WITHOUT COMPLICATION: ICD-10-CM

## 2021-03-24 DIAGNOSIS — G89.4 CHRONIC PAIN SYNDROME: ICD-10-CM

## 2021-03-24 DIAGNOSIS — R73.9 HYPERGLYCEMIA: ICD-10-CM

## 2021-03-24 DIAGNOSIS — Z01.818 PRE-OP EXAM: Primary | ICD-10-CM

## 2021-03-24 DIAGNOSIS — K21.9 GASTROESOPHAGEAL REFLUX DISEASE, UNSPECIFIED WHETHER ESOPHAGITIS PRESENT: ICD-10-CM

## 2021-03-24 DIAGNOSIS — C69.41 MALIGNANT MELANOMA OF CILIARY BODY OF RIGHT EYE (HCC): ICD-10-CM

## 2021-03-24 LAB — HBA1C MFR BLD: 5.9 %

## 2021-03-24 PROCEDURE — 83036 HEMOGLOBIN GLYCOSYLATED A1C: CPT | Performed by: INTERNAL MEDICINE

## 2021-03-24 PROCEDURE — 99242 OFF/OP CONSLTJ NEW/EST SF 20: CPT | Performed by: INTERNAL MEDICINE

## 2021-03-24 PROCEDURE — G8417 CALC BMI ABV UP PARAM F/U: HCPCS | Performed by: INTERNAL MEDICINE

## 2021-03-24 PROCEDURE — G8427 DOCREV CUR MEDS BY ELIG CLIN: HCPCS | Performed by: INTERNAL MEDICINE

## 2021-03-24 PROCEDURE — G8484 FLU IMMUNIZE NO ADMIN: HCPCS | Performed by: INTERNAL MEDICINE

## 2021-03-24 ASSESSMENT — PATIENT HEALTH QUESTIONNAIRE - PHQ9
SUM OF ALL RESPONSES TO PHQ QUESTIONS 1-9: 0
1. LITTLE INTEREST OR PLEASURE IN DOING THINGS: 0
2. FEELING DOWN, DEPRESSED OR HOPELESS: 0

## 2021-03-24 ASSESSMENT — ENCOUNTER SYMPTOMS
VOMITING: 0
SHORTNESS OF BREATH: 0
CONSTIPATION: 0
APNEA: 0
TROUBLE SWALLOWING: 0
NAUSEA: 0
DIARRHEA: 0
BLOOD IN STOOL: 0
ABDOMINAL PAIN: 0
BACK PAIN: 1
COUGH: 0

## 2021-03-24 NOTE — PROGRESS NOTES
SUBJECTIVE:  Wilfredo Smith is a 55 y.o. female being evaluated for:    Chief Complaint   Patient presents with    Pre-op Exam     surgery 4-2-2021 and 4-6-2021 radiation plate in right eye Dr Ronald Núñez        HPI   Melanoma tumor found in her right eye  Saw eye doctor and cataract had worsened so rapidly and went to [facility] and found a tumor on her eye and sent to the tumor specialist   Placing radiation plate in right eye and then removing it   Then to wait a year for more surgery and doing close follow up    Allergies   Allergen Reactions    Other Hives     Cloth gloves at my work     Current Outpatient Medications   Medication Sig Dispense Refill    levothyroxine (SYNTHROID) 125 MCG tablet Take 1 tablet by mouth Daily 90 tablet 1    Respiratory Therapy Supplies (NEBULIZER/TUBING/MOUTHPIECE) KIT 1 kit by Does not apply route daily 1 kit 0    oxyCODONE-acetaminophen (PERCOCET) 5-325 MG per tablet every 8 hours as needed.  diclofenac sodium (VOLTAREN) 1 % GEL Apply 2-4 gm to affected area tid for 2 weeks then bid prn thereafter 3 Tube 1    budesonide (PULMICORT) 0.5 MG/2ML nebulizer suspension TAKE 1 VIAL VIA NEBULIZER TWICE DAILY 360 mL 6    albuterol sulfate  (90 Base) MCG/ACT inhaler Inhale 2 puffs into the lungs 4 times daily as needed for Wheezing 3 Inhaler 1    omeprazole (PRILOSEC) 20 MG delayed release capsule Take 1 capsule by mouth every morning (before breakfast) 30 capsule 2    ondansetron (ZOFRAN) 4 MG tablet TAKE 1 TABLET BY MOUTH DAILY AS NEEDED FOR NAUSEA OR VOMITING 30 tablet 0    Cholecalciferol (VITAMIN D) 2000 units CAPS capsule Take by mouth daily       gabapentin (NEURONTIN) 300 MG capsule TWICE IN AM ONCE IN PM  1     No current facility-administered medications for this visit.       Facility-Administered Medications Ordered in Other Visits   Medication Dose Route Frequency Provider Last Rate Last Admin    technetium sulfur colloid egg (NYCOMED-SC) solution 500 micro  History of ulcer disease     Hypothyroidism     status post thyroidectomy     Kidney infection     Melanoma ([address]     right eye     Osteoarthritis     CHRONIC BACK PAIN/FIBROMYALGIA    Rheumatoid arthritis (HCC)     Spasmodic dysphonia     voice very hoarse    Thyroid disease     2015 found spot on left side of thyroid, right side thyroid was removed     Past Surgical History:   Procedure Laterality Date    APPENDECTOMY  age 17   [place] BREAST BIOPSY Right     x3  benign lesions     BREAST SURGERY Right needle localization right breast mass    BREAST SURGERY Right 10/22/15    riught breast mass excision/needle loc    CARPAL TUNNEL RELEASE Right 5/7/15    CARPAL TUNNEL RELEASE Left 5/28/15    Left carpal tunnel release      COLONOSCOPY      ENDOSCOPY, COLON, DIAGNOSTIC      EPIDURAL STEROID INJECTION N/A 2/1/2019    LUMBAR EPIDURAL STEROID INJECTION L3/4 performed by Laird Goodell, MD at [address] [place] [address] N/A 11/22/2019    CERVICAL EPIDURAL STEROID INJECTION C7-T1 performed by Laird Goodell, MD at [address] [place] [address] NERV Bilateral 3/29/2019    INTRA ARTICULAR KNEE INJECTIONS performed by Laird Goodell, MD at [address] [place] [address] NERV Bilateral 12/6/2019    BILATERAL INTRAARTICULAR KNEE INJECTION performed by Laird Goodell, MD at [address] [place] [address] Av ARTHROSCOPY Left 12/21/2017    [place] ARTHROCENTESIS ASPIR&/INJ MAJOR JT/BURSA W/O US Bilateral 5/10/2019    GREATER TROCHANTERIC BURSA INJECTIONS performed by Laird Goodell, MD at [facility] DX/THER SBST INTRLMNR CRV/THRC W/IMG GDN N/A 9/28/2018    EPIDURAL STEROID INJECTION C7-T1 performed by Laird Goodell, MD at [facility] DX/THER SBST INTRLMNR LMBR/SAC W/IMG GDN Right 11/9/2018    LUMBAR EPIDURAL STEROID INJECTION L3-4 performed by Laird Goodell, MD at [address]  bladder disease    Musculoskeletal: Positive for arthralgias, back pain, neck pain and neck stiffness. Negative for gait problem. Skin: Negative for rash and wound. Neurological: Positive for headaches. Negative for dizziness, tremors, seizures, syncope, speech difficulty, weakness, light-headedness and numbness. No hx of stroke or tia    Hematological: Does not bruise/bleed easily (no bleeding with previoius surgery ). No hx of blood clots    Psychiatric/Behavioral: Negative for dysphoric mood and suicidal ideas. The patient is nervous/anxious. OBJECTIVE:  /82 (Site: Left Upper Arm, Position: Sitting, Cuff Size: Medium Adult)   Pulse 62   Temp 98.8 °F (37.1 °C) (Temporal)   Ht 5' 8\" (1.727 m)   Wt 287 lb (130.2 kg)   LMP 02/25/2016 (Exact Date)   SpO2 95%   BMI 43.64 kg/m²      Body mass index is 43.64 kg/m². Physical Exam  Vitals signs and nursing note reviewed. Constitutional:       General: She is not in acute distress. Appearance: Normal appearance. She is well-developed. She is obese. HENT:      Head: Normocephalic and atraumatic. Right Ear: Tympanic membrane, ear canal and external ear normal.      Left Ear: Tympanic membrane, ear canal and external ear normal.      Nose: Nose normal.      Mouth/Throat:      Pharynx: Oropharynx is clear. Eyes:      Conjunctiva/sclera: Conjunctivae normal.   Neck:      Musculoskeletal: Neck supple. Thyroid: No thyromegaly. Vascular: No carotid bruit. Cardiovascular:      Rate and Rhythm: Normal rate and regular rhythm. Heart sounds: Normal heart sounds. No murmur. No friction rub. No gallop. Pulmonary:      Effort: Pulmonary effort is normal.      Breath sounds: Normal breath sounds. No wheezing. Chest:      Chest wall: No tenderness. Abdominal:      General: There is no distension. Palpations: Abdomen is soft. Tenderness: There is no abdominal tenderness.       Comments: No HSM Musculoskeletal:         General: No swelling. Lymphadenopathy:      Cervical: No cervical adenopathy. Skin:     General: Skin is warm and dry. Neurological:      General: No focal deficit present. Mental Status: She is alert. Gait: Gait normal.   Psychiatric:         Behavior: Behavior normal.         Thought Content: Thought content normal.         ASSESSMENT/PLAN:    Sharon Sims was seen today for pre-op exam.    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 ([address]  -     CT CHEST ABDOMEN PELVIS W CONTRAST; Future    Moderate persistent asthma without complication    Postoperative hypothyroidism    Steatohepatitis  Comments:  Minimally elevated lft's  scan as above     Hyperglycemia long range sugar 5.9  Pre diabetes and to watch diet lower carbohydrates and work on exercise and weight loss   -     POCT glycosylated hemoglobin (Hb A1C)    Gastroesophageal reflux disease, unspecified whether esophagitis present  Comments:  chronic proton pump     Chronic pain syndrome  Comments:  Due to diffuse osteoarthritis, myofascial pain syndrome  Follows with Dr Kathrin Bennett  on chronic narcotics         No orders of the defined types were placed in this encounter. Return in about 6 months (around 9/24/2021), or if symptoms worsen or fail to improve. There are no Patient Instructions on file for this visit.

## 2021-03-30 ENCOUNTER — OFFICE VISIT (OUTPATIENT)
Dept: PRIMARY CARE CLINIC | Age: 56
End: 2021-03-30
Payer: COMMERCIAL

## 2021-03-30 ENCOUNTER — HOSPITAL ENCOUNTER (OUTPATIENT)
Dept: CT IMAGING | Age: 56
Discharge: HOME | End: 2021-03-30
Payer: COMMERCIAL

## 2021-03-30 DIAGNOSIS — Z01.818 PREOP EXAMINATION: Primary | ICD-10-CM

## 2021-03-30 DIAGNOSIS — C69.41 MALIGNANT MELANOMA OF CILIARY BODY OF RIGHT EYE (HCC): ICD-10-CM

## 2021-03-30 PROCEDURE — G8417 CALC BMI ABV UP PARAM F/U: HCPCS | Performed by: NURSE PRACTITIONER

## 2021-03-30 PROCEDURE — 74177 CT ABD & PELVIS W/CONTRAST: CPT

## 2021-03-30 PROCEDURE — 6360000004 HC RX CONTRAST MEDICATION: Performed by: INTERNAL MEDICINE

## 2021-03-30 PROCEDURE — G8428 CUR MEDS NOT DOCUMENT: HCPCS | Performed by: NURSE PRACTITIONER

## 2021-03-30 PROCEDURE — 99211 OFF/OP EST MAY X REQ PHY/QHP: CPT | Performed by: NURSE PRACTITIONER

## 2021-03-30 RX ADMIN — IOPAMIDOL 75 ML: 755 INJECTION, SOLUTION INTRAVENOUS at 09:33

## 2021-03-30 RX ADMIN — IOHEXOL 50 ML: 240 INJECTION, SOLUTION INTRATHECAL; INTRAVASCULAR; INTRAVENOUS; ORAL at 09:33

## 2021-03-31 ENCOUNTER — TELEPHONE (OUTPATIENT)
Dept: FAMILY MEDICINE CLINIC | Age: 56
End: 2021-03-31

## 2021-03-31 ENCOUNTER — TELEPHONE (OUTPATIENT)
Dept: PULMONOLOGY | Age: 56
End: 2021-03-31

## 2021-03-31 LAB — SARS-COV-2: NOT DETECTED

## 2021-03-31 NOTE — TELEPHONE ENCOUNTER
Toro downs/ Dr. Archer [facility] office     Needs   Covid test   Bw results   Faxed to her @ [phone]    Phone #   [phone]

## 2021-03-31 NOTE — TELEPHONE ENCOUNTER
Dr Falgnui Nick called from [facility], she is calling to get Pulmonology clearance for this patient due to her having surgery with general anesthisia on 4/2/2021. This patients PCP already gave her clearance, however this patient complains of shortness of breath when walking up 3 stairs, so the surgeon is wanting to get clearance from Dr Claudio Coronel.      Please call Dr Zamzam Manzo on her personal cell phone to discuss further, Surgery is on 4/2/2021    Dr. Sonal Benjamin cell phone number is [phone]

## 2021-03-31 NOTE — TELEPHONE ENCOUNTER
I spoke with the patient and Dr. Hunter Rm. Patient scheduled for tomorrow.      Fax for pre op is [phone]

## 2021-04-01 ENCOUNTER — OFFICE VISIT (OUTPATIENT)
Dept: PULMONOLOGY | Age: 56
End: 2021-04-01
Payer: COMMERCIAL

## 2021-04-01 ENCOUNTER — TELEPHONE (OUTPATIENT)
Dept: PULMONOLOGY | Age: 56
End: 2021-04-01

## 2021-04-01 VITALS
HEIGHT: 68 IN | SYSTOLIC BLOOD PRESSURE: 122 MMHG | DIASTOLIC BLOOD PRESSURE: 80 MMHG | BODY MASS INDEX: 42.34 KG/M2 | TEMPERATURE: 97.7 F | WEIGHT: 279.4 LBS | RESPIRATION RATE: 16 BRPM | OXYGEN SATURATION: 94 % | HEART RATE: 86 BPM

## 2021-04-01 DIAGNOSIS — J45.40 MODERATE PERSISTENT ASTHMA WITHOUT COMPLICATION: Primary | ICD-10-CM

## 2021-04-01 PROCEDURE — 99213 OFFICE O/P EST LOW 20 MIN: CPT | Performed by: INTERNAL MEDICINE

## 2021-04-01 PROCEDURE — G8427 DOCREV CUR MEDS BY ELIG CLIN: HCPCS | Performed by: INTERNAL MEDICINE

## 2021-04-01 PROCEDURE — 1036F TOBACCO NON-USER: CPT | Performed by: INTERNAL MEDICINE

## 2021-04-01 PROCEDURE — 3017F COLORECTAL CA SCREEN DOC REV: CPT | Performed by: INTERNAL MEDICINE

## 2021-04-01 PROCEDURE — G8417 CALC BMI ABV UP PARAM F/U: HCPCS | Performed by: INTERNAL MEDICINE

## 2021-04-01 RX ORDER — ALBUTEROL SULFATE 90 UG/1
2 AEROSOL, METERED RESPIRATORY (INHALATION) EVERY 6 HOURS PRN
Qty: 1 INHALER | Refills: 11 | Status: SHIPPED | OUTPATIENT
Start: 2021-04-01

## 2021-04-01 RX ORDER — REVEFENACIN 175 UG/3ML
3 SOLUTION RESPIRATORY (INHALATION) DAILY
Qty: 7 VIAL | Refills: 0 | COMMUNITY
Start: 2021-04-01 | End: 2021-05-25

## 2021-04-01 NOTE — PROGRESS NOTES
REASON FOR CONSULTATION/CC: shortness of breath       CONSULTING PHYSICIAN: Yohan Skaggs MD   PCP: Yohan Skaggs MD    HISTORY OF PRESENT ILLNESS: Rosie Collins is a 55y.o. year old female with a history of smoking. who presents increase dyspnea for the last year. Asthma  Worsening shortness of breath that she believes it is secondary to weight. No  wheezing with shortness of breath. Never had issues with anesthesia in the past.      She has not needed albuterol rescue for some time. Using budesonide nebulizer 2 times per day. ASTHMA CONTROL TEST 2/24/2021   In the past 4 weeks, how much of the time did your asthma keep you from getting as much done at work, school or at home? 4   During the past 4 weeks, how often have you had shortness of breath? 1   During the past 4 weeks, how often did your asthma symptoms (wheezing, coughing, shortness of breath, chest tightness or pain) wake you up at night or earlier than usual in the morning? 5   During the past 4 weeks, how often have you used your rescue inhaler or nebulizer medication (such as albuterol)? 4   How would you rate your asthma control during the past 4 weeks?  5   Asthma Control Test Total Score 19              PAST MEDICAL HISTORY:  Past Medical History:   Diagnosis Date    Anxiety     Asthma     currently was instructed to stop inhalers d/t to collapse of vocal cords    Diaphragm paralysis     right side    Enlarged liver     Fatty liver     Fibromyalgia     GERD (gastroesophageal reflux disease)     Headache(784.0)     neck pain     History of ulcer disease     Hypothyroidism     status post thyroidectomy     Kidney infection     Melanoma ([address]     right eye     Osteoarthritis     CHRONIC BACK PAIN/FIBROMYALGIA    Rheumatoid arthritis (HCC)     Spasmodic dysphonia     voice very hoarse    Thyroid disease     2015 found spot on left side of thyroid, right side thyroid was removed       PAST SURGICAL HISTORY:  Past Surgical History:   Procedure Laterality Date    APPENDECTOMY  age 17   [facility] BREAST BIOPSY Right     x3  benign lesions     BREAST SURGERY Right needle localization right breast mass    BREAST SURGERY Right 10/22/15    riught breast mass excision/needle loc    CARPAL TUNNEL RELEASE Right 5/7/15    CARPAL TUNNEL RELEASE Left 5/28/15    Left carpal tunnel release      COLONOSCOPY      ENDOSCOPY, COLON, DIAGNOSTIC      EPIDURAL STEROID INJECTION N/A 2/1/2019    LUMBAR EPIDURAL STEROID INJECTION L3/4 performed by Cristina Yan MD at [address] [place] [address] N/A 11/22/2019    CERVICAL EPIDURAL STEROID INJECTION C7-T1 performed by Cristina Yan MD at [address] [place] [address] Bilateral 3/29/2019    INTRA ARTICULAR KNEE INJECTIONS performed by Cristina Yan MD at [address] [place] [address] Bilateral 12/6/2019    BILATERAL INTRAARTICULAR KNEE INJECTION performed by Cristina Yan MD at [address] [place] [address] Ave ARTHROSCOPY Left 12/21/2017    CT ARTHROCENTESIS ASPIR&/INJ MAJOR JT/BURSA W/O US Bilateral 5/10/2019    GREATER TROCHANTERIC BURSA INJECTIONS performed by Cristina Yan MD at [place] [address] DX/THER SBST INTRLMNR CRV/THRC W/IMG GDN N/A 9/28/2018    EPIDURAL STEROID INJECTION C7-T1 performed by Cristina Yan MD at [place] [address] DX/THER SBST INTRLMNR LMBR/SAC W/IMG GDN Right 11/9/2018    LUMBAR EPIDURAL STEROID INJECTION L3-4 performed by Cristina Yan MD at [facility] right side of thyroid    THYROIDECTOMY, PARTIAL      TUBAL LIGATION  1992       FAMILY HISTORY:  family history includes Asthma in her father; Breast Cancer in her maternal aunt, maternal aunt, maternal cousin, and paternal uncle;  Cancer (age of onset: 61) in her mother; Diabetes in her sister; Emphysema in her father; Heart Attack in her father; Heart Disease in her father; Migraines in her mother and sister; No Known Problems in her maternal grandfather, maternal grandmother, and paternal grandmother. SOCIAL HISTORY:   reports that she quit smoking about 18 years ago. Her smoking use included cigarettes. She started smoking about 38 years ago. She smoked 1.00 pack per day. She has never used smokeless tobacco.    ALLERGIES:  Patient is allergic to other. Objective:   PHYSICAL EXAM:  Blood pressure 122/80, pulse 86, temperature 97.7 °F (36.5 °C), temperature source Infrared, resp. rate 16, height 5' 8\" (1.727 m), weight 279 lb 6.4 oz (126.7 kg), last menstrual period 02/25/2016, SpO2 94 %, not currently breastfeeding.'  Body mass index is 42.48 kg/m². Gen: No distress. Eyes:    ENT:    Neck:    Resp: No accessory muscle use. No crackles. No wheezes. No rhonchi. CV: Regular rate. Regular rhythm. No murmur or rub. No edema. GI:    Skin:    Lymph:    M/S: No cyanosis. No clubbing. Neuro: Moves all four extremities. Psych: Oriented x 3. No anxiety. Awake. Alert. Intact judgement and insight. Current Outpatient Medications on File Prior to Visit   Medication Sig Dispense Refill    levothyroxine (SYNTHROID) 125 MCG tablet Take 1 tablet by mouth Daily 90 tablet 1    Respiratory Therapy Supplies (NEBULIZER/TUBING/MOUTHPIECE) KIT 1 kit by Does not apply route daily 1 kit 0    oxyCODONE-acetaminophen (PERCOCET) 5-325 MG per tablet every 8 hours as needed.       diclofenac sodium (VOLTAREN) 1 % GEL Apply 2-4 gm to affected area tid for 2 weeks then bid prn thereafter 3 Tube 1    budesonide (PULMICORT) 0.5 MG/2ML nebulizer suspension TAKE 1 VIAL VIA NEBULIZER TWICE DAILY 360 mL 6    albuterol sulfate  (90 Base) MCG/ACT inhaler Inhale 2 puffs into the lungs 4 times daily as needed for Wheezing 3 Inhaler 1    omeprazole (PRILOSEC) 20 MG delayed release capsule Take 1 capsule by mouth every morning (before breakfast) 30 capsule 2    Cholecalciferol (VITAMIN D) 2000 units CAPS capsule Take by mouth daily       gabapentin (NEURONTIN) 300 MG capsule TWICE IN AM ONCE IN PM  1    ondansetron (ZOFRAN) 4 MG tablet TAKE 1 TABLET BY MOUTH DAILY AS NEEDED FOR NAUSEA OR VOMITING (Patient not taking: Reported on 4/1/2021) 30 tablet 0     Current Facility-Administered Medications on File Prior to Visit   Medication Dose Route Frequency Provider Last Rate Last Admin    technetium sulfur colloid egg (NYCOMED-SC) solution 500 micro curie  500 micro curie Intravenous ONCE PRN Jose Meza MD         Data Reviewed:   CBC and Renal reviewed  Last CBC  Lab Results   Component Value Date    WBC 6.9 03/09/2021    RBC 4.69 03/09/2021    HGB 13.1 03/09/2021    MCV 85.0 03/09/2021     03/09/2021     Last Renal  Lab Results   Component Value Date     03/09/2021    K 4.2 03/09/2021     03/09/2021    CO2 26 03/09/2021    CO2 27 11/26/2020    CO2 24 08/20/2020    BUN 12 03/09/2021    CREATININE 0.6 03/09/2021    GLUCOSE 113 03/09/2021    CALCIUM 10.0 03/09/2021       Last ABG  POC Blood Gas:   No results found for: POCPH  No results for input(s): PH, PCO2, PO2, HCO3, BE, O2SAT in the last 72 hours. Assessment:     ·  Obstructive airways disease  · Obesity  · Dyspnea  · Dysphonia, hx spasmatic dysphonia   · Paralyzed right diaphram  · Allergic Rhinitis  · Thyroid nodule    Plan:            Problem List Items Addressed This Visit     Moderate persistent asthma without complication - Primary     She is cleared from a pulmonary stand point for surgery. She will continue budesonide bid and will add Ellender Duet for the next month to help with the surgery optimization. Patient has a history of spasmodic dysphonia, paralyzed diaphragm and weight gain which is likely contributing more to her shortness of breath than her asthma. Therefore, she is expected to do relatively well with surgery.     She was advised to use Yupelri tonight and tomorrow morning along with budesonide prior to her surgery. She expressed understanding for this. Relevant Medications    albuterol sulfate HFA (PROAIR HFA) 108 (90 Base) MCG/ACT inhaler    Revefenacin (YUPELRI) 175 MCG/3ML SOLN        This note was transcribed using [id number]. Please disregard any translational errors.

## 2021-04-01 NOTE — ASSESSMENT & PLAN NOTE
She is cleared from a pulmonary stand point for surgery. She will continue budesonide bid and will add David Whelan for the next month to help with the surgery optimization. Patient has a history of spasmodic dysphonia, paralyzed diaphragm and weight gain which is likely contributing more to her shortness of breath than her asthma. Therefore, she is expected to do relatively well with surgery. She was advised to use Yupelri tonight and tomorrow morning along with budesonide prior to her surgery. She expressed understanding for this.

## 2021-05-25 ENCOUNTER — OFFICE VISIT (OUTPATIENT)
Dept: FAMILY MEDICINE CLINIC | Age: 56
End: 2021-05-25
Payer: COMMERCIAL

## 2021-05-25 ENCOUNTER — TELEPHONE (OUTPATIENT)
Dept: FAMILY MEDICINE CLINIC | Age: 56
End: 2021-05-25

## 2021-05-25 VITALS
HEIGHT: 68 IN | SYSTOLIC BLOOD PRESSURE: 124 MMHG | DIASTOLIC BLOOD PRESSURE: 82 MMHG | BODY MASS INDEX: 44.1 KG/M2 | HEART RATE: 80 BPM | WEIGHT: 291 LBS | TEMPERATURE: 97.9 F | OXYGEN SATURATION: 95 %

## 2021-05-25 DIAGNOSIS — J45.40 MODERATE PERSISTENT ASTHMA WITHOUT COMPLICATION: ICD-10-CM

## 2021-05-25 DIAGNOSIS — J30.89 ALLERGIC RHINITIS DUE TO OTHER ALLERGIC TRIGGER, UNSPECIFIED SEASONALITY: ICD-10-CM

## 2021-05-25 DIAGNOSIS — B96.89 ACUTE BACTERIAL SINUSITIS: Primary | ICD-10-CM

## 2021-05-25 DIAGNOSIS — J01.90 ACUTE BACTERIAL SINUSITIS: Primary | ICD-10-CM

## 2021-05-25 PROCEDURE — G8427 DOCREV CUR MEDS BY ELIG CLIN: HCPCS | Performed by: NURSE PRACTITIONER

## 2021-05-25 PROCEDURE — G8417 CALC BMI ABV UP PARAM F/U: HCPCS | Performed by: NURSE PRACTITIONER

## 2021-05-25 PROCEDURE — 99213 OFFICE O/P EST LOW 20 MIN: CPT | Performed by: NURSE PRACTITIONER

## 2021-05-25 PROCEDURE — 3017F COLORECTAL CA SCREEN DOC REV: CPT | Performed by: NURSE PRACTITIONER

## 2021-05-25 PROCEDURE — 1036F TOBACCO NON-USER: CPT | Performed by: NURSE PRACTITIONER

## 2021-05-25 RX ORDER — FLUTICASONE PROPIONATE 50 MCG
1 SPRAY, SUSPENSION (ML) NASAL DAILY
Qty: 1 BOTTLE | Refills: 0 | Status: SHIPPED | OUTPATIENT
Start: 2021-05-25

## 2021-05-25 RX ORDER — AMOXICILLIN AND CLAVULANATE POTASSIUM 875; 125 MG/1; MG/1
1 TABLET, FILM COATED ORAL 2 TIMES DAILY
Qty: 14 TABLET | Refills: 0 | Status: CANCELLED | OUTPATIENT
Start: 2021-05-25 | End: 2021-06-01

## 2021-05-25 RX ORDER — AZITHROMYCIN 250 MG/1
250 TABLET, FILM COATED ORAL SEE ADMIN INSTRUCTIONS
Qty: 6 TABLET | Refills: 0 | Status: SHIPPED | OUTPATIENT
Start: 2021-05-25 | End: 2021-05-30

## 2021-05-25 RX ORDER — IBUPROFEN 600 MG/1
600 TABLET ORAL 3 TIMES DAILY PRN
Qty: 30 TABLET | Refills: 0 | Status: SHIPPED | OUTPATIENT
Start: 2021-05-25 | End: 2021-06-29 | Stop reason: SINTOL

## 2021-05-25 ASSESSMENT — ENCOUNTER SYMPTOMS
EYE DISCHARGE: 1
WHEEZING: 0
SORE THROAT: 1
VOICE CHANGE: 1
SHORTNESS OF BREATH: 1
EYE ITCHING: 0
EYE PAIN: 0
APNEA: 0
RHINORRHEA: 1
CHEST TIGHTNESS: 0
SINUS PAIN: 1
TROUBLE SWALLOWING: 1
SINUS PRESSURE: 1
COUGH: 1

## 2021-05-25 NOTE — PATIENT INSTRUCTIONS
nose.  · Put a hot, wet towel or a warm gel pack on your face 3 or 4 times a day for 5 to 10 minutes each time. · Try a decongestant nasal spray like oxymetazoline (Afrin). Do not use it for more than 3 days in a row. Using it for more than 3 days can make your congestion worse. When should you call for help? Call your doctor now or seek immediate medical care if:    · You have new or worse swelling or redness in your face or around your eyes.     · You have a new or higher fever. Watch closely for changes in your health, and be sure to contact your doctor if:    · You have new or worse facial pain.     · The mucus from your nose becomes thicker (like pus) or has new blood in it.     · You are not getting better as expected. Where can you learn more? Go to [email or web address].[email or web address] org and sign in to your BetterCloud account. Enter Q882 in the [employer] box to learn more about \"Sinusitis: Care Instructions. \"     If you do not have an account, please click on the \"Sign Up Now\" link. Current as of: December 2, 2020               Content Version: 12.8  © 2006-2021 [employer]. Care instructions adapted under license by [facility] ([facility]). If you have questions about a medical condition or this instruction, always ask your healthcare professional. [place] [address] any warranty or liability for your use of this information. Patient Education        Sore Throat: Care Instructions  Your Care Instructions     Infection by bacteria or a virus causes most sore throats. Cigarette smoke, dry air, air pollution, allergies, and yelling can also cause a sore throat. Sore throats can be painful and annoying. Fortunately, most sore throats go away on their own. If you have a bacterial infection, your doctor may prescribe antibiotics. Follow-up care is a key part of your treatment and safety.  Be sure to make and go to all appointments, and call your doctor if you are having problems. It's also a good idea to know your test results and keep a list of the medicines you take. How can you care for yourself at home? · If your doctor prescribed antibiotics, take them as directed. Do not stop taking them just because you feel better. You need to take the full course of antibiotics. · Gargle with warm salt water once an hour to help reduce swelling and relieve discomfort. Use 1 teaspoon of salt mixed in 1 cup of warm water. · Take an over-the-counter pain medicine, such as acetaminophen (Tylenol), ibuprofen (Advil, Motrin), or naproxen (Aleve). Read and follow all instructions on the label. · Be careful when taking over-the-counter cold or flu medicines and Tylenol at the same time. Many of these medicines have acetaminophen, which is Tylenol. Read the labels to make sure that you are not taking more than the recommended dose. Too much acetaminophen (Tylenol) can be harmful. · Drink plenty of fluids. Fluids may help soothe an irritated throat. Hot fluids, such as tea or soup, may help decrease throat pain. · Use over-the-counter throat lozenges to soothe pain. Regular cough drops or hard candy may also help. These should not be given to young children because of the risk of choking. · Do not smoke or allow others to smoke around you. If you need help quitting, talk to your doctor about stop-smoking programs and medicines. These can increase your chances of quitting for good. · Use a vaporizer or humidifier to add moisture to your bedroom. Follow the directions for cleaning the machine. When should you call for help? Call your doctor now or seek immediate medical care if:    · You have new or worse trouble swallowing.     · Your sore throat gets much worse on one side. Watch closely for changes in your health, and be sure to contact your doctor if you do not get better as expected. Where can you learn more? Go to https://chmariloueb.health-partners. org and sign in to your SimpleReach account. Enter R789 in the [facility] box to learn more about \"Sore Throat: Care Instructions. \"     If you do not have an account, please click on the \"Sign Up Now\" link. Current as of: December 2, 2020               Content Version: 12.8  © 2006-2021 [employer]. Care instructions adapted under license by [facility] ([facility]). If you have questions about a medical condition or this instruction, always ask your healthcare professional. [address] any warranty or liability for your use of this information. Patient Education        Sinusitis: Care Instructions  Your Care Instructions     Sinusitis is an infection of the lining of the sinus cavities in your head. Sinusitis often follows a cold. It causes pain and pressure in your head and face. In most cases, sinusitis gets better on its own in 1 to 2 weeks. But some mild symptoms may last for several weeks. Sometimes antibiotics are needed. Follow-up care is a key part of your treatment and safety. Be sure to make and go to all appointments, and call your doctor if you are having problems. It's also a good idea to know your test results and keep a list of the medicines you take. How can you care for yourself at home? · Take an over-the-counter pain medicine, such as acetaminophen (Tylenol), ibuprofen (Advil, Motrin), or naproxen (Aleve). Read and follow all instructions on the label. · If the doctor prescribed antibiotics, take them as directed. Do not stop taking them just because you feel better. You need to take the full course of antibiotics. · Be careful when taking over-the-counter cold or flu medicines and Tylenol at the same time. Many of these medicines have acetaminophen, which is Tylenol. Read the labels to make sure that you are not taking more than the recommended dose. Too much acetaminophen (Tylenol) can be harmful.   · Breathe warm, moist air from a steamy shower, a hot bath, or a sink information.

## 2021-05-25 NOTE — PROGRESS NOTES
Beata Lazcano (:  1965) is a 55 y.o. female,Established patient, here for evaluation of the following chief complaint(s):  Sinus Problem (hoarse throat, sore throat, sinus pressure, eye mucous, drainage yellow )         ASSESSMENT/PLAN:  1. Acute bacterial sinusitis    - azithromycin (ZITHROMAX) 250 MG tablet; Take 1 tablet by mouth See Admin Instructions for 5 days 500mg on day 1 followed by 250mg on days 2 - 5  Dispense: 6 tablet; Refill: 0    2. Moderate persistent asthma without complication      Well controlled with her inhalers    3. Allergic rhinitis due to other allergic trigger, unspecified seasonality       Start Flonase and continue at home regimen. Return if symptoms worsen or fail to improve. Subjective   SUBJECTIVE/OBJECTIVE:  HPI   Started with left sided sinus pressure about a week ago. After doing yard work on Saturday, woke on  with severe sinus pressure, no voice and troubling swallowing due to sore throat. Reports green drainage from nose, coughing up yellow mucous and yellow drainage from eyes. Increased sob has relief with her inhalers. Used afrin and OTC cold and flu medicine with mild relief, states she was able to some sleep. Denies fevers, chills, nausea. States she gets this every year and normally needs a zpak.     Current Outpatient Medications   Medication Sig Dispense Refill    fluticasone (FLONASE) 50 MCG/ACT nasal spray 1 spray by Each Nostril route daily 1 Bottle 0    ibuprofen (ADVIL;MOTRIN) 600 MG tablet Take 1 tablet by mouth 3 times daily as needed for Pain 30 tablet 0    azithromycin (ZITHROMAX) 250 MG tablet Take 1 tablet by mouth See Admin Instructions for 5 days 500mg on day 1 followed by 250mg on days 2 - 5 6 tablet 0    albuterol sulfate HFA (PROAIR HFA) 108 (90 Base) MCG/ACT inhaler Inhale 2 puffs into the lungs every 6 hours as needed for Wheezing or Shortness of Breath 1 Inhaler 11    levothyroxine (SYNTHROID) 125 MCG tablet Take 1 tablet by mouth Daily 90 tablet 1    Respiratory Therapy Supplies (NEBULIZER/TUBING/MOUTHPIECE) KIT 1 kit by Does not apply route daily 1 kit 0    oxyCODONE-acetaminophen (PERCOCET) 5-325 MG per tablet every 8 hours as needed.  diclofenac sodium (VOLTAREN) 1 % GEL Apply 2-4 gm to affected area tid for 2 weeks then bid prn thereafter 3 Tube 1    budesonide (PULMICORT) 0.5 MG/2ML nebulizer suspension TAKE 1 VIAL VIA NEBULIZER TWICE DAILY 360 mL 6    omeprazole (PRILOSEC) 20 MG delayed release capsule Take 1 capsule by mouth every morning (before breakfast) 30 capsule 2    ondansetron (ZOFRAN) 4 MG tablet TAKE 1 TABLET BY MOUTH DAILY AS NEEDED FOR NAUSEA OR VOMITING 30 tablet 0    Cholecalciferol (VITAMIN D) 2000 units CAPS capsule Take by mouth daily       gabapentin (NEURONTIN) 300 MG capsule TWICE IN AM ONCE IN PM  1     No current facility-administered medications for this visit. Past Medical History:   Diagnosis Date    Anxiety     Asthma     currently was instructed to stop inhalers d/t to collapse of vocal cords    Diaphragm paralysis     right side    Enlarged liver     Fatty liver     Fibromyalgia     GERD (gastroesophageal reflux disease)     Headache(784.0)     neck pain     History of ulcer disease     Hypothyroidism     status post thyroidectomy     Kidney infection     Melan[address] ([facility] [address]     right eye     Osteoarthritis     CHRONIC BACK PAIN/FIBROMYALGIA    Rheumatoid arthritis (HCC)     Spasmodic dysphonia     voice very hoarse    Thyroid disease     2015 found spot on left side of thyroid, right side thyroid was removed        Review of Systems   Constitutional: Positive for fatigue (unable to sleep due to sinus congestion). Negative for activity change and fever. HENT: Positive for congestion, rhinorrhea, sinus pressure, sinus pain, sneezing, sore throat, trouble swallowing and voice change.     Eyes: Positive for discharge and visual disturbance (right eye tumor, radiation and surgery. wears eye sheild. ). Negative for pain and itching. Respiratory: Positive for cough and shortness of breath. Negative for apnea, chest tightness and wheezing. Cardiovascular: Negative for chest pain, palpitations and leg swelling. Neurological: Positive for dizziness and headaches (states she has chronic HA). Objective   Physical Exam  Constitutional:       General: She is not in acute distress. Appearance: She is obese. She is not diaphoretic. HENT:      Right Ear: Tympanic membrane, ear canal and external ear normal. There is no impacted cerumen. Left Ear: Tympanic membrane, ear canal and external ear normal. There is no impacted cerumen. Nose: Rhinorrhea present. Right Sinus: Maxillary sinus tenderness and frontal sinus tenderness present. Left Sinus: Maxillary sinus tenderness and frontal sinus tenderness (states the left is more tender than the right) present. Mouth/Throat:      Mouth: Mucous membranes are moist.      Dentition: Has dentures (full upper and lower). Pharynx: Posterior oropharyngeal erythema present. No oropharyngeal exudate. Tonsils: No tonsillar exudate or tonsillar abscesses. 1+ on the right. 1+ on the left. Eyes:      General: No scleral icterus. Right eye: No discharge. Left eye: No discharge. Conjunctiva/sclera: Conjunctivae normal.      Pupils: Pupils are equal, round, and reactive to light. Cardiovascular:      Rate and Rhythm: Normal rate and regular rhythm. Heart sounds: No murmur heard. No friction rub. No gallop. Pulmonary:      Effort: Pulmonary effort is normal.      Breath sounds: Normal breath sounds. Lymphadenopathy:      Cervical: No cervical adenopathy. Neurological:      Mental Status: She is alert.               --ODALYS Gardner - NP

## 2021-05-28 ENCOUNTER — OFFICE VISIT (OUTPATIENT)
Dept: PULMONOLOGY | Age: 56
End: 2021-05-28
Payer: COMMERCIAL

## 2021-05-28 VITALS
OXYGEN SATURATION: 94 % | DIASTOLIC BLOOD PRESSURE: 80 MMHG | RESPIRATION RATE: 12 BRPM | HEIGHT: 68 IN | HEART RATE: 84 BPM | BODY MASS INDEX: 44.04 KG/M2 | WEIGHT: 290.6 LBS | TEMPERATURE: 97.3 F | SYSTOLIC BLOOD PRESSURE: 122 MMHG

## 2021-05-28 DIAGNOSIS — J45.40 MODERATE PERSISTENT ASTHMA WITHOUT COMPLICATION: Primary | ICD-10-CM

## 2021-05-28 DIAGNOSIS — J45.41 MODERATE PERSISTENT ASTHMA WITH EXACERBATION: ICD-10-CM

## 2021-05-28 PROCEDURE — 99214 OFFICE O/P EST MOD 30 MIN: CPT | Performed by: INTERNAL MEDICINE

## 2021-05-28 PROCEDURE — G8427 DOCREV CUR MEDS BY ELIG CLIN: HCPCS | Performed by: INTERNAL MEDICINE

## 2021-05-28 PROCEDURE — G8417 CALC BMI ABV UP PARAM F/U: HCPCS | Performed by: INTERNAL MEDICINE

## 2021-05-28 PROCEDURE — 3017F COLORECTAL CA SCREEN DOC REV: CPT | Performed by: INTERNAL MEDICINE

## 2021-05-28 PROCEDURE — 1036F TOBACCO NON-USER: CPT | Performed by: INTERNAL MEDICINE

## 2021-05-28 RX ORDER — PREDNISONE 10 MG/1
TABLET ORAL
Qty: 30 TABLET | Refills: 0 | Status: SHIPPED | OUTPATIENT
Start: 2021-05-28 | End: 2021-06-07

## 2021-05-28 ASSESSMENT — ASTHMA QUESTIONNAIRES
QUESTION_2 LAST FOUR WEEKS HOW OFTEN HAVE YOU HAD SHORTNESS OF BREATH: 1
QUESTION_1 LAST FOUR WEEKS HOW MUCH OF THE TIME DID YOUR ASTHMA KEEP YOU FROM GETTING AS MUCH DONE AT WORK, SCHOOL OR AT HOME: 2

## 2021-05-30 NOTE — ASSESSMENT & PLAN NOTE
She is having increased shortness of breath and wheezing. Therefore, this is likely asthma exacerbation but VCD is difficult to rule out. Start prednisone with Breo . Sample given.

## 2021-06-08 ENCOUNTER — APPOINTMENT (OUTPATIENT)
Dept: GENERAL RADIOLOGY | Age: 56
End: 2021-06-08
Payer: COMMERCIAL

## 2021-06-08 ENCOUNTER — TELEPHONE (OUTPATIENT)
Dept: PULMONOLOGY | Age: 56
End: 2021-06-08

## 2021-06-08 ENCOUNTER — HOSPITAL ENCOUNTER (OUTPATIENT)
Age: 56
Setting detail: OBSERVATION
Discharge: HOME | End: 2021-06-10
Attending: EMERGENCY MEDICINE | Admitting: HOSPITALIST
Payer: COMMERCIAL

## 2021-06-08 DIAGNOSIS — R07.9 CHEST PAIN, UNSPECIFIED TYPE: Primary | ICD-10-CM

## 2021-06-08 LAB
A/G RATIO: 1.4 (ref 1.1–2.2)
ALBUMIN SERPL-MCNC: 4.1 G/DL (ref 3.4–5)
ALP BLD-CCNC: 103 U/L (ref 40–129)
ALT SERPL-CCNC: 93 U/L (ref 10–40)
ANION GAP SERPL CALCULATED.3IONS-SCNC: 10 MMOL/L (ref 3–16)
AST SERPL-CCNC: 57 U/L (ref 15–37)
BASOPHILS ABSOLUTE: 0.1 K/UL (ref 0–0.2)
BASOPHILS RELATIVE PERCENT: 0.5 %
BILIRUB SERPL-MCNC: 0.4 MG/DL (ref 0–1)
BUN BLDV-MCNC: 14 MG/DL (ref 7–20)
CALCIUM SERPL-MCNC: 10.5 MG/DL (ref 8.3–10.6)
CHLORIDE BLD-SCNC: 102 MMOL/L (ref 99–110)
CO2: 26 MMOL/L (ref 21–32)
CREAT SERPL-MCNC: 0.5 MG/DL (ref 0.6–1.1)
D DIMER: 0.43 UG/ML FEU
EKG ATRIAL RATE: 72 BPM
EKG DIAGNOSIS: NORMAL
EKG P AXIS: 60 DEGREES
EKG P-R INTERVAL: 154 MS
EKG Q-T INTERVAL: 408 MS
EKG QRS DURATION: 92 MS
EKG QTC CALCULATION (BAZETT): 446 MS
EKG R AXIS: -27 DEGREES
EKG T AXIS: 2 DEGREES
EKG VENTRICULAR RATE: 72 BPM
EOSINOPHILS ABSOLUTE: 0.3 K/UL (ref 0–0.6)
EOSINOPHILS RELATIVE PERCENT: 2.3 %
GFR AFRICAN AMERICAN: >60
GFR NON-AFRICAN AMERICAN: >60
GLOBULIN: 2.9 G/DL
GLUCOSE BLD-MCNC: 110 MG/DL (ref 70–99)
HCG QUALITATIVE: NEGATIVE
HCT VFR BLD CALC: 39.3 % (ref 36–48)
HEMOGLOBIN: 13.4 G/DL (ref 12–16)
LYMPHOCYTES ABSOLUTE: 3.5 K/UL (ref 1–5.1)
LYMPHOCYTES RELATIVE PERCENT: 30.4 %
MCH RBC QN AUTO: 28.7 PG (ref 26–34)
MCHC RBC AUTO-ENTMCNC: 34.1 G/DL (ref 31–36)
MCV RBC AUTO: 84.2 FL (ref 80–100)
MONOCYTES ABSOLUTE: 1 K/UL (ref 0–1.3)
MONOCYTES RELATIVE PERCENT: 8.8 %
NEUTROPHILS ABSOLUTE: 6.6 K/UL (ref 1.7–7.7)
NEUTROPHILS RELATIVE PERCENT: 58 %
PDW BLD-RTO: 13.8 % (ref 12.4–15.4)
PLATELET # BLD: 256 K/UL (ref 135–450)
PMV BLD AUTO: 8.5 FL (ref 5–10.5)
POTASSIUM REFLEX MAGNESIUM: 4.1 MMOL/L (ref 3.5–5.1)
PRO-BNP: 31 PG/ML (ref 0–124)
RBC # BLD: 4.66 M/UL (ref 4–5.2)
SODIUM BLD-SCNC: 138 MMOL/L (ref 136–145)
TOTAL PROTEIN: 7 G/DL (ref 6.4–8.2)
TROPONIN: <0.01 NG/ML
WBC # BLD: 11.5 K/UL (ref 4–11)

## 2021-06-08 PROCEDURE — 2580000003 HC RX 258: Performed by: HOSPITALIST

## 2021-06-08 PROCEDURE — 93010 ELECTROCARDIOGRAM REPORT: CPT | Performed by: INTERNAL MEDICINE

## 2021-06-08 PROCEDURE — 6370000000 HC RX 637 (ALT 250 FOR IP): Performed by: EMERGENCY MEDICINE

## 2021-06-08 PROCEDURE — 6370000000 HC RX 637 (ALT 250 FOR IP): Performed by: HOSPITALIST

## 2021-06-08 PROCEDURE — 93005 ELECTROCARDIOGRAM TRACING: CPT | Performed by: EMERGENCY MEDICINE

## 2021-06-08 PROCEDURE — 6370000000 HC RX 637 (ALT 250 FOR IP): Performed by: NURSE PRACTITIONER

## 2021-06-08 PROCEDURE — 6360000002 HC RX W HCPCS: Performed by: HOSPITALIST

## 2021-06-08 PROCEDURE — 83880 ASSAY OF NATRIURETIC PEPTIDE: CPT

## 2021-06-08 PROCEDURE — 84484 ASSAY OF TROPONIN QUANT: CPT

## 2021-06-08 PROCEDURE — G0378 HOSPITAL OBSERVATION PER HR: HCPCS

## 2021-06-08 PROCEDURE — 94640 AIRWAY INHALATION TREATMENT: CPT

## 2021-06-08 PROCEDURE — 94664 DEMO&/EVAL PT USE INHALER: CPT

## 2021-06-08 PROCEDURE — 85025 COMPLETE CBC W/AUTO DIFF WBC: CPT

## 2021-06-08 PROCEDURE — 36415 COLL VENOUS BLD VENIPUNCTURE: CPT

## 2021-06-08 PROCEDURE — 84703 CHORIONIC GONADOTROPIN ASSAY: CPT

## 2021-06-08 PROCEDURE — 99283 EMERGENCY DEPT VISIT LOW MDM: CPT

## 2021-06-08 PROCEDURE — 80053 COMPREHEN METABOLIC PANEL: CPT

## 2021-06-08 PROCEDURE — 94760 N-INVAS EAR/PLS OXIMETRY 1: CPT

## 2021-06-08 PROCEDURE — 71045 X-RAY EXAM CHEST 1 VIEW: CPT

## 2021-06-08 PROCEDURE — 85379 FIBRIN DEGRADATION QUANT: CPT

## 2021-06-08 RX ORDER — SODIUM CHLORIDE 9 MG/ML
25 INJECTION, SOLUTION INTRAVENOUS PRN
Status: DISCONTINUED | OUTPATIENT
Start: 2021-06-08 | End: 2021-06-10 | Stop reason: HOSPADM

## 2021-06-08 RX ORDER — PANTOPRAZOLE SODIUM 40 MG/1
40 TABLET, DELAYED RELEASE ORAL
Status: DISCONTINUED | OUTPATIENT
Start: 2021-06-09 | End: 2021-06-10 | Stop reason: HOSPADM

## 2021-06-08 RX ORDER — BUDESONIDE AND FORMOTEROL FUMARATE DIHYDRATE 160; 4.5 UG/1; UG/1
2 AEROSOL RESPIRATORY (INHALATION) 2 TIMES DAILY
Status: DISCONTINUED | OUTPATIENT
Start: 2021-06-08 | End: 2021-06-10 | Stop reason: HOSPADM

## 2021-06-08 RX ORDER — OXYCODONE HYDROCHLORIDE AND ACETAMINOPHEN 5; 325 MG/1; MG/1
1 TABLET ORAL EVERY 8 HOURS PRN
Status: DISCONTINUED | OUTPATIENT
Start: 2021-06-08 | End: 2021-06-10 | Stop reason: HOSPADM

## 2021-06-08 RX ORDER — FLUTICASONE PROPIONATE 50 MCG
1 SPRAY, SUSPENSION (ML) NASAL DAILY
Status: DISCONTINUED | OUTPATIENT
Start: 2021-06-09 | End: 2021-06-10 | Stop reason: HOSPADM

## 2021-06-08 RX ORDER — VITAMIN B COMPLEX
2000 TABLET ORAL DAILY
Status: DISCONTINUED | OUTPATIENT
Start: 2021-06-09 | End: 2021-06-10 | Stop reason: HOSPADM

## 2021-06-08 RX ORDER — ONDANSETRON 4 MG/1
4 TABLET, ORALLY DISINTEGRATING ORAL EVERY 8 HOURS PRN
Status: DISCONTINUED | OUTPATIENT
Start: 2021-06-08 | End: 2021-06-10 | Stop reason: HOSPADM

## 2021-06-08 RX ORDER — POLYETHYLENE GLYCOL 3350 17 G/17G
17 POWDER, FOR SOLUTION ORAL DAILY PRN
Status: DISCONTINUED | OUTPATIENT
Start: 2021-06-08 | End: 2021-06-10 | Stop reason: HOSPADM

## 2021-06-08 RX ORDER — ONDANSETRON 2 MG/ML
4 INJECTION INTRAMUSCULAR; INTRAVENOUS EVERY 6 HOURS PRN
Status: DISCONTINUED | OUTPATIENT
Start: 2021-06-08 | End: 2021-06-10 | Stop reason: HOSPADM

## 2021-06-08 RX ORDER — ASPIRIN 81 MG/1
81 TABLET, CHEWABLE ORAL DAILY
Status: DISCONTINUED | OUTPATIENT
Start: 2021-06-09 | End: 2021-06-10 | Stop reason: HOSPADM

## 2021-06-08 RX ORDER — SODIUM CHLORIDE 0.9 % (FLUSH) 0.9 %
5-40 SYRINGE (ML) INJECTION PRN
Status: DISCONTINUED | OUTPATIENT
Start: 2021-06-08 | End: 2021-06-10 | Stop reason: HOSPADM

## 2021-06-08 RX ORDER — SODIUM CHLORIDE 0.9 % (FLUSH) 0.9 %
5-40 SYRINGE (ML) INJECTION EVERY 12 HOURS SCHEDULED
Status: DISCONTINUED | OUTPATIENT
Start: 2021-06-08 | End: 2021-06-10 | Stop reason: HOSPADM

## 2021-06-08 RX ORDER — ACETAMINOPHEN 325 MG/1
650 TABLET ORAL EVERY 6 HOURS PRN
Status: DISCONTINUED | OUTPATIENT
Start: 2021-06-08 | End: 2021-06-10 | Stop reason: HOSPADM

## 2021-06-08 RX ORDER — ASPIRIN 81 MG/1
324 TABLET, CHEWABLE ORAL ONCE
Status: COMPLETED | OUTPATIENT
Start: 2021-06-08 | End: 2021-06-08

## 2021-06-08 RX ORDER — ALBUTEROL SULFATE 90 UG/1
2 AEROSOL, METERED RESPIRATORY (INHALATION) EVERY 6 HOURS PRN
Status: DISCONTINUED | OUTPATIENT
Start: 2021-06-08 | End: 2021-06-10 | Stop reason: HOSPADM

## 2021-06-08 RX ORDER — LEVOTHYROXINE SODIUM 0.12 MG/1
125 TABLET ORAL DAILY
Status: DISCONTINUED | OUTPATIENT
Start: 2021-06-09 | End: 2021-06-10 | Stop reason: HOSPADM

## 2021-06-08 RX ORDER — ACETAMINOPHEN 650 MG/1
650 SUPPOSITORY RECTAL EVERY 6 HOURS PRN
Status: DISCONTINUED | OUTPATIENT
Start: 2021-06-08 | End: 2021-06-10 | Stop reason: HOSPADM

## 2021-06-08 RX ORDER — ATORVASTATIN CALCIUM 40 MG/1
40 TABLET, FILM COATED ORAL NIGHTLY
Status: DISCONTINUED | OUTPATIENT
Start: 2021-06-08 | End: 2021-06-10 | Stop reason: HOSPADM

## 2021-06-08 RX ADMIN — OXYCODONE HYDROCHLORIDE AND ACETAMINOPHEN 1 TABLET: 5; 325 TABLET ORAL at 22:22

## 2021-06-08 RX ADMIN — ENOXAPARIN SODIUM 40 MG: 40 INJECTION SUBCUTANEOUS at 22:22

## 2021-06-08 RX ADMIN — ATORVASTATIN CALCIUM 40 MG: 40 TABLET, FILM COATED ORAL at 22:22

## 2021-06-08 RX ADMIN — Medication 10 ML: at 22:22

## 2021-06-08 RX ADMIN — ASPIRIN 324 MG: 81 TABLET, CHEWABLE ORAL at 15:28

## 2021-06-08 RX ADMIN — BUDESONIDE AND FORMOTEROL FUMARATE DIHYDRATE 2 PUFF: 160; 4.5 AEROSOL RESPIRATORY (INHALATION) at 20:17

## 2021-06-08 SDOH — ECONOMIC STABILITY: INCOME INSECURITY: IN THE LAST 12 MONTHS, WAS THERE A TIME WHEN YOU WERE NOT ABLE TO PAY THE MORTGAGE OR RENT ON TIME?: NO

## 2021-06-08 SDOH — HEALTH STABILITY: PHYSICAL HEALTH: ON AVERAGE, HOW MANY MINUTES DO YOU ENGAGE IN EXERCISE AT THIS LEVEL?: 0 MIN

## 2021-06-08 SDOH — ECONOMIC STABILITY: HOUSING INSECURITY
IN THE LAST 12 MONTHS, WAS THERE A TIME WHEN YOU DID NOT HAVE A STEADY PLACE TO SLEEP OR SLEPT IN A SHELTER (INCLUDING NOW)?: NO

## 2021-06-08 SDOH — ECONOMIC STABILITY: FOOD INSECURITY: WITHIN THE PAST 12 MONTHS, THE FOOD YOU BOUGHT JUST DIDN'T LAST AND YOU DIDN'T HAVE MONEY TO GET MORE.: NEVER TRUE

## 2021-06-08 SDOH — HEALTH STABILITY: PHYSICAL HEALTH: ON AVERAGE, HOW MANY DAYS PER WEEK DO YOU ENGAGE IN MODERATE TO STRENUOUS EXERCISE (LIKE A BRISK WALK)?: 0 DAYS

## 2021-06-08 SDOH — ECONOMIC STABILITY: TRANSPORTATION INSECURITY
IN THE PAST 12 MONTHS, HAS LACK OF TRANSPORTATION KEPT YOU FROM MEETINGS, WORK, OR FROM GETTING THINGS NEEDED FOR DAILY LIVING?: NO

## 2021-06-08 SDOH — ECONOMIC STABILITY: FOOD INSECURITY: WITHIN THE PAST 12 MONTHS, YOU WORRIED THAT YOUR FOOD WOULD RUN OUT BEFORE YOU GOT MONEY TO BUY MORE.: NEVER TRUE

## 2021-06-08 SDOH — ECONOMIC STABILITY: TRANSPORTATION INSECURITY
IN THE PAST 12 MONTHS, HAS THE LACK OF TRANSPORTATION KEPT YOU FROM MEDICAL APPOINTMENTS OR FROM GETTING MEDICATIONS?: NO

## 2021-06-08 SDOH — ECONOMIC STABILITY: HOUSING INSECURITY: IN THE LAST 12 MONTHS, HOW MANY PLACES HAVE YOU LIVED?: 1

## 2021-06-08 ASSESSMENT — SOCIAL DETERMINANTS OF HEALTH (SDOH)
WITHIN THE LAST YEAR, HAVE YOU BEEN AFRAID OF YOUR PARTNER OR EX-PARTNER?: NO
HOW OFTEN DO YOU ATTENT MEETINGS OF THE CLUB OR ORGANIZATION YOU BELONG TO?: NEVER
WITHIN THE LAST YEAR, HAVE TO BEEN RAPED OR FORCED TO HAVE ANY KIND OF SEXUAL ACTIVITY BY YOUR PARTNER OR EX-PARTNER?: NO
IN A TYPICAL WEEK, HOW MANY TIMES DO YOU TALK ON THE PHONE WITH FAMILY, FRIENDS, OR NEIGHBORS?: MORE THAN THREE TIMES A WEEK
HOW OFTEN DO YOU ATTEND CHURCH OR RELIGIOUS SERVICES?: 1 TO 4 TIMES PER YEAR
WITHIN THE LAST YEAR, HAVE YOU BEEN HUMILIATED OR EMOTIONALLY ABUSED IN OTHER WAYS BY YOUR PARTNER OR EX-PARTNER?: NO
HOW OFTEN DO YOU GET TOGETHER WITH FRIENDS OR RELATIVES?: TWICE A WEEK
DO YOU BELONG TO ANY CLUBS OR ORGANIZATIONS SUCH AS CHURCH GROUPS UNIONS, FRATERNAL OR ATHLETIC GROUPS, OR SCHOOL GROUPS?: NO
HOW HARD IS IT FOR YOU TO PAY FOR THE VERY BASICS LIKE FOOD, HOUSING, MEDICAL CARE, AND HEATING?: NOT HARD AT ALL
WITHIN THE LAST YEAR, HAVE YOU BEEN KICKED, HIT, SLAPPED, OR OTHERWISE PHYSICALLY HURT BY YOUR PARTNER OR EX-PARTNER?: NO

## 2021-06-08 ASSESSMENT — PAIN DESCRIPTION - DESCRIPTORS
DESCRIPTORS: ACHING
DESCRIPTORS: ACHING

## 2021-06-08 ASSESSMENT — PAIN SCALES - GENERAL
PAINLEVEL_OUTOF10: 8
PAINLEVEL_OUTOF10: 8
PAINLEVEL_OUTOF10: 5

## 2021-06-08 ASSESSMENT — HEART SCORE: ECG: 1

## 2021-06-08 ASSESSMENT — PAIN DESCRIPTION - LOCATION
LOCATION: HEAD;NECK
LOCATION: HEAD;NECK

## 2021-06-08 ASSESSMENT — PAIN DESCRIPTION - PAIN TYPE
TYPE: CHRONIC PAIN
TYPE: CHRONIC PAIN

## 2021-06-08 ASSESSMENT — LIFESTYLE VARIABLES
HOW MANY STANDARD DRINKS CONTAINING ALCOHOL DO YOU HAVE ON A TYPICAL DAY: 1 OR 2
HOW OFTEN DO YOU HAVE A DRINK CONTAINING ALCOHOL: MONTHLY OR LESS

## 2021-06-08 NOTE — Clinical Note
Patient Class: Observation [104]   REQUIRED: Diagnosis: Chest pain [147264]   Estimated Length of Stay: Estimated stay of less than 2 midnights   Telemetry/Cardiac Monitoring Required?: Yes

## 2021-06-08 NOTE — PROGRESS NOTES
[facility] here to room 4105. Telemetry monitor on and verified. Orders released, dinner tray ordered. Educated on NPO status after midnight. Call light in reach.

## 2021-06-08 NOTE — ED NOTES
Kathrin Leyden RN notified room ready and will set up transportation.        Kathrine Borrero, NINA  06/08/21 [id number]

## 2021-06-08 NOTE — ED NOTES
SBAR report given to strategic. Patient has no complaints of pain.        Alan Montiel RN  06/08/21 [id number]

## 2021-06-08 NOTE — ED PROVIDER NOTES
[address]      Pt Name: Teri Wolff  MRN: [id number]  [place] 1965  Date of evaluation: 6/8/2021  Provider: Jonny Puga DO    CHIEF COMPLAINT       Chief Complaint   Patient presents with    Shortness of Breath     couple weeks     Nasal Congestion     couple weeks     Pharyngitis     loss of voice. HISTORY OF PRESENT ILLNESS   (Location/Symptom, Timing/Onset, Context/Setting, Quality, Duration, Modifying Factors, Severity)  Note limiting factors. Teri Wolff is a 55 y.o. female who presents to the emergency department with a complaint of shortness of breath for the last 3 weeks. She states that she initially saw her pulmonologist and was prescribed a tapering dose of prednisone which she takes the last dose today. She does report some improvement. She denies any fever or chills. She denies any cough or sputum production. She states that she was vaccinated for Covid and completed her vaccination series in April. She has never had Covid before. Currently her sister whom she has recently been exposed to tested positive for Covid and her employer recommended that she go get a Covid test.  She went to an urgent care center today and had a rapid Covid test which was negative. However, because of her continued shortness of breath she was advised to come to the emergency department for further testing and evaluation. She does report that over the last several weeks she has been experiencing some chest pain described as chest tightness and burning in her chest that occurs with activity and exertion and is relieved with rest after a few minutes. She denies any associated diaphoresis. She does report shortness of breath with activity. She was concerned that it could be her heart. She denies any prior history of coronary artery disease or thromboembolic disease. No leg or calf pain. No recent travel.     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 This was diagnosed several months ago. She wears a patch on the right eye. She states that she was having decreased vision in the right eye and went to see an ophthalmologist and was diagnosed with melanoma of the right eye and referred to the [address] [place] [address]. In April, she had a radiation implant placed in the right eye which will stay in for several months. She denies any current chemotherapy or infusions. No other active treatment. She does report that she has had a lot of gastroesophageal reflux and indigestion since her surgery in April. Her physician prescribed omeprazole which did not help. This was increased to twice daily and now the reflux appears to be much improved. No current symptoms of reflux. She denies any abdominal pain melena medic easier hematemesis. No back pain or flank pain. 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 She sees a pulmonologist at [facility].  She does report a slight sore throat and some hoarseness of her voice. No longer smokes tobacco.  She does have a history of COPD/asthma. She has had some occasional wheezing at times. This is improved after steroids. She does not wear oxygen. Nursing Notes were reviewed. HPI        REVIEW OF SYSTEMS    (2-9 systems for level 4, 10 or more for level 5)       Constitutional: Negative for fever or chills. HENT: Negative for rhinorrhea   Eyes: Negative for redness or drainage. Gastrointestinal: Negative for abdominal pain. Negative for vomiting or diarrhea. Genitourinary: Negative for flank pain. Negative for dysuria. Negative for hematuria. Neurological: Negative for headache. Musculoskeletal:  Negative edema. Hematological: Negative for adenopathy. All systems are reviewed and are negative except for those listed above in the history of present illness and ROS.         PAST MEDICAL HISTORY     Past Medical History:   Diagnosis Date    Anxiety     Asthma     currently was instructed to stop inhalers d/t to collapse of vocal cords    Diaphragm paralysis     right side    Enlarged liver     Fatty liver     Fibromyalgia     GERD (gastroesophageal reflux disease)     Headache(784.0)     neck pain     History of ulcer disease     Hypothyroidism     status post thyroidectomy     Kidney infection     Melanoma ([facility] [address]     right eye     Osteoarthritis     CHRONIC BACK PAIN/FIBROMYALGIA    Rheumatoid arthritis (HCC)     Spasmodic dysphonia     voice very hoarse    Thyroid disease     2015 found spot on left side of thyroid, right side thyroid was removed         SURGICAL HISTORY       Past Surgical History:   Procedure Laterality Date    APPENDECTOMY  age 17   Serafin Layer BREAST BIOPSY Right     x3  benign lesions     BREAST SURGERY Right needle localization right breast mass    BREAST SURGERY Right 10/22/15    riught breast mass excision/needle loc    CARPAL TUNNEL RELEASE Right 5/7/15    CARPAL TUNNEL RELEASE Left 5/28/15    Left carpal tunnel release      COLONOSCOPY      ENDOSCOPY, COLON, DIAGNOSTIC      EPIDURAL STEROID INJECTION N/A 2/1/2019    LUMBAR EPIDURAL STEROID INJECTION L3/4 performed by Cydney Han MD at [address] [place] [address] N/A 11/22/2019    CERVICAL EPIDURAL STEROID INJECTION C7-T1 performed by Cydney Han MD at [address] [place] [address] NERV Bilateral 3/29/2019    INTRA ARTICULAR KNEE INJECTIONS performed by Cydney Han MD at [address] [place] [address] NERV Bilateral 12/6/2019    BILATERAL INTRAARTICULAR KNEE INJECTION performed by Cydney Han MD at [address] [place] [address] ARTHROSCOPY Left 12/21/2017    W[place] ARTHROCENTESIS ASPIR&/INJ MAJOR JT/BURSA W/O US Bilateral 5/10/2019    GREATER TROCHANTERIC BURSA INJECTIONS performed by Cydney Han MD at [facility] ENDOSCOPY    ND NJX DX/THER SBST INTRLMNR CRV/THRC W/IMG GDN N/A 9/28/2018    EPIDURAL STEROID INJECTION C7-T1 performed by Wava Najjar, MD at [facility] DX/THER SBST INTRLMNR LMBR/SAC W/IMG GDN Right 11/9/2018    LUMBAR EPIDURAL STEROID INJECTION L3-4 performed by Wava Najjar, MD at [facility] [address] right side of thyroid    THYROIDECTOMY, PARTIAL      TUBAL LIGATION  1992         CURRENT MEDICATIONS       Previous Medications    ALBUTEROL SULFATE HFA (PROAIR HFA) 108 (90 BASE) MCG/ACT INHALER    Inhale 2 puffs into the lungs every 6 hours as needed for Wheezing or Shortness of Breath    CHOLECALCIFEROL (VITAMIN D) 2000 UNITS CAPS CAPSULE    Take by mouth daily     DICLOFENAC SODIUM (VOLTAREN) 1 % GEL    Apply 2-4 gm to affected area tid for 2 weeks then bid prn thereafter    FLUTICASONE (FLONASE) 50 MCG/ACT NASAL SPRAY    1 spray by Each Nostril route daily    FLUTICASONE FUROATE-VILANTEROL (BREO ELLIPTA) 200-25 MCG/INH AEPB INHALER    Inhale 1 puff into the lungs daily    FLUTICASONE FUROATE-VILANTEROL (BREO ELLIPTA) 200-25 MCG/INH AEPB INHALER    Inhale 1 puff into the lungs daily    FLUTICASONE FUROATE-VILANTEROL (BREO ELLIPTA) 200-25 MCG/INH AEPB INHALER    Inhale 1 puff into the lungs daily Sample  Lot # [id number]  Exp date  9.30.21    GABAPENTIN (NEURONTIN) 300 MG CAPSULE    TWICE IN AM ONCE IN PM    IBUPROFEN (ADVIL;MOTRIN) 600 MG TABLET    Take 1 tablet by mouth 3 times daily as needed for Pain    LEVOTHYROXINE (SYNTHROID) 125 MCG TABLET    Take 1 tablet by mouth Daily    OMEPRAZOLE (PRILOSEC) 20 MG DELAYED RELEASE CAPSULE    Take 1 capsule by mouth every morning (before breakfast)    ONDANSETRON (ZOFRAN) 4 MG TABLET    TAKE 1 TABLET BY MOUTH DAILY AS NEEDED FOR NAUSEA OR VOMITING    OXYCODONE-ACETAMINOPHEN (PERCOCET) 5-325 MG PER TABLET    every 8 hours as needed.     RESPIRATORY THERAPY SUPPLIES (NEBULIZER/TUBING/MOUTHPIECE) KIT    1 kit by Does not apply route daily       ALLERGIES     Other    FAMILY HISTORY       Family History   Problem Relation Age of Onset    Asthma Father     Heart Disease Father     Emphysema Father     Heart Attack Father     Breast Cancer Maternal Aunt         breast    Breast Cancer Maternal Aunt         breast    Migraines Mother     Cancer Mother 61        multiple mylenoma    Diabetes Sister     Migraines Sister     Breast Cancer Paternal Uncle         colon    Breast Cancer Maternal Cousin         breast    No Known Problems Maternal Grandmother     No Known Problems Maternal Grandfather     No Known Problems Paternal Grandmother           SOCIAL HISTORY       Social History     Socioeconomic History    Marital status:      Spouse name: None    Number of children: None    Years of education: None    Highest education level: None   Occupational History    Occupation: room leader     Comment: [employer]   Tobacco Use    Smoking status: Former Smoker     Packs/day: 1.00     Years: 15.00     Pack years: 15.00     Types: Cigarettes     Start date:      Quit date: 2003     Years since quittin.4    Smokeless tobacco: Never Used   Vaping Use    Vaping Use: Never used   Substance and Sexual Activity    Alcohol use: Not Currently     Alcohol/week: 0.0 standard drinks    Drug use: No    Sexual activity: Not Currently     Partners: Male   Other Topics Concern    None   Social History Narrative    None     Social Determinants of Health     Financial Resource Strain: Unknown    Difficulty of Paying Living Expenses: Patient refused   Food Insecurity: Unknown    Worried About Running Out of Food in the Last Year: Patient refused    Ran Out of Food in the Last Year: Patient refused   Transportation Needs:     Lack of Transportation (Medical):      Lack of Transportation (Non-Medical):    Physical Activity:     Days of Exercise per Week:     Minutes of Exercise per Session:    Stress:  Feeling of Stress :    Social Connections:     Frequency of Communication with Friends and Family:     Frequency of Social Gatherings with Friends and Family:     Attends Caodaism Services:     Active Member of Clubs or Organizations:     Attends Club or Organization Meetings:     Marital Status:    Intimate Partner Violence:     Fear of Current or Ex-Partner:     Emotionally Abused:     Physically Abused:     Sexually Abused:        SCREENINGS      Heart Score for chest pain patients  History: Moderately Suspicious  ECG: Non-Specifc repolarization disturbance/LBTB/PM  Patient Age: > 39 and < 65 years  *Risk factors for Atherosclerotic disease: Positive family History, Obesity  Risk Factors: 1 or 2 risk factors  Troponin: < 1X normal limit  Heart Score Total: 4      PHYSICAL EXAM    (up to 7 for level 4, 8 or more for level 5)     ED Triage Vitals   BP Temp Temp src Pulse Resp SpO2 Height Weight   -- -- -- -- -- -- -- --         Physical Exam   Constitutional: Awake and alert. Nontoxic. Not ill-appearing. Head: No visible evidence of trauma. Normocephalic. Eyes: Left pupil reactive. Sclera white. Conjunctive a pink. Right eye patch in place. Right eye not visualized. No photophobia. Conjunctiva normal.    HENT: Oral mucosa moist.  Airway patent. Pharynx without erythema. Nares were clear. Neck:  Soft and supple. Nontender. Heart:  Regular rate and rhythm. No murmur. Lungs: Diminished breath sounds bilaterally but clear to auscultation. No wheezes rales or rhonchi. Slight conversational dyspnea. Chest: Chest wall non-tender. No evidence of trauma. Abdomen:  Soft, nondistended, bowel sounds present. Nontender. No guarding rigidity or rebound. No masses. Musculoskeletal: Extremities non-tender with full range of motion. Radial and dorsalis pedis pulses were intact. No calf tenderness erythema or edema. Neurological: Alert and oriented x 3. Speech clear.   Cranial nerves II-XII intact. No facial droop. No acute focal motor or sensory deficits. Skin: Skin is warm and dry. No rash. Lymphatic:  No lympadenopathy. Psychiatric: Normal mood and affect. Behavior is normal.         DIAGNOSTIC RESULTS     EKG: All EKG's are interpreted by the Emergency Department Physician who either signs or Co-signs this chart in the absence of a cardiologist.    Normal sinus rhythm. Rate 72. PA interval 154 ms. QRS duration 92 ms. QTc 446 ms.  R axis -27 degrees. No ST elevation. Nonspecific T wave abnormalities. RADIOLOGY:   Non-plain film images such as CT, Ultrasound and MRI are read by the radiologist. Plain radiographic images are visualized and preliminarily interpreted by the emergency physician with the below findings:        Interpretation per the Radiologist below, if available at the time of this note:    XR CHEST PORTABLE   Final Result   No acute abnormality.                ED BEDSIDE ULTRASOUND:   Performed by ED Physician - none    LABS:  Labs Reviewed   CBC WITH AUTO DIFFERENTIAL - Abnormal; Notable for the following components:       Result Value    WBC 11.5 (*)     All other components within normal limits    Narrative:     Performed at:  [facility]  [address] [facility]   Phone [phone]   COMPREHENSIVE METABOLIC PANEL W/ REFLEX TO MG FOR LOW K - Abnormal; Notable for the following components:    Glucose 110 (*)     CREATININE 0.5 (*)     ALT 93 (*)     AST 57 (*)     All other components within normal limits    Narrative:     Performed at:  [facility]  [address] [facility]   Phone [phone]   BRAIN NATRIURETIC PEPTIDE    Narrative:     Performed at:  [address] [place] [address]  [address] [facility]   Phone [phone]   TROPONIN    Narrative:     Performed at:  [facility]  [address] [facility]   Phone [phone]   D-DIMER, QUANTITATIVE    Narrative:     Performed at:  [address] [facility]  [address] [facility]   Phone [phone]       All other labs were within normal range or not returned as of this dictation. EMERGENCY DEPARTMENT COURSE and DIFFERENTIAL DIAGNOSIS/MDM:   Vitals:    Vitals:    21 1700 21 1715 21 1730 21 1745   BP: (!) 92/44 104/60 (!) 104/53 (!) 146/66   Pulse: 78 88 88 89   Resp: 19 17 22 17   Temp:       TempSrc:       SpO2: 94% 96% 97% 97%   Weight:       Height:             MDM      The patient presents with persistent shortness of breath over the last several weeks as well as exertional chest pain relieved with rest.  Currently she denies any chest discomfort. Breath sounds are diminished but clear to auscultation. She does have some conversational dyspnea but states that this is consistent with her baseline. She does have diaphragmatic paralysis which causes some baseline dyspnea. Oxygen saturation is 96% on room air. She did have a rapid Covid test done today at the urgent care center which was negative. EKG was obtained which revealed normal sinus rhythm with nonspecific T wave abnormalities. No ST elevation. REASSESSMENT          3:14 PM: Troponin is normal.  D-dimer is normal.  Clinical suspicion for pulmonary embolus is low. No evidence of hypoxia or tachycardia. However, the patient is exertional chest pain which is relieved with rest is concerning. Her father  of a \"massive heart attack\" at age 62. Risk factors include obesity and family history. There is at least moderate suspicion for ischemic source of her chest discomfort. Heart score is 4. She will be admitted for further treatment and evaluation. A call was placed to the hospitalist on-call at [facility] for admission.   She was given aspirin 324 mg p.o.    3:31 PM: I spoke with Dr. Shalom Jefferson, hospitalist on-call at [facility] who agrees to admit the patient. CRITICAL CARE TIME   Total Critical Care time was 0 minutes, excluding separately reportable procedures. There was a high probability of clinically significant/life threatening deterioration in the patient's condition which required my urgent intervention. CONSULTS:  None    PROCEDURES:  Unless otherwise noted below, none     Procedures        FINAL IMPRESSION      1. Chest pain, unspecified type          DISPOSITION/PLAN   DISPOSITION        PATIENT REFERRED TO:  No follow-up provider specified. DISCHARGE MEDICATIONS:  New Prescriptions    No medications on file     Controlled Substances Monitoring:     RX Monitoring 6/20/2018   Attestation The Prescription Monitoring Report for this patient was reviewed today. (Please note that portions of this note were completed with a voice recognition program.  Efforts were made to edit the dictations but occasionally words are mis-transcribed. )    [address] Puga,  (electronically signed)  Attending Emergency Physician          Slime Pederson,   06/08/21 [address]   [address], DO  06/08/21 175

## 2021-06-08 NOTE — TELEPHONE ENCOUNTER
Patient is not any better after prednisone, zpack and inhalers. Very SOB and wheezing. She is going to [facility] to have a rapid COVID test, please advise regarding what else she should do or could do.

## 2021-06-08 NOTE — H&P
Hospital Medicine History & Physical      PCP: Kingsley White MD    Date of Admission: 6/8/2021    Date of Service: Pt seen/examined on 6/8/2021 and Placed in Observation. Chief Complaint:  Chest pain      History Of Present Illness:      55 y.o. female with history of CAD of Asthma, anxiety, fibromyalgia, chronic pain, hypothyroidism and GERD presented to [facility] in transfer from [facility] for evaluation of Shortness of breath and Chest pain. Patient reports shortness of breath over the last 3 weeks. She was seen by her pulmonologist and was prescribed a tapering dose of prednisone which she completed today. She really has not felt any better. 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 She reports chest tightness and burning which occurs with exertion occasionally throughout the last few weeks. She is not having any of this discomfort presently. Patient was vaccinated for Covid in April, her sister currently has Covid and she was exposed. She went to urgent care and had a rapid Covid test performed which was negative. While she was at urgent care she mentioned that she had chest pain and was referred to the emergency department. Patient has no history of CAD or embolic disease. No leg or calf pain. She denies cough or congestion. No fever or chills. No nausea, diaphoresis or dizziness.     Past Medical History:          Diagnosis Date    Anxiety     Asthma     currently was instructed to stop inhalers d/t to collapse of vocal cords    Diaphragm paralysis     right side    Enlarged liver     Fatty liver     Fibromyalgia     GERD (gastroesophageal reflux disease)     Headache(784.0)     neck pain     History of ulcer disease     Hypothyroidism     status post thyroidectomy     Kidney infection     Melanoma ([facility] [address]     right eye     Osteoarthritis     CHRONIC BACK PAIN/FIBROMYALGIA    Rheumatoid arthritis (HCC)     Spasmodic dysphonia voice very hoarse    Thyroid disease     2015 found spot on left side of thyroid, right side thyroid was removed       Past Surgical History:          Procedure Laterality Date    APPENDECTOMY  age 17   [facility] [place] BREAST BIOPSY Right     x3  benign lesions     BREAST SURGERY Right needle localization right breast mass    BREAST SURGERY Right 10/22/15    riught breast mass excision/needle loc    CARPAL TUNNEL RELEASE Right 5/7/15    CARPAL TUNNEL RELEASE Left 5/28/15    Left carpal tunnel release      COLONOSCOPY      ENDOSCOPY, COLON, DIAGNOSTIC      EPIDURAL STEROID INJECTION N/A 2/1/2019    LUMBAR EPIDURAL STEROID INJECTION L3/4 performed by Clifford Mendieta MD at [address] [place] [address] N/A 11/22/2019    CERVICAL EPIDURAL STEROID INJECTION C7-T1 performed by Clifford Mendieta MD at [address] [place] [address] Bilateral 3/29/2019    INTRA ARTICULAR KNEE INJECTIONS performed by Clifford Mendieta MD at [address] [place] [address] Bilateral 12/6/2019    BILATERAL INTRAARTICULAR KNEE INJECTION performed by Clifford Mendieta MD at [address] [facility] [address] ARTHROSCOPY Left 12/21/2017    CO ARTHROCENTESIS ASPIR&/INJ MAJOR JT/BURSA W/O US Bilateral 5/10/2019    GREATER TROCHANTERIC BURSA INJECTIONS performed by Clifford Mendieta MD at [facility] DX/THER SBST INTRLMNR CRV/THRC W/IMG GDN N/A 9/28/2018    EPIDURAL STEROID INJECTION C7-T1 performed by Clifford Mendieta MD at [facility] 96 DX/THER SBST INTRLMNR LMBR/SAC W/IMG GDN Right 11/9/2018    LUMBAR EPIDURAL STEROID INJECTION L3-4 performed by Clifford Mendieta MD at [facility]      removed right side of thyroid    THYROIDECTOMY, PARTIAL      TUBAL LIGATION  1992       Medications Prior to Admission:      Prior to Admission medications    Medication Sig Start Date End Date Taking?  Authorizing Provider Fluticasone furoate-vilanterol (BREO ELLIPTA) 200-25 MCG/INH AEPB inhaler Inhale 1 puff into the lungs daily 5/29/21  Yes Gabriel Farfan MD   Fluticasone furoate-vilanterol (BREO ELLIPTA) 200-25 MCG/INH AEPB inhaler Inhale 1 puff into the lungs daily Sample  Lot # [id number]  Exp date  9.30.21 5/29/21  Yes Gabriel Farfan MD   Fluticasone furoate-vilanterol (BREO ELLIPTA) 200-25 MCG/INH AEPB inhaler Inhale 1 puff into the lungs daily 5/28/21  Yes Gabriel Farfan MD   fluticasone [facility]) 50 MCG/ACT nasal spray 1 spray by Each Nostril route daily 5/25/21  Yes ODALYS Lr NP   ibuprofen (ADVIL;MOTRIN) 600 MG tablet Take 1 tablet by mouth 3 times daily as needed for Pain 5/25/21  Yes ODALYS Lr NP   albuterol sulfate HFA (PROAIR HFA) 108 (90 Base) MCG/ACT inhaler Inhale 2 puffs into the lungs every 6 hours as needed for Wheezing or Shortness of Breath 4/1/21  Yes Gabriel Farfan MD   levothyroxine (SYNTHROID) 125 MCG tablet Take 1 tablet by mouth Daily 3/14/21  Yes Calvin Pimentel MD   Respiratory Therapy Supplies (NEBULIZER/TUBING/MOUTHPIECE) KIT 1 kit by Does not apply route daily 2/24/21  Yes Gabriel Farfan MD   oxyCODONE-acetaminophen (PERCOCET) 5-325 MG per tablet every 8 hours as needed.  7/24/20  Yes Historical Provider, MD   diclofenac sodium (VOLTAREN) 1 % GEL Apply 2-4 gm to affected area tid for 2 weeks then bid prn thereafter 7/9/19  Yes Madai Barton MD   omeprazole (PRILOSEC) 20 MG delayed release capsule Take 1 capsule by mouth every morning (before breakfast) 3/4/19  Yes Calvin Pimentel MD   ondansetron (ZOFRAN) 4 MG tablet TAKE 1 TABLET BY MOUTH DAILY AS NEEDED FOR NAUSEA OR VOMITING 12/6/18  Yes Calvin Pimentel MD   Cholecalciferol (VITAMIN D) 2000 units CAPS capsule Take by mouth daily    Yes Historical Provider, MD   gabapentin (NEURONTIN) 300 MG capsule TWICE IN AM ONCE IN PM 2/1/17  Yes Historical Provider, MD       Allergies: Other    Social History:      The patient currently lives at home with family    TOBACCO:   reports that she quit smoking about 18 years ago. Her smoking use included cigarettes. She started smoking about 38 years ago. She has a 15.00 pack-year smoking history. She has never used smokeless tobacco.  ETOH:   reports previous alcohol use. Family History:      Reviewed in detail positive as follows:        Problem Relation Age of Onset    Asthma Father     Heart Disease Father     Emphysema Father     Heart Attack Father     Breast Cancer Maternal Aunt         breast    Breast Cancer Maternal Aunt         breast    Migraines Mother     Cancer Mother 61        multiple mylenoma    Diabetes Sister     Migraines Sister     Breast Cancer Paternal Uncle         colon    Breast Cancer Maternal Cousin         breast    No Known Problems Maternal Grandmother     No Known Problems Maternal Grandfather     No Known Problems Paternal Grandmother        REVIEW OF SYSTEMS:   Pertinent positives as noted in the HPI. All other systems reviewed and negative. PHYSICAL EXAM PERFORMED:    BP (!) 136/99   Pulse 80   Temp 97.8 °F (36.6 °C)   Resp 18   Ht 5' 8\" (1.727 m)   Wt 295 lb 10.2 oz (134.1 kg)   LMP 02/25/2016 (Exact Date)   SpO2 94%   Breastfeeding Yes   BMI 44.95 kg/m²     General appearance: Well-developed, well-nourished  female lying on hospital bed in no apparent distress, appears stated age and cooperative. HEENT:  Normal cephalic, atraumatic without obvious deformity. Pupils equal, round, and reactive to light. Extra ocular muscles intact. Conjunctivae/corneas clear. Neck: Supple, with full range of motion. No jugular venous distention. Trachea midline. Respiratory:  Normal respiratory effort. Clear to auscultation, bilaterally without Rales/Wheezes/Rhonchi. Cardiovascular:  Regular rate and rhythm without murmurs, rubs or gallops. No lower extremity edema.   Abdomen: Soft, obese abdomen, non-tender, non-distended, without rebound or guarding. Normal bowel sounds. Musculoskeletal:  No clubbing, cyanosis or edema bilaterally. Full range of motion without deformity. Skin: Skin color, texture, turgor normal.  No rashes or lesions. Neurologic:  Neurovascularly intact without any focal sensory/motor deficits. Cranial nerves: II-XII intact, grossly non-focal.  Psychiatric:  Alert and oriented, thought content appropriate, normal insight  Capillary Refill: Brisk,< 3 seconds   Peripheral Pulses: +2 palpable, equal bilaterally       Labs:     Recent Labs     06/08/21  1415   WBC 11.5*   HGB 13.4   HCT 39.3        Recent Labs     06/08/21  1415      K 4.1      CO2 26   BUN 14   CREATININE 0.5*   CALCIUM 10.5     Recent Labs     06/08/21  1415   AST 57*   ALT 93*   BILITOT 0.4   ALKPHOS 103     No results for input(s): INR in the last 72 hours. Recent Labs     06/08/21  1415 06/08/21  1921   TROPONINI <0.01 <0.01       Urinalysis:      Lab Results   Component Value Date    NITRU Negative 03/04/2015    WBCUA None seen 03/04/2015    BACTERIA 2+ 05/22/2014    RBCUA 0-2 03/04/2015    BLOODU SMALL 03/04/2015    SPECGRAV 1.015 03/04/2015    GLUCOSEU Negative 03/04/2015    GLUCOSEU Negative 11/23/2010       Radiology:     EKG:  I have reviewed the EKG with the following interpretation: Normal sinus rhythm, ventricular rate 72. No acute ST elevation. XR CHEST PORTABLE   Final Result   No acute abnormality.          NM Cardiac Stress Test Nuclear Imaging    (Results Pending)       ASSESSMENT:    Active Hospital Problems    Diagnosis Date Noted    Chest pain [R07.9] 05/23/2014         PLAN:    Chest pain  - ECG shows no ST/T abnormalities  - troponins neg, will trend x 2 more draws  - ASA, statin  - NTG for chest pain PRN  - will obtain ECG if chest pain recurrs  - stress test in am  - check lipid panel    Hypothyroidism  - continue synthroid    Asthma/diaphragmatic paralysis  - continue Symbicort and flonase  - respiratory evaluation  - consult pulmonology    GERD  - continue protonix    Chronic pain/fibromyalgia  - continue gabapentin and percocet  - Voltaren topical BID    DVT Prophylaxis: Lovenox  Diet: ADULT DIET; Regular; Low Fat/Low Chol/High Fiber/THEODORE; No Caffeine  Diet NPO  Code Status: Full Code    Dispo - Observation       [address] [facility]    Thank you Carrington Mcfadden MD for the opportunity to be involved in this patient's care. If you have any questions or concerns please feel free to contact me at [phone].

## 2021-06-08 NOTE — ED NOTES
Access center called regarding transportation. No answer at this time.      Alan Montiel RN  06/08/21 Maldonado Giraldo RN  06/08/21 [id number]

## 2021-06-09 LAB
CHOLESTEROL, TOTAL: 230 MG/DL (ref 0–199)
EKG ATRIAL RATE: 69 BPM
EKG DIAGNOSIS: NORMAL
EKG P AXIS: 64 DEGREES
EKG P-R INTERVAL: 166 MS
EKG Q-T INTERVAL: 428 MS
EKG QRS DURATION: 88 MS
EKG QTC CALCULATION (BAZETT): 458 MS
EKG R AXIS: -28 DEGREES
EKG T AXIS: 9 DEGREES
EKG VENTRICULAR RATE: 69 BPM
HCT VFR BLD CALC: 39.7 % (ref 36–48)
HDLC SERPL-MCNC: 48 MG/DL (ref 40–60)
HEMOGLOBIN: 13.2 G/DL (ref 12–16)
LDL CHOLESTEROL CALCULATED: 139 MG/DL
LV EF: 70 %
LVEF MODALITY: NORMAL
MCH RBC QN AUTO: 28.3 PG (ref 26–34)
MCHC RBC AUTO-ENTMCNC: 33.4 G/DL (ref 31–36)
MCV RBC AUTO: 85 FL (ref 80–100)
PDW BLD-RTO: 15.2 % (ref 12.4–15.4)
PLATELET # BLD: 239 K/UL (ref 135–450)
PMV BLD AUTO: 8.8 FL (ref 5–10.5)
RBC # BLD: 4.67 M/UL (ref 4–5.2)
TRIGL SERPL-MCNC: 216 MG/DL (ref 0–150)
VLDLC SERPL CALC-MCNC: 43 MG/DL
WBC # BLD: 9.7 K/UL (ref 4–11)

## 2021-06-09 PROCEDURE — 99220 PR INITIAL OBSERVATION CARE/DAY 70 MINUTES: CPT | Performed by: INTERNAL MEDICINE

## 2021-06-09 PROCEDURE — 6370000000 HC RX 637 (ALT 250 FOR IP): Performed by: NURSE PRACTITIONER

## 2021-06-09 PROCEDURE — G0378 HOSPITAL OBSERVATION PER HR: HCPCS

## 2021-06-09 PROCEDURE — 6370000000 HC RX 637 (ALT 250 FOR IP): Performed by: HOSPITALIST

## 2021-06-09 PROCEDURE — A9502 TC99M TETROFOSMIN: HCPCS | Performed by: HOSPITALIST

## 2021-06-09 PROCEDURE — 93017 CV STRESS TEST TRACING ONLY: CPT

## 2021-06-09 PROCEDURE — 2580000003 HC RX 258: Performed by: HOSPITALIST

## 2021-06-09 PROCEDURE — 6360000002 HC RX W HCPCS: Performed by: NURSE PRACTITIONER

## 2021-06-09 PROCEDURE — 93005 ELECTROCARDIOGRAM TRACING: CPT | Performed by: HOSPITALIST

## 2021-06-09 PROCEDURE — 6360000002 HC RX W HCPCS: Performed by: HOSPITALIST

## 2021-06-09 PROCEDURE — 99204 OFFICE O/P NEW MOD 45 MIN: CPT | Performed by: STUDENT IN AN ORGANIZED HEALTH CARE EDUCATION/TRAINING PROGRAM

## 2021-06-09 PROCEDURE — 36415 COLL VENOUS BLD VENIPUNCTURE: CPT

## 2021-06-09 PROCEDURE — 93010 ELECTROCARDIOGRAM REPORT: CPT | Performed by: INTERNAL MEDICINE

## 2021-06-09 PROCEDURE — 78452 HT MUSCLE IMAGE SPECT MULT: CPT

## 2021-06-09 PROCEDURE — 94640 AIRWAY INHALATION TREATMENT: CPT

## 2021-06-09 PROCEDURE — 31231 NASAL ENDOSCOPY DX: CPT | Performed by: STUDENT IN AN ORGANIZED HEALTH CARE EDUCATION/TRAINING PROGRAM

## 2021-06-09 PROCEDURE — 85027 COMPLETE CBC AUTOMATED: CPT

## 2021-06-09 PROCEDURE — 3430000000 HC RX DIAGNOSTIC RADIOPHARMACEUTICAL: Performed by: HOSPITALIST

## 2021-06-09 PROCEDURE — 80061 LIPID PANEL: CPT

## 2021-06-09 PROCEDURE — 94761 N-INVAS EAR/PLS OXIMETRY MLT: CPT

## 2021-06-09 RX ADMIN — LEVOTHYROXINE SODIUM 125 MCG: 0.12 TABLET ORAL at 05:51

## 2021-06-09 RX ADMIN — ENOXAPARIN SODIUM 40 MG: 40 INJECTION SUBCUTANEOUS at 21:04

## 2021-06-09 RX ADMIN — Medication 10 ML: at 21:14

## 2021-06-09 RX ADMIN — BUDESONIDE AND FORMOTEROL FUMARATE DIHYDRATE 2 PUFF: 160; 4.5 AEROSOL RESPIRATORY (INHALATION) at 20:01

## 2021-06-09 RX ADMIN — DICLOFENAC SODIUM 2 G: 10 GEL TOPICAL at 12:27

## 2021-06-09 RX ADMIN — BUDESONIDE AND FORMOTEROL FUMARATE DIHYDRATE 2 PUFF: 160; 4.5 AEROSOL RESPIRATORY (INHALATION) at 08:21

## 2021-06-09 RX ADMIN — ATORVASTATIN CALCIUM 40 MG: 40 TABLET, FILM COATED ORAL at 21:04

## 2021-06-09 RX ADMIN — OXYCODONE HYDROCHLORIDE AND ACETAMINOPHEN 1 TABLET: 5; 325 TABLET ORAL at 21:14

## 2021-06-09 RX ADMIN — FLUTICASONE PROPIONATE 1 SPRAY: 50 SPRAY, METERED NASAL at 08:32

## 2021-06-09 RX ADMIN — ASPIRIN 81 MG: 81 TABLET, CHEWABLE ORAL at 08:32

## 2021-06-09 RX ADMIN — Medication 10 ML: at 08:36

## 2021-06-09 RX ADMIN — DICLOFENAC SODIUM 2 G: 10 GEL TOPICAL at 21:04

## 2021-06-09 RX ADMIN — PANTOPRAZOLE SODIUM 40 MG: 40 TABLET, DELAYED RELEASE ORAL at 05:51

## 2021-06-09 RX ADMIN — Medication 2000 UNITS: at 08:32

## 2021-06-09 RX ADMIN — OXYCODONE HYDROCHLORIDE AND ACETAMINOPHEN 1 TABLET: 5; 325 TABLET ORAL at 12:27

## 2021-06-09 RX ADMIN — TETROFOSMIN 30 MILLICURIE: 1.38 INJECTION, POWDER, LYOPHILIZED, FOR SOLUTION INTRAVENOUS at 10:37

## 2021-06-09 RX ADMIN — REGADENOSON 0.4 MG: 0.08 INJECTION, SOLUTION INTRAVENOUS at 10:27

## 2021-06-09 ASSESSMENT — PAIN SCALES - GENERAL
PAINLEVEL_OUTOF10: 0
PAINLEVEL_OUTOF10: 5
PAINLEVEL_OUTOF10: 7
PAINLEVEL_OUTOF10: 4
PAINLEVEL_OUTOF10: 7

## 2021-06-09 ASSESSMENT — PAIN DESCRIPTION - DESCRIPTORS: DESCRIPTORS: ACHING

## 2021-06-09 ASSESSMENT — PAIN DESCRIPTION - ONSET: ONSET: ON-GOING

## 2021-06-09 ASSESSMENT — PAIN DESCRIPTION - LOCATION
LOCATION: HEAD;NECK
LOCATION: HEAD;NECK

## 2021-06-09 ASSESSMENT — PAIN DESCRIPTION - PAIN TYPE
TYPE: CHRONIC PAIN
TYPE: CHRONIC PAIN

## 2021-06-09 ASSESSMENT — PAIN - FUNCTIONAL ASSESSMENT: PAIN_FUNCTIONAL_ASSESSMENT: ACTIVITIES ARE NOT PREVENTED

## 2021-06-09 NOTE — PROGRESS NOTES
Back to room from first part of 2 day stress test. Called stress lab, [id number], patient does not have to be NPO after MN for tomorrow's second part of stress test.

## 2021-06-09 NOTE — PLAN OF CARE
Problem: Falls - Risk of:  Goal: Will remain free from falls  Description: Will remain free from falls  Outcome: Ongoing     Problem: Falls - Risk of:  Goal: Absence of physical injury  Description: Absence of physical injury  Outcome: Ongoing   Patient uses call light appropriately to express needs. Bed to lowest position with door open and call light in reach. All fall precautions implemented at this time. Siderails up x2. Non skid footwear in place. Patient has had no falls this shift. Will continue to monitor. Problem: Pain:  Goal: Pain level will decrease  Description: Pain level will decrease  Outcome: Ongoing     Pain/discomfort being managed with PRN analgesics per MD orders. Pt able to express presence and absence of pain and rate pain appropriately using numerical scale. Cardiac monitor. Stress test in AM.  Problem: Discharge Planning:  Goal: Discharged to appropriate level of care  Description: Discharged to appropriate level of care  Outcome: Ongoing     Problem: Activity Intolerance:  Goal: Ability to perform activities of daily living will improve  Description: Ability to perform activities of daily living will improve  Outcome: Ongoing   Medications as ordered.

## 2021-06-09 NOTE — PLAN OF CARE
Problem: Falls - Risk of:  Goal: Will remain free from falls  Description: Will remain free from falls  3/1/5764 [id number] by Adrianna Nguyen RN  Outcome: Ongoing  6/8/2021 2347 by Shelly Aguirre RN  Outcome: Ongoing  Goal: Absence of physical injury  Description: Absence of physical injury  0/0/7250 6638 by Adrianna Nguyen RN  Outcome: Ongoing  6/8/2021 2347 by Shelly Aguirre RN  Outcome: Ongoing     Problem: Pain:  Goal: Pain level will decrease  Description: Pain level will decrease  0/7/7267 1706 by Adrianna Nguyen RN  Outcome: Ongoing  6/8/2021 2347 by Shelly Aguirre RN  Outcome: Ongoing  Goal: Control of acute pain  Description: Control of acute pain  Outcome: Ongoing  Goal: Control of chronic pain  Description: Control of chronic pain  Outcome: Ongoing     Problem: Discharge Planning:  Goal: Discharged to appropriate level of care  Description: Discharged to appropriate level of care  2/8/5924 6177 by Adrianna Nguyen RN  Outcome: Ongoing  6/8/2021 2347 by Shelly Aguirre RN  Outcome: Ongoing     Problem:  Activity Intolerance:  Goal: Ability to perform activities of daily living will improve  Description: Ability to perform activities of daily living will improve  9/5/8394 3519 by Adrianna Nguyen RN  Outcome: Ongoing  6/8/2021 2347 by Shelly Aguirre RN  Outcome: Ongoing

## 2021-06-09 NOTE — CONSULTS
REASON FOR CONSULTATION/CC: Shortness of breath/dysphonia      Consult at request of Demar Dominique MD for dysphonia/shortness of breath  PCP: Valeria Clark MD  Established Pulmonologist: Dr. Ge Deleon: Chase Burk is a 55y.o. year old female with a history of asthma who presents wit    History  Patient has a history of right diaphragmatic paralysis and spastic dysphonia      Current history  Patient has had increasing shortness of breath he was seen in May treated with prednisone, and Breo, albuterol without significant improvement. Assessment:       Asthma/obstructive airways disease  Obesity  Dysphonia history of spastic dysphonia  Right diaphragmatic paralysis  Allergic rhinitis  Thyroid nodule  Hypothyroidism  GERD  Chronic pain/fibromyalgia  melanoma     Plan:      Hospital Day 0     Asthma, diaphragmatic paralysis, dyspnea  -Patient complains of continued dyspnea with upper airway wheezing. Consult ENT for assessment  -Continue Symbicort/outpatient Breo  -Albuterol as needed      Allergic rhinitis  -Controlled      Chest pain  -Stress test ordered      Obesity  -Unfortunately, the patient has gained 40 pounds over the last calendar year. This is a clear contributor to her shortness of breath particularly with her right diaphragmatic paralysis. This note was transcribed using [id number] [employer]. Please disregard any translational errors.     Thank you for the consult    Ryan  Pulmonary, Sleep and Critical Care  [phone]             Data:     PAST MEDICAL HISTORY:  Past Medical History:   Diagnosis Date    Anxiety     Asthma     currently was instructed to stop inhalers d/t to collapse of vocal cords    Diaphragm paralysis     right side    Enlarged liver     Fatty liver     Fibromyalgia     GERD (gastroesophageal reflux disease)     Headache(784.0)     neck pain     History of ulcer disease     Hypothyroidism status post thyroidectomy     Kidney infection     Melanoma ([facility] [address]     right eye     Osteoarthritis     CHRONIC BACK PAIN/FIBROMYALGIA    Rheumatoid arthritis (HCC)     Spasmodic dysphonia     voice very hoarse    Thyroid disease     2015 found spot on left side of thyroid, right side thyroid was removed       PAST SURGICAL HISTORY:  Past Surgical History:   Procedure Laterality Date    APPENDECTOMY  age 17   [facility] BREAST BIOPSY Right     x3  benign lesions     BREAST SURGERY Right needle localization right breast mass    BREAST SURGERY Right 10/22/15    riught breast mass excision/needle loc    CARPAL TUNNEL RELEASE Right 5/7/15    CARPAL TUNNEL RELEASE Left 5/28/15    Left carpal tunnel release      COLONOSCOPY      ENDOSCOPY, COLON, DIAGNOSTIC      EPIDURAL STEROID INJECTION N/A 2/1/2019    LUMBAR EPIDURAL STEROID INJECTION L3/4 performed by Cassie Rose MD at [address] [place] [address] N/A 11/22/2019    CERVICAL EPIDURAL STEROID INJECTION C7-T1 performed by Cassie Rose MD at [address] [place] [address] Bilateral 3/29/2019    INTRA ARTICULAR KNEE INJECTIONS performed by Cassie Rose MD at [address] [place] [address] Bilateral 12/6/2019    BILATERAL INTRAARTICULAR KNEE INJECTION performed by Cassie Rose MD at [address] [place] [address] Ave ARTHROSCOPY Left 12/21/2017    [place] ARTHROCENTESIS ASPIR&/INJ MAJOR JT/BURSA W/O US Bilateral 5/10/2019    GREATER TROCHANTERIC BURSA INJECTIONS performed by Cassie Rose MD at [facility] 96 DX/THER SBST INTRLMNR CRV/THRC W/IMG GDN N/A 9/28/2018    EPIDURAL STEROID INJECTION C7-T1 performed by Cassie Rose MD at [facility] 96 DX/THER SBST INTRLMNR LMBR/SAC W/IMG GDN Right 11/9/2018    LUMBAR EPIDURAL STEROID INJECTION L3-4 performed by [facility] MD at [facility] right side of thyroid    THYROIDECTOMY, PARTIAL      TUBAL LIGATION  1992       FAMILY HISTORY:  family history includes Asthma in her father; Breast Cancer in her maternal aunt, maternal aunt, maternal cousin, and paternal uncle; Cancer (age of onset: 61) in her mother; Diabetes in her sister; Emphysema in her father; Heart Attack in her father; Heart Disease in her father; Migraines in her mother and sister; No Known Problems in her maternal grandfather, maternal grandmother, and paternal grandmother. SOCIAL HISTORY:   reports that she quit smoking about 18 years ago. Her smoking use included cigarettes. She started smoking about 38 years ago. She has a 15.00 pack-year smoking history. She has never used smokeless tobacco.    Scheduled Meds:   sodium chloride flush  5-40 mL Intravenous 2 times per day    aspirin  81 mg Oral Daily    atorvastatin  40 mg Oral Nightly    enoxaparin  40 mg Subcutaneous Nightly    Vitamin D  2,000 Units Oral Daily    diclofenac sodium  2 g Topical BID    fluticasone  1 spray Each Nostril Daily    budesonide-formoterol  2 puff Inhalation BID    levothyroxine  125 mcg Oral Daily    pantoprazole  40 mg Oral QAM AC       Continuous Infusions:   sodium chloride         PRN Meds:  sodium chloride flush, sodium chloride, ondansetron **OR** ondansetron, acetaminophen **OR** acetaminophen, polyethylene glycol, albuterol sulfate HFA, oxyCODONE-acetaminophen, regadenoson    ALLERGIES:  Patient is allergic to other.     REVIEW OF SYSTEMS:  Constitutional: Negative for fever   HENT: Negative for sore throat  Eyes: Negative for redness   Respiratory: + for dyspnea, cough  Cardiovascular: Negative for chest pain  Gastrointestinal: Negative for vomiting, diarrhea    Genitourinary: Negative for hematuria   Musculoskeletal: Negative for arthralgias   Skin: Negative for rash  Neurological: Negative for syncope  Hematological: Negative for adenopathy  Psychiatric/Behavorial: Negative for Contrast    Narrative  CT CHEST, WITH CONTRAST    INDICATIONS:  Paralysis right hemidiaphragm    COMPARISON: CT neck done same day    TECHNIQUE:  Multiple axial sections were obtained following IV  Optiray 320 without reaction. Coronal reformatting was performed. FINDINGS:    The heart is normal in size. Dependent atelectasis is noted in  both lung bases. The right hemidiaphragm is elevated. The liver is  enlarged and demonstrates diffuse fatty infiltration. Emphysematous changes are noted in both upper lobes. Bronchial  wall thickening is evident. A pleural effusion is not seen. Small  paratracheal and subcarinal nodes are identified, largest  measuring less than 1 cm in greatest dimension. Hilar structures  are symmetric. Paratracheal mass is not observed. The nodule in  the lower pole of the right lobe the thyroid is again seen. Bony  structures are intact. Dense material within a small contracted gallbladder may represent  small stones and/or sludge. Biliary ductal dilatation is not  observed. Visualized portions of the spleen, pancreas, both  adrenal glands, and both kidneys are normal in appearance. Para-aortic adenopathy is not appreciated. A 1.5 x 2 cm soft tissue density in the right breast is noted. Report of prior right breast biopsies in 2012 is evident. Impression  1. Nodule in the right lobe the thyroid. 2. Elevated right hemidiaphragm. 3. Streaky basilar atelectasis, right greater than left. 4. 2 cm right breast density, which may have been previously  biopsied. Followup mammogram is recommended to confirm stability. CT Chest w/o contrast: No results found for this or any previous visit. CTPA: No results found for this or any previous visit.       CXR PA/LAT: Results for orders placed during the hospital encounter of 02/06/20    XR CHEST STANDARD (2 VW)    Narrative  EXAMINATION:  TWO XRAY VIEWS OF THE CHEST    2/6/2020 3:17 pm    COMPARISON:  November 10, 2016.    HISTORY:  ORDERING SYSTEM PROVIDED HISTORY: SOB (shortness of breath)  TECHNOLOGIST PROVIDED HISTORY:  Reason for exam:->sob  Are you Pregnant?->No  Reason for Exam: increasing sob and carias over the last 2 months . hx of  paralyzed right diaphragm  Acuity: Acute  Type of Exam: Initial    FINDINGS:  The cardiomediastinal silhouette is normal in size. There is chronic asymmetric elevation of the right diaphragm. Lung volumes are low. No evidence of acute infiltrate, pleural effusion or  pneumothorax. Impression  1. No acute cardiopulmonary process. 2. Chronic asymmetric elevation of the right diaphragm. CXR portable: Results for orders placed during the hospital encounter of 06/08/21    XR CHEST PORTABLE    Narrative  EXAMINATION:  ONE XRAY VIEW OF THE CHEST    6/8/2021 1:33 pm    COMPARISON:  None. HISTORY:  ORDERING SYSTEM PROVIDED HISTORY: dyspnea  TECHNOLOGIST PROVIDED HISTORY:  Reason for exam:->dyspnea  Reason for Exam: sore throat and cough for the last 3 weeks. Acuity: Acute  Type of Exam: Initial  Relevant Medical/Surgical History: hx of paralyzed diaphragm    FINDINGS:  The lungs are clear. The mediastinum and cardiac silhouette are unremarkable. Impression  No acute abnormality.

## 2021-06-09 NOTE — CARE COORDINATION
INITIAL CASE MANAGEMENT ASSESSMENT    Reviewed chart, met with patient to assess possible discharge needs. Explained Case Management role/services. Living Situation: confirmed address, lives with her daughter and granddaughter in home, 3 LIZY    ADLs: independent      DME: none reported    PT/OT Recs: n/a     Active Services: none reported     Transportation: active , family to transport home at [employer]      Medications: Pharmacy: [facility] in [place], [place] - no issues obtaining or taking medications     PCP: confirmed MARCELA Sexton     PLAN/COMMENTS: plan is home with family, follow for needs     SW/CM provided contact information for patient or family to call with any questions. SW/CM will follow and assist as needed.     OCTAVIANO LakeW-S, Social Work  [phone]    Electronically signed by DEREK Parker, SAGE on 6/9/2021 at 2:55 PM

## 2021-06-09 NOTE — PROGRESS NOTES
Hospitalist Progress Note  6/9/2021 10:59 AM    PCP: Sherry Bowser MD    [phone]     Date of Admission: 6/8/2021                                                                                                                     HOSPITAL COURSE    Patient demographics:  The patient  Shweta Meredith is a 55 y.o. female     Significant past medical history:   Patient Active Problem List   Diagnosis    AR (allergic rhinitis)    Moderate persistent asthma without complication    SOB (shortness of breath)    Fibroadenoma    Obesity (BMI 30-39. 9)    Diaphragm paralysis    Thyroid nodule    Hypothyroid    Vitamin D deficiency    Chest pain    Spasmodic dysphonia    Neck pain    Low back pain radiating to right lower extremity    Intestinal ulcer    Cervical radicular pain    Palpitations    Fibromyalgia    CMC DJD(carpometacarpal degenerative joint disease), localized primary    CTS (carpal tunnel syndrome)    Osteoarthritis of left knee    Neuroma    Arthritis of right knee    Abnormal mammogram of right breast    Chronic narcotic use    Primary osteoarthritis of both knees    Acquired varus deformity knee, left    Chondral lesion    Pain in both knees         Presenting symptoms:  Chest pain    Diagnostic workup:      CONSULTS DURING ADMISSION :   IP CONSULT TO PULMONOLOGY  IP CONSULT TO OTOLARYNGOLOGY      Patient was diagnosed with:        Treatment while inpatient:  Pt presented to [facility] in transfer from [facility] for evaluation of Shortness of breath and Chest pain.                                                                                        ----------------------------------------------------------      SUBJECTIVE COMPLAINTS- follow up for Chest pain    Diet: Diet NPO      OBJECTIVE:   Patient Active Problem List   Diagnosis    AR (allergic rhinitis)    Moderate persistent asthma without complication    SOB (shortness of breath)    Fibroadenoma    No discharge. Pharynx clear. External appearance of ears and nose normal.  Neck: Trachea midline. No obvious mass. Resp: No accessory muscle use. No crackles. No wheezes. No rhonchi. CV: Regular rate. Regular rhythm. No murmur or rub. No edema. GI: Non-tender. Non-distended. No hernia. Skin: Warm, dry, normal texture and turgor. Lymph: No cervical LAD. No supraclavicular LAD. M/S: / Ext. No cyanosis. No clubbing. No joint deformity. Neuro: CN 2-12 are intact,  no neurologic deficits noted. PT/INR: No results for input(s): PROTIME, INR in the last 72 hours. APTT: No results for input(s): APTT in the last 72 hours. CBC:   Recent Labs     06/08/21  1415 06/09/21  0551   WBC 11.5* 9.7   HGB 13.4 13.2   HCT 39.3 39.7   MCV 84.2 85.0    239       BMP:   Recent Labs     06/08/21  1415      K 4.1      CO2 26   BUN 14   CREATININE 0.5*       LIVER PROFILE:   Recent Labs     06/08/21  1415   ALKPHOS 103   AST 57*   ALT 93*   BILITOT 0.4     No results for input(s): AMYLASE in the last 72 hours. No results for input(s): LIPASE in the last 72 hours. UA:No results for input(s): NITRITE, LABCAST, WBCUA, RBCUA, MUCUS in the last 72 hours. TROPONIN:   Recent Labs     06/08/21  1415 06/08/21  1921 06/08/21  2154   TROPONINI <0.01 <0.01 <0.01       No results found for: URRFLXCULT    No results for input(s): TSHREFLEX in the last 72 hours. No components found for: [id number]  POC GLUCOSE:  No results for input(s): POCGLU in the last 72 hours. No results for input(s): LABA1C in the last 72 hours. Lab Results   Component Value Date    LABA1C 5.9 03/24/2021     Overall left ventricular systolic function appears normal.   Ejection fraction is visually estimated to be 60-65%. (4/10/2019)    ASSESSMENT AND PLAN  Chest pain  - ECG shows no ST/T abnormalities  - troponins neg  - ASA, statin  - NTG for chest pain PRN  - stress test in two part  - check lipid panel     Hypothyroidism  - continue synthroid     Asthma/diaphragmatic paralysis  - continue Symbicort and flonase  - respiratory evaluation  - consult pulmonology     GERD  - continue protonix     Chronic pain/fibromyalgia  - continue gabapentin and percocet  - Voltaren topical BID      Code Status: Full Code        Dispo -cc        The patient and / or the family were informed of the results of any tests, a time was given to answer questions, a plan was proposed and they agreed with plan. Colt Flowers MD    This note was transcribed using [id number] [employer]. Please disregard any translational errors.

## 2021-06-09 NOTE — CONSULTS
[facility] NIURKA Lazcano (:  1965) is a 55 y.o. female, here for evaluation of the following chief complaint(s):  Shortness of Breath (couple weeks ), Nasal Congestion (couple weeks ), and Pharyngitis (loss of voice.)      ASSESSMENT/PLAN:  1. Dyspnea on exertion  2. Shortness of breath  3. Nasal congestion  4. Chronic rhinitis  5. Dysphonia/laryngitis    This is a very pleasant 55 y.o. female here today for evaluation of the the above-noted complaints. Independently reviewed the patient's electronic medical records including her prior visit with an ENT back in 2018. I could find no diagnosis related to spasmodic dysphonia, and it appears she was previously seen for thyroid nodules. I reviewed her most recent CT chest and while it shows limited view of the thyroid, there are several nodules which are not overly enlarged. Regarding the patient's complaints regarding her voice, she has evidence of inflammation of the bilateral vocal cords with a small amount of white lesions of the mid cords. I suspect that this is related to inflammation and a laryngitis type picture. Her voice findings are consistent with dysphonia and do not represent a spasmodic form of voice disorder, vocal cord dysfunction or laryngospasm. I do not feel that her larynx is a cause of her symptoms at this time. I would like to see the patient back on an outpatient basis and have her work with our speech therapist to work on her chronic intermittent dysphonia. No further intervention is necessary at this time. Regarding her chronic sinonasal complaints, we can address this as an outpatient once she gets her issues related to dyspnea on exertion and shortness of breath figured out. SUBJECTIVE/OBJECTIVE:  ISAIAH Price is here today for evaluation of issues related to her voice.   Patient notes that several weeks ago she got a sinus infection which she felt then drained down into her chest and turned into a chest infection. She has had intermittent difficulties with her voice over the years. She talks a lot at work and feels like her voice goes out at times. She does not feel like her voice cracks or breaks. She does not have difficulty saying specific words. She is previously been evaluated by an ENT for thyroid nodules and scope was performed at that time and there was no abnormal findings noted. It was felt at the time that the thyroid nodules could be monitored. She does note that she gets intermittent nasal congestion and frequent sinus infections. She also gets nasal drainage which is often clear. She denies purulent drainage. REVIEW OF SYSTEMS  The following systems were reviewed and revealed the following in addition to any already discussed in the HPI:    PHYSICAL EXAM    GENERAL: No acute distress, alert and oriented, weak voice with moderate hoarseness and high-pitched strain. EYES: EOMI, Anti-icteric  HENT:   Head: Normocephalic and atraumatic. Face:  Symmetric, facial nerve intact, no sinus tenderness  Right Ear: Normal external ear, normal external auditory canal, intact tympanic membrane with normal mobility and aerated middle ear  Left Ear: Normal external ear, normal external auditory canal, intact tympanic membrane with normal mobility and aerated middle ear  Mouth/Oral Cavity:  normal lips, Uvula is midline, no mucosal lesions, no trismus, without dentition, normal salivary quality/flow  Oropharynx/Larynx:  normal oropharynx, 1+ tonsils; patient did not tolerate mirror exam due to excessive gag reflex  Nose:Normal external nasal appearance. Anterior rhinoscopy shows  a deviated septum preventing view posteriorly. turbinates.   Normal mucosa   NECK: Normal range of motion, no thyromegaly, trachea is midline, no lymphadenopathy, no neck masses, no crepitus  CHEST: Normal respiratory effort, no retractions, breathing comfortably  SKIN: No rashes, normal appearing skin, no evidence of skin lesions/tumors  Neuro:  cranial nerve II-XII intact; normal gait  Cardio:  no edema        PROCEDURE  Nasal Endoscopy (CPT code 06363)    Preop: chronic rhinitis  Postop: Same    Verbal consent was received. After topical anesthesia and decongestion had been obtained using aerosolized 1% lidocaine and oxymetazoline, a 45 degree rigid endoscope was placed into both nares with the patient in a sitting position. The following was observed:    Right Nasal Cavity and Paranasal Sinuses:  Polyp score = 0 (0 = no polyps, 1 = small polyps in middle meatus not reaching below the inferior border of the middle racihd, 2 = polyps reaching below the middle border of the middle turbinate, 3= large polyps reaching the lower border of the inferior turbinate or polyps medial to the middle rachid, 4= large polyps causing almost complete congestion/obstruction of the interior meatus)  Edema score = 1 (0 = absent, 1 = mild, 2 = severe)  Discharge score = 1 (0 = no discharge, 1 = clear thin discharge, 2 = thick purulent discharge)    Left Nasal Cavity and Paranasal Sinuses:    Polyp score = 0 (0 = no polyps, 1 = small polyps in middle meatus not reaching below the inferior border of the middle rachid, 2 = polyps reaching below the middle border of the middle turbinate, 3= large polyps reaching the lower border of the inferior turbinate or polyps medial to the middle rachid, 4= large polyps causing almost complete congestion/obstruction of the interior meatus)  Edema score = 1 (0 = absent, 1 = mild, 2 = severe)  Discharge score = 1 (0 = no discharge, 1 = clear thin discharge, 2 = thick purulent discharge)    Septum: intact and deviated   Other:   -The inferior and middle turbinates were examined. The middle meatus, and sphenoethmoid recess was examined bilaterally.    -There is evidence of mild to moderate sinonasal inflammation.   Advancement of the scope posteriorly revealed no lesions of the nasopharynx, oropharynx, supraglottis, or hypopharynx. There were bilateral fine whitish plaques over the central portions of the vocal cords. True vocal cord motion was symmetric and there was no underlying masses noted. No evidence of vocal cord dysfunction-There were no complications. Tolerated well without complication. I attest that I was present for and did the entire procedure myself. This note was generated completely or in part utilizing Dragon dictation speech recognition software. Occasionally, words are mistranscribed and despite editing, the text may contain inaccuracies due to incorrect word recognition. If further clarification is needed please contact the office at [phone]. An electronic signature was used to authenticate this note.     --Bonita Barlow MD

## 2021-06-10 VITALS
TEMPERATURE: 97.4 F | RESPIRATION RATE: 16 BRPM | DIASTOLIC BLOOD PRESSURE: 63 MMHG | WEIGHT: 290.35 LBS | SYSTOLIC BLOOD PRESSURE: 95 MMHG | OXYGEN SATURATION: 92 % | HEIGHT: 68 IN | BODY MASS INDEX: 44 KG/M2 | HEART RATE: 81 BPM

## 2021-06-10 LAB
ANION GAP SERPL CALCULATED.3IONS-SCNC: 7 MMOL/L (ref 3–16)
BUN BLDV-MCNC: 11 MG/DL (ref 7–20)
CALCIUM SERPL-MCNC: 9.8 MG/DL (ref 8.3–10.6)
CHLORIDE BLD-SCNC: 103 MMOL/L (ref 99–110)
CO2: 28 MMOL/L (ref 21–32)
CREAT SERPL-MCNC: 0.6 MG/DL (ref 0.6–1.1)
GFR AFRICAN AMERICAN: >60
GFR NON-AFRICAN AMERICAN: >60
GLUCOSE BLD-MCNC: 99 MG/DL (ref 70–99)
HCT VFR BLD CALC: 42.2 % (ref 36–48)
HEMOGLOBIN: 14 G/DL (ref 12–16)
MCH RBC QN AUTO: 28 PG (ref 26–34)
MCHC RBC AUTO-ENTMCNC: 33.1 G/DL (ref 31–36)
MCV RBC AUTO: 84.5 FL (ref 80–100)
PDW BLD-RTO: 15 % (ref 12.4–15.4)
PLATELET # BLD: 256 K/UL (ref 135–450)
PMV BLD AUTO: 8.7 FL (ref 5–10.5)
POTASSIUM SERPL-SCNC: 4.5 MMOL/L (ref 3.5–5.1)
RBC # BLD: 5 M/UL (ref 4–5.2)
SODIUM BLD-SCNC: 138 MMOL/L (ref 136–145)
WBC # BLD: 9.7 K/UL (ref 4–11)

## 2021-06-10 PROCEDURE — 6370000000 HC RX 637 (ALT 250 FOR IP): Performed by: HOSPITALIST

## 2021-06-10 PROCEDURE — 94761 N-INVAS EAR/PLS OXIMETRY MLT: CPT

## 2021-06-10 PROCEDURE — A9502 TC99M TETROFOSMIN: HCPCS | Performed by: HOSPITALIST

## 2021-06-10 PROCEDURE — G0378 HOSPITAL OBSERVATION PER HR: HCPCS

## 2021-06-10 PROCEDURE — 99232 SBSQ HOSP IP/OBS MODERATE 35: CPT | Performed by: INTERNAL MEDICINE

## 2021-06-10 PROCEDURE — 94640 AIRWAY INHALATION TREATMENT: CPT

## 2021-06-10 PROCEDURE — 6370000000 HC RX 637 (ALT 250 FOR IP): Performed by: NURSE PRACTITIONER

## 2021-06-10 PROCEDURE — 36415 COLL VENOUS BLD VENIPUNCTURE: CPT

## 2021-06-10 PROCEDURE — 80048 BASIC METABOLIC PNL TOTAL CA: CPT

## 2021-06-10 PROCEDURE — 85027 COMPLETE CBC AUTOMATED: CPT

## 2021-06-10 PROCEDURE — 2580000003 HC RX 258: Performed by: HOSPITALIST

## 2021-06-10 PROCEDURE — 3430000000 HC RX DIAGNOSTIC RADIOPHARMACEUTICAL: Performed by: HOSPITALIST

## 2021-06-10 RX ADMIN — PANTOPRAZOLE SODIUM 40 MG: 40 TABLET, DELAYED RELEASE ORAL at 05:33

## 2021-06-10 RX ADMIN — Medication 2000 UNITS: at 08:09

## 2021-06-10 RX ADMIN — LEVOTHYROXINE SODIUM 125 MCG: 0.12 TABLET ORAL at 05:33

## 2021-06-10 RX ADMIN — ASPIRIN 81 MG: 81 TABLET, CHEWABLE ORAL at 08:09

## 2021-06-10 RX ADMIN — BUDESONIDE AND FORMOTEROL FUMARATE DIHYDRATE 2 PUFF: 160; 4.5 AEROSOL RESPIRATORY (INHALATION) at 08:41

## 2021-06-10 RX ADMIN — DICLOFENAC SODIUM 2 G: 10 GEL TOPICAL at 08:10

## 2021-06-10 RX ADMIN — Medication 10 ML: at 08:10

## 2021-06-10 RX ADMIN — FLUTICASONE PROPIONATE 1 SPRAY: 50 SPRAY, METERED NASAL at 08:12

## 2021-06-10 RX ADMIN — TETROFOSMIN 30 MILLICURIE: 1.38 INJECTION, POWDER, LYOPHILIZED, FOR SOLUTION INTRAVENOUS at 06:54

## 2021-06-10 RX ADMIN — OXYCODONE HYDROCHLORIDE AND ACETAMINOPHEN 1 TABLET: 5; 325 TABLET ORAL at 08:09

## 2021-06-10 ASSESSMENT — PAIN DESCRIPTION - DESCRIPTORS: DESCRIPTORS: ACHING

## 2021-06-10 ASSESSMENT — PAIN SCALES - GENERAL: PAINLEVEL_OUTOF10: 8

## 2021-06-10 ASSESSMENT — PAIN DESCRIPTION - ONSET: ONSET: ON-GOING

## 2021-06-10 ASSESSMENT — PAIN DESCRIPTION - LOCATION: LOCATION: HEAD

## 2021-06-10 ASSESSMENT — PAIN DESCRIPTION - ORIENTATION: ORIENTATION: MID

## 2021-06-10 ASSESSMENT — PAIN DESCRIPTION - PAIN TYPE: TYPE: CHRONIC PAIN

## 2021-06-10 NOTE — DISCHARGE SUMMARY
Hospital Medicine Discharge Summary      Patient ID: Bronson El , [id number]     Patient's PCP: Margarita Naidu MD    Admit Date: 6/8/2021     Discharge Date: 6/10/2021      Admitting Physician: Elio Welsh MD    Discharge Physician: Luís Rodriguez MD     Discharge Diagnoses: Active Hospital Problems    Diagnosis Date Noted    Chest pain [R07.9] 05/23/2014         The patient was seen and examined on the day of discharge and this discharge summary is in conjunction with any daily progress note from day of discharge. HOSPITAL COURSE  Patient demographics:  The patient  Bronson El is a 55 y.o. female      Significant past medical history:       Patient Active Problem List   Diagnosis    AR (allergic rhinitis)    Moderate persistent asthma without complication    SOB (shortness of breath)    Fibroadenoma    Obesity (BMI 30-39. 9)    Diaphragm paralysis    Thyroid nodule    Hypothyroid    Vitamin D deficiency    Chest pain    Spasmodic dysphonia    Neck pain    Low back pain radiating to right lower extremity    Intestinal ulcer    Cervical radicular pain    Palpitations    Fibromyalgia    CMC DJD(carpometacarpal degenerative joint disease), localized primary    CTS (carpal tunnel syndrome)    Osteoarthritis of left knee    Neuroma    Arthritis of right knee    Abnormal mammogram of right breast    Chronic narcotic use    Primary osteoarthritis of both knees    Acquired varus deformity knee, left    Chondral lesion    Pain in both knees            Presenting symptoms:  Chest pain     Diagnostic workup:        CONSULTS DURING ADMISSION :   IP CONSULT TO PULMONOLOGY  IP CONSULT TO OTOLARYNGOLOGY        Patient was diagnosed with:  Chest pain  Asthma  Right-sided diaphragmatic paralysis     Treatment while inpatient:  Pt presented to [place] [id number] in transfer from [facility] for evaluation of Shortness of breath and Chest pain.   Patient was ruled out for acute coronary syndrome with EKG and enzyme criteria. Patient's stress test was done that was a low probability study for ischemia. For asthma and hoarseness patient was evaluated by pulmonary and ENT.        Discharge Condition:  stable      Discharged to:  Home      Activity:   as tolerated:     Follow Up: Follow-up with PCP in 1-2 weeks         Labs:  For convenience and continuity at follow-up the following most recent labs are provided:      CBC:   Lab Results   Component Value Date    WBC 9.7 06/10/2021    HGB 14.0 06/10/2021    HCT 42.2 06/10/2021     06/10/2021       RENAL:   Lab Results   Component Value Date     06/10/2021    K 4.5 06/10/2021    K 4.1 06/08/2021     06/10/2021    CO2 28 06/10/2021    BUN 11 06/10/2021    CREATININE 0.6 06/10/2021           Discharge Medications:    Kasia Chung   Home Medication Instructions NGN:[id number]    Printed on:06/10/21 5370   Medication Information                      albuterol sulfate HFA (PROAIR HFA) 108 (90 Base) MCG/ACT inhaler  Inhale 2 puffs into the lungs every 6 hours as needed for Wheezing or Shortness of Breath             Cholecalciferol (VITAMIN D) 2000 units CAPS capsule  Take by mouth daily              diclofenac sodium (VOLTAREN) 1 % GEL  Apply 2-4 gm to affected area tid for 2 weeks then bid prn thereafter             fluticasone (FLONASE) 50 MCG/ACT nasal spray  1 spray by Each Nostril route daily             Fluticasone furoate-vilanterol (BREO ELLIPTA) 200-25 MCG/INH AEPB inhaler  Inhale 1 puff into the lungs daily             Fluticasone furoate-vilanterol (BREO ELLIPTA) 200-25 MCG/INH AEPB inhaler  Inhale 1 puff into the lungs daily             Fluticasone furoate-vilanterol (BREO ELLIPTA) 200-25 MCG/INH AEPB inhaler  Inhale 1 puff into the lungs daily Sample  Lot # [id number]  Exp date  9.30.21             gabapentin (NEURONTIN) 300 MG capsule  TWICE IN AM ONCE IN PM             ibuprofen (ADVIL;MOTRIN) 600 MG tablet  Take 1 tablet by mouth 3 times daily as needed for Pain             levothyroxine (SYNTHROID) 125 MCG tablet  Take 1 tablet by mouth Daily             omeprazole (PRILOSEC) 20 MG delayed release capsule  Take 1 capsule by mouth every morning (before breakfast)             ondansetron (ZOFRAN) 4 MG tablet  TAKE 1 TABLET BY MOUTH DAILY AS NEEDED FOR NAUSEA OR VOMITING             oxyCODONE-acetaminophen (PERCOCET) 5-325 MG per tablet  every 8 hours as needed. Respiratory Therapy Supplies (NEBULIZER/TUBING/MOUTHPIECE) KIT  1 kit by Does not apply route daily                    Time Spent on discharge is more than 30 min in the examination, evaluation, counseling and review of medications and discharge plan. Signed:  Osei Bradshaw MD   6/10/2021      Thank you Sherry Bowser MD for the opportunity to be involved in this patient's care. If you have any questions or concerns please feel free to contact me at [phone]. This note was transcribed using [id number] [employer]. Please disregard any translational errors.

## 2021-06-10 NOTE — PROGRESS NOTES
Pulmonary Progress Note    Date of Admission: 6/8/2021   LOS: 0 days       CC:  shortness of breath     Subjective:  Feeling significantly better. Still having some of her airway wheezing but improved. ROS:   No nausea  No Vomiting  No chest pain      Assessment:     Asthma/obstructive airways disease  Obesity  Dysphonia history of spastic dysphonia  Right diaphragmatic paralysis  Allergic rhinitis  Thyroid nodule  Hypothyroidism  GERD  Chronic pain/fibromyalgia  melanoma     Plan:        Hospital Day: 0     Asthma, diaphragmatic paralysis, dyspnea  - seen by ENT. -Continue Symbicort/outpatient Breo  -Albuterol as needed  - feeling better.         Allergic rhinitis  -Controlled        Chest pain  -stress test normal         Obesity  -Unfortunately, the patient has gained 40 pounds over the last calendar year. This is a clear contributor to her shortness of breath particularly with her right diaphragmatic paralysis. Ok to d/c from pul POV    Future Appointments   Date Time Provider Adenike Yuly   8/25/2021  2:00 PM Breanna Gonzalez  [address] [facility]   9/1/2021  2:00 PM Breanna Gonzalez  [facility]           Data:        PHYSICAL EXAM:   Blood pressure 95/63, pulse 81, temperature 97.4 °F (36.3 °C), temperature source Oral, resp. rate 16, height 5' 8\" (1.727 m), weight 290 lb 5.5 oz (131.7 kg), last menstrual period 02/25/2016, SpO2 92 %, currently breastfeeding.'  Body mass index is 44.15 kg/m². Gen: No distress. ENT:   Resp: No accessory muscle use. No crackles. No wheezes. No rhonchi. CV: Regular rate. Regular rhythm. No murmur or rub. No edema. Skin: Warm, dry, normal texture and turgor. No nodule on exposed extremities. M/S: No cyanosis. No clubbing. No joint deformity. Psych: Oriented x 3. No anxiety. Awake. Alert. Intact judgement and insight. Good Mood / Affect.   Memory appears in tact       Medications:    Scheduled Meds:   sodium chloride flush  5-40 mL Intravenous 2 times per day    aspirin  81 mg Oral Daily    atorvastatin  40 mg Oral Nightly    enoxaparin  40 mg Subcutaneous Nightly    Vitamin D  2,000 Units Oral Daily    diclofenac sodium  2 g Topical BID    fluticasone  1 spray Each Nostril Daily    budesonide-formoterol  2 puff Inhalation BID    levothyroxine  125 mcg Oral Daily    pantoprazole  40 mg Oral QAM AC       Continuous Infusions:   sodium chloride         PRN Meds:  sodium chloride flush, sodium chloride, ondansetron **OR** ondansetron, acetaminophen **OR** acetaminophen, polyethylene glycol, albuterol sulfate HFA, oxyCODONE-acetaminophen    Labs reviewed:  CBC:   Recent Labs     06/08/21  1415 06/09/21  0551 06/10/21  0639   WBC 11.5* 9.7 9.7   HGB 13.4 13.2 14.0   HCT 39.3 39.7 42.2   MCV 84.2 85.0 84.5    239 256     BMP:   Recent Labs     06/08/21  1415 06/10/21  0638    138   K 4.1 4.5    103   CO2 26 28   BUN 14 11   CREATININE 0.5* 0.6     LIVER PROFILE:   Recent Labs     06/08/21  1415   AST 57*   ALT 93*   BILITOT 0.4   ALKPHOS 103     PT/INR: No results for input(s): PROTIME, INR in the last 72 hours. APTT: No results for input(s): APTT in the last 72 hours. UA:No results for input(s): NITRITE, COLORU, PHUR, LABCAST, WBCUA, RBCUA, MUCUS, TRICHOMONAS, YEAST, BACTERIA, CLARITYU, SPECGRAV, LEUKOCYTESUR, UROBILINOGEN, BILIRUBINUR, BLOODU, GLUCOSEU, AMORPHOUS in the last 72 hours. Invalid input(s): Sasha Grimes         This note was transcribed using [id number] [facility]. Please disregard any translational errors.       Ryan Liu Pulmonary, [facility] [phone]

## 2021-06-11 ENCOUNTER — TELEPHONE (OUTPATIENT)
Dept: INTERNAL MEDICINE CLINIC | Age: 56
End: 2021-06-11

## 2021-06-11 NOTE — TELEPHONE ENCOUNTER
[facility] Transitions Initial Follow Up Call    Outreach made within 2 business days of discharge: Yes    Patient: Caron Ferro Patient : 1965   MRN: [id number]  Reason for Admission: There are no discharge diagnoses documented for the most recent discharge. Discharge Date: 6/10/21       Spoke with: Left voicemail for patient to call office with questions, concerns and to schedule HFU.     Discharge department/facility: [facility]    Scheduled appointment with PCP within 7-14 days    Follow Up  Future Appointments   Date Time Provider Adenike Emery   2021 11:45 AM Alda Gerber, [address] ENT [place]   2021  2:00 PM [facility] Whitten  [address] [facility]   2021  2:00 PM Truong Whitten  [address] [place], [place]

## 2021-06-14 ENCOUNTER — TELEPHONE (OUTPATIENT)
Dept: FAMILY MEDICINE CLINIC | Age: 56
End: 2021-06-14

## 2021-06-14 NOTE — TELEPHONE ENCOUNTER
[facility] Transitions Initial Follow Up Call    Outreach made within 2 business days of discharge: Yes    Patient: Merry Shepard Patient : 1965   MRN: [id number]  Reason for Admission: There are no discharge diagnoses documented for the most recent discharge. Discharge Date: 6/10/21       Spoke with: Hunter De La Cruz    Discharge department/facility: [facility] -6/10    TCM Interactive Patient Contact:  Was patient able to fill all prescriptions: Yes  Was patient instructed to bring all medications to the follow-up visit: Yes  Is patient taking all medications as directed in the discharge summary?  Yes  Does patient understand their discharge instructions: Yes  Does patient have questions or concerns that need addressed prior to 7-14 day follow up office visit: no    Scheduled appointment with PCP within 7-14 days    Follow Up  Future Appointments   Date Time Provider Adenike Emery   2021 11:45 AM Michael Conn [address] [facility]   2021  2:00 PM [facility]   2021  2:00 PM Estephania Roy MD [facility]       Arnulfo Flores MA

## 2021-06-24 ENCOUNTER — OFFICE VISIT (OUTPATIENT)
Dept: ENT CLINIC | Age: 56
End: 2021-06-24
Payer: COMMERCIAL

## 2021-06-24 VITALS
HEART RATE: 93 BPM | SYSTOLIC BLOOD PRESSURE: 123 MMHG | WEIGHT: 287 LBS | BODY MASS INDEX: 43.5 KG/M2 | DIASTOLIC BLOOD PRESSURE: 79 MMHG | HEIGHT: 68 IN

## 2021-06-24 DIAGNOSIS — R49.0 DYSPHONIA: Primary | ICD-10-CM

## 2021-06-24 DIAGNOSIS — J32.9 CHRONIC SINUSITIS, UNSPECIFIED LOCATION: ICD-10-CM

## 2021-06-24 DIAGNOSIS — J04.0 LARYNGITIS: ICD-10-CM

## 2021-06-24 PROCEDURE — 3017F COLORECTAL CA SCREEN DOC REV: CPT | Performed by: OTOLARYNGOLOGY

## 2021-06-24 PROCEDURE — 99214 OFFICE O/P EST MOD 30 MIN: CPT | Performed by: OTOLARYNGOLOGY

## 2021-06-24 PROCEDURE — 1036F TOBACCO NON-USER: CPT | Performed by: OTOLARYNGOLOGY

## 2021-06-24 PROCEDURE — G8427 DOCREV CUR MEDS BY ELIG CLIN: HCPCS | Performed by: OTOLARYNGOLOGY

## 2021-06-24 PROCEDURE — G8417 CALC BMI ABV UP PARAM F/U: HCPCS | Performed by: OTOLARYNGOLOGY

## 2021-06-24 RX ORDER — FLUCONAZOLE 100 MG/1
100 TABLET ORAL DAILY
Qty: 7 TABLET | Refills: 0 | Status: SHIPPED | OUTPATIENT
Start: 2021-06-24 | End: 2021-07-01 | Stop reason: ALTCHOICE

## 2021-06-29 ENCOUNTER — OFFICE VISIT (OUTPATIENT)
Dept: FAMILY MEDICINE CLINIC | Age: 56
End: 2021-06-29
Payer: COMMERCIAL

## 2021-06-29 VITALS
WEIGHT: 290.4 LBS | HEIGHT: 68 IN | BODY MASS INDEX: 44.01 KG/M2 | TEMPERATURE: 98.4 F | DIASTOLIC BLOOD PRESSURE: 78 MMHG | HEART RATE: 73 BPM | SYSTOLIC BLOOD PRESSURE: 126 MMHG | OXYGEN SATURATION: 94 %

## 2021-06-29 DIAGNOSIS — C69.30 MALIGNANT MELANOMA OF CHOROID, UNSPECIFIED LATERALITY (HCC): ICD-10-CM

## 2021-06-29 DIAGNOSIS — K75.81 STEATOHEPATITIS: ICD-10-CM

## 2021-06-29 DIAGNOSIS — J04.0 LARYNGITIS: Primary | ICD-10-CM

## 2021-06-29 PROCEDURE — 1111F DSCHRG MED/CURRENT MED MERGE: CPT | Performed by: INTERNAL MEDICINE

## 2021-06-29 PROCEDURE — 99214 OFFICE O/P EST MOD 30 MIN: CPT | Performed by: INTERNAL MEDICINE

## 2021-06-29 RX ORDER — PANTOPRAZOLE SODIUM 40 MG/1
40 TABLET, DELAYED RELEASE ORAL
Qty: 180 TABLET | Refills: 1
Start: 2021-06-29

## 2021-06-29 NOTE — PROGRESS NOTES
SUBJECTIVE:  Connor Antonio is a 55 y.o. female being evaluated for:    Chief Complaint   Patient presents with    Follow-Up from Hospital      Pt was in the hospital on 06/8/2021.        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  Put on diflucan for yeast infection   Voice off for over a month Intubated in April amd seemed to start with surgery and throat hurt but no voice change until got sick End of may treated for bacterial sinusitis  Saw her pulmonary and treated with steroids  Lots of heart burn and indigestion   Allergies   Allergen Reactions    Other Hives     Cloth gloves at my work     Current Outpatient Medications   Medication Sig Dispense Refill    pantoprazole (PROTONIX) 40 MG tablet Take 1 tablet by mouth 2 times daily (before meals) 180 tablet 1    fluconazole (DIFLUCAN) 100 MG tablet Take 1 tablet by mouth daily for 7 days 7 tablet 0    Fluticasone furoate-vilanterol (BREO ELLIPTA) 200-25 MCG/INH AEPB inhaler Inhale 1 puff into the lungs daily 1 each 11    Fluticasone furoate-vilanterol (BREO ELLIPTA) 200-25 MCG/INH AEPB inhaler Inhale 1 puff into the lungs daily Sample  Lot # [id number]  Exp date  9.30.21 1 each 0    Fluticasone furoate-vilanterol (BREO ELLIPTA) 200-25 MCG/INH AEPB inhaler Inhale 1 puff into the lungs daily 1 each 11    fluticasone (FLONASE) 50 MCG/ACT nasal spray 1 spray by Each Nostril route daily 1 Bottle 0    albuterol sulfate HFA (PROAIR HFA) 108 (90 Base) MCG/ACT inhaler Inhale 2 puffs into the lungs every 6 hours as needed for Wheezing or Shortness of Breath 1 Inhaler 11    levothyroxine (SYNTHROID) 125 MCG tablet Take 1 tablet by mouth Daily 90 tablet 1    Respiratory Therapy Supplies (NEBULIZER/TUBING/MOUTHPIECE) KIT 1 kit by Does not apply route daily 1 kit 0    oxyCODONE-acetaminophen (PERCOCET) 5-325 MG per tablet every 8 hours as needed.  diclofenac sodium (VOLTAREN) 1 % GEL Apply 2-4 gm to affected area tid for 2 weeks then bid prn thereafter 3 Tube 1    ondansetron (ZOFRAN) 4 MG tablet TAKE 1 TABLET BY MOUTH DAILY AS NEEDED FOR NAUSEA OR VOMITING 30 tablet 0    Cholecalciferol (VITAMIN D) 2000 units CAPS capsule Take by mouth daily       gabapentin (NEURONTIN) 300 MG capsule TWICE IN AM ONCE IN PM  1     No current facility-administered medications for this visit.      Facility-Administered Medications Ordered in Other Visits   Medication Dose Route Frequency Provider Last Rate Last Admin    technetium sulfur colloid egg (NYCOMED-SC) solution 500 micro curie  500 micro curie Intravenous ONCE PRN Roslyn Goldberg, MD             Social History     Socioeconomic History    Marital status:      Spouse name: Not on file    Number of children: Not on file    Years of education: Not on file    Highest education level: Not on file   Occupational History    Occupation: room leader     Comment: J   Tobacco Use    Smoking status: Former Smoker     Packs/day: 1.00     Years: 15.00     Pack years: 15.00     Types: Cigarettes     Start date:      Quit date: 2003     Years since quittin.5    Smokeless tobacco: Never Used   Vaping Use    Vaping Use: Never used   Substance and Sexual Activity    Alcohol use: Not Currently     Alcohol/week: 0.0 standard drinks    Drug use: No    Sexual activity: Not Currently     Partners: Male   Other Topics Concern    Not on file   Social History Narrative    Not on file     Social Determinants of Health     Financial Resource Strain: Low Risk     Difficulty of Paying Living Expenses: Not hard at all   Food Insecurity: No Food Insecurity    Worried About [facility] in the Last Year: Never true    [employer] in the Last Year: Never true   Transportation Needs: No Transportation Needs    2/1/2019    LUMBAR EPIDURAL STEROID INJECTION L3/4 performed by Mae Davidson MD at [address] N/A 11/22/2019    CERVICAL EPIDURAL STEROID INJECTION C7-T1 performed by Mae Davidson MD at [address] [facility] NERV Bilateral 3/29/2019    INTRA ARTICULAR KNEE INJECTIONS performed by Mae Davidson MD at [address] [facility] NERV Bilateral 12/6/2019    BILATERAL INTRAARTICULAR KNEE INJECTION performed by Mae Davidson MD at [address] [facility] [address] ARTHROSCOPY Left 12/21/2017    AR ARTHROCENTESIS ASPIR&/INJ MAJOR JT/BURSA W/O US Bilateral 5/10/2019    GREATER TROCHANTERIC BURSA INJECTIONS performed by Mae Davidson MD at [facility] DX/THER SBST INTRLMNR CRV/THRC W/IMG GDN N/A 9/28/2018    EPIDURAL STEROID INJECTION C7-T1 performed by Mae Davidson MD at [facility] [address] DX/THER SBST INTRLMNR LMBR/SAC W/IMG GDN Right 11/9/2018    LUMBAR EPIDURAL STEROID INJECTION L3-4 performed by Mae Davidson MD at [facility]      removed right side of thyroid    THYROIDECTOMY, PARTIAL      TUBAL LIGATION  1992       Review of Systems   Constitutional: Negative for chills and fever. HENT: Positive for postnasal drip, sore throat and voice change. Negative for congestion. Eyes: Positive for visual disturbance. Respiratory: Positive for cough. Negative for shortness of breath and wheezing. Cardiovascular: Negative for chest pain, palpitations and leg swelling. Gastrointestinal: Negative for abdominal pain, blood in stool, diarrhea, nausea and vomiting. Heart burn   Genitourinary: Negative for dysuria. Neurological: Negative for dizziness, light-headedness and headaches.        OBJECTIVE:  /78 (Site: Right Upper Arm, Position: Sitting, Cuff Size: Large Adult)   Pulse 73   Temp 98.4 °F (36.9 °C) (Temporal)   Ht 5' 8\" (1.727 m)   Wt 290 lb 6.4 oz (131.7 kg)   LMP 02/25/2016 (Exact Date)   SpO2 94%   BMI 44.16 kg/m²      Body mass index is 44.16 kg/m². Physical Exam  Vitals and nursing note reviewed. Constitutional:       General: She is not in acute distress. Appearance: Normal appearance. She is well-developed. She is obese. HENT:      Head: Normocephalic and atraumatic. Right Ear: Tympanic membrane, ear canal and external ear normal.      Left Ear: Tympanic membrane, ear canal and external ear normal.      Nose: Nose normal.      Mouth/Throat:      Pharynx: Oropharynx is clear. Comments: Laryngitis   Eyes:      Conjunctiva/sclera: Conjunctivae normal.   Neck:      Thyroid: No thyromegaly. Vascular: No carotid bruit. Cardiovascular:      Rate and Rhythm: Normal rate and regular rhythm. Heart sounds: Normal heart sounds. No murmur heard. No friction rub. No gallop. Pulmonary:      Effort: Pulmonary effort is normal.      Breath sounds: Normal breath sounds. No wheezing. Chest:      Chest wall: No tenderness. Abdominal:      General: There is no distension. Palpations: Abdomen is soft. Tenderness: There is no abdominal tenderness. Comments: No HSM   Musculoskeletal:         General: No swelling. Cervical back: Neck supple. Lymphadenopathy:      Cervical: No cervical adenopathy. Skin:     General: Skin is warm and dry. Neurological:      General: No focal deficit present. Mental Status: She is alert. Gait: Gait normal.   Psychiatric:         Behavior: Behavior normal.         Thought Content:  Thought content normal.         ASSESSMENT/PLAN:    Kyle Mcdonald was seen today for follow-up from hospital.    Diagnoses and all orders for this visit:    Laryngitis   On reflux and diflucan  Following with ent  Having speech for spastic dysphonia     Serohepatitis should have imaging soon     Malignant melanoma of choroid, unspecified laterality ([facility] [address] tumor regressing follows with UC     GE reflux   -     pantoprazole (PROTONIX) 40 MG tablet; Take 1 tablet by mouth 2 times daily (before meals)        Orders Placed This Encounter   Medications    pantoprazole (PROTONIX) 40 MG tablet     Sig: Take 1 tablet by mouth 2 times daily (before meals)     Dispense:  180 tablet     Refill:  1        Return if symptoms worsen or fail to improve. There are no Patient Instructions on file for this visit. Post-Discharge Transitional Care Management Services or Hospital Follow Up      Staci Randolph   YOB: 1965    Date of Office Visit:  6/29/2021  Date of Hospital Admission: 7/2/21  Date of Hospital Discharge: 7/2/21  Risk of hospital readmission (high >=14%. Medium >=10%) :No data recorded    Care management risk score Rising risk (score 2-5) and Complex Care (Scores >=6): 5     Non face to face  following discharge, date last encounter closed (first attempt may have been earlier): *No documented post hospital discharge outreach found in the last 14 days    Call initiated 2 business days of discharge: *No response recorded in the last 14 days    Patient Active Problem List   Diagnosis    AR (allergic rhinitis)    Moderate persistent asthma without complication    SOB (shortness of breath)    Fibroadenoma    Obesity (BMI 30-39. 9)    Diaphragm paralysis    Thyroid nodule    Hypothyroid    Vitamin D deficiency    Chest pain    Spasmodic dysphonia    Neck pain    Low back pain radiating to right lower extremity    Intestinal ulcer    Cervical radicular pain    Palpitations    Fibromyalgia    CMC DJD(carpometacarpal degenerative joint disease), localized primary    CTS (carpal tunnel syndrome)    Osteoarthritis of left knee    Neuroma    Arthritis of right knee    Abnormal mammogram of right breast    Chronic narcotic use    Primary osteoarthritis of both knees    Acquired varus deformity knee, left    Chondral lesion    Pain in both knees    Umbilical hernia without obstruction and without gangrene       Allergies   Allergen Reactions    Other Hives     Cloth gloves at my work       Medications listed as ordered at the time of discharge from hospital   [facility], [address] Medication Instructions ROSS:    Printed on:07/05/21 3421   Medication Information                      albuterol sulfate HFA (PROAIR HFA) 108 (90 Base) MCG/ACT inhaler  Inhale 2 puffs into the lungs every 6 hours as needed for Wheezing or Shortness of Breath             Cholecalciferol (VITAMIN D) 2000 units CAPS capsule  Take by mouth daily              diclofenac sodium (VOLTAREN) 1 % GEL  Apply 2-4 gm to affected area tid for 2 weeks then bid prn thereafter             fluticasone (FLONASE) 50 MCG/ACT nasal spray  1 spray by Each Nostril route daily             Fluticasone furoate-vilanterol (BREO ELLIPTA) 200-25 MCG/INH AEPB inhaler  Inhale 1 puff into the lungs daily             gabapentin (NEURONTIN) 600 MG tablet  Take 600 mg by mouth 3 times daily. ibuprofen (ADVIL;MOTRIN) 800 MG tablet  Take 800 mg by mouth as needed for Pain             levothyroxine (SYNTHROID) 125 MCG tablet  Take 1 tablet by mouth Daily             ondansetron (ZOFRAN) 4 MG tablet  TAKE 1 TABLET BY MOUTH DAILY AS NEEDED FOR NAUSEA OR VOMITING             oxyCODONE-acetaminophen (PERCOCET) 5-325 MG per tablet  every 8 hours as needed.              pantoprazole (PROTONIX) 40 MG tablet  Take 1 tablet by mouth 2 times daily (before meals)             Respiratory Therapy Supplies (NEBULIZER/TUBING/MOUTHPIECE) KIT  1 kit by Does not apply route daily                   Medications marked \"taking\" at this time  Outpatient Medications Marked as Taking for the 6/29/21 encounter (Office Visit) with Suzie Hrenadez MD   Medication Sig Dispense Refill    pantoprazole (PROTONIX) 40 MG tablet Take 1 tablet by mouth 2 times daily (before meals) 180 tablet 1    [DISCONTINUED] fluconazole (DIFLUCAN) 100 MG tablet Take 1 tablet by mouth daily for 7 days 7 tablet 0    [DISCONTINUED] Fluticasone furoate-vilanterol (BREO ELLIPTA) 200-25 MCG/INH AEPB inhaler Inhale 1 puff into the lungs daily 1 each 11    [DISCONTINUED] Fluticasone furoate-vilanterol (BREO ELLIPTA) 200-25 MCG/INH AEPB inhaler Inhale 1 puff into the lungs daily Sample  Lot # [id number]  Exp date  9.30.21 1 each 0    Fluticasone furoate-vilanterol (BREO ELLIPTA) 200-25 MCG/INH AEPB inhaler Inhale 1 puff into the lungs daily 1 each 11    fluticasone (FLONASE) 50 MCG/ACT nasal spray 1 spray by Each Nostril route daily 1 Bottle 0    albuterol sulfate HFA (PROAIR HFA) 108 (90 Base) MCG/ACT inhaler Inhale 2 puffs into the lungs every 6 hours as needed for Wheezing or Shortness of Breath 1 Inhaler 11    levothyroxine (SYNTHROID) 125 MCG tablet Take 1 tablet by mouth Daily 90 tablet 1    Respiratory Therapy Supplies (NEBULIZER/TUBING/MOUTHPIECE) KIT 1 kit by Does not apply route daily 1 kit 0    oxyCODONE-acetaminophen (PERCOCET) 5-325 MG per tablet every 8 hours as needed.  diclofenac sodium (VOLTAREN) 1 % GEL Apply 2-4 gm to affected area tid for 2 weeks then bid prn thereafter 3 Tube 1    ondansetron (ZOFRAN) 4 MG tablet TAKE 1 TABLET BY MOUTH DAILY AS NEEDED FOR NAUSEA OR VOMITING 30 tablet 0    Cholecalciferol (VITAMIN D) 2000 units CAPS capsule Take by mouth daily       [DISCONTINUED] gabapentin (NEURONTIN) 300 MG capsule TWICE IN AM ONCE IN PM  1        Medications patient taking as of now reconciled against medications ordered at time of hospital discharge:yes    Chief Complaint   Patient presents with    Follow-Up from Hospital      Pt was in the hospital on 06/8/2021. History of Present illness - Follow up of Hospital diagnosis(es): dysphonia     Inpatient course: Discharge summary reviewed- see chart.     Interval history/Current status:continued problems with voice    Vitals:    06/29/21 1004 BP: 126/78   Site: Right Upper Arm   Position: Sitting   Cuff Size: Large Adult   Pulse: 73   Temp: 98.4 °F (36.9 °C)   TempSrc: Temporal   SpO2: 94%   Weight: 290 lb 6.4 oz (131.7 kg)   Height: 5' 8\" (1.727 m)     Body mass index is 44.16 kg/m². Wt Readings from Last 3 Encounters:   07/02/21 288 lb 5.8 oz (130.8 kg)   06/30/21 290 lb (131.5 kg)   06/29/21 290 lb 6.4 oz (131.7 kg)     BP Readings from Last 3 Encounters:   07/02/21 115/63   07/02/21 (!) 106/58   06/30/21 100/62        Physical Exam:  See above note     Assessment/Plan:  1. Laryngitis  - GA DISCHARGE MEDS RECONCILED W/ CURRENT OUTPATIENT MED LIST    2. Steatohepatitis    3.  Malignant melanoma of choroid, unspecified laterality [facility])          Medical Decision Making:moderate

## 2021-06-30 ENCOUNTER — PROCEDURE VISIT (OUTPATIENT)
Dept: SPEECH THERAPY | Age: 56
End: 2021-06-30
Payer: COMMERCIAL

## 2021-06-30 ENCOUNTER — OFFICE VISIT (OUTPATIENT)
Dept: SURGERY | Age: 56
End: 2021-06-30
Payer: COMMERCIAL

## 2021-06-30 VITALS — DIASTOLIC BLOOD PRESSURE: 62 MMHG | SYSTOLIC BLOOD PRESSURE: 100 MMHG | BODY MASS INDEX: 44.09 KG/M2 | WEIGHT: 290 LBS

## 2021-06-30 DIAGNOSIS — K42.9 UMBILICAL HERNIA WITHOUT OBSTRUCTION AND WITHOUT GANGRENE: Primary | ICD-10-CM

## 2021-06-30 DIAGNOSIS — R49.0 DYSPHONIA: ICD-10-CM

## 2021-06-30 DIAGNOSIS — F11.90 CHRONIC NARCOTIC USE: ICD-10-CM

## 2021-06-30 DIAGNOSIS — E66.01 MORBID OBESITY WITH BMI OF 40.0-44.9, ADULT (HCC): ICD-10-CM

## 2021-06-30 PROCEDURE — 92520 LARYNGEAL FUNCTION STUDIES: CPT | Performed by: SPEECH-LANGUAGE PATHOLOGIST

## 2021-06-30 PROCEDURE — 92524 BEHAVRAL QUALIT ANALYS VOICE: CPT | Performed by: SPEECH-LANGUAGE PATHOLOGIST

## 2021-06-30 PROCEDURE — 92507 TX SP LANG VOICE COMM INDIV: CPT | Performed by: SPEECH-LANGUAGE PATHOLOGIST

## 2021-06-30 PROCEDURE — 31579 LARYNGOSCOPY TELESCOPIC: CPT | Performed by: SPEECH-LANGUAGE PATHOLOGIST

## 2021-06-30 PROCEDURE — G8417 CALC BMI ABV UP PARAM F/U: HCPCS | Performed by: SURGERY

## 2021-06-30 PROCEDURE — G8427 DOCREV CUR MEDS BY ELIG CLIN: HCPCS | Performed by: SURGERY

## 2021-06-30 PROCEDURE — 99244 OFF/OP CNSLTJ NEW/EST MOD 40: CPT | Performed by: SURGERY

## 2021-06-30 ASSESSMENT — ENCOUNTER SYMPTOMS
ABDOMINAL PAIN: 1
VOICE CHANGE: 1
BACK PAIN: 1
NAUSEA: 1

## 2021-06-30 NOTE — LETTER
CONSENT TO OPERATION      Umbilical hernia repair with mesh      PATIENT : Vanessa Lazcano  YOB: 1965             DATE : 6/30/21     1. I request and consent that Dr. Cary Collins and/or his associates or assistants perform an operation and/or procedures on the above patient at [facility], to treat the condition(s) which appear indicated by the diagnostic studies already performed. 2. It has been explained to me by the informing physician that during the course of the operation and/or procedure(s) unforeseen conditions may be revealed that necessitate an extension of the original operation and/or procedure(s) or different operation and/or procedures than those set forth in Paragraph 1. I therefore authorize and request that my physician and/or his associates or assistants perform such operations and/or procedures as are necessary and desirable in the exercise of professional judgment. The authority granted under this Paragraph 2 shall extend to all conditions that require treatment and are known to my physician at the time the operation is commenced. 3. I have been made aware by the informing physician of certain risks and consequences that are associated with the operation and/or procedure(s) described in Paragraph 1, the reasonable alternative methods or treatment, the possible consequences, the possibility that the operation and/or procedure(s) may be unsuccessful and the possibility of complications. I understand the reasonably known risks to be:  - Bleeding  - Infection  - Poor Healing  - Possible Damage to Nerve, Vessel, Tendon/Muscle or Bone  - Need for further Treatment/Surgery  - Stiffness  - Pain  - Residual or Recurrent Symptoms  - Anesthetic and/or Medical Risks     4. I have also been informed by the informing physician that there are other risks from both known and unknown causes that are attendant to the performance of any surgical procedure.   I am aware that the practice of medicine and surgery is not an exact science, and that no guarantees have been made to me concerning the results of the operation and/or procedure(s). 5. I consent to the administration of anesthesia and to the use of such anesthetics as may be deemed advisable by the anesthesiologist who has been engaged by me or my physician. 6. I certify that I have read and understand the above consent to operation and/or other procedure(s); that the explanations therein referred to were made to me by the informing physician in advance of my signing this consent; that all blanks or statements requiring insertion or completion were filled in and inapplicable paragraphs, if any, were stricken before I signed; and that all questions asked by me about the operation and/or procedure(s) which I have consented to have been fully answered in a satisfactory manner.            6/30/21                      _______________________  Signature Of Patient   Date              Witness          COVID-19 Date: ___________           OR Date:  ___________  [id number] Time: ___________

## 2021-06-30 NOTE — PROGRESS NOTES
Subjective:      Patient ID: Tita Schulte is a 55 y.o. female. HPI  Chief Complaint: hernia  Patient referred by Dr. Chris Lopez for evaluation of hernia. Patient reports symptoms of pain. Location of symptoms is periumbilical. Symptoms were first noted years ago but reports becoming more bothersome and protruding more. Has chronic nausea followed by Dr. Chris Lopez. No worsening recently. Denies obstructive symptoms. Alleviated by rest.  Symptoms aggravated by trauma to area, certain movements. 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 Reports hoarse voice, seeing ENT. Follows with pain management for DDD. Will plan following treatment: umb hernia repair.         Past Medical History:   Diagnosis Date    Anxiety     Asthma     currently was instructed to stop inhalers d/t to collapse of vocal cords    Diaphragm paralysis     right side    Enlarged liver     Fatty liver     Fibromyalgia     GERD (gastroesophageal reflux disease)     Headache(784.0)     neck pain     History of ulcer disease     Hypothyroidism     status post thyroidectomy     Kidney infection     Melanoma ([facility] [address]     right eye     Osteoarthritis     CHRONIC BACK PAIN/FIBROMYALGIA    Rheumatoid arthritis (HCC)     Spasmodic dysphonia     voice very hoarse    Thyroid disease     2015 found spot on left side of thyroid, right side thyroid was removed       Past Surgical History:   Procedure Laterality Date    APPENDECTOMY  age 17   24 [facility] BREAST BIOPSY Right     x3  benign lesions     BREAST SURGERY Right needle localization right breast mass    BREAST SURGERY Right 10/22/15    [facility] breast mass excision/needle loc    CARPAL TUNNEL RELEASE Right 5/7/15    CARPAL TUNNEL RELEASE Left 5/28/15    Left carpal tunnel release      COLONOSCOPY      ENDOSCOPY, COLON, DIAGNOSTIC      EPIDURAL STEROID INJECTION N/A 2/1/2019    LUMBAR EPIDURAL STEROID INJECTION L3/4 performed by Clifford Mendieta MD at [facility] 11/22/2019    CERVICAL EPIDURAL STEROID INJECTION C7-T1 performed by Clifford Mendieta MD at [address] [place] [address] NERV Bilateral 3/29/2019    INTRA ARTICULAR KNEE INJECTIONS performed by Clifford Mendieta MD at [facility] NERV Bilateral 12/6/2019    BILATERAL INTRAARTICULAR KNEE INJECTION performed by Clifford Mendieta MD at [address] [place] [address] Ave ARTHROSCOPY Left 12/21/2017    UT ARTHROCENTESIS ASPIR&/INJ MAJOR JT/BURSA W/O US Bilateral 5/10/2019    GREATER TROCHANTERIC BURSA INJECTIONS performed by Clifford Mendieta MD at [facility] 96 DX/THER SBST INTRLMNR CRV/THRC W/IMG GDN N/A 9/28/2018    EPIDURAL STEROID INJECTION C7-T1 performed by Clifford Mendieta MD at [facility] 96 DX/THER SBST INTRLMNR LMBR/SAC W/IMG GDN Right 11/9/2018    LUMBAR EPIDURAL STEROID INJECTION L3-4 performed by Clifford [facility]eta MD at [facility] right side of thyroid    THYROIDECTOMY, PARTIAL      TUBAL LIGATION  1992       Current Outpatient Medications   Medication Sig Dispense Refill    pantoprazole (PROTONIX) 40 MG tablet Take 1 tablet by mouth 2 times daily (before meals) 180 tablet 1    fluconazole (DIFLUCAN) 100 MG tablet Take 1 tablet by mouth daily for 7 days 7 tablet 0    Fluticasone furoate-vilanterol (BREO ELLIPTA) 200-25 MCG/INH AEPB inhaler Inhale 1 puff into the lungs daily 1 each 11    Fluticasone furoate-vilanterol (BREO ELLIPTA) 200-25 MCG/INH AEPB inhaler Inhale 1 puff into the lungs daily Sample  Lot # [id number]  Exp date  9.30.21 1 each 0    Fluticasone furoate-vilanterol (BREO ELLIPTA) 200-25 MCG/INH AEPB inhaler Inhale 1 puff into the lungs daily 1 each 11    fluticasone (FLONASE) 50 MCG/ACT nasal spray 1 spray by Each Nostril route daily 1 Bottle 0    albuterol sulfate HFA (PROAIR HFA) 108 (90 Base) MCG/ACT inhaler Inhale 2 puffs into the lungs every 6 hours as needed for Wheezing or Shortness of Breath 1 Inhaler 11    levothyroxine (SYNTHROID) 125 MCG tablet Take 1 tablet by mouth Daily 90 tablet 1    Respiratory Therapy Supplies (NEBULIZER/TUBING/MOUTHPIECE) KIT 1 kit by Does not apply route daily 1 kit 0    oxyCODONE-acetaminophen (PERCOCET) 5-325 MG per tablet every 8 hours as needed.  diclofenac sodium (VOLTAREN) 1 % GEL Apply 2-4 gm to affected area tid for 2 weeks then bid prn thereafter 3 Tube 1    ondansetron (ZOFRAN) 4 MG tablet TAKE 1 TABLET BY MOUTH DAILY AS NEEDED FOR NAUSEA OR VOMITING 30 tablet 0    Cholecalciferol (VITAMIN D) 2000 units CAPS capsule Take by mouth daily       gabapentin (NEURONTIN) 300 MG capsule TWICE IN AM ONCE IN PM  1     No current facility-administered medications for this visit. Facility-Administered Medications Ordered in Other Visits   Medication Dose Route Frequency Provider Last Rate Last Admin    technetium sulfur colloid egg (NYCOMED-SC) solution 500 micro curie  500 micro curie Intravenous ONCE PRN Tiffani Zambrano MD           Prior to Admission medications    Medication Sig Start Date End Date Taking?  Authorizing Provider   pantoprazole (PROTONIX) 40 MG tablet Take 1 tablet by mouth 2 times daily (before meals) 6/29/21   Karri Mcconnell MD   fluconazole (DIFLUCAN) 100 MG tablet Take 1 tablet by mouth daily for 7 days 6/24/21 7/1/21  Carrington Kamara MD   Fluticasone furoate-vilanterol (BREO ELLIPTA) 200-25 MCG/INH AEPB inhaler Inhale 1 puff into the lungs daily 5/29/21   Sherrod Fothergill, MD   Fluticasone furoate-vilanterol (BREO ELLIPTA) 200-25 MCG/INH AEPB inhaler Inhale 1 puff into the lungs daily Sample  Lot # [id number]  Exp date  9.30.21 5/29/21   Sherrod Fothergill, MD   Fluticasone furoate-vilanterol (BREO ELLIPTA) 200-25 MCG/INH AEPB inhaler Inhale 1 puff into the lungs daily 5/28/21 Socorro Patten MD   fluticasone [facility]) 50 MCG/ACT nasal spray 1 spray by Each Nostril route daily 21   ODALYS Hui NP   albuterol sulfate HFA (PROAIR HFA) 108 (90 Base) MCG/ACT inhaler Inhale 2 puffs into the lungs every 6 hours as needed for Wheezing or Shortness of Breath 21   Socorro Patten MD   levothyroxine (SYNTHROID) 125 MCG tablet Take 1 tablet by mouth Daily 3/14/21   Daren Dobbs MD   Respiratory Therapy Supplies (NEBULIZER/TUBING/MOUTHPIECE) KIT 1 kit by Does not apply route daily 21   Socorro Patten MD   oxyCODONE-acetaminophen (PERCOCET) 5-325 MG per tablet every 8 hours as needed.  20   Historical Provider, MD   diclofenac sodium (VOLTAREN) 1 % GEL Apply 2-4 gm to affected area tid for 2 weeks then bid prn thereafter 19   Rashaun Roldan MD   ondansetron (ZOFRAN) 4 MG tablet TAKE 1 TABLET BY MOUTH DAILY AS NEEDED FOR NAUSEA OR VOMITING 18   Daren Dobbs MD   Cholecalciferol (VITAMIN D) 2000 units CAPS capsule Take by mouth daily     Historical Provider, MD   gabapentin (NEURONTIN) 300 MG capsule TWICE IN AM ONCE IN PM 17   Historical Provider, MD         Allergies   Allergen Reactions    Other Hives     Cloth gloves at my work       Social History     Socioeconomic History    Marital status:      Spouse name: Not on file    Number of children: Not on file    Years of education: Not on file    Highest education level: Not on file   Occupational History    Occupation: room leader     Comment: [employer]   Tobacco Use    Smoking status: Former Smoker     Packs/day: 1.00     Years: 15.00     Pack years: 15.00     Types: Cigarettes     Start date:      Quit date: 2003     Years since quittin.5    Smokeless tobacco: Never Used   Vaping Use    Vaping Use: Never used   Substance and Sexual Activity    Alcohol use: Not Currently     Alcohol/week: 0.0 standard drinks    Drug use: No    Sexual activity: Not Currently     Partners: Male   Other Topics Concern    Not on file   Social History Narrative    Not on file     Social Determinants of Health     Financial Resource Strain: Low Risk     Difficulty of Paying Living Expenses: Not hard at all   Food Insecurity: No Food Insecurity    Worried About Running Out of Food in the Last Year: Never true    [address] in the Last Year: Never true   Transportation Needs: No Transportation Needs    Lack of Transportation (Medical): No    Lack of Transportation (Non-Medical): No   Physical Activity: Inactive    Days of Exercise per Week: 0 days    Minutes of Exercise per Session: 0 min   Stress: No Stress Concern Present    Feeling of Stress : Only a little   Social Connections: Moderately Isolated    Frequency of Communication with Friends and Family: More than three times a week    Frequency of Social Gatherings with Friends and Family: Twice a week    Attends Hindu Services: 1 to 4 times per year    Active Member of [email or web address] Group or Organizations: No    Attends Club or Organization Meetings: Never    Marital Status:    Intimate Partner Violence: Not At Risk    Fear of Current or Ex-Partner: No    Emotionally Abused: No    Physically Abused: No    Sexually Abused: No       Family History   Problem Relation Age of Onset    Asthma Father     Heart Disease Father     Emphysema Father     Heart Attack Father     Breast Cancer Maternal Aunt         breast    Breast Cancer Maternal Aunt         breast    Migraines Mother    Milagros Vasques Mother 61        multiple mylenoma    Diabetes Sister     Migraines Sister     Breast Cancer Paternal Uncle         colon    Breast Cancer Maternal Cousin         breast    No Known Problems Maternal Grandmother     No Known Problems Maternal Grandfather     No Known Problems Paternal Grandmother        Review of Systems   Constitutional: Negative. HENT: Positive for voice change.     Gastrointestinal: Positive for abdominal pain and nausea. Musculoskeletal: Positive for back pain and myalgias. Hematological: Negative. All other systems reviewed and are negative. Objective:   Physical Exam  Vitals reviewed. Constitutional:       General: She is not in acute distress. Appearance: She is well-developed. She is not diaphoretic. HENT:      Head: Normocephalic and atraumatic. Right Ear: External ear normal.      Left Ear: External ear normal.      Nose: Nose normal.   Eyes:      General: No scleral icterus. Conjunctiva/sclera: Conjunctivae normal.   Cardiovascular:      Rate and Rhythm: Normal rate and regular rhythm. Heart sounds: Normal heart sounds. Pulmonary:      Effort: Pulmonary effort is normal. No respiratory distress. Breath sounds: Normal breath sounds. No wheezing. Abdominal:      General: There is no distension. Palpations: Abdomen is soft. Tenderness: There is no abdominal tenderness. Hernia: A hernia (reducible umbilical hernia) is present. Musculoskeletal:         General: Normal range of motion. Cervical back: Normal range of motion and neck supple. Skin:     General: Skin is warm and dry. Findings: No erythema. Neurological:      Mental Status: She is alert and oriented to person, place, and time. Psychiatric:         Behavior: Behavior normal.         Thought Content: Thought content normal.         Judgment: Judgment normal.         Assessment:       Diagnosis Orders   1. Umbilical hernia without obstruction and without gangrene     2. Chronic narcotic use     3. Morbid obesity with BMI of 40.0-44.9, adult [facility])             Plan:      Plan umbilical hernia repair  The risks, benefits and alternatives to the planned procedure were discussed. Patient expressed an understanding and is willing to proceed.   Continue speech therapy        Catina Massey MD

## 2021-06-30 NOTE — LETTER
Surgery Scheduling Form:      DEMOGRAPHICS:                                                                                                         .    Patient Name:  Radha Stern  Patient :  1965   Patient SS#:      Patient Phone:  [phone] (home)  Alt. Patient Phone:                     Patient Address:  [place] [address]    PCP:  Kingsley White MD  Insurance:  Payor: [employer] / Plan: [employer] [address] / Product Type: *No Product type* / Insurance ID Number:    Payor/Plan Subscr  Sex Relation Sub. Ins. ID Effective Group Num   1. [address]* 1965 Female Self [id number]                                   [address]         DIAGNOSIS & PROCEDURE:                                                                                       .    Diagnosis:  Y33.7 Umbilical hernia without obstruction and without gangrene  Operation:  Umbilical Hernia Repair with Mesh  Location: [facility] AT [facility]   Surgeon:  Breanne Bird. Abdullahi Felipe MD          SCHEDULING INFORMATION:                                                                                    .    Surgeon's Scheduling Instruction:  elective  Requested Date: 21   OR Time:           Patient Arrival Time:   OR Time Required:  45  Minutes  Anesthesia:  MAC/TIVA  Equipment:                                                           SA Required:  Yes     Status:  Outpatient             Standard C-Arm:  No  PAT Required:   Yes                             Best Time to Call:  AM  Patient Requested to see PCP for Pre-op H & P:  No  Special Comments:    Vaccinated: 3/23/21, 21                       Pedro Barrera MD       [id number]  PRE-CERTIFICATION INFORMATION:                                                                           .    Procedure:       CPT Code Modifier          30576

## 2021-06-30 NOTE — PROGRESS NOTES
[facility] ([facility]) ENT  Videostroboscopic Examination of the Larynx    BACKGROUND HISTORY:  Pt presents w/ complaints of dysphonia; occular surgery April 2021 and URI 4 weeks after. Voice became worse after onset of URI; now aphonic. Placed on Diflucan for suspected fungal laryngitis by ENT; last day of medication today. Denied effort or pain during voice use. 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 Endorsed mild dysphagia characterized by intermittent sensation of food \"sticking\"; resolving. Hx of diaphragmatic paralysis on R side that does result in dyspnea; has been present since childhood. Similar voice changes in Jan 2014 after URI; Total thyroidectomy May 2014 d/t concern that thyroid was pressing on RLN. No improvement in voice. Saw by SLP at [address] [place] [address] for VOT which did return voice to baseline. Surgical/Medical History: See the above. Hydration: >64oz of water  Smoking History: None  Caffeine Intake: NA    [facility] 8/7/14  Stroboscopic Examination: This study revealed bilateral TVF smooth, straight vocal cords with pinkish coloration. During sustained phonation and slow-motion stroboscopy, there was no vibratory characteristics noted secondary to posturing and tension of TVFs. The interarytenoid area exhibited a mild pachydermia suggestive of laryngopharyngeal reflux. No mass lesions, paralysis, paresis was noted. PER ENT NOTE, Dr. Chavez Tinoco, 6/24/21:  The patient has had intermittent dysphonia in the past.  She has a glottic gap on phonation without an obvious cause for this. She also has some evidence of a fungal laryngitis. This makes sense given the fact that she uses a steroid inhaler as well as had recent antibiotics and oral steroids. I will give her a week of Diflucan. I would like for her to follow-up with myself or Dez Noriega along with her speech therapist for stroboscopy.   Reportedly speech therapy was needed in the past.    Perceptual Quality: Pt presented with aphonia; intermittent instances of phonation and/or diplophonia. Non-sustainable and pt feels unable to control. Acoustic Analysis:  Maximum Sustained Phonation- 4 seconds  Avg Hz- NA d/t aphonia; x1 instance of phonation w/ Hz of 206    Flexible Stroboscopy Laryngoscopy  Procedure : Flexible Stroboscopy Laryngoscopy  Performed by: Emani Vinsno SLP  Anesthesia: Afrin with 4% lidocaine  Description:  The scope was passed along the floor of the right naris to the level of the larynx. There was no evidence of concerning masses or lesions of the base of tongue, vallecula, epiglottis, aryepiglottic folds, arytenoids, false vocal folds, true vocal folds, or pyriform sinuses. The scope was removed. The patient tolerated the procedure without difficulty. There were no complications. Pertinent findings: See Assessment and Plan of Care       TVF erythema and RTVF ectasia           Adduction; incomplete closure     Abduction              Erythema and white coating of TVFs     ASSESSMENT AND PLAN OF CARE:   55 y.o. female w/ hx of dysphonia/aphonia; completing last day of Diflucan. VLS revealed erythematous changes of TVFs bilaterally w/ irregular edges; increased medial edge edema of RTVF w/ superior-medial ectasia observed. White coating observed on superior surface bilaterally. Glottic closure characterized as incomplete despite multiple phonatory attempts; no mucosal wave present indicating stiffness. Bilateral \"guarding\" of arytenoids during abduction; improved w/ nasal inhale vs mouth breathing. No compensatory supraglottic compression present w/ exception of throat clear; false fold vibrations present for cough/throat clear. Mild interarytenoid pachydermia and posterior edema, erythema and thick mucus were observed, indicative of laryngopharyngeal reflux. Subglottis was patent without evidence of narrowing. No mass lesions, paresis or paralysis was noted. EDUCATION AND THERAPY:  Reviewed VLS w/ pt, including possible ongoing laryngitis d/t stiffness and erythema of TVFs; ENT present and recommended hold on prescribing additional medications/steroids at this time. Discussed no maladaptive behaviors noted at this time and initiation of voice therapy to keep functional voicing physiology. Provided w/ SOVT strawflow to ensure ongoing resolution of muscle tension, decrease irritation, promote laryngeal closure, and increase mucosal vibrations for decreased stiffness. Pt encouraged to take breath in through nose for increased airway opening and able to coordination respiration/phonation w/o difficulties. Noted to have increased ability to sustain phonation w/ both ascending/descending glides. Endorsed no tension or discomfort. PATIENT GOALS:  Pt will adhere to vocal hygiene protocol during daily life activities w/ 80% acc given mod cues  Pt will adhere to reflux management protocol during daily life activities w/ 80% acc given mod cues  Pt will perform SOVT techniques during various voicing tasks w/ 80% acc given mod cues  Pt will perform RVT techniques during various voicing tasks w/ 80% acc given mod cues  Pt will perform resistive/abdominal breathing exercises at rest and during phonation w/ 80% acc given mod cues  Pt will complete laryngeal/general relaxation stretches/exercises w/ 80% acc given mod cues    SLP recommended: Yes  Barriers to treatment: None  Potential benefits from rehab include: Improved vocal quality  Frequency: 4 sessions over 6-8/wk  Prognosis is: Good    RECOMMENDATIONS:   1. Dr. Cahvez Tinoco was present and reviewed recorded evaluation to assist in diagnosis and provide assessment and plan for treatment. 2. Follow good vocal hygiene behaviors and precautions, including increasing oral hydration. 3. Follow dietary precautions and behavioral lifestyle changes regarding laryngopharyngeal reflux, including taking PPI as prescribed by physician. 4. Voice therapy to focus on re-strengthening and balancing the laryngeal musculature, to promote to open oral front focus, to promote using adequate diaphragmatic breath support to sustain conversational speech. 5. Pt is a good candidate for further medical evaluation/intervention at the discretion of the treating physician. 6. Pt to follow up with ENT/Dr. Benitez Holland.       CPT Code Units Billed Time Billed Today Date of POC Start Re-Certification Date Referring Provider   [id number], [id number], 37795, 48878 4 Unit Time in: 9044  Time out: 1000  Total time: 40 min 6/30/21 60 Days Dr. Benitez Holland       Thank you,    Hunter Diaz) [place], [place], [address] [facility]; [id number]  Voice Specialized Speech-Language Pathologist

## 2021-07-01 ENCOUNTER — ANESTHESIA EVENT (OUTPATIENT)
Dept: ENDOSCOPY | Age: 56
End: 2021-07-01
Payer: COMMERCIAL

## 2021-07-01 RX ORDER — GABAPENTIN 600 MG/1
600 TABLET ORAL 3 TIMES DAILY
COMMUNITY

## 2021-07-01 RX ORDER — IBUPROFEN 800 MG/1
800 TABLET ORAL PRN
COMMUNITY

## 2021-07-01 NOTE — PROGRESS NOTES
[facility] time____1000________        Surgery time__1130__________    Take the following medications with a sip of water: Follow your MD/Surgeons pre-procedure instructions regarding your medications    Do not eat or drink anything after 12:00 midnight prior to your surgery. This includes water chewing gum, mints and ice chips. You may brush your teeth and gargle the morning of your surgery, but do not swallow the water     Please see your family doctor/pediatrician for a history and physical and/or concerning medications. Bring any test results/reports from your physicians office. If you are under the care of a heart doctor or specialist doctor, please be aware that you may be asked to them for clearance    You may be asked to stop blood thinners such as Coumadin, Plavix, Fragmin, Lovenox, etc., or any anti-inflammatories such as:  Aspirin, Ibuprofen, Advil, Naproxen prior to your surgery. We also ask that you stop any OTC medications such as fish oil, vitamin E, glucosamine, garlic, Multivitamins, COQ 10, etc. May take tylenol    We ask that you do not smoke 24 hours prior to surgery  We ask that you do not  drink any alcoholic beverages 24 hours prior to surgery     You must make arrangements for a responsible adult to take you home after your surgery. For your safety you will not be allowed to leave alone or drive yourself home. Your surgery will be cancelled if you do not have a ride home. Also for your safety, it is strongly suggested that someone stay with you the first 24 hours after your surgery. A parent or legal guardian must accompany a child scheduled for surgery and plan to stay at the hospital until the child is discharged. Please do not bring other children with you. For your comfort, please wear simple loose fitting clothing to the hospital.  Please do not bring valuables.     Do not wear any make-up or nail polish on your fingers or toes      For your safety, please do not wear any jewelry or body piercing's on the day of surgery. All jewelry must be removed. If you have dentures, they will be removed before going to operating room. For your convenience, we will provide you with a container. If you wear contact lenses or glasses, they will be removed, please bring a case for them. If you have a living will and a durable power of  for healthcare, please bring in a copy. As part of our patient safety program to minimize surgical site infections, we ask you to do the following:    · Please notify your surgeon if you develop any illness between         now and the  day of your surgery. · This includes a cough, cold, fever, sore throat, nausea,         or vomiting, and diarrhea, etc.  ·  Please notify your surgeon if you experience dizziness, shortness         of breath or blurred vision between now and the time of your surgery. Do not shave your operative site 96 hours prior to surgery. For face and neck surgery, men may use an electric razor 48 hours   prior to surgery. You may shower the night before surgery or the morning of   your surgery with an antibacterial soap. You will need to bring a photo ID and insurance card    [facility] has an onsite pharmacy, would you like to utilize our pharmacy     If you will be staying overnight and use a C-pap machine, please bring   your C-pap to hospital     Our goal is to provide you with excellent care, therefore, visitors will be limited to two(2) in the room at a time so that we may focus on providing this care for you. Please contact pre-admission testing if you have any further questions. [facility] phone number:  [phone] PAT fax number:  [phone]  Please note these are generalized instructions for all surgical cases, you may be provided with more specific instructions according to your surgery.     SAFETY FIRST. .call before you fall

## 2021-07-01 NOTE — PROGRESS NOTES
Preoperative Screening for Elective Surgery/Invasive Procedures While COVID-19 present in the community     Have you tested positive or have been told to self-isolate for COVID-19 like symptoms within the past 28 days? no   Do you currently have any of the following symptoms? no  o Fever >100.0 F or 99.9 F in immunocompromised patients? no  o New onset cough, shortness of breath or difficulty breathing? no  o New onset sore throat, myalgia (muscle aches and pains), headache, loss of taste/smell or diarrhea? no   Have you had a potential exposure to COVID-19 within the past 14 days by:  o Close contact with a confirmed case? no  o Close contact with a healthcare worker,  or essential infrastructure worker (grocery store, [employer], gas station, public utilities or transportation)? yes  o Do you reside in a congregate setting such as; skilled nursing facility, adult home, correctional facility, homeless shelter or other institutional setting? yes  o Have you had recent travel to a known COVID-19 hotspot? no    Indicate if the patient has a positive screen by answering yes to one or more of the above questions. Patients who test positive or screen positive prior to surgery or on the day of surgery should be evaluated in conjunction with the surgeon/proceduralist/anesthesiologist to determine the urgency of the procedure.

## 2021-07-02 ENCOUNTER — HOSPITAL ENCOUNTER (OUTPATIENT)
Age: 56
Setting detail: OUTPATIENT SURGERY
Discharge: HOME | End: 2021-07-02
Attending: INTERNAL MEDICINE | Admitting: INTERNAL MEDICINE
Payer: COMMERCIAL

## 2021-07-02 ENCOUNTER — ANESTHESIA (OUTPATIENT)
Dept: ENDOSCOPY | Age: 56
End: 2021-07-02
Payer: COMMERCIAL

## 2021-07-02 VITALS
WEIGHT: 288.36 LBS | DIASTOLIC BLOOD PRESSURE: 58 MMHG | RESPIRATION RATE: 16 BRPM | SYSTOLIC BLOOD PRESSURE: 106 MMHG | HEIGHT: 68 IN | BODY MASS INDEX: 43.7 KG/M2 | TEMPERATURE: 96.8 F | OXYGEN SATURATION: 92 % | HEART RATE: 63 BPM

## 2021-07-02 VITALS — DIASTOLIC BLOOD PRESSURE: 63 MMHG | OXYGEN SATURATION: 99 % | SYSTOLIC BLOOD PRESSURE: 115 MMHG

## 2021-07-02 DIAGNOSIS — D50.9 IRON DEFICIENCY ANEMIA, UNSPECIFIED IRON DEFICIENCY ANEMIA TYPE: ICD-10-CM

## 2021-07-02 PROCEDURE — 3700000001 HC ADD 15 MINUTES (ANESTHESIA): Performed by: INTERNAL MEDICINE

## 2021-07-02 PROCEDURE — 3609012400 HC EGD TRANSORAL BIOPSY SINGLE/MULTIPLE: Performed by: INTERNAL MEDICINE

## 2021-07-02 PROCEDURE — 6360000002 HC RX W HCPCS: Performed by: NURSE ANESTHETIST, CERTIFIED REGISTERED

## 2021-07-02 PROCEDURE — 3700000000 HC ANESTHESIA ATTENDED CARE: Performed by: INTERNAL MEDICINE

## 2021-07-02 PROCEDURE — 7100000011 HC PHASE II RECOVERY - ADDTL 15 MIN: Performed by: INTERNAL MEDICINE

## 2021-07-02 PROCEDURE — 7100000001 HC PACU RECOVERY - ADDTL 15 MIN: Performed by: INTERNAL MEDICINE

## 2021-07-02 PROCEDURE — 2580000003 HC RX 258: Performed by: NURSE ANESTHETIST, CERTIFIED REGISTERED

## 2021-07-02 PROCEDURE — 2709999900 HC NON-CHARGEABLE SUPPLY: Performed by: INTERNAL MEDICINE

## 2021-07-02 PROCEDURE — 88305 TISSUE EXAM BY PATHOLOGIST: CPT

## 2021-07-02 PROCEDURE — 6370000000 HC RX 637 (ALT 250 FOR IP): Performed by: ANESTHESIOLOGY

## 2021-07-02 PROCEDURE — 2500000003 HC RX 250 WO HCPCS: Performed by: NURSE ANESTHETIST, CERTIFIED REGISTERED

## 2021-07-02 PROCEDURE — 7100000000 HC PACU RECOVERY - FIRST 15 MIN: Performed by: INTERNAL MEDICINE

## 2021-07-02 PROCEDURE — 7100000010 HC PHASE II RECOVERY - FIRST 15 MIN: Performed by: INTERNAL MEDICINE

## 2021-07-02 RX ORDER — SODIUM CHLORIDE 0.9 % (FLUSH) 0.9 %
10 SYRINGE (ML) INJECTION EVERY 12 HOURS SCHEDULED
Status: DISCONTINUED | OUTPATIENT
Start: 2021-07-02 | End: 2021-07-02 | Stop reason: HOSPADM

## 2021-07-02 RX ORDER — SODIUM CHLORIDE 9 MG/ML
25 INJECTION, SOLUTION INTRAVENOUS PRN
Status: DISCONTINUED | OUTPATIENT
Start: 2021-07-02 | End: 2021-07-02 | Stop reason: HOSPADM

## 2021-07-02 RX ORDER — SODIUM CHLORIDE 9 MG/ML
INJECTION, SOLUTION INTRAVENOUS CONTINUOUS PRN
Status: DISCONTINUED | OUTPATIENT
Start: 2021-07-02 | End: 2021-07-02 | Stop reason: SDUPTHER

## 2021-07-02 RX ORDER — GLYCOPYRROLATE 0.2 MG/ML
INJECTION INTRAMUSCULAR; INTRAVENOUS PRN
Status: DISCONTINUED | OUTPATIENT
Start: 2021-07-02 | End: 2021-07-02 | Stop reason: SDUPTHER

## 2021-07-02 RX ORDER — ONDANSETRON 2 MG/ML
4 INJECTION INTRAMUSCULAR; INTRAVENOUS
Status: DISCONTINUED | OUTPATIENT
Start: 2021-07-02 | End: 2021-07-02 | Stop reason: HOSPADM

## 2021-07-02 RX ORDER — LIDOCAINE HYDROCHLORIDE 20 MG/ML
INJECTION, SOLUTION EPIDURAL; INFILTRATION; INTRACAUDAL; PERINEURAL PRN
Status: DISCONTINUED | OUTPATIENT
Start: 2021-07-02 | End: 2021-07-02 | Stop reason: SDUPTHER

## 2021-07-02 RX ORDER — SODIUM CHLORIDE 9 MG/ML
INJECTION, SOLUTION INTRAVENOUS CONTINUOUS
Status: DISCONTINUED | OUTPATIENT
Start: 2021-07-02 | End: 2021-07-02 | Stop reason: HOSPADM

## 2021-07-02 RX ORDER — SODIUM CHLORIDE 0.9 % (FLUSH) 0.9 %
10 SYRINGE (ML) INJECTION PRN
Status: DISCONTINUED | OUTPATIENT
Start: 2021-07-02 | End: 2021-07-02 | Stop reason: HOSPADM

## 2021-07-02 RX ORDER — PROPOFOL 10 MG/ML
INJECTION, EMULSION INTRAVENOUS CONTINUOUS PRN
Status: DISCONTINUED | OUTPATIENT
Start: 2021-07-02 | End: 2021-07-02 | Stop reason: SDUPTHER

## 2021-07-02 RX ORDER — ACETAMINOPHEN 325 MG/1
650 TABLET ORAL ONCE
Status: COMPLETED | OUTPATIENT
Start: 2021-07-02 | End: 2021-07-02

## 2021-07-02 RX ORDER — PROPOFOL 10 MG/ML
INJECTION, EMULSION INTRAVENOUS PRN
Status: DISCONTINUED | OUTPATIENT
Start: 2021-07-02 | End: 2021-07-02 | Stop reason: SDUPTHER

## 2021-07-02 RX ADMIN — PROPOFOL 80 MG: 10 INJECTION, EMULSION INTRAVENOUS at 12:01

## 2021-07-02 RX ADMIN — GLYCOPYRROLATE 0.1 MG: 0.2 INJECTION, SOLUTION INTRAMUSCULAR; INTRAVENOUS at 11:55

## 2021-07-02 RX ADMIN — SODIUM CHLORIDE: 9 INJECTION, SOLUTION INTRAVENOUS at 11:55

## 2021-07-02 RX ADMIN — PROPOFOL 180 MCG/KG/MIN: 10 INJECTION, EMULSION INTRAVENOUS at 12:01

## 2021-07-02 RX ADMIN — LIDOCAINE HYDROCHLORIDE 80 MG: 20 INJECTION, SOLUTION EPIDURAL; INFILTRATION; INTRACAUDAL; PERINEURAL at 12:01

## 2021-07-02 RX ADMIN — ACETAMINOPHEN 650 MG: 325 TABLET, FILM COATED ORAL at 12:43

## 2021-07-02 ASSESSMENT — PULMONARY FUNCTION TESTS
PIF_VALUE: 1
PIF_VALUE: 1
PIF_VALUE: 0
PIF_VALUE: 0
PIF_VALUE: 1
PIF_VALUE: 0
PIF_VALUE: 0
PIF_VALUE: 1
PIF_VALUE: 0
PIF_VALUE: 1
PIF_VALUE: 1

## 2021-07-02 ASSESSMENT — PAIN DESCRIPTION - FREQUENCY: FREQUENCY: CONTINUOUS

## 2021-07-02 ASSESSMENT — PAIN - FUNCTIONAL ASSESSMENT: PAIN_FUNCTIONAL_ASSESSMENT: 0-10

## 2021-07-02 ASSESSMENT — PAIN SCALES - GENERAL
PAINLEVEL_OUTOF10: 0
PAINLEVEL_OUTOF10: 0
PAINLEVEL_OUTOF10: 9

## 2021-07-02 ASSESSMENT — PAIN DESCRIPTION - LOCATION: LOCATION: HEAD

## 2021-07-02 ASSESSMENT — ENCOUNTER SYMPTOMS: SHORTNESS OF BREATH: 1

## 2021-07-02 ASSESSMENT — PAIN DESCRIPTION - DESCRIPTORS: DESCRIPTORS: ACHING

## 2021-07-02 ASSESSMENT — PAIN DESCRIPTION - ONSET: ONSET: ON-GOING

## 2021-07-02 ASSESSMENT — PAIN DESCRIPTION - PAIN TYPE: TYPE: ACUTE PAIN

## 2021-07-02 NOTE — ANESTHESIA POSTPROCEDURE EVALUATION
Department of Anesthesiology  Postprocedure Note    Patient: Tony Charles  MRN: [id number]  YOB: 1965  Date of evaluation: 7/2/2021  Time:  3:12 PM     Procedure Summary     Date: 07/02/21 Room / Location: [address] [place] [address]    Anesthesia Start: 1055 Anesthesia Stop: 2522    Procedure: EGD BIOPSY (N/A ) Diagnosis:       Iron deficiency anemia, unspecified iron deficiency anemia type      (IRON DEFICIENCY ANEMIA)    Surgeons: Sam Colby MD Responsible Provider: Tawny Bridges MD    Anesthesia Type: MAC ASA Status: 3          Anesthesia Type: MAC    Medardo Phase I: Medardo Score: 10    Medardo Phase II:      Last vitals: Reviewed and per EMR flowsheets.        Anesthesia Post Evaluation    Patient location during evaluation: PACU  Level of consciousness: awake and alert  Airway patency: patent  Nausea & Vomiting: no nausea and no vomiting  Complications: no  Cardiovascular status: hemodynamically stable  Respiratory status: acceptable  Hydration status: stable

## 2021-07-02 NOTE — OP NOTE
[address] [place] [address]  Endoscopy Note    Patient: Haylie Mark  : 1965  Acct#:     Procedure: Esophagogastroduodenoscopy with biopsy    Date:  2021     Surgeon:  Gian Mishra MD, MD    Referring Physician:  Dr. Kezia Cherry    Indications: This is a 55y.o. year old female who presents today with refractory reflux despite bid pantoprazole. Anesthesia:  TIVA    Description of Procedure:  Informed consent was obtained from the patient after explanation of indications, benefits and possible risks and complications of the procedure. The patient was then taken to the endoscopy suite, placed in the left lateral decubitus position and the above IV sedation was administrered. The Olympus videoendoscope was placed in the patient's mouth and under direct visualization passed into the esophagus and advanced without difficulty to the 2nd portion of the duodenum. Views were good, patient toleration was good. Retroflexion was performed in the stomach. Findings:  1. The esophagus showed a 2mm island of columnar epithelium above the z-line without nodularity. Biopsied. NO reflux esophagitis. 2.  Normal stomach and duodenum. The scope was then withdrawn back into the stomach, it was decompressed, and the scope was completely withdrawn. The patient tolerated the procedure well and was taken to the post anesthesia care unit in good condition. Estimated blood loss: minima  Specimens taken: yes    Impression:    1. The esophagus showed a 2mm island of columnar epithelium above the z-line without nodularity. Biopsied. NO reflux esophagitis. 2.  Normal stomach and duodenum. Recommendations:   1. Clear liquid diet, advance as tolerated. 2.  Follow up on biopsies. 3.  Pantoprazole does seem to be controlling the acid level as she has no reflux esophgagitis. She could be getting non-acid reflux which can be improved with gaviscon 2-4 tablets chewed before meals.     4.  If no benefit with gaviscon, may need to consider another trial of nortriptyline or amitriptyline (she had stopped previously because of headaches).     Awilda Moran MD, MD  [address]

## 2021-07-02 NOTE — ANESTHESIA PRE PROCEDURE
Department of Anesthesiology  Preprocedure Note       Name:  Dinesh Rogers   Age:  55 y.o.  :  1965                                          MRN:  [id number]         Date:  2021      Surgeon: Hernando Mac):  Asaf Arrington MD    Procedure: Procedure(s):  ESOPHAGOGASTRODUODENOSCOPY    Medications prior to admission:   Prior to Admission medications    Medication Sig Start Date End Date Taking? Authorizing Provider   gabapentin (NEURONTIN) 600 MG tablet Take 600 mg by mouth 3 times daily. Yes Historical Provider, MD   ibuprofen (ADVIL;MOTRIN) 800 MG tablet Take 800 mg by mouth as needed for Pain   Yes Historical Provider, MD   pantoprazole (PROTONIX) 40 MG tablet Take 1 tablet by mouth 2 times daily (before meals) 21  Yes Alpha Boeck, MD   Fluticasone furoate-vilanterol (BREO ELLIPTA) 200-25 MCG/INH AEPB inhaler Inhale 1 puff into the lungs daily 21  Yes Carlita Beebe MD   fluticasone [facility]) 50 MCG/ACT nasal spray 1 spray by Each Nostril route daily 21  Yes ODALYS Urena NP   albuterol sulfate HFA (PROAIR HFA) 108 (90 Base) MCG/ACT inhaler Inhale 2 puffs into the lungs every 6 hours as needed for Wheezing or Shortness of Breath 21  Yes Carlita Beebe MD   levothyroxine (SYNTHROID) 125 MCG tablet Take 1 tablet by mouth Daily 3/14/21  Yes Alpha Boeck, MD   oxyCODONE-acetaminophen (PERCOCET) 5-325 MG per tablet every 8 hours as needed.  20  Yes Historical Provider, MD   diclofenac sodium (VOLTAREN) 1 % GEL Apply 2-4 gm to affected area tid for 2 weeks then bid prn thereafter 19  Yes Margie Li MD   ondansetron (ZOFRAN) 4 MG tablet TAKE 1 TABLET BY MOUTH DAILY AS NEEDED FOR NAUSEA OR VOMITING 18  Yes Alpha Boeck, MD   Cholecalciferol (VITAMIN D) 2000 units CAPS capsule Take by mouth daily    Yes Historical Provider, MD   Respiratory Therapy Supplies (NEBULIZER/TUBING/MOUTHPIECE) KIT 1 kit by Does not apply route daily 2/24/21   Gill Villa MD       Current medications:    Current Facility-Administered Medications   Medication Dose Route Frequency Provider Last Rate Last Admin    0.9 % sodium chloride infusion   Intravenous Continuous Orin Louis MD        sodium chloride flush 0.9 % injection 10 mL  10 mL Intravenous 2 times per day Orin Louis MD        sodium chloride flush 0.9 % injection 10 mL  10 mL Intravenous PRN Orin Louis MD        0.9 % sodium chloride infusion  25 mL Intravenous PRN Orin Louis MD         Facility-Administered Medications Ordered in Other Encounters   Medication Dose Route Frequency Provider Last Rate Last Admin    technetium sulfur colloid egg (NYCOMED-SC) solution 500 micro curie  500 micro curie Intravenous ONCE PRN Carolina Evangelista MD           Allergies: Allergies   Allergen Reactions    Other Hives     Cloth gloves at my work       Problem List:    Patient Active Problem List   Diagnosis Code    AR (allergic rhinitis) J30.9    Moderate persistent asthma without complication T79.67    SOB (shortness of breath) R06.02    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9    Pain in both knees M25.561, M25.562    2017    HI ARTHROCENTESIS ASPIR&/INJ MAJOR JT/BURSA W/O US Bilateral 5/10/2019    GREATER TROCHANTERIC BURSA INJECTIONS performed by Noel Kincaid MD at [place] [address] DX/THER [facility] INTRNR CRV/THRC W/IMG GDN N/A 2018    EPIDURAL STEROID INJECTION C7-T1 performed by Noel Kincaid MD at [place] [address] DX/THER [facility] LMBR/SAC W/IMG GDN Right 2018    LUMBAR EPIDURAL STEROID INJECTION L3-4 performed by Noel Kincaid MD at [facility] right side of thyroid    THYROIDECTOMY, PARTIAL      TUBAL LIGATION         Social History:    Social History     Tobacco Use    Smoking status: Former Smoker     Packs/day: 1.00     Years: 15.00     Pack years: 15.00     Types: Cigarettes     Start date:      Quit date: 2003     Years since quittin.5    Smokeless tobacco: Never Used   Substance Use Topics    Alcohol use: Not Currently     Alcohol/week: 0.0 standard drinks                                Counseling given: Not Answered      Vital Signs (Current):   Vitals:    21 1106 21 1010   BP:  117/74   Pulse:  73   Resp:  16   Temp:  94.8 °F (34.9 °C)   TempSrc:  Temporal   SpO2:  94%   Weight: 290 lb (131.5 kg) 288 lb 5.8 oz (130.8 kg)   Height: 5' 8\" (1.727 m) 5' 8\" (1.727 m)                                              BP Readings from Last 3 Encounters:   21 117/74   21 100/62   21 126/78       NPO Status: Time of last liquid consumption: 1800                        Time of last solid consumption: 1800                        Date of last liquid consumption: 21                        Date of last solid food consumption: 21    BMI:   Wt Readings from Last 3 Encounters:   21 288 lb 5.8 oz (130.8 kg)   21 290 lb (131.5 kg)   21 290 lb 6.4 oz (131.7 kg)     Body mass index is 43.85 kg/m².     CBC:   Lab Results   Component Value Date    WBC 9.7 06/10/2021    RBC 5.00 06/10/2021    HGB 14.0 06/10/2021    HCT 42.2 06/10/2021    MCV 84.5 06/10/2021    RDW 15.0 06/10/2021     06/10/2021       CMP:   Lab Results   Component Value Date     06/10/2021    K 4.5 06/10/2021    K 4.1 06/08/2021     06/10/2021    CO2 28 06/10/2021    BUN 11 06/10/2021    CREATININE 0.6 06/10/2021    GFRAA >60 06/10/2021    GFRAA >60 07/18/2012    AGRATIO 1.4 06/08/2021    LABGLOM >60 06/10/2021    GLUCOSE 99 06/10/2021    PROT 7.0 06/08/2021    CALCIUM 9.8 06/10/2021    BILITOT 0.4 06/08/2021    ALKPHOS 103 06/08/2021    AST 57 06/08/2021    ALT 93 06/08/2021       POC Tests: No results for input(s): POCGLU, POCNA, POCK, POCCL, POCBUN, POCHEMO, POCHCT in the last 72 hours.     Coags:   Lab Results   Component Value Date    PROTIME 10.7 04/04/2014    INR 0.95 04/04/2014       HCG (If Applicable):   Lab Results   Component Value Date    PREGTESTUR Negative 08/10/2018        ABGs: No results found for: PHART, PO2ART, KKO2WLM, QXO2AFM, BEART, R2LIJAAD     Type & Screen (If Applicable):  No results found for: LABABO, LABRH    Drug/Infectious Status (If Applicable):  No results found for: HIV, HEPCAB    COVID-19 Screening (If Applicable):   Lab Results   Component Value Date    COVID19 Not Detected 03/30/2021    COVID19 NOT DETECTED 11/06/2020           Anesthesia Evaluation  Patient summary reviewed no history of anesthetic complications:   Airway: Mallampati: II  TM distance: >3 FB   Neck ROM: full  Mouth opening: > = 3 FB Dental:    (+) upper dentures      Pulmonary: breath sounds clear to auscultation  (+) shortness of breath:  asthma:                           ROS comment: Right diaphragm paralysis    Cardiovascular:        (-) hypertension, valvular problems/murmurs, past MI, CAD, CABG/stent, dysrhythmias,  angina and no pulmonary hypertension      Rhythm: regular  Rate: normal                    Neuro/Psych:   (+) neuromuscular disease:, headaches:, depression/anxiety             GI/Hepatic/Renal:   (+) GERD:, liver disease:, morbid obesity          Endo/Other:    (+) hypothyroidism, blood dyscrasia: arthritis:., malignancy/cancer. Abdominal:   (+) obese,           Vascular: Other Findings:           Anesthesia Plan      MAC     ASA 3       Induction: intravenous. Anesthetic plan and risks discussed with patient and child/children. Plan discussed with CRNA. This pre-anesthesia assessment may be used as a history and physical.    DOS STAFF ADDENDUM:    Pt seen and examined, chart reviewed (including anesthesia, drug and allergy history). No interval changes to history and physical examination. Anesthetic plan, risks, benefits, alternatives, and personnel involved discussed with patient. Patient verbalized an understanding and agrees to proceed.       Addie Barnes MD  July 2, 2021  10:14 AM      Addie Barnes MD   7/2/2021

## 2021-07-02 NOTE — H&P
[address]  [facility] [place]   Pre-operative History and Physical    Patient: Roberto Situ  : 1965  Acct#:     HISTORY OF PRESENT ILLNESS:    The patient is a 55 y.o. female who presents with refractory reflux despite bid pantoprazole.       Past Medical History:        Diagnosis Date    Anxiety     Anxiety     Asthma     currently was instructed to stop inhalers d/t to collapse of vocal cords    Diaphragm paralysis     right side    Enlarged liver     Fatty liver     Fibromyalgia     GERD (gastroesophageal reflux disease)     Headache(784.0)     neck pain     History of ulcer disease     Hypothyroidism     status post thyroidectomy     Kidney infection     Melanoma ([address]     right eye     Migraine     Osteoarthritis     CHRONIC BACK PAIN/FIBROMYALGIA    Rheumatoid arthritis (HCC)     Spasmodic dysphonia     voice very hoarse    Thyroid disease     2015 found spot on left side of thyroid, right side thyroid was removed    Wears dentures     Wears glasses       Past Surgical History:        Procedure Laterality Date    APPENDECTOMY  age 17   [facility] BREAST BIOPSY Right     x3  benign lesions     BREAST SURGERY Right needle localization right breast mass    BREAST SURGERY Right 10/22/15    riught breast mass excision/needle loc    CARPAL TUNNEL RELEASE Right 5/7/15    CARPAL TUNNEL RELEASE Left 5/28/15    Left carpal tunnel release      COLONOSCOPY      ENDOSCOPY, COLON, DIAGNOSTIC      EPIDURAL STEROID INJECTION N/A 2019    LUMBAR EPIDURAL STEROID INJECTION L3/4 performed by Sofia Roy MD at [facility] N/A 2019    CERVICAL EPIDURAL STEROID INJECTION C7-T1 performed by Sofia Roy MD at [facility]      right eye-radiation plate for melanoma    HC INJECT OTHER PERPHRL NERV Bilateral 3/29/2019    INTRA ARTICULAR KNEE INJECTIONS performed by Sofia Roy MD at [address] HC INJECT OTHER PERPHRL NERV Bilateral 12/6/2019    BILATERAL INTRAARTICULAR KNEE INJECTION performed by Srinivas Rubi MD at [address] [facility] [address] ARTHROSCOPY Left 12/21/2017    PA ARTHROCENTESIS ASPIR&/INJ MAJOR JT/BURSA W/O US Bilateral 5/10/2019    GREATER TROCHANTERIC BURSA INJECTIONS performed by Srinivas Rubi MD at [facility] 96 DX/THER SBST INTRLMNR CRV/THRC W/IMG GDN N/A 9/28/2018    EPIDURAL STEROID INJECTION C7-T1 performed by Srinivas Rubi MD at [facility] 96 DX/THER SBST INTRLMNR LMBR/SAC W/IMG GDN Right 11/9/2018    LUMBAR EPIDURAL STEROID INJECTION L3-4 performed by Srinivas Rubi MD at [facility] right side of thyroid    THYROIDECTOMY, PARTIAL      TUBAL LIGATION  1992      Medications Prior to Admission:   Current Facility-Administered Medications on File Prior to Encounter   Medication Dose Route Frequency Provider Last Rate Last Admin    technetium sulfur colloid egg (NYCOMED-SC) solution 500 micro curie  500 micro curie Intravenous ONCE PRN Aysha Hutchins MD         Current Outpatient Medications on File Prior to Encounter   Medication Sig Dispense Refill    gabapentin (NEURONTIN) 600 MG tablet Take 600 mg by mouth 3 times daily.  ibuprofen (ADVIL;MOTRIN) 800 MG tablet Take 800 mg by mouth as needed for Pain      Fluticasone furoate-vilanterol (BREO ELLIPTA) 200-25 MCG/INH AEPB inhaler Inhale 1 puff into the lungs daily 1 each 11    fluticasone (FLONASE) 50 MCG/ACT nasal spray 1 spray by Each Nostril route daily 1 Bottle 0    albuterol sulfate HFA (PROAIR HFA) 108 (90 Base) MCG/ACT inhaler Inhale 2 puffs into the lungs every 6 hours as needed for Wheezing or Shortness of Breath 1 Inhaler 11    levothyroxine (SYNTHROID) 125 MCG tablet Take 1 tablet by mouth Daily 90 tablet 1    oxyCODONE-acetaminophen (PERCOCET) 5-325 MG per tablet every 8 hours as needed.       diclofenac sodium (VOLTAREN) 1 % GEL Apply 2-4 gm to affected area tid for 2 weeks then bid prn thereafter 3 Tube 1    ondansetron (ZOFRAN) 4 MG tablet TAKE 1 TABLET BY MOUTH DAILY AS NEEDED FOR NAUSEA OR VOMITING 30 tablet 0    Cholecalciferol (VITAMIN D) 2000 units CAPS capsule Take by mouth daily       Respiratory Therapy Supplies (NEBULIZER/TUBING/MOUTHPIECE) KIT 1 kit by Does not apply route daily 1 kit 0        Allergies: Other    Social History:   Social History     Socioeconomic History    Marital status:      Spouse name: Not on file    Number of children: Not on file    Years of education: Not on file    Highest education level: Not on file   Occupational History    Occupation: room leader     Comment: [employer]   Tobacco Use    Smoking status: Former Smoker     Packs/day: 1.00     Years: 15.00     Pack years: 15.00     Types: Cigarettes     Start date:      Quit date: 2003     Years since quittin.5    Smokeless tobacco: Never Used   Vaping Use    Vaping Use: Never used   Substance and Sexual Activity    Alcohol use: Not Currently     Alcohol/week: 0.0 standard drinks    Drug use: Never    Sexual activity: Not Currently     Partners: Male   Other Topics Concern    Not on file   Social History Narrative    Not on file     Social Determinants of Health     Financial Resource Strain: Low Risk     Difficulty of Paying Living Expenses: Not hard at all   Food Insecurity: No Food Insecurity    Worried About [facility] in the Last Year: Never true    [place] [employer] in the Last Year: Never true   Transportation Needs: No Transportation Needs    Lack of Transportation (Medical): No    Lack of Transportation (Non-Medical): No   Physical Activity: Inactive    Days of Exercise per Week: 0 days    Minutes of Exercise per Session: 0 min   Stress: No Stress Concern Present    Feeling of Stress : Only a little   Social Connections:  Moderately Isolated    Frequency of Communication with Friends and Family: More than three times a week    Frequency of Social Gatherings with Friends and Family: Twice a week    Attends Jain Services: 1 to 4 times per year    Active Member of [employer] Group or Organizations: No    Attends Club or Organization Meetings: Never    Marital Status:    Intimate Partner Violence: Not At Risk    Fear of Current or Ex-Partner: No    Emotionally Abused: No    Physically Abused: No    Sexually Abused: No      Family History:       Problem Relation Age of Onset    Asthma Father     Heart Disease Father     Emphysema Father     Heart Attack Father     Breast Cancer Maternal Aunt         breast    Breast Cancer Maternal Aunt         breast    Migraines Mother     Cancer Mother 61        multiple mylenoma    Diabetes Sister     Migraines Sister     Breast Cancer Maternal Cousin         breast    No Known Problems Maternal Grandmother     No Known Problems Maternal Grandfather     No Known Problems Paternal Grandmother     Colon Cancer Maternal Uncle         PHYSICAL EXAM:      /74   Pulse 73   Temp 94.8 °F (34.9 °C) (Temporal)   Resp 16   Ht 5' 8\" (1.727 m)   Wt 288 lb 5.8 oz (130.8 kg)   LMP 02/25/2016 (Exact Date)   SpO2 94%   BMI 43.85 kg/m²  I        Heart:  RRR    Lungs:  CTA b    Abdomen:  S/NT/ND/+BS      ASSESSMENT AND PLAN:  ASA: per anesthesia  Mallampati: per anesthesia  1. Patient is a 55 y.o. female here for EGD   2. Procedure options, risks and benefits reviewed with the patient. The patient expresses understanding.     Lorenzo Starr

## 2021-07-05 ASSESSMENT — ENCOUNTER SYMPTOMS
DIARRHEA: 0
SORE THROAT: 1
WHEEZING: 0
SHORTNESS OF BREATH: 0
NAUSEA: 0
BLOOD IN STOOL: 0
COUGH: 1
VOMITING: 0
ABDOMINAL PAIN: 0
VOICE CHANGE: 1

## 2021-07-07 ENCOUNTER — TELEPHONE (OUTPATIENT)
Dept: SURGERY | Age: 56
End: 2021-07-07

## 2021-07-07 NOTE — TELEPHONE ENCOUNTER
Spoke with patient regarding scheduled surgery on 07- with Dr. Baldomero Sexton. Patient is asked to arrive by 7:00 AM @ [facility] after midnight. May use your breathing treatments that morning. Please bring your Rescue Inhaler with you to the hospital.   You will need someone to drive you home following this procedure. Please bring a Photo ID & Insurance card with you, and check-in at the Surgery Desk down the right-hand hallway on the first floor. Surgery is scheduled to start at approx. 8:40 AM and should take approx. 45 - 60 minutes. If the hospital needs any further information, someone will give you a call. Patient expressed a verbal understanding of these instructions and had no further questions at this time. Call ended.

## 2021-07-08 NOTE — PROGRESS NOTES
Preoperative Screening for Elective Surgery/Invasive Procedures While COVID-19 present in the community     Have you tested positive or have been told to self-isolate for COVID-19 like symptoms within the past 28 days? Pt to bring vaccination card DOS   Do you currently have any of the following symptoms? o Fever >100.0 F or 99.9 F in immunocompromised patients? NO  o New onset cough, shortness of breath or difficulty breathing?  o New onset sore throat, myalgia (muscle aches and pains), headache, loss of taste/smell or diarrhea?  Have you had a potential exposure to COVID-19 within the past 14 days by: NO  o Close contact with a confirmed case? o Close contact with a healthcare worker,  or essential infrastructure worker (grocery store, [employer], gas station, public utilities or transportation)? o Do you reside in a congregate setting such as; skilled nursing facility, adult home, correctional facility, homeless shelter or other institutional setting?  o Have you had recent travel to a known COVID-19 hotspot? Indicate if the patient has a positive screen by answering yes to one or more of the above questions. Patients who test positive or screen positive prior to surgery or on the day of surgery should be evaluated in conjunction with the surgeon/proceduralist/anesthesiologist to determine the urgency of the procedure.

## 2021-07-08 NOTE — PROGRESS NOTES
C-Difficile admission screening and protocol:     * Admitted with diarrhea? YES____    NO____X_     *Prior history of C-Diff. In last 3 months? YES____   NO__X__     *Antibiotic use in the past 6-8 weeks? NO______YES__X____                 If yes which  ANTIBIOTIC AND REASON___X-sinus___     *Prior hospitalization or nursing home in the last month?  YES____   NO__X__

## 2021-07-08 NOTE — PROGRESS NOTES
[facility] time_____0650_______        Surgery time_____0840_______    Take the following medications with a sip of water: Follow your MD/Surgeons pre-procedure instructions regarding your medications    Do not eat or drink anything after 12:00 midnight prior to your surgery. This includes water chewing gum, mints and ice chips. You may brush your teeth and gargle the morning of your surgery, but do not swallow the water     Please see your family doctor/pediatrician for a history and physical and/or concerning medications. Bring any test results/reports from your physicians office. If you are under the care of a heart doctor or specialist doctor, please be aware that you may be asked to them for clearance    You may be asked to stop blood thinners such as Coumadin, Plavix, Fragmin, Lovenox, etc., or any anti-inflammatories such as:  Aspirin, Ibuprofen, Advil, Naproxen prior to your surgery. We also ask that you stop any OTC medications such as fish oil, vitamin E, glucosamine, garlic, Multivitamins, COQ 10, etc.    We ask that you do not smoke 24 hours prior to surgery  We ask that you do not  drink any alcoholic beverages 24 hours prior to surgery     You must make arrangements for a responsible adult to take you home after your surgery. For your safety you will not be allowed to leave alone or drive yourself home. Your surgery will be cancelled if you do not have a ride home. Also for your safety, it is strongly suggested that someone stay with you the first 24 hours after your surgery. A parent or legal guardian must accompany a child scheduled for surgery and plan to stay at the hospital until the child is discharged. Please do not bring other children with you. For your comfort, please wear simple loose fitting clothing to the hospital.  Please do not bring valuables.     Do not wear any make-up or nail polish on your fingers or toes      For your safety, please do not wear any jewelry or body piercing's on the day of surgery. All jewelry must be removed. If you have dentures, they will be removed before going to operating room. For your convenience, we will provide you with a container. If you wear contact lenses or glasses, they will be removed, please bring a case for them. If you have a living will and a durable power of  for healthcare, please bring in a copy. As part of our patient safety program to minimize surgical site infections, we ask you to do the following:    · Please notify your surgeon if you develop any illness between         now and the  day of your surgery. · This includes a cough, cold, fever, sore throat, nausea,         or vomiting, and diarrhea, etc.  ·  Please notify your surgeon if you experience dizziness, shortness         of breath or blurred vision between now and the time of your surgery. Do not shave your operative site 96 hours prior to surgery. For face and neck surgery, men may use an electric razor 48 hours   prior to surgery. You may shower the night before surgery or the morning of   your surgery with an antibacterial soap. You will need to bring a photo ID and insurance card    [facility] has an onsite pharmacy, would you like to utilize our pharmacy     If you will be staying overnight and use a C-pap machine, please bring   your C-pap to hospital     Our goal is to provide you with excellent care, therefore, visitors will be limited to two(2) in the room at a time so that we may focus on providing this care for you. Please contact pre-admission testing if you have any further questions. [facility] phone number:  [phone] PAT fax number:  [phone]  Please note these are generalized instructions for all surgical cases, you may be provided with more specific instructions according to your surgery.

## 2021-07-09 ENCOUNTER — ANESTHESIA EVENT (OUTPATIENT)
Dept: OPERATING ROOM | Age: 56
End: 2021-07-09
Payer: COMMERCIAL

## 2021-07-12 ENCOUNTER — HOSPITAL ENCOUNTER (OUTPATIENT)
Age: 56
Setting detail: OUTPATIENT SURGERY
Discharge: HOME | End: 2021-07-12
Attending: SURGERY | Admitting: SURGERY
Payer: COMMERCIAL

## 2021-07-12 ENCOUNTER — ANESTHESIA (OUTPATIENT)
Dept: OPERATING ROOM | Age: 56
End: 2021-07-12
Payer: COMMERCIAL

## 2021-07-12 VITALS
SYSTOLIC BLOOD PRESSURE: 98 MMHG | RESPIRATION RATE: 15 BRPM | DIASTOLIC BLOOD PRESSURE: 57 MMHG | OXYGEN SATURATION: 93 %

## 2021-07-12 VITALS
WEIGHT: 289.46 LBS | HEART RATE: 68 BPM | HEIGHT: 68 IN | SYSTOLIC BLOOD PRESSURE: 114 MMHG | RESPIRATION RATE: 18 BRPM | TEMPERATURE: 96.4 F | BODY MASS INDEX: 43.87 KG/M2 | OXYGEN SATURATION: 91 % | DIASTOLIC BLOOD PRESSURE: 64 MMHG

## 2021-07-12 DIAGNOSIS — K42.9 UMBILICAL HERNIA WITHOUT OBSTRUCTION AND WITHOUT GANGRENE: ICD-10-CM

## 2021-07-12 PROCEDURE — 6360000002 HC RX W HCPCS

## 2021-07-12 PROCEDURE — 7100000011 HC PHASE II RECOVERY - ADDTL 15 MIN: Performed by: SURGERY

## 2021-07-12 PROCEDURE — 7100000001 HC PACU RECOVERY - ADDTL 15 MIN: Performed by: SURGERY

## 2021-07-12 PROCEDURE — C1781 MESH (IMPLANTABLE): HCPCS | Performed by: SURGERY

## 2021-07-12 PROCEDURE — 3700000000 HC ANESTHESIA ATTENDED CARE: Performed by: SURGERY

## 2021-07-12 PROCEDURE — 7100000000 HC PACU RECOVERY - FIRST 15 MIN: Performed by: SURGERY

## 2021-07-12 PROCEDURE — 6370000000 HC RX 637 (ALT 250 FOR IP): Performed by: ANESTHESIOLOGY

## 2021-07-12 PROCEDURE — 3600000013 HC SURGERY LEVEL 3 ADDTL 15MIN: Performed by: SURGERY

## 2021-07-12 PROCEDURE — 7100000010 HC PHASE II RECOVERY - FIRST 15 MIN: Performed by: SURGERY

## 2021-07-12 PROCEDURE — 6360000002 HC RX W HCPCS: Performed by: ANESTHESIOLOGY

## 2021-07-12 PROCEDURE — 2580000003 HC RX 258: Performed by: SURGERY

## 2021-07-12 PROCEDURE — 49585 REPAIR UMBILICAL HERN,5+Y/O,REDUC: CPT | Performed by: SURGERY

## 2021-07-12 PROCEDURE — 2500000003 HC RX 250 WO HCPCS

## 2021-07-12 PROCEDURE — 3600000003 HC SURGERY LEVEL 3 BASE: Performed by: SURGERY

## 2021-07-12 PROCEDURE — 2500000003 HC RX 250 WO HCPCS: Performed by: SURGERY

## 2021-07-12 PROCEDURE — 2709999900 HC NON-CHARGEABLE SUPPLY: Performed by: SURGERY

## 2021-07-12 PROCEDURE — 2580000003 HC RX 258: Performed by: ANESTHESIOLOGY

## 2021-07-12 PROCEDURE — 88302 TISSUE EXAM BY PATHOLOGIST: CPT

## 2021-07-12 PROCEDURE — 3700000001 HC ADD 15 MINUTES (ANESTHESIA): Performed by: SURGERY

## 2021-07-12 DEVICE — PATCH HERN M DIA2.5IN CIR W/ STRP SEPRA TECHNOLOGY ABSRB: Type: IMPLANTABLE DEVICE | Site: UMBILICAL | Status: FUNCTIONAL

## 2021-07-12 RX ORDER — LIDOCAINE HYDROCHLORIDE AND EPINEPHRINE 10; 10 MG/ML; UG/ML
INJECTION, SOLUTION INFILTRATION; PERINEURAL
Status: COMPLETED | OUTPATIENT
Start: 2021-07-12 | End: 2021-07-12

## 2021-07-12 RX ORDER — ONDANSETRON 2 MG/ML
4 INJECTION INTRAMUSCULAR; INTRAVENOUS
Status: DISCONTINUED | OUTPATIENT
Start: 2021-07-12 | End: 2021-07-12 | Stop reason: HOSPADM

## 2021-07-12 RX ORDER — HYDROCODONE BITARTRATE AND ACETAMINOPHEN 5; 325 MG/1; MG/1
1 TABLET ORAL PRN
Status: COMPLETED | OUTPATIENT
Start: 2021-07-12 | End: 2021-07-12

## 2021-07-12 RX ORDER — PROPOFOL 10 MG/ML
INJECTION, EMULSION INTRAVENOUS PRN
Status: DISCONTINUED | OUTPATIENT
Start: 2021-07-12 | End: 2021-07-12 | Stop reason: SDUPTHER

## 2021-07-12 RX ORDER — SODIUM CHLORIDE 9 MG/ML
25 INJECTION, SOLUTION INTRAVENOUS PRN
Status: DISCONTINUED | OUTPATIENT
Start: 2021-07-12 | End: 2021-07-12 | Stop reason: HOSPADM

## 2021-07-12 RX ORDER — MAGNESIUM HYDROXIDE 1200 MG/15ML
LIQUID ORAL CONTINUOUS PRN
Status: COMPLETED | OUTPATIENT
Start: 2021-07-12 | End: 2021-07-12

## 2021-07-12 RX ORDER — PROPOFOL 10 MG/ML
INJECTION, EMULSION INTRAVENOUS CONTINUOUS PRN
Status: DISCONTINUED | OUTPATIENT
Start: 2021-07-12 | End: 2021-07-12 | Stop reason: SDUPTHER

## 2021-07-12 RX ORDER — FENTANYL CITRATE 50 UG/ML
25 INJECTION, SOLUTION INTRAMUSCULAR; INTRAVENOUS EVERY 5 MIN PRN
Status: DISCONTINUED | OUTPATIENT
Start: 2021-07-12 | End: 2021-07-12 | Stop reason: HOSPADM

## 2021-07-12 RX ORDER — CEFAZOLIN SODIUM 1 G/3ML
INJECTION, POWDER, FOR SOLUTION INTRAMUSCULAR; INTRAVENOUS PRN
Status: DISCONTINUED | OUTPATIENT
Start: 2021-07-12 | End: 2021-07-12 | Stop reason: SDUPTHER

## 2021-07-12 RX ORDER — MEPERIDINE HYDROCHLORIDE 25 MG/ML
12.5 INJECTION INTRAMUSCULAR; INTRAVENOUS; SUBCUTANEOUS
Status: DISCONTINUED | OUTPATIENT
Start: 2021-07-12 | End: 2021-07-12 | Stop reason: HOSPADM

## 2021-07-12 RX ORDER — FENTANYL CITRATE 50 UG/ML
50 INJECTION, SOLUTION INTRAMUSCULAR; INTRAVENOUS EVERY 5 MIN PRN
Status: DISCONTINUED | OUTPATIENT
Start: 2021-07-12 | End: 2021-07-12 | Stop reason: HOSPADM

## 2021-07-12 RX ORDER — PROMETHAZINE HYDROCHLORIDE 25 MG/ML
6.25 INJECTION, SOLUTION INTRAMUSCULAR; INTRAVENOUS
Status: DISCONTINUED | OUTPATIENT
Start: 2021-07-12 | End: 2021-07-12 | Stop reason: HOSPADM

## 2021-07-12 RX ORDER — SODIUM CHLORIDE 0.9 % (FLUSH) 0.9 %
10 SYRINGE (ML) INJECTION EVERY 12 HOURS SCHEDULED
Status: DISCONTINUED | OUTPATIENT
Start: 2021-07-12 | End: 2021-07-12 | Stop reason: HOSPADM

## 2021-07-12 RX ORDER — SODIUM CHLORIDE 0.9 % (FLUSH) 0.9 %
10 SYRINGE (ML) INJECTION PRN
Status: DISCONTINUED | OUTPATIENT
Start: 2021-07-12 | End: 2021-07-12 | Stop reason: HOSPADM

## 2021-07-12 RX ORDER — BUPIVACAINE HYDROCHLORIDE 5 MG/ML
INJECTION, SOLUTION EPIDURAL; INTRACAUDAL
Status: COMPLETED | OUTPATIENT
Start: 2021-07-12 | End: 2021-07-12

## 2021-07-12 RX ORDER — SODIUM CHLORIDE 9 MG/ML
INJECTION, SOLUTION INTRAVENOUS CONTINUOUS
Status: DISCONTINUED | OUTPATIENT
Start: 2021-07-12 | End: 2021-07-12 | Stop reason: HOSPADM

## 2021-07-12 RX ORDER — HYDRALAZINE HYDROCHLORIDE 20 MG/ML
5 INJECTION INTRAMUSCULAR; INTRAVENOUS EVERY 10 MIN PRN
Status: DISCONTINUED | OUTPATIENT
Start: 2021-07-12 | End: 2021-07-12 | Stop reason: HOSPADM

## 2021-07-12 RX ORDER — LIDOCAINE HYDROCHLORIDE 20 MG/ML
INJECTION, SOLUTION EPIDURAL; INFILTRATION; INTRACAUDAL; PERINEURAL PRN
Status: DISCONTINUED | OUTPATIENT
Start: 2021-07-12 | End: 2021-07-12 | Stop reason: SDUPTHER

## 2021-07-12 RX ORDER — MIDAZOLAM HYDROCHLORIDE 1 MG/ML
INJECTION INTRAMUSCULAR; INTRAVENOUS PRN
Status: DISCONTINUED | OUTPATIENT
Start: 2021-07-12 | End: 2021-07-12 | Stop reason: SDUPTHER

## 2021-07-12 RX ORDER — FENTANYL CITRATE 50 UG/ML
INJECTION, SOLUTION INTRAMUSCULAR; INTRAVENOUS PRN
Status: DISCONTINUED | OUTPATIENT
Start: 2021-07-12 | End: 2021-07-12 | Stop reason: SDUPTHER

## 2021-07-12 RX ORDER — HYDROCODONE BITARTRATE AND ACETAMINOPHEN 5; 325 MG/1; MG/1
2 TABLET ORAL PRN
Status: COMPLETED | OUTPATIENT
Start: 2021-07-12 | End: 2021-07-12

## 2021-07-12 RX ADMIN — PROPOFOL 120 MCG/KG/MIN: 10 INJECTION, EMULSION INTRAVENOUS at 08:41

## 2021-07-12 RX ADMIN — FENTANYL CITRATE 50 MCG: 50 INJECTION INTRAMUSCULAR; INTRAVENOUS at 09:03

## 2021-07-12 RX ADMIN — HYDROCODONE BITARTRATE AND ACETAMINOPHEN 2 TABLET: 5; 325 TABLET ORAL at 11:53

## 2021-07-12 RX ADMIN — SODIUM CHLORIDE: 9 INJECTION, SOLUTION INTRAVENOUS at 08:28

## 2021-07-12 RX ADMIN — MIDAZOLAM 2 MG: 1 INJECTION INTRAMUSCULAR; INTRAVENOUS at 08:36

## 2021-07-12 RX ADMIN — FENTANYL CITRATE 50 MCG: 50 INJECTION INTRAMUSCULAR; INTRAVENOUS at 08:42

## 2021-07-12 RX ADMIN — PROPOFOL 60 MG: 10 INJECTION, EMULSION INTRAVENOUS at 08:41

## 2021-07-12 RX ADMIN — FENTANYL CITRATE 50 MCG: 50 INJECTION INTRAMUSCULAR; INTRAVENOUS at 09:41

## 2021-07-12 RX ADMIN — LIDOCAINE HYDROCHLORIDE 60 MG: 20 INJECTION, SOLUTION EPIDURAL; INFILTRATION; INTRACAUDAL; PERINEURAL at 08:41

## 2021-07-12 RX ADMIN — CEFAZOLIN SODIUM 3000 MG: 1 INJECTION, POWDER, FOR SOLUTION INTRAMUSCULAR; INTRAVENOUS at 08:43

## 2021-07-12 RX ADMIN — FENTANYL CITRATE 50 MCG: 50 INJECTION INTRAMUSCULAR; INTRAVENOUS at 11:10

## 2021-07-12 RX ADMIN — FENTANYL CITRATE 25 MCG: 50 INJECTION, SOLUTION INTRAMUSCULAR; INTRAVENOUS at 09:50

## 2021-07-12 ASSESSMENT — PULMONARY FUNCTION TESTS
PIF_VALUE: 1
PIF_VALUE: 1
PIF_VALUE: 0
PIF_VALUE: 1
PIF_VALUE: 0
PIF_VALUE: 1
PIF_VALUE: 1
PIF_VALUE: 0
PIF_VALUE: 1
PIF_VALUE: 0
PIF_VALUE: 1
PIF_VALUE: 0
PIF_VALUE: 0
PIF_VALUE: 1
PIF_VALUE: 1
PIF_VALUE: 0
PIF_VALUE: 1
PIF_VALUE: 0
PIF_VALUE: 1
PIF_VALUE: 0
PIF_VALUE: 1
PIF_VALUE: 1

## 2021-07-12 ASSESSMENT — PAIN DESCRIPTION - DESCRIPTORS
DESCRIPTORS: ACHING;CONSTANT
DESCRIPTORS: ACHING;CONSTANT

## 2021-07-12 ASSESSMENT — PAIN SCALES - GENERAL
PAINLEVEL_OUTOF10: 4
PAINLEVEL_OUTOF10: 6
PAINLEVEL_OUTOF10: 10
PAINLEVEL_OUTOF10: 10
PAINLEVEL_OUTOF10: 7
PAINLEVEL_OUTOF10: 6

## 2021-07-12 ASSESSMENT — PAIN DESCRIPTION - LOCATION
LOCATION: ABDOMEN
LOCATION: ABDOMEN

## 2021-07-12 ASSESSMENT — PAIN DESCRIPTION - ONSET
ONSET: ON-GOING
ONSET: ON-GOING

## 2021-07-12 ASSESSMENT — PAIN DESCRIPTION - PAIN TYPE
TYPE: ACUTE PAIN;SURGICAL PAIN
TYPE: ACUTE PAIN;SURGICAL PAIN

## 2021-07-12 ASSESSMENT — PAIN - FUNCTIONAL ASSESSMENT
PAIN_FUNCTIONAL_ASSESSMENT: ACTIVITIES ARE NOT PREVENTED
PAIN_FUNCTIONAL_ASSESSMENT: PREVENTS OR INTERFERES SOME ACTIVE ACTIVITIES AND ADLS
PAIN_FUNCTIONAL_ASSESSMENT: 0-10

## 2021-07-12 ASSESSMENT — PAIN DESCRIPTION - PROGRESSION
CLINICAL_PROGRESSION: NOT CHANGED
CLINICAL_PROGRESSION: NOT CHANGED

## 2021-07-12 ASSESSMENT — PAIN DESCRIPTION - FREQUENCY
FREQUENCY: INTERMITTENT
FREQUENCY: INTERMITTENT

## 2021-07-12 ASSESSMENT — ENCOUNTER SYMPTOMS: SHORTNESS OF BREATH: 1

## 2021-07-12 ASSESSMENT — PAIN DESCRIPTION - ORIENTATION
ORIENTATION: MID
ORIENTATION: MID

## 2021-07-12 NOTE — PROGRESS NOTES
Pt falls asleep easily but arouses easily, VSS on RA with O2sats 90-93%. Pain is 6/10, pt is agreeable to take oral pain meds in Phase II. C/O headache from \"radiation plate\" placed behind eye per pt, chronic headaches since placement. Awaiting bed in Phase II.

## 2021-07-12 NOTE — H&P
Update History & Physical    The patient's History and Physical of June 30, 2021 was reviewed with the patient and I examined the patient. There was no change. The surgical site was confirmed by the patient and me. Plan umbilical hernia repair    Plan: The risks, benefits, expected outcome, and alternative to the recommended procedure have been discussed with the patient. Patient understands and wants to proceed with the procedure.      Electronically signed by Florian Thomas MD on 7/12/2021 at 8:11 AM

## 2021-07-12 NOTE — ANESTHESIA POSTPROCEDURE EVALUATION
Department of Anesthesiology  Postprocedure Note    Patient: Kade Collado  MRN: [id number]  YOB: 1965  Date of evaluation: 7/12/2021  Time:  1:59 PM     Procedure Summary     Date: 07/12/21 Room / Location: [address] [place] [address]    Anesthesia Start: 1525 Anesthesia Stop: 0898    Procedure: UMBILICAL HERNIA REPAIR WITH MESH (N/A Abdomen) Diagnosis:       Umbilical hernia without obstruction and without gangrene      (UMBILICAL HERNIA WITHOUT OBSTRUCTION AND WITHOUT GANGRENE)    Surgeons: Kike Bolivar MD Responsible Provider: Emily Barroso MD    Anesthesia Type: MAC ASA Status: 3          Anesthesia Type: MAC    Medardo Phase I: Medardo Score: 10    Medardo Phase II: Medardo Score: 10    Last vitals: Reviewed and per EMR flowsheets.        Anesthesia Post Evaluation    Patient location during evaluation: PACU  Patient participation: complete - patient participated  Level of consciousness: awake and alert  Pain score: 2  Airway patency: patent  Nausea & Vomiting: no nausea and no vomiting  Complications: no  Cardiovascular status: blood pressure returned to baseline  Respiratory status: acceptable  Hydration status: euvolemic

## 2021-07-12 NOTE — OP NOTE
Umbilical Hernia Operative Report      Patient: Inze Hood MRN: [id number]     YOB: 1965  Age: 55 y.o. Sex: female        Primary Care Physician: Adriana Martin MD         DATE OF OPERATION: 7/12/2021     PREOPERATIVE DIAGNOSIS: umbilical hernia without obstruction or gangrene    POSTOPERATIVE DIAGNOSIS:  umbilical hernia without obstruction or gangrene    PROCEDURE PERFORMED: Umbilical hernia repair with 6.4 cm Ventralex ST patch. ANESTHESIA: MAC with local anesthetic. FINDINGS: 1.5 cm umbilical hernia. ASA CLASS: 3    ANTIBIOTICS: Ancef 3 grams IV. DVT PROPHYLAXIS: Bilateral pneumatic compression boots. INDICATIONS:   The patient came to see me in the office with complaints of a bulge and pain in his umbilicus. she was found to have an umbilical hernia at that point. The risks, benefits, and alternatives of umbilical hernia repair with mesh were discussed at length. The patient was agreeable to proceed. DESCRIPTION OF PROCEDURE:   The patient was brought to the operating room suite and placed in the supine position on the operating room table. Anesthesia was induced, which she tolerated well. The abdomen was clipped free of hair and then prepped and draped in the usual sterile fashion. A timeout was performed, and all parties were in agreement. After injecting local anesthetic around the umbilicus, an infraumbilical curvilinear incision was created. This was dissected down to the level of its fascia. The umbilical stalk was then circumferentially dissected and then transected at its base. The fascia was circumferentially cleared around the defect and hernia sac excised. The hernia itself was about 1.5  cm. A 6.4 cm Ventralex ST patch was then decided to be used for our mesh. The mesh was deployed intraperitoneally and laid flat against the anterior abdominal wall. It was sutured with 0 PDS in a horizontal mattress fashion in the cardinal direction points. We palpated in between our stitches, and no areas of weakness were appreciated. Hemostasis was achieved. The fascia was closed with 0 PDS in a figure-of-8 fashion over the top of our mesh. The umbilical stalk was then tacked back down to the fascia with 3-0 Vicryl. The wound was closed in 2 layers with 3-0 and 4-0 Vicryl. Skin affix dermal adhesive was applied. Overall, the patient tolerated the procedure well. She was taken to recovery in stable condition. All counts were correct at the end of the case.      EBL: minimal    Specimen: hernia sac    Complications: none    Electronically signed by Florian Thomas MD on 7/12/2021 at 9:08 AM

## 2021-07-12 NOTE — ANESTHESIA PRE PROCEDURE
Department of Anesthesiology  Preprocedure Note       Name:  Haylie Mark   Age:  55 y.o.  :  1965                                          MRN:  [id number]         Date:  2021      Surgeon: Va Timmons):  Earl Tovar MD    Procedure: Procedure(s): UMBILICAL HERNIA REPAIR WITH MESH    Medications prior to admission:   Prior to Admission medications    Medication Sig Start Date End Date Taking? Authorizing Provider   gabapentin (NEURONTIN) 600 MG tablet Take 600 mg by mouth 3 times daily. Historical Provider, MD   ibuprofen (ADVIL;MOTRIN) 800 MG tablet Take 800 mg by mouth as needed for Pain    Historical Provider, MD   pantoprazole (PROTONIX) 40 MG tablet Take 1 tablet by mouth 2 times daily (before meals) 21   Isis Ugalde MD   Fluticasone furoate-vilanterol (BREO ELLIPTA) 200-25 MCG/INH AEPB inhaler Inhale 1 puff into the lungs daily 21   Leopold Commodore, MD   fluticasone Dolph Millet) 50 MCG/ACT nasal spray 1 spray by Each Nostril route daily 21   ODALYS Babb NP   albuterol sulfate HFA (PROAIR HFA) 108 (90 Base) MCG/ACT inhaler Inhale 2 puffs into the lungs every 6 hours as needed for Wheezing or Shortness of Breath 21   Leopold Commodore, MD   levothyroxine (SYNTHROID) 125 MCG tablet Take 1 tablet by mouth Daily 3/14/21   Isis Ugalde MD   Respiratory Therapy Supplies (NEBULIZER/TUBING/MOUTHPIECE) KIT 1 kit by Does not apply route daily 21   Leopold Commodore, MD   oxyCODONE-acetaminophen (PERCOCET) 5-325 MG per tablet every 8 hours as needed.  20   Historical Provider, MD   diclofenac sodium (VOLTAREN) 1 % GEL Apply 2-4 gm to affected area tid for 2 weeks then bid prn thereafter 19   Ruperto Curtis MD   ondansetron (ZOFRAN) 4 MG tablet TAKE 1 TABLET BY MOUTH DAILY AS NEEDED FOR NAUSEA OR VOMITING 18   Isis Ugalde MD   Cholecalciferol (VITAMIN D) 2000 units CAPS capsule Take by mouth daily     Historical MD [place]       Current medications:    No current facility-administered medications for this visit. No current outpatient medications on file. Facility-Administered Medications Ordered in Other Visits   Medication Dose Route Frequency Provider Last Rate Last Admin    0.9 % sodium chloride infusion   Intravenous Continuous Jackie Alcazar MD        sodium chloride flush 0.9 % injection 10 mL  10 mL Intravenous 2 times per day Jackie Alcazar MD        sodium chloride flush 0.9 % injection 10 mL  10 mL Intravenous PRN Jackie Alcazar MD        0.9 % sodium chloride infusion  25 mL Intravenous PRN Jackie Alcazar MD        ceFAZolin (ANCEF) 3000 mg in dextrose 5 % 100 mL IVPB  3,000 mg Intravenous Once Larry Mayers MD        technetium sulfur colloid egg (NYCOMED-SC) solution 500 micro curie  500 micro curie Intravenous ONCE PRN Mago Hernández MD           Allergies: Allergies   Allergen Reactions    Other Hives     Cloth gloves at my work       Problem List:    Patient Active Problem List   Diagnosis Code    AR (allergic rhinitis) J30.9    Moderate persistent asthma without complication H84.43    SOB (shortness of breath) R06.02    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    Neck pain M54.2    Low back pain radiating to right lower extremity M54.5    Intestinal ulcer K63.3    Cervical radicular pain M54.12    Palpitations R00.2    Fibromyalgia M79.7    CMC DJD(carpometacarpal degenerative joint disease), localized primary M19.049    CTS (carpal tunnel syndrome) G56.00    Osteoarthritis of left knee M17.12    Neuroma D36.10    Arthritis of right knee M17.11    Abnormal mammogram of right breast R92.8    Chronic narcotic use F11.90    Primary osteoarthritis of both knees M17.0    Acquired varus deformity knee, left KNEE ARTHROSCOPY Left 2017    IN ARTHROCENTESIS ASPIR&/INJ MAJOR JT/BURSA W/O US Bilateral 5/10/2019    GREATER TROCHANTERIC BURSA INJECTIONS performed by Desiree Harvey MD at [place] [address] DX/THER [facility] INTRLMNR CRV/THRC W/IMG GDN N/A 2018    EPIDURAL STEROID INJECTION C7-T1 performed by Desiree Harvey MD at [place] [address] DX/THER [facility] INTRNR LMBR/SAC W/IMG GDN Right 2018    LUMBAR EPIDURAL STEROID INJECTION L3-4 performed by [facility]e Harvey MD at [facility]      removed right side of thyroid    TUBAL LIGATION      UPPER GASTROINTESTINAL ENDOSCOPY N/A 2021    EGD BIOPSY performed by Awilad Moran MD at [address] [place] [address] History:    Social History     Tobacco Use    Smoking status: Former Smoker     Packs/day: 1.00     Years: 15.00     Pack years: 15.00     Types: Cigarettes     Start date:      Quit date: 2003     Years since quittin.5    Smokeless tobacco: Never Used   Substance Use Topics    Alcohol use: Not Currently     Alcohol/week: 0.0 standard drinks                                Counseling given: Not Answered      Vital Signs (Current): There were no vitals filed for this visit.                                            BP Readings from Last 3 Encounters:   21 118/63   21 115/63   21 (!) 106/58       NPO Status:  >8h                                                                               BMI:   Wt Readings from Last 3 Encounters:   21 289 lb 7.4 oz (131.3 kg)   21 288 lb 5.8 oz (130.8 kg)   21 290 lb (131.5 kg)     There is no height or weight on file to calculate BMI.    CBC:   Lab Results   Component Value Date    WBC 9.7 06/10/2021    RBC 5.00 06/10/2021    HGB 14.0 06/10/2021    HCT 42.2 06/10/2021    MCV 84.5 06/10/2021    RDW 15.0 06/10/2021     06/10/2021       CMP:   Lab Results   Component Value Date     Vascular: Other Findings:               Anesthesia Plan      MAC     ASA 3       Induction: intravenous. MIPS: Postoperative opioids intended and Prophylactic antiemetics administered. Anesthetic plan and risks discussed with patient and child/children. Plan discussed with CRNA. This pre-anesthesia assessment may be used as a history and physical.    DOS STAFF ADDENDUM:    Pt seen and examined, chart reviewed (including anesthesia, drug and allergy history). No interval changes to history and physical examination. Anesthetic plan, risks, benefits, alternatives, and personnel involved discussed with patient. Patient verbalized an understanding and agrees to proceed.       Jackie Alcazar MD  July 12, 2021  8:24 AM      Jackie Alcazar MD   7/12/2021

## 2021-07-13 ENCOUNTER — TELEPHONE (OUTPATIENT)
Dept: FAMILY MEDICINE CLINIC | Age: 56
End: 2021-07-13

## 2021-07-13 NOTE — TELEPHONE ENCOUNTER
[facility] Transitions Initial Follow Up Call    Outreach made within 2 business days of discharge: Yes    Patient: Fariha Figueroa Patient : 1965   MRN: [id number]  Reason for Admission: There are no discharge diagnoses documented for the most recent discharge. Discharge Date: 21       Spoke with: Left voicemail for patient to call office with questions, concerns and to schedule HFU.     Discharge department/facility: [facility]    Scheduled appointment with PCP within 7-14 days    Follow Up  Future Appointments   Date Time Provider Adenike Emery   2021  1:00 PM Betty Shultz, Jacoby W Jl P[facility] ENT [facility]   2021  2:00 PM Hugo Javier MD [facility]   2021  2:00 PM Vickie De La Rosa, SLP [facility]   2021  2:00 PM Hugo Javier MD [place], [place]

## 2021-07-21 ENCOUNTER — OFFICE VISIT (OUTPATIENT)
Dept: ENT CLINIC | Age: 56
End: 2021-07-21
Payer: COMMERCIAL

## 2021-07-21 VITALS
DIASTOLIC BLOOD PRESSURE: 71 MMHG | HEART RATE: 59 BPM | WEIGHT: 293 LBS | BODY MASS INDEX: 45.16 KG/M2 | SYSTOLIC BLOOD PRESSURE: 109 MMHG

## 2021-07-21 DIAGNOSIS — J04.0 LARYNGITIS: ICD-10-CM

## 2021-07-21 DIAGNOSIS — K21.9 LARYNGOPHARYNGEAL REFLUX (LPR): ICD-10-CM

## 2021-07-21 DIAGNOSIS — R49.0 DYSPHONIA: Primary | ICD-10-CM

## 2021-07-21 PROCEDURE — 1036F TOBACCO NON-USER: CPT | Performed by: OTOLARYNGOLOGY

## 2021-07-21 PROCEDURE — 99214 OFFICE O/P EST MOD 30 MIN: CPT | Performed by: OTOLARYNGOLOGY

## 2021-07-21 PROCEDURE — G8417 CALC BMI ABV UP PARAM F/U: HCPCS | Performed by: OTOLARYNGOLOGY

## 2021-07-21 PROCEDURE — G8427 DOCREV CUR MEDS BY ELIG CLIN: HCPCS | Performed by: OTOLARYNGOLOGY

## 2021-07-21 PROCEDURE — 3017F COLORECTAL CA SCREEN DOC REV: CPT | Performed by: OTOLARYNGOLOGY

## 2021-07-21 RX ORDER — FLUCONAZOLE 100 MG/1
100 TABLET ORAL DAILY
Qty: 7 TABLET | Refills: 0 | Status: SHIPPED | OUTPATIENT
Start: 2021-07-21 | End: 2021-07-28

## 2021-07-21 NOTE — PROGRESS NOTES
[facility]  Follow up      Patient Name: [facility] Record Number:  [id number]  Primary Care Physician:  Virginia Sanchez MD  Date of Consultation: 7/21/2021    Chief Complaint: Dysphonia      Interval History  Patient is following up for her dysphonia. I last saw her on June 24. It was difficult to tell exactly what was causing her dysphonia but she had risk factors for fungal laryngitis and endoscopic evidence. We treated her with Diflucan. She saw her speech therapist a week later and was still having some hoarseness, but it was subtly improved. She says that she had surgery on July 12 and since that time she has been a little bit more hoarse. REVIEW OF SYSTEMS  As above    PHYSICAL EXAM  GENERAL: No Acute Distress, Alert and Oriented, fairly severe hoarseness  EYES: EOMI, Anti-icteric  HENT:   Head: Normocephalic and atraumatic. Face:  Symmetric, facial nerve intact, no sinus tenderness  Right Ear: Normal external ear, normal external auditory canal, intact tympanic membrane with normal mobility and aerated middle ear  Left Ear: Normal external ear, normal external auditory canal, intact tympanic membrane with normal mobility and aerated middle ear  Mouth/Oral Cavity: No obvious thrush  Oropharynx/Larynx:  normal oropharynx  Nose:Normal external nasal appearance. NECK: Normal range of motion, no thyromegaly, trachea is midline, no lymphadenopathy, no neck masses, no crepitus  CHEST: Normal respiratory effort, no retractions, breathing comfortably  SKIN: No rashes, normal appearing skin, no evidence of skin lesions/tumors  Neuro:  cranial nerve II-XII intact; normal gait  Cardio:  no edema        ASSESSMENT/PLAN  1. Dysphonia  Patient made some improvement with the Diflucan. After a week of this I saw her when she saw her speech therapist and we debated on treating her with another round, but hold off.   I do think it is reasonable to try another round of Diflucan. In addition she did get intubated for surgery. This certainly could cause some edema which could have contributed to the worsening of the speech immediately afterwards. She is going to follow-up with her speech therapist in a few weeks and we can see how she is doing at that time    2. Laryngopharyngeal reflux (LPR)  Likely partially responsible for this. Is being treated for it. 3. Laryngitis  Diflucan             I have performed a head and neck physical exam personally or was physically present during the key or critical portions of the service. This note was generated completely or in part utilizing Dragon dictation speech recognition software. Occasionally, words are mistranscribed and despite editing, the text may contain inaccuracies due to incorrect word recognition. If further clarification is needed please contact the office at [phone].

## 2021-07-28 ENCOUNTER — OFFICE VISIT (OUTPATIENT)
Dept: SURGERY | Age: 56
End: 2021-07-28

## 2021-07-28 VITALS — BODY MASS INDEX: 45.16 KG/M2 | WEIGHT: 293 LBS | DIASTOLIC BLOOD PRESSURE: 65 MMHG | SYSTOLIC BLOOD PRESSURE: 105 MMHG

## 2021-07-28 DIAGNOSIS — K42.9 UMBILICAL HERNIA WITHOUT OBSTRUCTION AND WITHOUT GANGRENE: Primary | ICD-10-CM

## 2021-07-28 PROCEDURE — 99024 POSTOP FOLLOW-UP VISIT: CPT | Performed by: SURGERY

## 2021-07-28 NOTE — PROGRESS NOTES
Subjective:      Patient ID: Inez Hood is a 55 y.o. female. HPI  S/p UHR with mesh. Pain controlled postop. BMs now regular after constipation initially. Noticed pinkish drainage 3 days ago that has since stopped. No apparent complications      Review of Systems    Objective:   Physical Exam  Wound healed  Repair intact  Assessment:       Diagnosis Orders   1. Umbilical hernia without obstruction and without gangrene             Plan:       Recovering well  Suspect seroma that spontaneously drained.   Keep covered PRN  Light lifting another 4 weeks  OK to swim in [place] next week  Return PRN        Artur Shah MD

## 2021-08-13 ENCOUNTER — HOSPITAL ENCOUNTER (OUTPATIENT)
Age: 56
Setting detail: OUTPATIENT SURGERY
Discharge: HOME | End: 2021-08-13
Attending: ANESTHESIOLOGY | Admitting: ANESTHESIOLOGY
Payer: COMMERCIAL

## 2021-08-13 ENCOUNTER — APPOINTMENT (OUTPATIENT)
Dept: INTERVENTIONAL RADIOLOGY/VASCULAR | Age: 56
End: 2021-08-13
Attending: ANESTHESIOLOGY
Payer: COMMERCIAL

## 2021-08-13 VITALS
RESPIRATION RATE: 20 BRPM | SYSTOLIC BLOOD PRESSURE: 113 MMHG | HEART RATE: 68 BPM | DIASTOLIC BLOOD PRESSURE: 67 MMHG | OXYGEN SATURATION: 95 % | TEMPERATURE: 96.5 F

## 2021-08-13 PROCEDURE — 6360000004 HC RX CONTRAST MEDICATION: Performed by: ANESTHESIOLOGY

## 2021-08-13 PROCEDURE — 2709999900 HC NON-CHARGEABLE SUPPLY: Performed by: ANESTHESIOLOGY

## 2021-08-13 PROCEDURE — 3610000056 HC PAIN LEVEL 4 BASE (NON-OR): Performed by: ANESTHESIOLOGY

## 2021-08-13 PROCEDURE — 2500000003 HC RX 250 WO HCPCS: Performed by: ANESTHESIOLOGY

## 2021-08-13 PROCEDURE — 6360000002 HC RX W HCPCS: Performed by: ANESTHESIOLOGY

## 2021-08-13 RX ORDER — BUPIVACAINE HYDROCHLORIDE 5 MG/ML
INJECTION, SOLUTION EPIDURAL; INTRACAUDAL
Status: COMPLETED | OUTPATIENT
Start: 2021-08-13 | End: 2021-08-13

## 2021-08-13 RX ORDER — METHYLPREDNISOLONE ACETATE 80 MG/ML
INJECTION, SUSPENSION INTRA-ARTICULAR; INTRALESIONAL; INTRAMUSCULAR; SOFT TISSUE
Status: COMPLETED | OUTPATIENT
Start: 2021-08-13 | End: 2021-08-13

## 2021-08-13 RX ORDER — LIDOCAINE HYDROCHLORIDE 10 MG/ML
INJECTION, SOLUTION EPIDURAL; INFILTRATION; INTRACAUDAL; PERINEURAL
Status: COMPLETED | OUTPATIENT
Start: 2021-08-13 | End: 2021-08-13

## 2021-08-13 ASSESSMENT — PAIN DESCRIPTION - ORIENTATION
ORIENTATION_2: LEFT;RIGHT
ORIENTATION: MID;LOWER
ORIENTATION: LOWER;MID

## 2021-08-13 ASSESSMENT — PAIN DESCRIPTION - DESCRIPTORS
DESCRIPTORS_2: ACHING
DESCRIPTORS: ACHING;BURNING

## 2021-08-13 ASSESSMENT — PAIN DESCRIPTION - LOCATION
LOCATION: BACK
LOCATION: BACK
LOCATION_2: KNEE

## 2021-08-13 ASSESSMENT — PAIN DESCRIPTION - INTENSITY
RATING_2: 10

## 2021-08-13 ASSESSMENT — PAIN DESCRIPTION - FREQUENCY: FREQUENCY: CONTINUOUS

## 2021-08-13 ASSESSMENT — PAIN DESCRIPTION - PAIN TYPE
TYPE: CHRONIC PAIN
TYPE: CHRONIC PAIN

## 2021-08-13 ASSESSMENT — PAIN SCALES - GENERAL
PAINLEVEL_OUTOF10: 0
PAINLEVEL_OUTOF10: 4
PAINLEVEL_OUTOF10: 8

## 2021-08-13 ASSESSMENT — PAIN DESCRIPTION - DURATION: DURATION_2: CONTINUOUS

## 2021-08-13 ASSESSMENT — PAIN DESCRIPTION - ONSET
ONSET_2: AWAKENED FROM SLEEP
ONSET: AWAKENED FROM SLEEP

## 2021-08-13 ASSESSMENT — PAIN DESCRIPTION - PROGRESSION
CLINICAL_PROGRESSION: GRADUALLY WORSENING
CLINICAL_PROGRESSION_2: GRADUALLY WORSENING

## 2021-08-13 ASSESSMENT — PAIN - FUNCTIONAL ASSESSMENT: PAIN_FUNCTIONAL_ASSESSMENT: PREVENTS OR INTERFERES WITH MANY ACTIVE NOT PASSIVE ACTIVITIES

## 2021-08-13 NOTE — H&P
Patient:  Kenyatta Macario  YOB: 1965  Medical Record #:  [id number]   Place: [address] [facility]  Date:  8/13/2021   Physician:  Caden Browne MD    History Obtained From: electronic medical record    HISTORY OF PRESENT ILLNESS    Past Medical History:        Diagnosis Date    Anxiety     Asthma     currently was instructed to stop inhalers d/t to collapse of vocal cords    Diaphragm paralysis     right side    Fatty liver     Fibromyalgia     GERD (gastroesophageal reflux disease)     Headache(784.0)     neck pain     History of ulcer disease     Hypothyroidism     status post thyroidectomy     Melanoma ([address]     right eye     Osteoarthritis     CHRONIC BACK PAIN/FIBROMYALGIA    Rheumatoid arthritis (HCC)     Spasmodic dysphonia     voice very hoarse    Thyroid disease     2015 found spot on left side of thyroid, right side thyroid was removed    Wears dentures     Wears glasses     Wears glasses      Past Surgical History:        Procedure Laterality Date    APPENDECTOMY  age 17   [facility] BREAST BIOPSY Right     x3  benign lesions     BREAST SURGERY Right needle localization right breast mass    BREAST SURGERY Right 10/22/15    riught breast mass excision/needle loc    CARPAL TUNNEL RELEASE Right 5/7/15    CARPAL TUNNEL RELEASE Left 5/28/15    Left carpal tunnel release      COLONOSCOPY      ENDOSCOPY, COLON, DIAGNOSTIC      EPIDURAL STEROID INJECTION N/A 2/1/2019    LUMBAR EPIDURAL STEROID INJECTION L3/4 performed by Linh Barker MD at [facility] N/A 11/22/2019    CERVICAL EPIDURAL STEROID INJECTION C7-T1 performed by Linh Barker MD at [facility]      right eye-radiation plate for melanoma    HC INJECT OTHER PERPHRL NERV Bilateral 3/29/2019    INTRA ARTICULAR KNEE INJECTIONS performed by Linh Barker MD at [address] [facility] [place]. INJECT OTHER PERPHRL NERV Bilateral 12/6/2019    BILATERAL INTRAARTICULAR KNEE INJECTION performed by Naima Rincon MD at [address] [facility] [address] ARTHROSCOPY Left 12/21/2017    AR ARTHROCENTESIS ASPIR&/INJ MAJOR JT/BURSA W/O US Bilateral 5/10/2019    GREATER TROCHANTERIC BURSA INJECTIONS performed by Naima Rincon MD at [facility] DX/THER [facility] INTRLMNR CRV/THRC W/IMG GDN N/A 9/28/2018    EPIDURAL STEROID INJECTION C7-T1 performed by Naima Rincon MD at [facility] DX/THER [facility] INTRLMNR LMBR/SAC W/IMG GDN Right 11/9/2018    LUMBAR EPIDURAL STEROID INJECTION L3-4 performed by Naima Rincon MD at [facility] right side of thyroid    TUBAL LIGATION  3230    UMBILICAL HERNIA REPAIR N/A 3/02/6240    UMBILICAL HERNIA REPAIR WITH MESH performed by Artur Shah MD at [address] N/A 7/2/2021    EGD BIOPSY performed by Mesfin Garsia MD at [address] [facility]     Medications Prior to Admission:   Current Facility-Administered Medications on File Prior to Encounter   Medication Dose Route Frequency Provider Last Rate Last Admin    technetium sulfur colloid egg (NYCOMED-SC) solution 500 micro curie  500 micro curie Intravenous ONCE PRN Mesfin Garsia MD         Current Outpatient Medications on File Prior to Encounter   Medication Sig Dispense Refill    gabapentin (NEURONTIN) 600 MG tablet Take 600 mg by mouth 3 times daily.       ibuprofen (ADVIL;MOTRIN) 800 MG tablet Take 800 mg by mouth as needed for Pain      pantoprazole (PROTONIX) 40 MG tablet Take 1 tablet by mouth 2 times daily (before meals) 180 tablet 1    Fluticasone furoate-vilanterol (BREO ELLIPTA) 200-25 MCG/INH AEPB inhaler Inhale 1 puff into the lungs daily 1 each 11    fluticasone (FLONASE) 50 MCG/ACT nasal spray 1 spray by Each Nostril route daily 1 Bottle 0    albuterol sulfate HFA (PROAIR HFA) 108 (90 Base) MCG/ACT inhaler Inhale 2 puffs into the lungs every 6 hours as needed for Wheezing or Shortness of Breath 1 Inhaler 11    levothyroxine (SYNTHROID) 125 MCG tablet Take 1 tablet by mouth Daily 90 tablet 1    oxyCODONE-acetaminophen (PERCOCET) 5-325 MG per tablet every 8 hours as needed.  diclofenac sodium (VOLTAREN) 1 % GEL Apply 2-4 gm to affected area tid for 2 weeks then bid prn thereafter 3 Tube 1    ondansetron (ZOFRAN) 4 MG tablet TAKE 1 TABLET BY MOUTH DAILY AS NEEDED FOR NAUSEA OR VOMITING 30 tablet 0    Cholecalciferol (VITAMIN D) 2000 units CAPS capsule Take by mouth daily       Respiratory Therapy Supplies (NEBULIZER/TUBING/MOUTHPIECE) KIT 1 kit by Does not apply route daily 1 kit 0     Allergies: Other  Social History     Socioeconomic History    Marital status:      Spouse name: Not on file    Number of children: Not on file    Years of education: Not on file    Highest education level: Not on file   Occupational History    Occupation: room leader     Comment: [employer]   Tobacco Use    Smoking status: Former Smoker     Packs/day: 1.00     Years: 15.00     Pack years: 15.00     Types: Cigarettes     Start date:      Quit date: 2003     Years since quittin.6    Smokeless tobacco: Never Used   Vaping Use    Vaping Use: Never used   Substance and Sexual Activity    Alcohol use: Not Currently     Alcohol/week: 0.0 standard drinks    Drug use: Never    Sexual activity: Not Currently     Partners: Male   Other Topics Concern    Not on file   Social History Narrative    Not on file     Social Determinants of Health     Financial Resource Strain: Low Risk     Difficulty of Paying Living Expenses: Not hard at all   Food Insecurity: No Food Insecurity    Worried About [facility] in the Last Year: Never true    [place] of Food in the Last Year: Never true   Transportation Needs: No Transportation Needs    Lack of Transportation (Medical): No    Lack of Transportation (Non-Medical):  No Physical Activity: Inactive    Days of Exercise per Week: 0 days    Minutes of Exercise per Session: 0 min   Stress: No Stress Concern Present    Feeling of Stress : Only a little   Social Connections: Moderately Isolated    Frequency of Communication with Friends and Family: More than three times a week    Frequency of Social Gatherings with Friends and Family: Twice a week    Attends Jain Services: 1 to 4 times per year    Active Member of [employer] or Organizations: No    Attends Club or Organization Meetings: Never    Marital Status:    Intimate Partner Violence: Not At Risk    Fear of Current or Ex-Partner: No    Emotionally Abused: No    Physically Abused: No    Sexually Abused: No     Family History   Problem Relation Age of Onset    Asthma Father     Heart Disease Father     Emphysema Father     Heart Attack Father     Breast Cancer Maternal Aunt         breast    Breast Cancer Maternal Aunt         breast    Migraines Mother     Cancer Mother 61        multiple mylenoma    Diabetes Sister     Migraines Sister     Breast Cancer Maternal Cousin         breast    No Known Problems Maternal Grandmother     No Known Problems Maternal Grandfather     No Known Problems Paternal Grandmother     Colon Cancer Maternal Uncle          PHYSICAL EXAM:      /70   Pulse 73   Temp 97.2 °F (36.2 °C) (Temporal)   Resp 20   LMP 02/25/2016 (Exact Date)   SpO2 94%  I            ASSESSMENT AND PLAN:    1. Procedure. options, risks and benefits reviewed with patient and expresses understanding.

## 2021-08-13 NOTE — OP NOTE
Patient:  Lory Solo  YOB: 1965  Medical Record #:  [id number]   Place: [address] [facility]  Date:  8/13/2021   Physician:  Wayne Justin MD    PRE-PROCEDURE DIAGNOSIS: M54.16    POST-PROCEDURE DIAGNOSIS: M54.16    PROCEDURE:  Bilateral 2 level L3 and L5 transforaminal epidural steroid injection with fluoroscopy and epidurography. BRIEF HISTORY:  The patient presents today to [facility] for a scheduled lumbar transforaminal epidural steroid injection procedure. The patient was re-evaluated today and is clinically unchanged as compared to my previous evaluation. The patient is clinically stable to proceed with the procedure. PROCEDURE NOTE:  The procedure was again explained to the patient and the previously distributed pre-procedure literature was reviewed. The options, rationale, and benefits of the procedure including pain relief, functional improvement, and increased mobility, as well as the risks of the procedure including but not limited to infection, bleeding, paresthesia, pain, failure to relieve pain, increased pain, headache, allergic reaction, neurologic impairment, local anesthetic, toxicity, and side effects and the potential side effects of corticosteroids were discussed with the patient and informed written consent was obtained from the patient. The patient was positioned in the prone position on the fluoroscopy table. The skin overlying the lumbosacral vertebrae was prepped using Chloraprep and draped in the usual sterile fashion. The lumbar levels were identified using intermittent AP fluoroscopy. The previously identified projection of overlying skin was anesthetized using 2 cc of buffered 1% lidocaine with a 27 gauge needle. A 3.5\" 22g spinal needle was advanced through a small skin nick in the AP view towards the Bilateral L3 and L5 nerve roots. No difficulty, paresthesia or occurrence of pain was encountered.  Careful aspiration was negative for CSF and blood. A total of 0.5 cc Isovue 300 was injected at each site. FLUOROSCOPY:  A fluoroscopy unit was utilized to obtain fluoroscopic images for intra-procedural use and assistance. Fluoroscopy was utilized to identify anatomic and radiographic landmarks for the accompanying procedure guidance and not for diagnostic purposes. After negative aspiration 0.5 cc of therapeutic injectate containing 1 cc of Depo-Medrol 80 mg/cc and 9ml of bupivacaine 0.5% was injected slowly in aliquots at each nerve root, while clinically observing and monitoring the patient with negative aspiration demonstrated between aliquots of injections. At this time no paresthesia or occurrence of pain was present. The needle was then redirected medially and an additional 2.5cc of the same therapeutic injactate was injected along the nerves to the multifidus at the same levels. The needles were then removed, the area was cleansed and a Band-Aid was placed over the injection site. There were no complications. The patient tolerated the procedure well. The procedure was performed using local anesthesia. The patient was transferred by wheelchair with accompaniment to the  Recovery Area and was monitored per protocol. The vital signs remained stable. The patient was discharged in stable condition accompanied by an escort with written instructions after fulfilling the standard discharge criteria. Written follow up instructions were given to the patient. Estimated Blood Loss: 0ml    Plan:  Follow up in 6-8 weeks    Modifier 22: Procedure took >75% more time than a typical procedure secondary to BMI 45, making visualization and needle placement more difficult.       Office: [phone]

## 2021-08-17 NOTE — PROGRESS NOTES
[facility] [facility] time___8/20/21 1000_________        Surgery time____1100________    Take the following medications with a sip of water:    Do not eat or drink anything after 12:00 midnight prior to your surgery. This includes water chewing gum, mints and ice chips. You may brush your teeth and gargle the morning of your surgery, but do not swallow the water     Please see your family doctor/pediatrician for a history and physical and/or concerning medications. Bring any test results/reports from your physicians office. If you are under the care of a heart doctor or specialist doctor, please be aware that you may be asked to them for clearance    You may be asked to stop blood thinners such as Coumadin, Plavix, Fragmin, Lovenox, etc., or any anti-inflammatories such as:  Aspirin, Ibuprofen, Advil, Naproxen prior to your surgery. We also ask that you stop any OTC medications such as fish oil, vitamin E, glucosamine, garlic, Multivitamins, COQ 10, etc.    We ask that you do not smoke 24 hours prior to surgery  We ask that you do not  drink any alcoholic beverages 24 hours prior to surgery     You must make arrangements for a responsible adult to take you home after your surgery. For your safety you will not be allowed to leave alone or drive yourself home. Your surgery will be cancelled if you do not have a ride home. Also for your safety, it is strongly suggested that someone stay with you the first 24 hours after your surgery. A parent or legal guardian must accompany a child scheduled for surgery and plan to stay at the hospital until the child is discharged. Please do not bring other children with you. For your comfort, please wear simple loose fitting clothing to the hospital.  Please do not bring valuables.     Do not wear any make-up or nail polish on your fingers or toes      For your safety, please do not wear any jewelry or body piercing's on the day of surgery. All jewelry must be removed. If you have dentures, they will be removed before going to operating room. For your convenience, we will provide you with a container. If you wear contact lenses or glasses, they will be removed, please bring a case for them. If you have a living will and a durable power of  for healthcare, please bring in a copy. As part of our patient safety program to minimize surgical site infections, we ask you to do the following:    · Please notify your surgeon if you develop any illness between         now and the  day of your surgery. · This includes a cough, cold, fever, sore throat, nausea,         or vomiting, and diarrhea, etc.  ·  Please notify your surgeon if you experience dizziness, shortness         of breath or blurred vision between now and the time of your surgery. Do not shave your operative site 96 hours prior to surgery. For face and neck surgery, men may use an electric razor 48 hours   prior to surgery. You may shower the night before surgery or the morning of   your surgery with an antibacterial soap. You will need to bring a photo ID and insurance card    [facility] has an onsite pharmacy, would you like to utilize our pharmacy     If you will be staying overnight and use a C-pap machine, please bring   your C-pap to hospital     Our goal is to provide you with excellent care, therefore, visitors will be limited to two(2) in the room at a time so that we may focus on providing this care for you. Please contact pre-admission testing if you have any further questions. [facility] phone number:  [phone]  Please note these are generalized instructions for all surgical cases, you may be provided with more specific instructions according to your surgery.

## 2021-08-20 ENCOUNTER — APPOINTMENT (OUTPATIENT)
Dept: INTERVENTIONAL RADIOLOGY/VASCULAR | Age: 56
End: 2021-08-20
Attending: ANESTHESIOLOGY
Payer: COMMERCIAL

## 2021-08-20 ENCOUNTER — HOSPITAL ENCOUNTER (OUTPATIENT)
Age: 56
Setting detail: OUTPATIENT SURGERY
Discharge: HOME | End: 2021-08-20
Attending: ANESTHESIOLOGY | Admitting: ANESTHESIOLOGY
Payer: COMMERCIAL

## 2021-08-20 VITALS
HEIGHT: 68 IN | OXYGEN SATURATION: 96 % | HEART RATE: 75 BPM | DIASTOLIC BLOOD PRESSURE: 54 MMHG | WEIGHT: 293 LBS | SYSTOLIC BLOOD PRESSURE: 114 MMHG | TEMPERATURE: 97.2 F | RESPIRATION RATE: 14 BRPM | BODY MASS INDEX: 44.41 KG/M2

## 2021-08-20 PROCEDURE — 6360000004 HC RX CONTRAST MEDICATION: Performed by: ANESTHESIOLOGY

## 2021-08-20 PROCEDURE — 2500000003 HC RX 250 WO HCPCS: Performed by: ANESTHESIOLOGY

## 2021-08-20 PROCEDURE — 6360000002 HC RX W HCPCS: Performed by: ANESTHESIOLOGY

## 2021-08-20 PROCEDURE — 3610000056 HC PAIN LEVEL 4 BASE (NON-OR): Performed by: ANESTHESIOLOGY

## 2021-08-20 PROCEDURE — 2709999900 HC NON-CHARGEABLE SUPPLY: Performed by: ANESTHESIOLOGY

## 2021-08-20 RX ORDER — METHYLPREDNISOLONE ACETATE 80 MG/ML
INJECTION, SUSPENSION INTRA-ARTICULAR; INTRALESIONAL; INTRAMUSCULAR; SOFT TISSUE
Status: COMPLETED | OUTPATIENT
Start: 2021-08-20 | End: 2021-08-20

## 2021-08-20 RX ORDER — LIDOCAINE HYDROCHLORIDE 10 MG/ML
INJECTION, SOLUTION EPIDURAL; INFILTRATION; INTRACAUDAL; PERINEURAL
Status: COMPLETED | OUTPATIENT
Start: 2021-08-20 | End: 2021-08-20

## 2021-08-20 ASSESSMENT — PAIN SCALES - GENERAL
PAINLEVEL_OUTOF10: 0
PAINLEVEL_OUTOF10: 0

## 2021-08-20 ASSESSMENT — PAIN - FUNCTIONAL ASSESSMENT
PAIN_FUNCTIONAL_ASSESSMENT: PREVENTS OR INTERFERES WITH MANY ACTIVE NOT PASSIVE ACTIVITIES
PAIN_FUNCTIONAL_ASSESSMENT: 0-10

## 2021-08-20 ASSESSMENT — PAIN DESCRIPTION - DESCRIPTORS: DESCRIPTORS: SHARP

## 2021-08-20 NOTE — H&P
Patient:  Deidra Bridges  YOB: 1965  Medical Record #:  [id number]   Place: [address] [facility]  Date:  8/20/2021   Physician:  José Lawton MD    History Obtained From: electronic medical record    HISTORY OF PRESENT ILLNESS    Past Medical History:        Diagnosis Date    Anxiety     Asthma     currently was instructed to stop inhalers d/t to collapse of vocal cords    Diaphragm paralysis     right side    Fatty liver     Fibromyalgia     GERD (gastroesophageal reflux disease)     Headache(784.0)     neck pain     History of ulcer disease     Hypothyroidism     status post thyroidectomy     Melanoma ([address]     right eye     Osteoarthritis     CHRONIC BACK PAIN/FIBROMYALGIA    Rheumatoid arthritis (HCC)     Spasmodic dysphonia     voice very hoarse    Thyroid disease     2015 found spot on left side of thyroid, right side thyroid was removed    Wears dentures     Wears glasses     Wears glasses      Past Surgical History:        Procedure Laterality Date    APPENDECTOMY  age 17   [facility] BREAST BIOPSY Right     x3  benign lesions     BREAST SURGERY Right needle localization right breast mass    BREAST SURGERY Right 10/22/15    riught breast mass excision/needle loc    CARPAL TUNNEL RELEASE Right 5/7/15    CARPAL TUNNEL RELEASE Left 5/28/15    Left carpal tunnel release      COLONOSCOPY      ENDOSCOPY, COLON, DIAGNOSTIC      EPIDURAL STEROID INJECTION N/A 2/1/2019    LUMBAR EPIDURAL STEROID INJECTION L3/4 performed by Wendy Morley MD at [facility] N/A 11/22/2019    CERVICAL EPIDURAL STEROID INJECTION C7-T1 performed by Wendy Morley MD at [facility]      right eye-radiation plate for melanoma    HC INJECT OTHER PERPHRL NERV Bilateral 3/29/2019    INTRA ARTICULAR KNEE INJECTIONS performed by Wendy Morley MD at [address] [place] [address] OF [place]. INJECT OTHER PERPHRL NERV Bilateral 12/6/2019    BILATERAL INTRAARTICULAR KNEE INJECTION performed by Elizabeth Tijerina MD at [address] [facility] [address] ARTHROSCOPY Left 12/21/2017    PAIN MANAGEMENT PROCEDURE Bilateral 8/13/2021    BILATERAL TWO LEVEL LUMBAR TRANSFORAMINAL EPIDURAL STEROID INJECTION AT L3 AND L5 performed by Elizabeth Tijerina MD at [address] [facility] ARTHROCENTESIS ASPIR&/INJ MAJOR JT/BURSA W/O US Bilateral 5/10/2019    GREATER TROCHANTERIC BURSA INJECTIONS performed by Elizabeth Tijerina MD at [facility] 96 DX/THER SBST INTRLMNR CRV/THRC W/IMG GDN N/A 9/28/2018    EPIDURAL STEROID INJECTION C7-T1 performed by Elizabeth Tijerina MD at [facility] 96 DX/THER SBST INTRLMNR LMBR/SAC W/IMG GDN Right 11/9/2018    LUMBAR EPIDURAL STEROID INJECTION L3-4 performed by Elizabeth Tijerina MD at [facility] right side of thyroid    TUBAL LIGATION  5744    UMBILICAL HERNIA REPAIR N/A 4/23/5308    UMBILICAL HERNIA REPAIR WITH MESH performed by Ny Jade MD at [address] [facility] N/A 7/2/2021    EGD BIOPSY performed by Christiano Quiroz MD at [address] [facility]     Medications Prior to Admission:   Current Facility-Administered Medications on File Prior to Encounter   Medication Dose Route Frequency Provider Last Rate Last Admin    technetium sulfur colloid egg (NYCOMED-SC) solution 500 micro curie  500 micro curie Intravenous ONCE PRN Christiano Quiroz MD         Current Outpatient Medications on File Prior to Encounter   Medication Sig Dispense Refill    gabapentin (NEURONTIN) 600 MG tablet Take 600 mg by mouth 3 times daily.       ibuprofen (ADVIL;MOTRIN) 800 MG tablet Take 800 mg by mouth as needed for Pain      pantoprazole (PROTONIX) 40 MG tablet Take 1 tablet by mouth 2 times daily (before meals) 180 tablet 1    Fluticasone furoate-vilanterol (BREO ELLIPTA) 200-25 MCG/INH AEPB inhaler Inhale 1 puff into the lungs daily 1 each 11    fluticasone (FLONASE) 50 MCG/ACT nasal spray 1 spray by Each Nostril route daily 1 Bottle 0    albuterol sulfate HFA (PROAIR HFA) 108 (90 Base) MCG/ACT inhaler Inhale 2 puffs into the lungs every 6 hours as needed for Wheezing or Shortness of Breath 1 Inhaler 11    levothyroxine (SYNTHROID) 125 MCG tablet Take 1 tablet by mouth Daily 90 tablet 1    Respiratory Therapy Supplies (NEBULIZER/TUBING/MOUTHPIECE) KIT 1 kit by Does not apply route daily 1 kit 0    oxyCODONE-acetaminophen (PERCOCET) 5-325 MG per tablet every 8 hours as needed.  diclofenac sodium (VOLTAREN) 1 % GEL Apply 2-4 gm to affected area tid for 2 weeks then bid prn thereafter 3 Tube 1    ondansetron (ZOFRAN) 4 MG tablet TAKE 1 TABLET BY MOUTH DAILY AS NEEDED FOR NAUSEA OR VOMITING 30 tablet 0    Cholecalciferol (VITAMIN D) 2000 units CAPS capsule Take by mouth daily        Allergies:   Other  Social History     Socioeconomic History    Marital status:      Spouse name: Not on file    Number of children: Not on file    Years of education: Not on file    Highest education level: Not on file   Occupational History    Occupation: room leader     Comment: [employer]   Tobacco Use    Smoking status: Former Smoker     Packs/day: 1.00     Years: 15.00     Pack years: 15.00     Types: Cigarettes     Start date:      Quit date: 2003     Years since quittin.6    Smokeless tobacco: Never Used   Vaping Use    Vaping Use: Never used   Substance and Sexual Activity    Alcohol use: Not Currently     Alcohol/week: 0.0 standard drinks    Drug use: Never    Sexual activity: Not Currently     Partners: Male   Other Topics Concern    Not on file   Social History Narrative    Not on file     Social Determinants of Health     Financial Resource Strain: Low Risk     Difficulty of Paying Living Expenses: Not hard at all   Food Insecurity: No Food Insecurity    Worried About [address] [employer] in the Last Year: Never true    [employer] in the Last Year: Never true   Transportation Needs: No Transportation Needs    Lack of Transportation (Medical): No    Lack of Transportation (Non-Medical): No   Physical Activity: Inactive    Days of Exercise per Week: 0 days    Minutes of Exercise per Session: 0 min   Stress: No Stress Concern Present    Feeling of Stress : Only a little   Social Connections: Moderately Isolated    Frequency of Communication with Friends and Family: More than three times a week    Frequency of Social Gatherings with Friends and Family: Twice a week    Attends Mosque Services: 1 to 4 times per year    Active Member of [address] [place] [address] [employer] or Organizations: No    Attends Club or Organization Meetings: Never    Marital Status:    Intimate Partner Violence: Not At Risk    Fear of Current or Ex-Partner: No    Emotionally Abused: No    Physically Abused: No    Sexually Abused: No     Family History   Problem Relation Age of Onset    Asthma Father     Heart Disease Father     Emphysema Father     Heart Attack Father     Breast Cancer Maternal Aunt         breast    Breast Cancer Maternal Aunt         breast    Migraines Mother     Cancer Mother 61        multiple mylenoma    Diabetes Sister     Migraines Sister     Breast Cancer Maternal Cousin         breast    No Known Problems Maternal Grandmother     No Known Problems Maternal Grandfather     No Known Problems Paternal Grandmother     Colon Cancer Maternal Uncle          PHYSICAL EXAM:      Ht 5' 8\" (1.727 m)   Wt 297 lb (134.7 kg)   LMP 02/25/2016 (Exact Date)   BMI 45.16 kg/m²  I            ASSESSMENT AND PLAN:    1. Procedure. options, risks and benefits reviewed with patient and expresses understanding.

## 2021-08-20 NOTE — OP NOTE
Patient:  Mor Curiel  YOB: 1965  Medical Record #:  [id number]   Place: [facility]  Date:  8/20/2021   Physician:  Archana Das MD      KNEE JOINT INJECTION    PRE-PROCEDURE DIAGNOSIS: bilateral knee joint generated pain (M17.0)    POST-PROCEDURE DIAGNOSIS:  bilateral knee joint generated pain (M17.0)    PROCEDURE:  bilateral knee joint injection with fluoroscopy. BRIEF HISTORY:  The patient returns today to the [facility] for scheduled knee joint injection procedure. The patient is clinically unchanged from my previous evaluation. The procedure was explained to the patient, and the previously distributed pre-procedure literature was reviewed. The options, rationale and benefits of the procedure, including functional improvement, pain relief, and increased mobility, as well as the risks of the procedure including but not limited to infection, bleeding, paresthesia, pain, failure to relieve pain, increased pain, headache, allergic reaction and neurologic impairment were discussed with the patient. Risks of corticosteroids were discussed with the patient and informed written consent was obtained from the patient. PROCEDURE NOTE:  The patient was positioned supine on the fluoroscopy table. The bilateral knee joint(s) was identified using AP fluoroscopy. The skin overlying thesacroiliac joint was widely prepped with chloraprep and draped in the usual sterile fashion. A 3.5  inch 22 gauge spinal needle was advanced in the AP view towards the knee joint. This was done under fluoroscopic guidance. Entry into the joint capsule was felt as well as verified via fluoroscopy in multiple views. Careful aspiration was negative for fluid and blood prior to that injection and afterwards. A total of 3 cc of injectate was injected. The injectate contained 1 cc of depomedrol 40 mg per cc and 2 cc of Bupivacaine 0.5%.  The injectate was injected in small increments while monitoring the patient. The patient tolerated the procedure well. There were no complications. The patient complained of no paresthesia during the injection. The needle was removed, the area was cleansed, and a Band-Aid was placed over the injection site. There were no complications. The patient tolerated the procedure well. The procedure was performed using local anesthesia. [facility] signs remained stable. The patient was discharged accompanied with an escort with written instructions after fulfilling standard discharge criteria. Follow-up instructions were given to the patient. Estimated Blood Loss: 0ml    Plan:  Follow up in 4 weeks     Modifier 22: Procedure took >75% more time than a typical procedure secondary to Body mass index is 44.7 kg/m². , making visualization and needle placement more difficult.       Office: [phone]

## 2021-08-21 DIAGNOSIS — E89.0 POSTOPERATIVE HYPOTHYROIDISM: ICD-10-CM

## 2021-08-23 RX ORDER — LEVOTHYROXINE SODIUM 0.12 MG/1
125 TABLET ORAL DAILY
Qty: 90 TABLET | Refills: 1 | Status: SHIPPED | OUTPATIENT
Start: 2021-08-23 | End: 2022-03-21

## 2021-08-25 ENCOUNTER — PROCEDURE VISIT (OUTPATIENT)
Dept: SPEECH THERAPY | Age: 56
End: 2021-08-25
Payer: COMMERCIAL

## 2021-08-25 ENCOUNTER — OFFICE VISIT (OUTPATIENT)
Dept: PULMONOLOGY | Age: 56
End: 2021-08-25
Payer: COMMERCIAL

## 2021-08-25 VITALS
SYSTOLIC BLOOD PRESSURE: 120 MMHG | BODY MASS INDEX: 44.41 KG/M2 | DIASTOLIC BLOOD PRESSURE: 70 MMHG | HEIGHT: 68 IN | OXYGEN SATURATION: 93 % | TEMPERATURE: 97.1 F | WEIGHT: 293 LBS | HEART RATE: 78 BPM | RESPIRATION RATE: 16 BRPM

## 2021-08-25 DIAGNOSIS — J45.40 MODERATE PERSISTENT ASTHMA WITHOUT COMPLICATION: ICD-10-CM

## 2021-08-25 DIAGNOSIS — E66.01 OBESITY, CLASS III, BMI 40-49.9 (MORBID OBESITY) (HCC): ICD-10-CM

## 2021-08-25 DIAGNOSIS — R49.0 DYSPHONIA: ICD-10-CM

## 2021-08-25 DIAGNOSIS — J30.9 ALLERGIC RHINITIS, UNSPECIFIED SEASONALITY, UNSPECIFIED TRIGGER: Primary | ICD-10-CM

## 2021-08-25 PROCEDURE — G8417 CALC BMI ABV UP PARAM F/U: HCPCS | Performed by: INTERNAL MEDICINE

## 2021-08-25 PROCEDURE — 3017F COLORECTAL CA SCREEN DOC REV: CPT | Performed by: INTERNAL MEDICINE

## 2021-08-25 PROCEDURE — 1036F TOBACCO NON-USER: CPT | Performed by: INTERNAL MEDICINE

## 2021-08-25 PROCEDURE — G8427 DOCREV CUR MEDS BY ELIG CLIN: HCPCS | Performed by: INTERNAL MEDICINE

## 2021-08-25 PROCEDURE — 92507 TX SP LANG VOICE COMM INDIV: CPT | Performed by: SPEECH-LANGUAGE PATHOLOGIST

## 2021-08-25 PROCEDURE — 99214 OFFICE O/P EST MOD 30 MIN: CPT | Performed by: INTERNAL MEDICINE

## 2021-08-25 RX ORDER — PREDNISONE 10 MG/1
TABLET ORAL
Qty: 30 TABLET | Refills: 0 | Status: SHIPPED | OUTPATIENT
Start: 2021-08-25 | End: 2021-09-04

## 2021-08-25 ASSESSMENT — ASTHMA QUESTIONNAIRES
QUESTION_1 LAST FOUR WEEKS HOW MUCH OF THE TIME DID YOUR ASTHMA KEEP YOU FROM GETTING AS MUCH DONE AT WORK, SCHOOL OR AT HOME: 2
QUESTION_3 LAST FOUR WEEKS HOW OFTEN DID YOUR ASTHMA SYMPTOMS (WHEEZING, COUGHING, SHORTNESS OF BREATH, CHEST TIGHTNESS OR PAIN) WAKE YOU UP AT NIGHT OR EARLIER THAN USUAL IN THE MORNING: 1
QUESTION_5 LAST FOUR WEEKS HOW WOULD YOU RATE YOUR ASTHMA CONTROL: 2
ACT_TOTALSCORE: 7
QUESTION_2 LAST FOUR WEEKS HOW OFTEN HAVE YOU HAD SHORTNESS OF BREATH: 1
QUESTION_4 LAST FOUR WEEKS HOW OFTEN HAVE YOU USED YOUR RESCUE INHALER OR NEBULIZER MEDICATION (SUCH AS ALBUTEROL): 1

## 2021-08-25 NOTE — PROGRESS NOTES
REASON FOR CONSULTATION/CC: shortness of breath       CONSULTING PHYSICIAN: Roseann Flor MD   PCP: Roseann Flor MD    HISTORY OF PRESENT ILLNESS: Fariha Figueroa is a 55y.o. year old female with a history of smoking. who presents increase dyspnea for the last year. Asthma     Off all medication. Did not refill Breo . 11 refills were given in May 2021. She has albuterol. Obesity. Continues to gain weight. ASTHMA CONTROL TEST 8/25/2021 5/28/2021 2/24/2021   In the past 4 weeks, how much of the time did your asthma keep you from getting as much done at work, school or at home? 2 2 4   During the past 4 weeks, how often have you had shortness of breath? 1 1 1   During the past 4 weeks, how often did your asthma symptoms (wheezing, coughing, shortness of breath, chest tightness or pain) wake you up at night or earlier than usual in the morning? 1 1 5   During the past 4 weeks, how often have you used your rescue inhaler or nebulizer medication (such as albuterol)? 1 1 4   How would you rate your asthma control during the past 4 weeks? 2 3 5   Asthma Control Test Total Score 7 8 19            Objective:   PHYSICAL EXAM:  Blood pressure 120/70, pulse 78, temperature 97.1 °F (36.2 °C), temperature source Infrared, resp. rate 16, height 5' 8\" (1.727 m), weight 296 lb 9.6 oz (134.5 kg), last menstrual period 02/25/2016, SpO2 93 %, not currently breastfeeding.'  Body mass index is 45.1 kg/m². Gen: No distress. Eyes:    ENT:    Neck:    Resp: No accessory muscle use. No crackles. No wheezes. No rhonchi. CV: Regular rate. Regular rhythm. No murmur or rub. No edema. GI:    Skin:    Lymph:    M/S: No cyanosis. No clubbing. Neuro: Moves all four extremities. Psych: Oriented x 3. No anxiety. Awake. Alert. Intact judgement and insight.       Data Reviewed:       Assessment:     ·  Obstructive airways disease  · Obesity  · Dyspnea  · Dysphonia, hx spasmatic dysphonia   · Paralyzed right diaphram  · Allergic Rhinitis  · Thyroid nodule    Plan:            Problem List Items Addressed This Visit     Obesity, Class III, BMI 40-49.9 (morbid obesity) ([address]     Continues to gain weight. We again discussed calorie restriction          Moderate persistent asthma without complication     She was advise to restart Breo. Trelegy sample given to start on medication today. Prednisone given for mild exacerbation          Relevant Medications    predniSONE (DELTASONE) 10 MG tablet    Fluticasone-Umeclidin-Vilant (Manolo Lafleur) 200-62.5-25 MCG/INH AEPB        This note was transcribed using [employer] Please disregard any translational errors.

## 2021-08-25 NOTE — PROGRESS NOTES
laryngopharyngeal reflux. No mass lesions, paralysis, paresis was noted. SUBJECTIVE:   Pt reported complete return of voice for one week after initial evaluation; felt decreased tension and no roughness. Did yardwork and experienced complete return of symptoms; SOB and vocal changes. Not aphonic, but elevated modal pitch w/ intermittent pitch breaks. Pt reported severe coughing fits w/ rib pain. Feels voice is directly impacted by decreased breath support and >1 week of coughing. F/u w/ pulmonologist this PM.    OBJECTIVE:   Voice Therapy    Goal: Session 1 Session 2 Session 3   Pt will adhere to vocal hygiene protocol during daily life activities w/ 80% acc   Pt adhered to vocal hygiene protocol w/ 65% acc    Pt reported completing exercise daily as recommended w/ improvement in voice; has not completed since onset of \"episode\" d/t coughing. Pt will perform SOVT techniques during various voicing tasks w/ 80% acc    Pt performed SOVT techniques via    Strawflow w/ sustained pitch, ascending/descending glides and sirens w/ 45% acc; reduced coordination of respiration and phonation w/ decreased airflow. Significant SOB when attempting to completed. Ongoing elevated modal pitch; unable to be cued into lower ranges. Pt will perform RVT techniques during various voicing tasks w/ 80% acc   Pt performed RVT techniques via    Hum in isolation w/o success this date; pt able to initially sustain hum in elevated pitch but became aphonic as trials continued d/t decreased breath support and SOB. Exercise discontinued when coughing fit induced and pt endorsed pain. ASSESSMENT:      55 y.o. female w/ hx of intermittent dysphonia w/ prolonged episode. Pt endorsed improvement in voice (baseline) for one week after initial evaluation, and then experienced regression d/t possible asthma/allergy flare-up.  Pt reported significant SOB and feels exhalatory wheeze; >1 week of coughing episodes w/ severe rib pain and laryngeal discomfort. Attempted to initiate exercises this date however, unable to be cued down into modal pitch range and endorsed pain. Recommended repeat VLS however, pt concerned it would induce cough. D/w ENT who recommended f/u in 2 weeks and placed order for allergy testing. Pt to f/u w/ pulmonologist this PM and RTC for ongoing VOT after. RECOMMENDATIONS:      1.  Follow HEP and RTC for ongoing VOT  2.   RTC in one month    CPT Code Units Billed Time Billed Today Date of POC Start Re-Certification Date Referring Provider   [id number] 1 Unit Time in: 1130  Time out: 1200  Total time: 30 min 6/30/21 60 days Dr. Steven Roman       Thank you,    Britt Melvi) [place], [place], [address] [facility]; [id number]  Voice Specialized Speech-Language Pathologist

## 2021-08-27 RX ORDER — FLUTICASONE FUROATE, UMECLIDINIUM BROMIDE AND VILANTEROL TRIFENATATE 200; 62.5; 25 UG/1; UG/1; UG/1
1 POWDER RESPIRATORY (INHALATION) DAILY
Qty: 1 EACH | Refills: 0 | COMMUNITY
Start: 2021-08-27 | End: 2022-03-03 | Stop reason: ALTCHOICE

## 2021-08-27 NOTE — ASSESSMENT & PLAN NOTE
She was advise to restart Breo. Trelegy sample given to start on medication today.      Prednisone given for mild exacerbation

## 2021-09-08 ENCOUNTER — OFFICE VISIT (OUTPATIENT)
Dept: ENT CLINIC | Age: 56
End: 2021-09-08
Payer: COMMERCIAL

## 2021-09-08 VITALS
BODY MASS INDEX: 43.5 KG/M2 | HEIGHT: 68 IN | WEIGHT: 287 LBS | DIASTOLIC BLOOD PRESSURE: 73 MMHG | HEART RATE: 82 BPM | SYSTOLIC BLOOD PRESSURE: 112 MMHG

## 2021-09-08 DIAGNOSIS — J04.0 LARYNGITIS: ICD-10-CM

## 2021-09-08 DIAGNOSIS — R49.0 DYSPHONIA: Primary | ICD-10-CM

## 2021-09-08 DIAGNOSIS — J30.9 ALLERGIC RHINITIS, UNSPECIFIED SEASONALITY, UNSPECIFIED TRIGGER: ICD-10-CM

## 2021-09-08 PROCEDURE — G8427 DOCREV CUR MEDS BY ELIG CLIN: HCPCS | Performed by: OTOLARYNGOLOGY

## 2021-09-08 PROCEDURE — G8417 CALC BMI ABV UP PARAM F/U: HCPCS | Performed by: OTOLARYNGOLOGY

## 2021-09-08 PROCEDURE — 1036F TOBACCO NON-USER: CPT | Performed by: OTOLARYNGOLOGY

## 2021-09-08 PROCEDURE — 3017F COLORECTAL CA SCREEN DOC REV: CPT | Performed by: OTOLARYNGOLOGY

## 2021-09-08 PROCEDURE — 99213 OFFICE O/P EST LOW 20 MIN: CPT | Performed by: OTOLARYNGOLOGY

## 2021-09-08 NOTE — PROGRESS NOTES
[address] [facility]  Follow up      Patient Name: Zain [facility] Record Number:  [id number]  Primary Care Physician:  Yuri Palacio MD  Date of Consultation: 9/8/2021    Chief Complaint: Dysphonia        Interval History  I last saw the patient on July 21, 2021. Since that time she has seen her speech therapist and pulmonology. She has had multiple different etiologies that could explain her dysphonia including fungal laryngitis, reflux, and muscle tension dysphonia. She was recently given another round of steroids and feels as though this actually helped. REVIEW OF SYSTEMS  As above    PHYSICAL EXAM  GENERAL: No Acute Distress, Alert and Oriented, fairly hoarse voice today, but better than last visit  EYES: EOMI, Anti-icteric  HENT:    Head: Normocephalic and atraumatic. Face:  Symmetric, facial nerve intact, no sinus tenderness  Right Ear: Normal external ear, normal external auditory canal, intact tympanic membrane with normal mobility and aerated middle ear  Left Ear: Normal external ear, normal external auditory canal, intact tympanic membrane with normal mobility and aerated middle ear  Mouth/Oral Cavity:  normal lips, Uvula is midline, no mucosal lesions  Oropharynx/Larynx:  normal oropharynx  Nose:Normal external nasal appearance. NECK: Normal range of motion, no thyromegaly, trachea is midline, no lymphadenopathy, no neck masses, no crepitus  CHEST: Normal respiratory effort, no retractions, breathing comfortably  SKIN: No rashes, normal appearing skin, no evidence of skin lesions/tumors  Neuro:  cranial nerve II-XII intact; normal gait  Cardio:  no edema                ASSESSMENT/PLAN  1. Dysphonia  Again her dysphonia is very complex. It seems to vary greatly with each visit. I discussed her with our speech therapist and we felt as though perhaps allergy testing is warranted at this time to see if it is playing a role.   I referred her for this. She will follow-up with me and our speech therapist afterwards  - Shama Santoyo MD, Allergy & Immunology, [facility]    2. Allergic rhinitis, unspecified seasonality, unspecified trigger  As above  - AFL - Yue Fu MD, Allergy & Immunology, [facility]    3. Laryngitis  Again she has a lot of risk factors for fungal laryngitis, but says that she feels as though the prednisone actually helping at this time. This would be very consistent with a fungal laryngitis. I will hold off on any Diflucan at this time. I have performed a head and neck physical exam personally or was physically present during the key or critical portions of the service. This note was generated completely or in part utilizing Dragon dictation speech recognition software. Occasionally, words are mistranscribed and despite editing, the text may contain inaccuracies due to incorrect word recognition. If further clarification is needed please contact the office at [phone].

## 2021-09-09 ENCOUNTER — TELEPHONE (OUTPATIENT)
Dept: PULMONOLOGY | Age: 56
End: 2021-09-09

## 2021-09-09 DIAGNOSIS — J45.40 MODERATE PERSISTENT ASTHMA WITHOUT COMPLICATION: Primary | ICD-10-CM

## 2021-09-09 RX ORDER — DOXYCYCLINE HYCLATE 100 MG
100 TABLET ORAL 2 TIMES DAILY
Qty: 14 TABLET | Refills: 0 | Status: SHIPPED | OUTPATIENT
Start: 2021-09-09 | End: 2021-09-16

## 2021-09-09 NOTE — TELEPHONE ENCOUNTER
Complains of cough and SOB  Duration Today    Cough with sputum production? yes Color yellow Fever? 100.2  Any other Symptoms? Weak tightness in chest   Any current treatment tried? No   Using inhalers?  Yes  do they help?somewhat   Pharmacy? [facility]    Patient is looking for relief , for coughing and mucous , she will be going to get a COVID test today

## 2021-09-13 ENCOUNTER — NURSE TRIAGE (OUTPATIENT)
Dept: OTHER | Facility: CLINIC | Age: 56
End: 2021-09-13

## 2021-09-13 NOTE — TELEPHONE ENCOUNTER
Received call from Dave Deras at [facility] with Red Flag Complaint. Brief description of triage:   Woke up Thursday and has had a headache since then, states that it is the worst headache of her life    Triage indicates for patient to go to the [facility] [address] now    Care advice provided, patient verbalizes understanding; denies any other questions or concerns; instructed to call back for any new or worsening symptoms. Attention Provider: Thank you for allowing me to participate in the care of your patient. The patient was connected to triage in response to information provided to the [facility]. Please do not respond through this encounter as the response is not directed to a shared pool. Reason for Disposition   [1] SEVERE headache (e.g., excruciating) AND [2] \"worst headache\" of life    Answer Assessment - Initial Assessment Questions  1. LOCATION: \"Where does it hurt? \"       The whole front of the forehead    2. ONSET: \"When did the headache start? \" (Minutes, hours or days)       Thursday    3. PATTERN: \"Does the pain come and go, or has it been constant since it started? \"      Constant    4. SEVERITY: \"How bad is the pain? \" and \"What does it keep you from doing? \"  (e.g., Scale 1-10; mild, moderate, or severe)    - MILD (1-3): doesn't interfere with normal activities     - MODERATE (4-7): interferes with normal activities or awakens from sleep     - SEVERE (8-10): excruciating pain, unable to do any normal activities         10    5. RECURRENT SYMPTOM: \"Have you ever had headaches before? \" If so, ask: \"When was the last time? \" and \"What happened that time? \"       Yes, states all the time, states that if she takes ibuprofen and lays down that they normally go away, she states that this one just does not go away    6. CAUSE: \"What do you think is causing the headache? \"      unknown    7. MIGRAINE: \"Have you been diagnosed with migraine headaches? \" If so, ask: \"Is this headache similar? \"       Has had migraines, but never anything like this    8. HEAD INJURY: \"Has there been any recent injury to the head? \"       No, has had radiation to her right eye in march 9. OTHER SYMPTOMS: \"Do you have any other symptoms? \" (fever, stiff neck, eye pain, sore throat, cold symptoms)      Fever until Saturday, neck pain    10. PREGNANCY: \"Is there any chance you are pregnant? \" \"When was your last menstrual period? \"        n/a    Protocols used: HEADACHE-ADULT-AH

## 2021-09-14 ENCOUNTER — APPOINTMENT (OUTPATIENT)
Dept: GENERAL RADIOLOGY | Age: 56
End: 2021-09-14
Payer: COMMERCIAL

## 2021-09-14 ENCOUNTER — VIRTUAL VISIT (OUTPATIENT)
Dept: FAMILY MEDICINE CLINIC | Age: 56
End: 2021-09-14
Payer: COMMERCIAL

## 2021-09-14 ENCOUNTER — TELEPHONE (OUTPATIENT)
Dept: FAMILY MEDICINE CLINIC | Age: 56
End: 2021-09-14

## 2021-09-14 ENCOUNTER — HOSPITAL ENCOUNTER (EMERGENCY)
Age: 56
Discharge: HOME | End: 2021-09-14
Payer: COMMERCIAL

## 2021-09-14 VITALS
OXYGEN SATURATION: 93 % | SYSTOLIC BLOOD PRESSURE: 131 MMHG | BODY MASS INDEX: 42.73 KG/M2 | RESPIRATION RATE: 20 BRPM | HEIGHT: 68 IN | WEIGHT: 281.97 LBS | HEART RATE: 101 BPM | DIASTOLIC BLOOD PRESSURE: 75 MMHG | TEMPERATURE: 98.6 F

## 2021-09-14 DIAGNOSIS — U07.1 COVID-19: Primary | ICD-10-CM

## 2021-09-14 LAB
A/G RATIO: 1 (ref 1.1–2.2)
ALBUMIN SERPL-MCNC: 3.8 G/DL (ref 3.4–5)
ALP BLD-CCNC: 95 U/L (ref 40–129)
ALT SERPL-CCNC: 52 U/L (ref 10–40)
ANION GAP SERPL CALCULATED.3IONS-SCNC: 13 MMOL/L (ref 3–16)
AST SERPL-CCNC: 35 U/L (ref 15–37)
BASOPHILS ABSOLUTE: 0 K/UL (ref 0–0.2)
BASOPHILS RELATIVE PERCENT: 0.2 %
BILIRUB SERPL-MCNC: 0.5 MG/DL (ref 0–1)
BUN BLDV-MCNC: 7 MG/DL (ref 7–20)
CALCIUM SERPL-MCNC: 10.4 MG/DL (ref 8.3–10.6)
CHLORIDE BLD-SCNC: 101 MMOL/L (ref 99–110)
CO2: 24 MMOL/L (ref 21–32)
CREAT SERPL-MCNC: 0.6 MG/DL (ref 0.6–1.1)
EOSINOPHILS ABSOLUTE: 0.1 K/UL (ref 0–0.6)
EOSINOPHILS RELATIVE PERCENT: 0.9 %
GFR AFRICAN AMERICAN: >60
GFR NON-AFRICAN AMERICAN: >60
GLOBULIN: 3.8 G/DL
GLUCOSE BLD-MCNC: 121 MG/DL (ref 70–99)
HCT VFR BLD CALC: 43.4 % (ref 36–48)
HEMOGLOBIN: 14.2 G/DL (ref 12–16)
LACTIC ACID: 1 MMOL/L (ref 0.4–2)
LYMPHOCYTES ABSOLUTE: 2.2 K/UL (ref 1–5.1)
LYMPHOCYTES RELATIVE PERCENT: 31.3 %
MCH RBC QN AUTO: 27.6 PG (ref 26–34)
MCHC RBC AUTO-ENTMCNC: 32.8 G/DL (ref 31–36)
MCV RBC AUTO: 84 FL (ref 80–100)
MONOCYTES ABSOLUTE: 0.6 K/UL (ref 0–1.3)
MONOCYTES RELATIVE PERCENT: 9.2 %
NEUTROPHILS ABSOLUTE: 4.1 K/UL (ref 1.7–7.7)
NEUTROPHILS RELATIVE PERCENT: 58.4 %
PDW BLD-RTO: 15.4 % (ref 12.4–15.4)
PLATELET # BLD: 228 K/UL (ref 135–450)
PMV BLD AUTO: 8.6 FL (ref 5–10.5)
POTASSIUM REFLEX MAGNESIUM: 3.9 MMOL/L (ref 3.5–5.1)
RBC # BLD: 5.16 M/UL (ref 4–5.2)
SODIUM BLD-SCNC: 138 MMOL/L (ref 136–145)
TOTAL PROTEIN: 7.6 G/DL (ref 6.4–8.2)
WBC # BLD: 7.1 K/UL (ref 4–11)

## 2021-09-14 PROCEDURE — 85025 COMPLETE CBC W/AUTO DIFF WBC: CPT

## 2021-09-14 PROCEDURE — 99213 OFFICE O/P EST LOW 20 MIN: CPT | Performed by: INTERNAL MEDICINE

## 2021-09-14 PROCEDURE — 99282 EMERGENCY DEPT VISIT SF MDM: CPT

## 2021-09-14 PROCEDURE — G8427 DOCREV CUR MEDS BY ELIG CLIN: HCPCS | Performed by: INTERNAL MEDICINE

## 2021-09-14 PROCEDURE — 80053 COMPREHEN METABOLIC PANEL: CPT

## 2021-09-14 PROCEDURE — 36415 COLL VENOUS BLD VENIPUNCTURE: CPT

## 2021-09-14 PROCEDURE — 83605 ASSAY OF LACTIC ACID: CPT

## 2021-09-14 PROCEDURE — 3017F COLORECTAL CA SCREEN DOC REV: CPT | Performed by: INTERNAL MEDICINE

## 2021-09-14 RX ORDER — BUTALBITAL, ACETAMINOPHEN AND CAFFEINE 300; 40; 50 MG/1; MG/1; MG/1
1 CAPSULE ORAL EVERY 4 HOURS PRN
Qty: 25 CAPSULE | Refills: 0 | Status: SHIPPED | OUTPATIENT
Start: 2021-09-14 | End: 2021-10-05

## 2021-09-14 RX ORDER — DEXAMETHASONE 6 MG/1
6 TABLET ORAL
Qty: 10 TABLET | Refills: 0 | Status: SHIPPED | OUTPATIENT
Start: 2021-09-14 | End: 2021-09-24

## 2021-09-14 ASSESSMENT — ENCOUNTER SYMPTOMS
SINUS PRESSURE: 1
COUGH: 1
RHINORRHEA: 1
COUGH: 1
SORE THROAT: 1
DIARRHEA: 0
NAUSEA: 0
WHEEZING: 1
SHORTNESS OF BREATH: 1
DIARRHEA: 0
BACK PAIN: 0
ABDOMINAL PAIN: 0
ABDOMINAL DISTENTION: 0
SHORTNESS OF BREATH: 1
FACIAL SWELLING: 0
VOMITING: 0

## 2021-09-14 ASSESSMENT — PAIN SCALES - GENERAL: PAINLEVEL_OUTOF10: 10

## 2021-09-14 ASSESSMENT — PAIN DESCRIPTION - LOCATION: LOCATION: HEAD

## 2021-09-14 ASSESSMENT — PAIN DESCRIPTION - DESCRIPTORS: DESCRIPTORS: PRESSURE

## 2021-09-14 NOTE — PROGRESS NOTES
Nakita Bhagat, was evaluated through a synchronous (real-time) audio-video encounter. The patient (or guardian if applicable) is aware that this is a billable service. Verbal consent to proceed has been obtained within the past 12 months. The visit was conducted pursuant to the emergency declaration under the [facility], [address] [place] [address] authority and the [employer] General Act. Patient identification was verified, and a caregiver was present when appropriate. The patient was located in a state where the provider was credentialed to provide care. Total time spent for this encounter: Not billed by time    --Agnes Howard MD on 9/14/2021 at 6:41 PM    An electronic signature was used to authenticate this note. SUBJECTIVE:  Nakita Bhagat is a 55 y.o. female being evaluated for:    Chief Complaint   Patient presents with    Abstract      VIDEO VISIT NUMBER =[phone].  Migraine       HPI   Home test positive for covid  SIck since THuraday  Bad headache across heaache worse in right eye where she had the radiation  Feels as though will pop out of her head  Fevers 100,2 cough nonproductive. Shortness of breath with activity. Oxygenation levels low especially with exertion. Been as low as the low 80s. At rest 88-93 able to eat and drink okay No increasing wheezing or sob     Allergies   Allergen Reactions    Other Hives     Cloth gloves at my work     Current Outpatient Medications   Medication Sig Dispense Refill    doxycycline hyclate (VIBRA-TABS) 100 MG tablet Take 1 tablet by mouth 2 times daily for 7 days 14 tablet 0    Fluticasone-Umeclidin-Vilant (TRELEGY ELLIPTA) 200-62.5-25 MCG/INH AEPB Inhale 1 puff into the lungs daily 1 each 0    levothyroxine (SYNTHROID) 125 MCG tablet TAKE 1 TABLET BY MOUTH DAILY 90 tablet 1    gabapentin (NEURONTIN) 600 MG tablet Take 600 mg by mouth 3 times daily.       Comment: VANDANA   Tobacco Use    Smoking status: Former Smoker     Packs/day: 1.00     Years: 15.00     Pack years: 15.00     Types: Cigarettes     Start date: [address]     Quit date: 2003     Years since quittin.7    Smokeless tobacco: Never Used   Vaping Use    Vaping Use: Never used   Substance and Sexual Activity    Alcohol use: Not Currently     Alcohol/week: 0.0 standard drinks    Drug use: Never    Sexual activity: Not Currently     Partners: Male   Other Topics Concern    Not on file   Social History Narrative    Not on file     Social Determinants of Health     Financial Resource Strain: Low Risk     Difficulty of Paying Living Expenses: Not hard at all   Food Insecurity: No Food Insecurity    Worried About Running Out of Food in the Last Year: Never true    Davion of Food in the Last Year: Never true   Transportation Needs: No Transportation Needs    Lack of Transportation (Medical): No    Lack of Transportation (Non-Medical): No   Physical Activity: Inactive    Days of Exercise per Week: 0 days    Minutes of Exercise per Session: 0 min   Stress: No Stress Concern Present    Feeling of Stress : Only a little   Social Connections:  Moderately Isolated    Frequency of Communication with Friends and Family: More than three times a week    Frequency of Social Gatherings with Friends and Family: Twice a week    Attends [employer] Services: 1 to 4 times per year    Active Member of [employer] or Organizations: No    Attends Club or Organization Meetings: Never    Marital Status:    Intimate Partner Violence: Not At Risk    Fear of Current or Ex-Partner: No    Emotionally Abused: No    Physically Abused: No    Sexually Abused: No      Past Medical History:   Diagnosis Date    Anxiety     Asthma     currently was instructed to stop inhalers d/t to collapse of vocal cords    Diaphragm paralysis     right side    Fatty liver     Fibromyalgia     GERD (gastroesophageal reflux disease)     Headache(784.0)     neck pain     History of ulcer disease     Hypothyroidism     status post thyroidectomy     Melanoma ([address]     right eye     Osteoarthritis     CHRONIC BACK PAIN/FIBROMYALGIA    Rheumatoid arthritis (HCC)     Spasmodic dysphonia     voice very hoarse    Thyroid disease     2015 found spot on left side of thyroid, right side thyroid was removed    Wears dentures     Wears glasses     Wears glasses      Past Surgical History:   Procedure Laterality Date    APPENDECTOMY  age 17   [employer] [place] BREAST BIOPSY Right     x3  benign lesions     BREAST SURGERY Right needle localization right breast mass    BREAST SURGERY Right 10/22/15    riught breast mass excision/needle loc    CARPAL TUNNEL RELEASE Right 5/7/15    CARPAL TUNNEL RELEASE Left 5/28/15    Left carpal tunnel release      COLONOSCOPY      ENDOSCOPY, COLON, DIAGNOSTIC      EPIDURAL STEROID INJECTION N/A 2/1/2019    LUMBAR EPIDURAL STEROID INJECTION L3/4 performed by Venita Craig MD at [facility] N/A 11/22/2019    CERVICAL EPIDURAL STEROID INJECTION C7-T1 performed by Venita Craig MD at [facility]      right eye-radiation plate for melanoma    HC INJECT OTHER PERPHRL NERV Bilateral 3/29/2019    INTRA ARTICULAR KNEE INJECTIONS performed by Venita Craig MD at [address] NERV Bilateral 12/6/2019    BILATERAL INTRAARTICULAR KNEE INJECTION performed by Venita Craig MD at [address] [facility] [address] ARTHROSCOPY Left 12/21/2017    MEDICATION INJECTION Bilateral 8/20/2021    BILATERAL KNEE STEROID INJECTION performed by Venita Craig MD at [address] Bilateral 8/13/2021    BILATERAL TWO LEVEL LUMBAR TRANSFORAMINAL EPIDURAL STEROID INJECTION AT L3 AND L5 performed by Venita Craig MD at [address] [facility] ARTHROCENTESIS ASPIR&/INJ MAJOR JT/BURSA W/O US Bilateral 5/10/2019    GREATER TROCHANTERIC BURSA INJECTIONS performed by Richi Al MD at [facility] [address] [facility]/THRC W/IMG GDN N/A 9/28/2018    EPIDURAL STEROID INJECTION C7-T1 performed by Richi Al MD at [facility] [address] SBST INTRLMNR LMBR/SAC W/IMG GDN Right 11/9/2018    LUMBAR EPIDURAL STEROID INJECTION L3-4 performed by Richi Al MD at [facility]      removed right side of thyroid    TUBAL LIGATION  3414    UMBILICAL HERNIA REPAIR N/A 4/05/4839    UMBILICAL HERNIA REPAIR WITH MESH performed by Sam Soto MD at [address] [place] [address] 7/2/2021    EGD BIOPSY performed by Cora Lino MD at [facility] of Systems   Constitutional: Positive for activity change, fatigue and fever. HENT: Positive for congestion, postnasal drip and sore throat. Respiratory: Positive for cough, shortness of breath and wheezing. Cardiovascular: Negative for chest pain, palpitations and leg swelling. Gastrointestinal: Negative for diarrhea. Musculoskeletal: Positive for myalgias. Neurological: Positive for light-headedness and headaches. OBJECTIVE:  LMP 02/25/2016 (Exact Date)      There is no height or weight on file to calculate BMI. Physical Exam  Constitutional:       General: She is in acute distress. Appearance: She is obese. She is ill-appearing. Comments: Lying in bed flushed    HENT:      Head: Normocephalic and atraumatic. Eyes:      Comments: Mildly swollen right eye    Pulmonary:      Effort: Pulmonary effort is normal.      Comments: At rest   Neurological:      Mental Status: She is alert. Comments: Answers question   Holding her head          ASSESSMENT/PLAN:    Moiz Hanks was seen today for abstract and migraine. Diagnoses and all orders for this visit:    COVID-19  Comments:   With a positive Covid test, shortness of breath cough fevers with some hypoxia. To go to the ER. No orders of the defined types were placed in this encounter. No follow-ups on file. There are no Patient Instructions on file for this visit.

## 2021-09-14 NOTE — TELEPHONE ENCOUNTER
It looks like she went to the ER and they only gave her dexamethasone.   If she is a symptomatic if she was I will get her set up for antibody infusions she does meet the criteria

## 2021-09-14 NOTE — ED NOTES
Blood draw performed - tubes yellow, light green, and lavender. Lavella Reining drawn and put on ice. Labeled with pt info and sent to lab.  Pt tolerated blood draw well       Gerry Alvarez  09/14/21 1853

## 2021-09-14 NOTE — ED PROVIDER NOTES
**ADVANCED PRACTICE PROVIDER, I HAVE EVALUATED THIS PATIENT**        [address]      Pt Name: Roberto Mccallum  MANUELITO:[id number]  [place] 1965  Date of evaluation: 9/14/2021  Provider: ODALYS Morrissey CNP      Chief Complaint:    Chief Complaint   Patient presents with    Concern For COVID-19     has been sick since Thursday, headache, fever; was tested at the urgent care in [facility] but unsure of results     Headache         Nursing Notes, Past Medical Hx, Past Surgical Hx, Social Hx, Allergies, and Family Hx were all reviewed and agreed with or any disagreements were addressed in the HPI.    HPI: (Location, Duration, Timing, Severity, Quality, Assoc Sx, Context, Modifying factors)    Chief Complaint of covid    This is a  55 y.o. female who presents to the emergency department today complaining of a 5-day history of not feeling well. Symptoms have included headache, sinus pressure, fever, and cough. Symptoms of been persistent. She advised she is currently on doxycycline started by her pulmonologist.  She denies chest pain or tightness. No worsening shortness of breath. No abdominal pain or GI symptoms. No neck pain or stiffness.     PastMedical/Surgical History:      Diagnosis Date    Anxiety     Asthma     currently was instructed to stop inhalers d/t to collapse of vocal cords    Diaphragm paralysis     right side    Fatty liver     Fibromyalgia     GERD (gastroesophageal reflux disease)     Headache(784.0)     neck pain     History of ulcer disease     Hypothyroidism     status post thyroidectomy     Melan[address] [address]     right eye     Osteoarthritis     CHRONIC BACK PAIN/FIBROMYALGIA    Rheumatoid arthritis (HCC)     Spasmodic dysphonia     voice very hoarse    Thyroid disease     2015 found spot on left side of thyroid, right side thyroid was removed    Wears dentures     Wears glasses     Wears glasses Procedure Laterality Date    APPENDECTOMY  age 17    BREAST BIOPSY Right     x3  benign lesions     BREAST SURGERY Right needle localization right breast mass    BREAST SURGERY Right 10/22/15    riught breast mass excision/needle loc    CARPAL TUNNEL RELEASE Right 5/7/15    CARPAL TUNNEL RELEASE Left 5/28/15    Left carpal tunnel release      COLONOSCOPY      ENDOSCOPY, COLON, DIAGNOSTIC      EPIDURAL STEROID INJECTION N/A 2/1/2019    LUMBAR EPIDURAL STEROID INJECTION L3/4 performed by Aylin Pinzon MD at [facility] N/A 11/22/2019    CERVICAL EPIDURAL STEROID INJECTION C7-T1 performed by Aylin Pinzon MD at [facility]      right eye-radiation plate for melanoma    HC INJECT OTHER PERPHRL NERV Bilateral 3/29/2019    INTRA ARTICULAR KNEE INJECTIONS performed by Aylin Pinzon MD at [address] NERV Bilateral 12/6/2019    BILATERAL INTRAARTICULAR KNEE INJECTION performed by Aylin Pinzon MD at [address] ARTHROSCOPY Left 12/21/2017    MEDICATION INJECTION Bilateral 8/20/2021    BILATERAL KNEE STEROID INJECTION performed by Aylin Pinzon MD at [address] [facility] [address] Bilateral 8/13/2021    BILATERAL TWO LEVEL LUMBAR TRANSFORAMINAL EPIDURAL STEROID INJECTION AT L3 AND L5 performed by Aylin Pinzon MD at [address] [facility] ARTHROCENTESIS ASPIR&/INJ MAJOR JT/BURSA W/O US Bilateral 5/10/2019    GREATER TROCHANTERIC BURSA INJECTIONS performed by Aylin Pinzon MD at [place] [address] DX/THER SBST INTRLMNR CRV/THRC W/IMG GDN N/A 9/28/2018    EPIDURAL STEROID INJECTION C7-T1 performed by Aylin Pinzon MD at [place] [address] DX/THER SBST INTRLMNR LMBR/SAC W/IMG GDN Right 11/9/2018    LUMBAR EPIDURAL STEROID INJECTION L3-4 performed by Aylin Pinzon MD at [facility] ENDOSCOPY    THYROID SURGERY      removed right side of thyroid    TUBAL LIGATION  3503    UMBILICAL HERNIA REPAIR N/A 9/35/7151    UMBILICAL HERNIA REPAIR WITH MESH performed by Julian Coelho MD at [facility] 7/2/2021    EGD BIOPSY performed by Arcelia Parker MD at [address] [place] [address]       Medications:  Discharge Medication List as of 9/14/2021  6:09 PM      CONTINUE these medications which have NOT CHANGED    Details   doxycycline hyclate (VIBRA-TABS) 100 MG tablet Take 1 tablet by mouth 2 times daily for 7 days, Disp-14 tablet, R-0Normal      Fluticasone-Umeclidin-Vilant (TRELEGY ELLIPTA) 200-62.5-25 MCG/INH AEPB Inhale 1 puff into the lungs daily, Disp-1 each, R-0Lot RB2J EXP 10/22Sample      levothyroxine (SYNTHROID) 125 MCG tablet TAKE 1 TABLET BY MOUTH DAILY, Disp-90 tablet, R-1Normal      gabapentin (NEURONTIN) 600 MG tablet Take 600 mg by mouth 3 times daily. Historical Med      ibuprofen (ADVIL;MOTRIN) 800 MG tablet Take 800 mg by mouth as needed for PainHistorical Med      pantoprazole (PROTONIX) 40 MG tablet Take 1 tablet by mouth 2 times daily (before meals), Disp-180 tablet, R-1Adjust Sig      Fluticasone furoate-vilanterol (BREO ELLIPTA) 200-25 MCG/INH AEPB inhaler Inhale 1 puff into the lungs daily, Disp-1 each, R-11Normal      fluticasone (FLONASE) 50 MCG/ACT nasal spray 1 spray by Each Nostril route daily, Disp-1 Bottle, R-0Normal      albuterol sulfate HFA (PROAIR HFA) 108 (90 Base) MCG/ACT inhaler Inhale 2 puffs into the lungs every 6 hours as needed for Wheezing or Shortness of Breath, Disp-1 Inhaler, R-11Normal      Respiratory Therapy Supplies (NEBULIZER/TUBING/MOUTHPIECE) KIT DAILY Starting Wed 2/24/2021, Disp-1 kit, R-0, Print      oxyCODONE-acetaminophen (PERCOCET) 5-325 MG per tablet every 8 hours as needed. Historical Med      diclofenac sodium (VOLTAREN) 1 % GEL Apply 2-4 gm to affected area tid for 2 weeks then bid prn thereafter, Disp-3 Tube, R-1, Conjunctiva/sclera: Conjunctivae normal.      Pupils: Pupils are equal, round, and reactive to light. Neck:      Vascular: No JVD. Cardiovascular:      Rate and Rhythm: Normal rate and regular rhythm. Heart sounds: Normal heart sounds. Pulmonary:      Effort: Pulmonary effort is normal. No respiratory distress. Breath sounds: Normal breath sounds. Comments: No hypoxia  Abdominal:      General: There is no distension. Palpations: Abdomen is soft. Abdomen is not rigid. Tenderness: There is no abdominal tenderness. Musculoskeletal:         General: Normal range of motion. Cervical back: Normal range of motion and neck supple. Skin:     General: Skin is warm and dry. Capillary Refill: Capillary refill takes less than 2 seconds. Findings: No rash. Neurological:      Comments: NIH 0 with no focal deficits. Patient is awake, alert & oriented x4 and speaking in complete sentences without dysphasia or slurring of their words, CN II-XII grossly intact, good coordination with normal finger-to-nose and heel-to-shin test, 5/5 motor strength in all 4 extremities, sensation to light touch intact bilaterally, patellar and achilles reflexes 2+ and equal bilaterally, gait is normal without ataxia, no truncal ataxia.      Psychiatric:         Mood and Affect: Mood normal.         MEDICAL DECISION MAKING    Vitals:    Vitals:    09/14/21 1620   BP: 131/75   Pulse: 101   Resp: 20   Temp: 98.6 °F (37 °C)   TempSrc: Oral   SpO2: 93%   Weight: 281 lb 15.5 oz (127.9 kg)   Height: 5' 8\" (1.727 m)       LABS:  Labs Reviewed   COMPREHENSIVE METABOLIC PANEL W/ REFLEX TO MG FOR LOW K - Abnormal; Notable for the following components:       Result Value    Glucose 121 (*)     Albumin/Globulin Ratio 1.0 (*)     ALT 52 (*)     All other components within normal limits    Narrative:     Performed at:  [facility]  [address] [place] [address]   Phone [phone]   CBC WITH AUTO DIFFERENTIAL    Narrative:     Performed at:  [facility]  [address] [facility] [address]   Phone [phone]   LACTIC ACID, PLASMA    Narrative:     Performed at:  [facility]  [address] [place] [facility] [address]   Phone [phone] of labs reviewed and were negative at this time or not returned at the time of this note. RADIOLOGY:   Non-plain film images such as CT, Ultrasound and MRI are read by the radiologist. ODALYS Palencia CNP have directly visualized the radiologic plain film image(s) with the below findings:      Interpretation per the Radiologist below, if available at the time of this note:    No orders to display        IR ARTHR/ASP/INJ MAJOR JT/BURSA W US    Result Date: 8/20/2021  Radiology exam is complete. No Radiologist dictation. Please follow up with ordering provider. MEDICAL DECISION MAKING / ED COURSE:      PROCEDURES:   Procedures    None    Patient was given:  Medications - No data to display    Differential diagnoses: Airway Obstruction, Epiglottitis, Retropharyngeal Abscess, Parapharyngeal Abscess, Pneumonia, Hypoxemia, Dehydration, other. Patient seen and examined today for cough and congestion. See HPI for patient presentation. Patient is hemodynamically stable, nontoxic, afebrile, and without tachycardia, tachypnea, and hypoxia. Physical exam as above. 55year-old lying in bed in no acute distress. She is awake alert and oriented with no neurovascular deficits. NIH of 0. Neck is supple with full range of motion. No meningismus or adenopathy. Posterior oropharynx without redness swelling or exudate. TMs benign. Lungs are CTA bilaterally. Cardiac exam is normal.  Abdomen soft and nontender. No swelling in lower extremities. CBC and chemistry are normal.  Lactic acid is normal.    Patient is already on doxycycline. She has a headache which is likely from the sinus pressure. She has no neurovascular deficits. She will be started on steroids. She already takes Flonase. I advised her to follow-up with her PCP and pulmonologist and return for any numbness dizziness or weakness. At this time, the evidence for any other entities in the differential is insufficient to justify any further testing. This was explained to the patient. The patient was advised that persistent or worsening symptoms will require further evaluation. I discussed with Roberto Mccallum and/or family the exam results, diagnosis, care, prognosis, reasons to return and the importance of follow up. Patient and/or family is in full agreement with plan and all questions have been answered. Specific discharge instructions explained, including reasons to return to the emergency department. Roberto Mccallum is well appearing, non-toxic, and afebrile at the time of discharge. Patient was instructed to follow up with primary care provider in 24-48 hours, and to instructed to return to ED immediately for any new or worsening concerns. Roberto Mccallum verbalized understanding and discharged home. The patient tolerated their visit well. I evaluated the patient. The physician was available for consultation as needed. The patient and / or the family were informed of the results of any tests, a time was given to answer questions, a plan was proposed and they agreed with plan. CLINICAL IMPRESSION:  1.  COVID-19        DISPOSITION Decision To Discharge 09/14/2021 06:08:10 PM      PATIENT REFERRED TO:  MD Adonay Childers Shira [address] [place] [address]  [phone]    Schedule an appointment as soon as possible for a visit       [facility] Emergency Department  [address] [address]  [phone]  Go to   As needed      DISCHARGE MEDICATIONS:  Discharge Medication List as of 9/14/2021  6:09 PM START taking these medications    Details   dexamethasone (DECADRON) 6 MG tablet Take 1 tablet by mouth daily (with breakfast) for 10 days, Disp-10 tablet, R-0Normal      butalbital-APAP-caffeine (FIORICET) -40 MG CAPS per capsule Take 1 capsule by mouth every 4 hours as needed for Headaches, Disp-25 capsule, R-0Normal             DISCONTINUED MEDICATIONS:  Discharge Medication List as of 9/14/2021  6:09 PM                 (Please note the MDM and HPI sections of this note were completed with a voice recognition program.  Efforts were made to edit the dictations but occasionally words are mis-transcribed.)    Electronically signed, ODALYS Villarreal CNP,           ODALYS Villarreal CNP  09/14/21 2232

## 2021-09-14 NOTE — TELEPHONE ENCOUNTER
----- Message from Nichole Sexton sent at 9/13/2021  5:19 PM EDT -----  Subject: Message to Provider    QUESTIONS  Information for Provider? Pt had covid test on thursday, does not have   results yet. Pt has a severe ha, fever, body aches, and cough. Please call   pt to advise.   ---------------------------------------------------------------------------  --------------  CALL BACK INFO  What is the best way for the office to contact you? OK to leave message on   voicemail  Preferred Call Back Phone Number? [phone]  ---------------------------------------------------------------------------  --------------  SCRIPT ANSWERS  Relationship to Patient?  Self

## 2021-09-14 NOTE — ED NOTES
D/C: Order noted for d/c. Pt confirmed d/c paperwork . Discharge and education instructions reviewed with patient. Teach-back successful. Pt verbalized understanding and signed d/c papers. Pt denied questions at this time. No acute distress noted. Patient instructed to follow-up as noted - return to emergency department if symptoms worsen. Patient verbalized understanding. Discharged per EDMD with discharge instructions. Pt discharged to private vehicle. Patient stable upon departure. Thanked patient for choosing [facility]) for care. Provider aware of patient pain at time of discharge.      Britney Shaw, RN  09/14/21 [id number]

## 2021-09-15 ENCOUNTER — CARE COORDINATION (OUTPATIENT)
Dept: CARE COORDINATION | Age: 56
End: 2021-09-15

## 2021-09-15 NOTE — CARE COORDINATION
Patient contacted regarding COVID-19 diagnosis. Discussed COVID-19 related testing which was not done at this time. Test results were not done. Patient informed of results, if available? N/A. Ambulatory Care Manager contacted the patient by telephone to perform post discharge assessment. Call within 2 business days of discharge: Yes. Verified name and  with patient as identifiers. Provided introduction to self, and explanation of the CTN/ACM role, and reason for call due to risk factors for infection and/or exposure to COVID-19. Symptoms reviewed with patient who verbalized the following symptoms: no new symptoms and no worsening symptoms. Due to no new or worsening symptoms encounter was not routed to provider for escalation. Discussed follow-up appointments. If no appointment was previously scheduled, appointment scheduling offered: Yes. [facility] follow up appointment(s):   Future Appointments   Date Time Provider Adenike Emery   2021  2:00 PM YAZMIN Guerrero [facility] [place]   2021  1:20 PM Hardik Hough MD [address] [place] [address]     Non-[facility] follow up appointment(s):     Non-face-to-face services provided:  Obtained and reviewed discharge summary and/or continuity of care documents     Advance Care Planning:   Does patient have an Advance Directive:  not on file; education provided. Educated patient about risk for severe COVID-19 due to risk factors according to CDC guidelines. ACM reviewed discharge instructions, medical action plan and red flag symptoms with the patient who verbalized understanding. Discussed COVID vaccination status: Yes. Education provided on COVID-19 vaccination as appropriate. Discussed exposure protocols and quarantine with CDC Guidelines. Patient was given an opportunity to verbalize any questions and concerns and agrees to contact ACM or health care provider for questions related to their healthcare.     Reviewed and educated patient on any new and changed medications related to discharge diagnosis     Was patient discharged with a pulse oximeter? No Discussed and confirmed pt understanding discharge instructions and when to notify provider or seek emergency care. Pt verbalized understanding of above and agreement with the following  Plan:  ACM provided contact information. Plan for follow-up call in 1-2 days based on severity of symptoms and risk factors.

## 2021-09-16 ENCOUNTER — CARE COORDINATION (OUTPATIENT)
Dept: CARE COORDINATION | Age: 56
End: 2021-09-16

## 2021-09-17 ENCOUNTER — TELEPHONE (OUTPATIENT)
Dept: FAMILY MEDICINE CLINIC | Age: 56
End: 2021-09-17

## 2021-09-17 NOTE — TELEPHONE ENCOUNTER
----- Message from Cari Osorio sent at 9/16/2021  5:08 PM EDT -----  Subject: Message to Provider    QUESTIONS  Information for Provider? Pt sees Dr. Srikanth Reyes and missed a call from [place]   today and she's returning the call. She also has questions about whether   she should take Ivermectin or the antibody infusion for the covid   symptoms. ---------------------------------------------------------------------------  --------------  Gilles Blocker INFO  What is the best way for the office to contact you? OK to leave message on   voicemail  Preferred Call Back Phone Number?  [phone]  ---------------------------------------------------------------------------  --------------  SCRIPT ANSWERS  undefined

## 2021-09-20 NOTE — TELEPHONE ENCOUNTER
Called the pt, she is feeling good, her fever subsided 9/15 last Sat none since. O2 running 93-94%.      Pt said when she went to [facility] on 9/9 to be tested for covid they ordered Ivermectin to be shipped over night to the pts home, pt is still waiting for that shipment!  Pt is not going to take it because she doesn't feel she needs it at this point.

## 2021-09-20 NOTE — TELEPHONE ENCOUNTER
Called the pt, she is feeling good, her fever subsided 9/15 last Sat none since. O2 running 93-94%. Pt said when she went to [facility] on 9/9 to be tested for covid they ordered Ivermectin to be shipped over night to the pts home, pt is still waiting for that shipment! Pt is not going to take it because she doesn't feel she needs it at this point.

## 2021-09-24 ENCOUNTER — CARE COORDINATION (OUTPATIENT)
Dept: CARE COORDINATION | Age: 56
End: 2021-09-24

## 2021-09-24 NOTE — CARE COORDINATION
Patient contacted regarding COVID-19 diagnosis. Discussed COVID-19 related testing which was available at this time. Test results were positive. Patient informed of results, if available? Yes    Ambulatory Care Manager contacted the patient by telephone to perform follow-up assessment. Verified name and  with patient as identifiers. Patient has following risk factors of: asthma and anemia and liver dx. .      Symptoms reviewed with patient who verbalized the following symptoms: fever and headache. .   Due to no new or worsening symptoms encounter was not routed to provider for escalation. Educated patient about risk for severe COVID-19 due to risk factors according to CDC guidelines. ACM reviewed discharge instructions, medical action plan and red flag symptoms with the patient who verbalized understanding. Discussed COVID vaccination status: Yes. Education provided on COVID-19 vaccination as appropriate. Discussed exposure protocols and quarantine with CDC Guidelines. Patient was given an opportunity to verbalize any questions and concerns and agrees to contact ACM or health care provider for questions related to their healthcare. Was patient discharged with a pulse oximeter? No Discussed and confirmed pulse oximeter discharge instructions and when to notify provider or seek emergency care. ACM provided contact information. No further follow-up call identified based on severity of symptoms and risk factors. Patient stated she was feeling a lot better and had no more symptoms at this time. She has a follow up appointment with Dr. Courtney Martin, her pulmonologist for next week.

## 2021-10-05 ENCOUNTER — TELEPHONE (OUTPATIENT)
Dept: FAMILY MEDICINE CLINIC | Age: 56
End: 2021-10-05

## 2021-10-05 RX ORDER — BUTALBITAL, ACETAMINOPHEN AND CAFFEINE 50; 325; 40 MG/1; MG/1; MG/1
1 TABLET ORAL EVERY 4 HOURS PRN
Qty: 60 TABLET | Refills: 3 | Status: SHIPPED | OUTPATIENT
Start: 2021-10-05

## 2021-10-05 NOTE — TELEPHONE ENCOUNTER
Pt was prescribed Fioricet for headaches when she had covid about 3-4 weeks ago, pt still has headaches and would like to know if Dr. Millicent Steward could call that in for her.      Pl advise  [phone] (home)

## 2022-03-02 ENCOUNTER — TELEPHONE (OUTPATIENT)
Dept: FAMILY MEDICINE CLINIC | Age: 57
End: 2022-03-02

## 2022-03-03 ENCOUNTER — OFFICE VISIT (OUTPATIENT)
Dept: FAMILY MEDICINE CLINIC | Age: 57
End: 2022-03-03
Payer: COMMERCIAL

## 2022-03-03 VITALS
HEIGHT: 68 IN | WEIGHT: 281 LBS | DIASTOLIC BLOOD PRESSURE: 72 MMHG | OXYGEN SATURATION: 96 % | BODY MASS INDEX: 42.59 KG/M2 | SYSTOLIC BLOOD PRESSURE: 112 MMHG | TEMPERATURE: 97.3 F | HEART RATE: 76 BPM

## 2022-03-03 DIAGNOSIS — J98.6 PARALYSIS OF DIAPHRAGM: ICD-10-CM

## 2022-03-03 DIAGNOSIS — F11.90 CHRONIC NARCOTIC USE: ICD-10-CM

## 2022-03-03 DIAGNOSIS — E89.0 POSTOPERATIVE HYPOTHYROIDISM: ICD-10-CM

## 2022-03-03 DIAGNOSIS — J45.40 MODERATE PERSISTENT ASTHMA WITHOUT COMPLICATION: ICD-10-CM

## 2022-03-03 DIAGNOSIS — E66.01 CLASS 3 SEVERE OBESITY DUE TO EXCESS CALORIES WITH SERIOUS COMORBIDITY AND BODY MASS INDEX (BMI) OF 40.0 TO 44.9 IN ADULT (HCC): ICD-10-CM

## 2022-03-03 DIAGNOSIS — Z01.818 PREOP EXAMINATION: Primary | ICD-10-CM

## 2022-03-03 DIAGNOSIS — C69.41 MALIGNANT MELANOMA OF CILIARY BODY OF RIGHT EYE (HCC): ICD-10-CM

## 2022-03-03 PROBLEM — H26.40 SECONDARY CATARACT: Status: ACTIVE | Noted: 2021-03-18

## 2022-03-03 PROBLEM — H18.529 ABMD (ANTERIOR BASEMENT MEMBRANE DYSTROPHY): Status: ACTIVE | Noted: 2021-03-18

## 2022-03-03 PROBLEM — H25.12 AGE-RELATED NUCLEAR CATARACT OF LEFT EYE: Status: ACTIVE | Noted: 2021-03-18

## 2022-03-03 PROCEDURE — 99214 OFFICE O/P EST MOD 30 MIN: CPT | Performed by: NURSE PRACTITIONER

## 2022-03-03 PROCEDURE — G8427 DOCREV CUR MEDS BY ELIG CLIN: HCPCS | Performed by: NURSE PRACTITIONER

## 2022-03-03 PROCEDURE — 1036F TOBACCO NON-USER: CPT | Performed by: NURSE PRACTITIONER

## 2022-03-03 PROCEDURE — 3017F COLORECTAL CA SCREEN DOC REV: CPT | Performed by: NURSE PRACTITIONER

## 2022-03-03 PROCEDURE — G8484 FLU IMMUNIZE NO ADMIN: HCPCS | Performed by: NURSE PRACTITIONER

## 2022-03-03 PROCEDURE — G8417 CALC BMI ABV UP PARAM F/U: HCPCS | Performed by: NURSE PRACTITIONER

## 2022-03-03 ASSESSMENT — ENCOUNTER SYMPTOMS
BACK PAIN: 1
TROUBLE SWALLOWING: 0
VOMITING: 0
WHEEZING: 0
RHINORRHEA: 0
SORE THROAT: 0
COLOR CHANGE: 0
ROS SKIN COMMENTS: NO HISTORY OF MRSA
DIARRHEA: 0
CONSTIPATION: 0
CHEST TIGHTNESS: 0
SHORTNESS OF BREATH: 0
NAUSEA: 0
BLOOD IN STOOL: 0
APNEA: 0
ABDOMINAL PAIN: 0
COUGH: 0

## 2022-03-03 NOTE — PROGRESS NOTES
PRE-OP HISTORY AND PHYSICAL             Date: 3/3/2022        Patient Name: Donny Kussmaul     YOB: 1965      Age:  65 y.o. Chief Complaint     Chief Complaint   Patient presents with    Pre-op Exam     surgery 3-4-22 new lens in right eye, Dr Monae Krishnan, [facility] [phone]          History Obtained From   Patient    History of Present Illness   Presents to office today for pre-op clearance for lens implant to right eye scheduled on  3/4/2022  with  Dr. Monae Krishnan at [facility]. Had neuroma in right eye had radiation plate taken out in April, now going in for new lens, hoping to gain some vision in right eye.    Past Medical History     Past Medical History:   Diagnosis Date    Anxiety     Asthma     currently was instructed to stop inhalers d/t to collapse of vocal cords    Diaphragm paralysis     right side    Fatty liver     Fibromyalgia     GERD (gastroesophageal reflux disease)     Headache(784.0)     neck pain     History of ulcer disease     Hypothyroidism     status post thyroidectomy     Melanoma ([address]     right eye     Osteoarthritis     CHRONIC BACK PAIN/FIBROMYALGIA    Rheumatoid arthritis (HCC)     Spasmodic dysphonia     voice very hoarse    Thyroid disease     2015 found spot on left side of thyroid, right side thyroid was removed    Wears dentures     Wears glasses     Wears glasses         Past Surgical History     Past Surgical History:   Procedure Laterality Date    APPENDECTOMY  age 17   [facility] BREAST BIOPSY Right     x3  benign lesions     BREAST SURGERY Right needle localization right breast mass    BREAST SURGERY Right 10/22/15    riught breast mass excision/needle loc    CARPAL TUNNEL RELEASE Right 5/7/15    CARPAL TUNNEL RELEASE Left 5/28/15    Left carpal tunnel release      COLONOSCOPY      ENDOSCOPY, COLON, DIAGNOSTIC      EPIDURAL STEROID INJECTION N/A 2/1/2019    LUMBAR EPIDURAL STEROID INJECTION L3/4 performed by Araceli Roa MD at [facility] ENDOSCOPY    EPIDURAL STEROID INJECTION N/A 11/22/2019    CERVICAL EPIDURAL STEROID INJECTION C7-T1 performed by Maria De Jesus Johnson MD at [address] [facility]      right eye-radiation plate for melanoma    HC INJECT OTHER PERPHRL NERV Bilateral 3/29/2019    INTRA ARTICULAR KNEE INJECTIONS performed by Maria De Jesus Johnson MD at [address] NERV Bilateral 12/6/2019    BILATERAL INTRAARTICULAR KNEE INJECTION performed by Maria De Jesus Johnson MD at [address] ARTHROSCOPY Left 12/21/2017    MEDICATION INJECTION Bilateral 8/20/2021    BILATERAL KNEE STEROID INJECTION performed by Maria De Jesus Johnson MD at [address] [facility] [address] Bilateral 8/13/2021    BILATERAL TWO LEVEL LUMBAR TRANSFORAMINAL EPIDURAL STEROID INJECTION AT L3 AND L5 performed by Maria De Jesus Johnson MD at [address] [facility] ARTHROCENTESIS ASPIR&/INJ MAJOR JT/BURSA W/O US Bilateral 5/10/2019    GREATER TROCHANTERIC BURSA INJECTIONS performed by Maria De Jesus Johnson MD at [place] [address] DX/THER SBST INTRLMNR CRV/THRC W/IMG GDN N/A 9/28/2018    EPIDURAL STEROID INJECTION C7-T1 performed by Maria De Jesus Johnson MD at [place] [address] DX/THER SBST INTRLMNR LMBR/SAC W/IMG GDN Right 11/9/2018    LUMBAR EPIDURAL STEROID INJECTION L3-4 performed by Maria De Jesus Johnson MD at [facility] right side of thyroid    TUBAL LIGATION  2505    UMBILICAL HERNIA REPAIR N/A 2/58/9273    UMBILICAL HERNIA REPAIR WITH MESH performed by Estelle Osorio MD at [facility] 7/2/2021    EGD BIOPSY performed by Cassie Sterling MD at [facility]        Medications Prior to Admission     Prior to Admission medications    Medication Sig Start Date End Date Taking?  Authorizing Provider   butalbital-acetaminophen-caffeine (FIORICET, ESGIC) -40 MG per tablet Take 1 tablet by mouth every 4 hours as needed for Headaches 10/5/21  Yes Jhonny Cooper MD   levothyroxine (SYNTHROID) 125 MCG tablet TAKE 1 TABLET BY MOUTH DAILY 8/23/21  Yes ODALYS Crocker NP   gabapentin (NEURONTIN) 600 MG tablet Take 600 mg by mouth 3 times daily. Yes Historical Provider, MD   ibuprofen (ADVIL;MOTRIN) 800 MG tablet Take 800 mg by mouth as needed for Pain   Yes Historical Provider, MD   pantoprazole (PROTONIX) 40 MG tablet Take 1 tablet by mouth 2 times daily (before meals) 6/29/21  Yes Jhonny Cooper MD   Fluticasone furoate-vilanterol (BREO ELLIPTA) 200-25 MCG/INH AEPB inhaler Inhale 1 puff into the lungs daily 5/28/21  Yes Swetha Duffy MD   fluticasone [facility]) 50 MCG/ACT nasal spray 1 spray by Each Nostril route daily 5/25/21  Yes ODALYS Crocker NP   albuterol sulfate HFA (PROAIR HFA) 108 (90 Base) MCG/ACT inhaler Inhale 2 puffs into the lungs every 6 hours as needed for Wheezing or Shortness of Breath 4/1/21  Yes Swetha Duffy MD   oxyCODONE-acetaminophen (PERCOCET) 5-325 MG per tablet every 8 hours as needed.  7/24/20  Yes Historical Provider, MD   diclofenac sodium (VOLTAREN) 1 % GEL Apply 2-4 gm to affected area tid for 2 weeks then bid prn thereafter 7/9/19  Yes Jamie Byers MD   ondansetron (ZOFRAN) 4 MG tablet TAKE 1 TABLET BY MOUTH DAILY AS NEEDED FOR NAUSEA OR VOMITING 12/6/18  Yes Jhonny Cooper MD   Cholecalciferol (VITAMIN D) 2000 units CAPS capsule Take by mouth daily    Yes Historical Provider, MD        Allergies   Other    Social History     Social History     Tobacco History     Smoking Status  Former Smoker Smoking Start Date  1/1/1983 Quit date  1/1/2003 Smoking Frequency  1 pack/day for 15 years (15 pk yrs)    Smoking Tobacco Type  Cigarettes    Smokeless Tobacco Use  Never Used          Alcohol History     Alcohol Use Status  Not Currently Amount  0.0 standard drinks of alcohol/wk          Drug Use     Drug Use Status  Never          Sexual Activity     Sexually Active  Not Currently Partners  Male                Family History     Family History   Problem Relation Age of Onset    Asthma Father     Heart Disease Father     Emphysema Father     Heart Attack Father     Breast Cancer Maternal Aunt         breast    Breast Cancer Maternal Aunt         breast    Migraines Mother    Jessica Poles Mother 61        multiple mylenoma    Diabetes Sister     Migraines Sister     Breast Cancer Maternal Cousin         breast    No Known Problems Maternal Grandmother     No Known Problems Maternal Grandfather     No Known Problems Paternal Grandmother     Colon Cancer Maternal Uncle        Review of Systems   Review of Systems   Constitutional: Negative for activity change, appetite change, fatigue, fever and unexpected weight change. HENT: Positive for dental problem (full dentures upper and lower). Negative for congestion, nosebleeds, postnasal drip, rhinorrhea, sneezing, sore throat and trouble swallowing. Eyes: Positive for visual disturbance (right eye). Respiratory: Negative for apnea, cough, chest tightness, shortness of breath and wheezing. No history of TB or Communicable Diseases (Influenza or COVID) in the last 30 days  History of asthma, controlled with inhalers  No history of sleep apnea   Cardiovascular: Negative for chest pain, palpitations and leg swelling. No history of CP unable to due a flight of stairs due to right side of diaphragm is paralyzed from nerve damage. No history of heart attack, stroke, or irregular heart beat. No history of angina or heart failure. No history of rheumatic fever. Gastrointestinal: Negative for abdominal pain, blood in stool, constipation, diarrhea, nausea and vomiting. Endocrine: Negative for cold intolerance and heat intolerance.         No history of diabetes   No history of adrenal insufficiency  History of hypothyroid     Genitourinary: Negative for difficulty urinating, dysuria, flank pain, frequency, hematuria and urgency. Denies history of kidney disease   Musculoskeletal: Positive for arthralgias (yohana knee, neck) and back pain. Negative for gait problem, joint swelling, myalgias and neck pain. Problems with pain, stiffness and arthritis in neck  Denies pain, stiffness in jaw, arthritis in jaw   Skin: Negative for color change, rash and wound. No history of MRSA   Allergic/Immunologic: Negative for environmental allergies, food allergies and immunocompromised state. Neurological: Positive for headaches (related to right eye problems). Negative for dizziness, seizures, syncope, weakness, light-headedness and numbness. No history or epilepsy or seizures. Hematological: Negative for adenopathy. Does not bruise/bleed easily. No history of bleeding disorders   Psychiatric/Behavioral: Negative for confusion, dysphoric mood, sleep disturbance and suicidal ideas. The patient is nervous/anxious (mom living with pt, hospice). No personal or family (blood relatives) have had a problem following an anaesthetic  Fatty liver, ALT 52 9/14/2021  Physical Exam   /72 (Site: Left Upper Arm, Position: Sitting, Cuff Size: Medium Adult)   Pulse 76   Temp 97.3 °F (36.3 °C) (Oral)   Ht 5' 8\" (1.727 m)   Wt 281 lb (127.5 kg)   LMP 02/25/2016 (Exact Date)   SpO2 96%   BMI 42.73 kg/m²     Physical Exam  Constitutional:       General: She is not in acute distress. Appearance: She is obese. She is not ill-appearing. HENT:      Head: Normocephalic. Right Ear: Tympanic membrane, ear canal and external ear normal.      Left Ear: Tympanic membrane, ear canal and external ear normal.      Nose: Nose normal. No congestion. Mouth/Throat:      Mouth: Mucous membranes are moist.      Dentition: Has dentures (upper and lower). Pharynx: Oropharynx is clear.  No oropharyngeal exudate or posterior oropharyngeal erythema. Eyes:      Conjunctiva/sclera: Conjunctivae normal.      Pupils: Pupils are equal, round, and reactive to light. Neck:      Thyroid: No thyromegaly. Vascular: No carotid bruit. Comments: ROM limited from pain and arthritis  Cardiovascular:      Rate and Rhythm: Normal rate and regular rhythm. Pulses: Normal pulses. Heart sounds: Normal heart sounds. No murmur heard. No friction rub. No gallop. Pulmonary:      Effort: Pulmonary effort is normal. No respiratory distress. Breath sounds: Examination of the right-upper field reveals decreased breath sounds. Examination of the right-middle field reveals decreased breath sounds. Examination of the right-lower field reveals decreased breath sounds. Decreased breath sounds present. No wheezing or rhonchi. Abdominal:      General: Bowel sounds are normal. There is no distension. Palpations: Abdomen is soft. There is no hepatomegaly, splenomegaly or mass. Tenderness: There is no abdominal tenderness. There is no right CVA tenderness or left CVA tenderness. Musculoskeletal:      Cervical back: Neck supple. No tenderness. Decreased range of motion. Right lower leg: No edema. Left lower leg: No edema. Lymphadenopathy:      Cervical: No cervical adenopathy. Skin:     General: Skin is warm and dry. Capillary Refill: Capillary refill takes less than 2 seconds. Findings: No bruising, erythema or rash. Neurological:      General: No focal deficit present. Mental Status: She is alert and oriented to person, place, and time. Motor: No weakness.       Coordination: Coordination normal.   Psychiatric:         Mood and Affect: Mood normal.         Labs      Admission on 09/14/2021, Discharged on 09/14/2021   Component Date Value Ref Range Status    WBC 09/14/2021 7.1  4.0 - 11.0 K/uL Final    RBC 09/14/2021 5.16  4.00 - 5.20 M/uL Final    Hemoglobin 09/14/2021 14.2  12.0 - 16.0 g/dL Final    Hematocrit 09/14/2021 43.4  36.0 - 48.0 % Final    MCV 09/14/2021 84.0  80.0 - 100.0 fL Final    MCH 09/14/2021 27.6  26.0 - 34.0 pg Final    MCHC 09/14/2021 32.8  31.0 - 36.0 g/dL Final    RDW 09/14/2021 15.4  12.4 - 15.4 % Final    Platelets 96/26/3966 228  135 - 450 K/uL Final    MPV 09/14/2021 8.6  5.0 - 10.5 fL Final    Neutrophils % 09/14/2021 58.4  % Final    Lymphocytes % 09/14/2021 31.3  % Final    Monocytes % 09/14/2021 9.2  % Final    Eosinophils % 09/14/2021 0.9  % Final    Basophils % 09/14/2021 0.2  % Final    Neutrophils Absolute 09/14/2021 4.1  1.7 - 7.7 K/uL Final    Lymphocytes Absolute 09/14/2021 2.2  1.0 - 5.1 K/uL Final    Monocytes Absolute 09/14/2021 0.6  0.0 - 1.3 K/uL Final    Eosinophils Absolute 09/14/2021 0.1  0.0 - 0.6 K/uL Final    Basophils Absolute 09/14/2021 0.0  0.0 - 0.2 K/uL Final    Sodium 09/14/2021 138  136 - 145 mmol/L Final    Potassium reflex Magnesium 09/14/2021 3.9  3.5 - 5.1 mmol/L Final    Chloride 09/14/2021 101  99 - 110 mmol/L Final    CO2 09/14/2021 24  21 - 32 mmol/L Final    Anion Gap 09/14/2021 13  3 - 16 Final    Glucose 09/14/2021 121* 70 - 99 mg/dL Final    BUN 09/14/2021 7  7 - 20 mg/dL Final    CREATININE 09/14/2021 0.6  0.6 - 1.1 mg/dL Final    GFR Non- 09/14/2021 >60  >60 Final    Comment: >60 mL/min/1.73m2 EGFR, calc. for ages 25 and older using the  MDRD formula (not corrected for weight), is valid for stable  renal function.  GFR  09/14/2021 >60  >60 Final    Comment: Chronic Kidney Disease: less than 60 ml/min/1.73 sq.m. Kidney Failure: less than 15 ml/min/1.73 sq.m. Results valid for patients 18 years and older.       Calcium 09/14/2021 10.4  8.3 - 10.6 mg/dL Final    Total Protein 09/14/2021 7.6  6.4 - 8.2 g/dL Final    Albumin 09/14/2021 3.8  3.4 - 5.0 g/dL Final    Albumin/Globulin Ratio 09/14/2021 1.0* 1.1 - 2.2 Final    Total Bilirubin 09/14/2021 0.5  0.0 - 1.0 mg/dL Final  Alkaline Phosphatase 09/14/2021 95  40 - 129 U/L Final    ALT 09/14/2021 52* 10 - 40 U/L Final    AST 09/14/2021 35  15 - 37 U/L Final    Globulin 09/14/2021 3.8  g/dL Final    Lactic Acid 09/14/2021 1.0  0.4 - 2.0 mmol/L Final          Assessment & Plan   1. Preop examination  Cleared for right eye lens implant. Discussed increase risk with paralyzed diaphragm and obesity. 2. Postoperative hypothyroidism  Well controlled, last TSH 1.15    3. Moderate persistent asthma without complication  Well controlled on Breo and PRN inhaler    4. Chronic narcotic use  Due to fibromyalgia     5. Class 3 severe obesity due to excess calories with serious comorbidity and body mass index (BMI) of 40.0 to 44.9 in adult [facility])  Improving, down 11lbs in 5 months. 6. 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 Paralysis of diaphragm  Right side, cleared by pulmonology last year for inital right eye surgery.           Electronically signed by ODALYS Mccarthy NP on 3/3/22 at 9:40 AM EST

## 2022-03-19 DIAGNOSIS — E89.0 POSTOPERATIVE HYPOTHYROIDISM: ICD-10-CM

## 2022-03-21 ENCOUNTER — TELEPHONE (OUTPATIENT)
Dept: FAMILY MEDICINE CLINIC | Age: 57
End: 2022-03-21

## 2022-03-21 DIAGNOSIS — E89.0 POSTOPERATIVE HYPOTHYROIDISM: Primary | ICD-10-CM

## 2022-03-21 DIAGNOSIS — J40 BRONCHITIS: Primary | ICD-10-CM

## 2022-03-21 RX ORDER — LEVOTHYROXINE SODIUM 0.12 MG/1
125 TABLET ORAL DAILY
Qty: 30 TABLET | Refills: 0 | Status: SHIPPED | OUTPATIENT
Start: 2022-03-21 | End: 2022-04-13

## 2022-03-21 RX ORDER — AZITHROMYCIN 250 MG/1
250 TABLET, FILM COATED ORAL SEE ADMIN INSTRUCTIONS
Qty: 6 TABLET | Refills: 0 | Status: SHIPPED | OUTPATIENT
Start: 2022-03-21 | End: 2022-03-26

## 2022-04-11 DIAGNOSIS — E89.0 POSTOPERATIVE HYPOTHYROIDISM: ICD-10-CM

## 2022-04-12 DIAGNOSIS — E89.0 POSTOPERATIVE HYPOTHYROIDISM: ICD-10-CM

## 2022-04-12 LAB — TSH SERPL DL<=0.05 MIU/L-ACNC: 1.62 UIU/ML (ref 0.27–4.2)

## 2022-04-13 RX ORDER — LEVOTHYROXINE SODIUM 0.12 MG/1
125 TABLET ORAL DAILY
Qty: 90 TABLET | Refills: 1 | Status: SHIPPED | OUTPATIENT
Start: 2022-04-13 | End: 2022-10-17

## 2022-04-18 ENCOUNTER — OFFICE VISIT (OUTPATIENT)
Dept: ORTHOPEDIC SURGERY | Age: 57
End: 2022-04-18
Payer: COMMERCIAL

## 2022-04-18 VITALS — BODY MASS INDEX: 42.59 KG/M2 | RESPIRATION RATE: 16 BRPM | WEIGHT: 281 LBS | HEIGHT: 68 IN

## 2022-04-18 DIAGNOSIS — E89.0 POSTOPERATIVE HYPOTHYROIDISM: ICD-10-CM

## 2022-04-18 DIAGNOSIS — M18.0 ARTHRITIS OF CARPOMETACARPAL (CMC) JOINTS OF BOTH THUMBS: Primary | ICD-10-CM

## 2022-04-18 PROCEDURE — 99244 OFF/OP CNSLTJ NEW/EST MOD 40: CPT | Performed by: ORTHOPAEDIC SURGERY

## 2022-04-18 PROCEDURE — G8427 DOCREV CUR MEDS BY ELIG CLIN: HCPCS | Performed by: ORTHOPAEDIC SURGERY

## 2022-04-18 PROCEDURE — G8417 CALC BMI ABV UP PARAM F/U: HCPCS | Performed by: ORTHOPAEDIC SURGERY

## 2022-04-18 RX ORDER — LEVOTHYROXINE SODIUM 0.12 MG/1
125 TABLET ORAL DAILY
Qty: 30 TABLET | OUTPATIENT
Start: 2022-04-18

## 2022-04-18 NOTE — PROGRESS NOTES
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 This has been present from time to time since 2019, when she was seen by Dr. Michelle Rios for similar symptoms in her left thumb. There is mild pain at rest.  Pain is aggrevated by movement, pinching and gripping. Symptoms have worsened recently. There is no mechanical snapping or popping on movement. There is no history of injury or chronic overuse. Arthritic symptoms are sometimes noticed in other joints. There is a family history of arthritis. She is in pain management. The pain assessment has been reviewed and is correct as stated. The patient's social history, past medical history, family history, medications, allergies and review of systems, entered 4/18/22,  have been reviewed, and dated and are recorded in the chart. On physical examination the patient is Height: 5' 8\" (172.7 cm), Weight: 281 lb (127.5 kg),  Resp: 16 per minute. The patient is well nourished, is oriented to time and place, demonstrates appropriate mood and affect as well as normal gait and station. There is soft tissue swelling and bony thickening over the basilar joint areas of the thumbs, L>R. There is tenderness to palpation over the base of the right thumb metacarpal.  The grind test is negative. On testing range of motion, there is a limitation of only a few degrees of flexion, extension and retroposition. There is no thickening or tenderness over the flexor tendon sheath. There is no tenderness over the first dorsal compartment. The Ede test is negative. Skin is intact, as is distal circulation and sensation. Gross muscle strength is normal bilaterally. Hand and wrist joints are stable. There are no subcutaneous nodules or enlarged epitrochlear lymph nodes.     Lateral and Rincon view xrays of the right thumb, done in the office today, show narrowing and spur formation of the trapeziometacarpal joint. I have personally reviewed and interpreted all previous external imaging studies, laboratory tests(TSH, CMP, CBC+diff), diagnostic procedures(EMG) and medical encounters pertinent to this patient's visit today. 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 The nature of this medical problem is fully discussed with the patient, including all treatment options; including use of medications (acetaminaphin, Ibuprofen, naproxen, steroids), braces, injections and surgery, including the appropriate precautions, potential side effects and reasonable expectations of relief. Neither steroid injection nor surgery are indicated at this time. It is mutually decided to continue symptomatic treatment under direction of her pain management provider, Dr. Zuleima Vidal).

## 2022-06-29 DIAGNOSIS — J01.90 ACUTE NON-RECURRENT SINUSITIS, UNSPECIFIED LOCATION: Primary | ICD-10-CM

## 2022-06-29 RX ORDER — AZITHROMYCIN 250 MG/1
250 TABLET, FILM COATED ORAL SEE ADMIN INSTRUCTIONS
Qty: 6 TABLET | Refills: 0 | Status: SHIPPED | OUTPATIENT
Start: 2022-06-29 | End: 2022-07-04

## 2022-07-20 DIAGNOSIS — J45.40 MODERATE PERSISTENT ASTHMA WITHOUT COMPLICATION: ICD-10-CM

## 2022-07-21 RX ORDER — FLUTICASONE FUROATE AND VILANTEROL 200; 25 UG/1; UG/1
POWDER RESPIRATORY (INHALATION)
Qty: 60 EACH | Refills: 11 | Status: SHIPPED | OUTPATIENT
Start: 2022-07-21

## 2022-07-21 NOTE — TELEPHONE ENCOUNTER
Last appt: 8-  Next appt: No future appointment at this time. Patient has canceled/no showed her last 3 appointments.    Medication matches medication on Epic list

## 2022-08-17 ENCOUNTER — TELEPHONE (OUTPATIENT)
Dept: PULMONOLOGY | Age: 57
End: 2022-08-17

## 2022-08-17 DIAGNOSIS — R07.9 CHEST PAIN, UNSPECIFIED TYPE: Primary | ICD-10-CM

## 2022-08-17 NOTE — TELEPHONE ENCOUNTER
Visited with neurologist discussed with him new symptoms that have been occurring. Neurologist wanted this to be discussed with Dr. Jeffrey Carvalho       3 Days ago she started having pain in her back at the top of her right lung. Dr. Jeffrey Carvalho please advise as to if she should come in and see you , get an xray ?

## 2022-08-17 NOTE — TELEPHONE ENCOUNTER
BLANCA patient and gave her the information and instructions for CXR. She has no further questions at this time.

## 2022-08-18 ENCOUNTER — HOSPITAL ENCOUNTER (OUTPATIENT)
Dept: GENERAL RADIOLOGY | Age: 57
Discharge: HOME | End: 2022-08-18
Payer: COMMERCIAL

## 2022-08-18 ENCOUNTER — HOSPITAL ENCOUNTER (OUTPATIENT)
Age: 57
Discharge: HOME | End: 2022-08-18
Payer: COMMERCIAL

## 2022-08-18 DIAGNOSIS — R07.9 CHEST PAIN, UNSPECIFIED TYPE: ICD-10-CM

## 2022-08-18 PROCEDURE — 71046 X-RAY EXAM CHEST 2 VIEWS: CPT

## 2022-09-13 ENCOUNTER — PATIENT MESSAGE (OUTPATIENT)
Dept: FAMILY MEDICINE CLINIC | Age: 57
End: 2022-09-13

## 2022-09-16 ENCOUNTER — OFFICE VISIT (OUTPATIENT)
Dept: FAMILY MEDICINE CLINIC | Age: 57
End: 2022-09-16
Payer: COMMERCIAL

## 2022-09-16 VITALS
WEIGHT: 246.4 LBS | SYSTOLIC BLOOD PRESSURE: 124 MMHG | TEMPERATURE: 98.5 F | HEIGHT: 68 IN | DIASTOLIC BLOOD PRESSURE: 82 MMHG | BODY MASS INDEX: 37.35 KG/M2

## 2022-09-16 DIAGNOSIS — T14.8XXA MUSCLE STRAIN: ICD-10-CM

## 2022-09-16 DIAGNOSIS — M54.9 UPPER BACK PAIN ON RIGHT SIDE: Primary | ICD-10-CM

## 2022-09-16 PROCEDURE — 3017F COLORECTAL CA SCREEN DOC REV: CPT | Performed by: NURSE PRACTITIONER

## 2022-09-16 PROCEDURE — 99213 OFFICE O/P EST LOW 20 MIN: CPT | Performed by: NURSE PRACTITIONER

## 2022-09-16 PROCEDURE — 1036F TOBACCO NON-USER: CPT | Performed by: NURSE PRACTITIONER

## 2022-09-16 PROCEDURE — G8417 CALC BMI ABV UP PARAM F/U: HCPCS | Performed by: NURSE PRACTITIONER

## 2022-09-16 PROCEDURE — G8427 DOCREV CUR MEDS BY ELIG CLIN: HCPCS | Performed by: NURSE PRACTITIONER

## 2022-09-16 RX ORDER — TIZANIDINE 4 MG/1
4 TABLET ORAL 3 TIMES DAILY
Qty: 30 TABLET | Refills: 0 | Status: SHIPPED | OUTPATIENT
Start: 2022-09-16 | End: 2022-09-26

## 2022-09-16 RX ORDER — PREDNISONE 10 MG/1
10 TABLET ORAL DAILY
Qty: 10 TABLET | Refills: 0 | Status: SHIPPED | OUTPATIENT
Start: 2022-09-16 | End: 2022-09-26

## 2022-09-16 SDOH — ECONOMIC STABILITY: FOOD INSECURITY: WITHIN THE PAST 12 MONTHS, THE FOOD YOU BOUGHT JUST DIDN'T LAST AND YOU DIDN'T HAVE MONEY TO GET MORE.: PATIENT DECLINED

## 2022-09-16 SDOH — ECONOMIC STABILITY: FOOD INSECURITY: WITHIN THE PAST 12 MONTHS, YOU WORRIED THAT YOUR FOOD WOULD RUN OUT BEFORE YOU GOT MONEY TO BUY MORE.: PATIENT DECLINED

## 2022-09-16 ASSESSMENT — ENCOUNTER SYMPTOMS
COUGH: 0
BACK PAIN: 1

## 2022-09-16 ASSESSMENT — PATIENT HEALTH QUESTIONNAIRE - PHQ9
SUM OF ALL RESPONSES TO PHQ9 QUESTIONS 1 & 2: 0
SUM OF ALL RESPONSES TO PHQ QUESTIONS 1-9: 0
2. FEELING DOWN, DEPRESSED OR HOPELESS: 0
1. LITTLE INTEREST OR PLEASURE IN DOING THINGS: 0
SUM OF ALL RESPONSES TO PHQ QUESTIONS 1-9: 0

## 2022-09-16 ASSESSMENT — SOCIAL DETERMINANTS OF HEALTH (SDOH): HOW HARD IS IT FOR YOU TO PAY FOR THE VERY BASICS LIKE FOOD, HOUSING, MEDICAL CARE, AND HEATING?: PATIENT DECLINED

## 2022-09-16 NOTE — PROGRESS NOTES
Dian Lazcano (:  1965) is a 65 y.o. female,Established patient, here for evaluation of the following chief complaint(s):  Back Pain (Right upper back burning pain )         ASSESSMENT/PLAN:  1. Upper back pain on right side  Take medication as prescribed. Follow pt education and instructions    2. Muscle strain  Take medication as prescribed. Follow pt education and instructions     Return if symptoms worsen or fail to improve. Subjective   SUBJECTIVE/OBJECTIVE:  HPI  Ongoing right upper back pain for the last month. Hx of paralyzed lung, Dr. Melanie Montano did cxr and it was negatvie. Told to f/u PCP. States about a month ago it just started hurting, thought it was her lung. Pain is burning in nature, constant 10/10. Right arm weakness at times. Denies fever, cough, sob, cp. Current Outpatient Medications   Medication Sig Dispense Refill    predniSONE (DELTASONE) 10 MG tablet Take 1 tablet by mouth daily for 10 days 10 tablet 0    tiZANidine (ZANAFLEX) 4 MG tablet Take 1 tablet by mouth 3 times daily for 10 days 30 tablet 0    Fluticasone furoate-vilanterol (BREO ELLIPTA) 200-25 MCG/INH AEPB inhaler INHALE 1 PUFF INTO THE LUNGS DAILY 60 each 11    levothyroxine (SYNTHROID) 125 MCG tablet TAKE 1 TABLET BY MOUTH DAILY 90 tablet 1    butalbital-acetaminophen-caffeine (FIORICET, ESGIC) -40 MG per tablet Take 1 tablet by mouth every 4 hours as needed for Headaches 60 tablet 3    gabapentin (NEURONTIN) 600 MG tablet Take 600 mg by mouth 3 times daily.       ibuprofen (ADVIL;MOTRIN) 800 MG tablet Take 800 mg by mouth as needed for Pain      pantoprazole (PROTONIX) 40 MG tablet Take 1 tablet by mouth 2 times daily (before meals) 180 tablet 1    fluticasone (FLONASE) 50 MCG/ACT nasal spray 1 spray by Each Nostril route daily 1 Bottle 0    albuterol sulfate HFA (PROAIR HFA) 108 (90 Base) MCG/ACT inhaler Inhale 2 puffs into the lungs every 6 hours as needed for Wheezing or Shortness of Breath 1 Inhaler 11    oxyCODONE-acetaminophen (PERCOCET) 5-325 MG per tablet every 8 hours as needed. diclofenac sodium (VOLTAREN) 1 % GEL Apply 2-4 gm to affected area tid for 2 weeks then bid prn thereafter 3 Tube 1    ondansetron (ZOFRAN) 4 MG tablet TAKE 1 TABLET BY MOUTH DAILY AS NEEDED FOR NAUSEA OR VOMITING 30 tablet 0    Cholecalciferol (VITAMIN D) 2000 units CAPS capsule Take by mouth daily        No current facility-administered medications for this visit. Facility-Administered Medications Ordered in Other Visits   Medication Dose Route Frequency Provider Last Rate Last Admin    technetium sulfur colloid egg (NYCOMED-SC) solution 500 micro curie  500 micro curie IntraVENous ONCE PRN Victoria Mireles MD            Past Medical History:   Diagnosis Date    Anxiety     Asthma     currently was instructed to stop inhalers d/t to collapse of vocal cords    Diaphragm paralysis     right side    Fatty liver     Fibromyalgia     GERD (gastroesophageal reflux disease)     Headache(784.0)     neck pain     History of ulcer disease     Hypothyroidism     status post thyroidectomy     Mel[address] ([facility] [address]     right eye     Osteoarthritis     CHRONIC BACK PAIN/FIBROMYALGIA    Rheumatoid arthritis ([address]     Spasmodic dysphonia     voice very hoarse    Thyroid disease     2015 found spot on left side of thyroid, right side thyroid was removed    Wears dentures     Wears glasses     Wears glasses         Review of Systems   Constitutional:  Negative for activity change, fatigue and fever. Respiratory:  Negative for cough. Musculoskeletal:  Positive for back pain. Skin: Negative. Neurological:  Positive for weakness (right arm at times). Objective   Physical Exam  Constitutional:       General: She is not in acute distress. Cardiovascular:      Rate and Rhythm: Normal rate and regular rhythm. Heart sounds: Normal heart sounds. Pulmonary:      Effort: Pulmonary effort is normal.      Breath sounds:  No wheezing. Musculoskeletal:         General: Tenderness (muscle tenderness right para spinal into right side) present. No swelling or deformity. Skin:     General: Skin is warm and dry. Findings: No bruising or lesion. Neurological:      Mental Status: She is alert.             --ODALYS Montiel - NP

## 2022-10-15 DIAGNOSIS — E89.0 POSTOPERATIVE HYPOTHYROIDISM: ICD-10-CM

## 2022-10-17 RX ORDER — LEVOTHYROXINE SODIUM 0.12 MG/1
125 TABLET ORAL DAILY
Qty: 90 TABLET | Refills: 1 | Status: SHIPPED | OUTPATIENT
Start: 2022-10-17

## 2022-12-08 ENCOUNTER — HOSPITAL ENCOUNTER (OUTPATIENT)
Age: 57
Discharge: HOME | End: 2022-12-08
Payer: COMMERCIAL

## 2022-12-08 ENCOUNTER — HOSPITAL ENCOUNTER (OUTPATIENT)
Dept: GENERAL RADIOLOGY | Age: 57
Discharge: HOME | End: 2022-12-08
Payer: COMMERCIAL

## 2022-12-08 DIAGNOSIS — M54.14 COMPRESSION OF THORACIC NERVE ROOT: ICD-10-CM

## 2022-12-08 PROCEDURE — 72074 X-RAY EXAM THORAC SPINE4/>VW: CPT

## 2023-03-02 ENCOUNTER — TELEPHONE (OUTPATIENT)
Dept: FAMILY MEDICINE CLINIC | Age: 58
End: 2023-03-02

## 2023-03-02 NOTE — TELEPHONE ENCOUNTER
Pt calling she would like Dr Buck Gaming to look at her las 2 mri's and see if she can tell what is causing the numbness in her back.     [phone]

## 2023-03-02 NOTE — TELEPHONE ENCOUNTER
I have mri's only form 2018 at [facility] with severe right sided disc disease  It could be this  Who have you seen for your back ? ?  Visit here or with back / neck specialist

## 2023-03-03 ENCOUNTER — OFFICE VISIT (OUTPATIENT)
Dept: PULMONOLOGY | Age: 58
End: 2023-03-03
Payer: COMMERCIAL

## 2023-03-03 VITALS
RESPIRATION RATE: 16 BRPM | DIASTOLIC BLOOD PRESSURE: 70 MMHG | WEIGHT: 197.2 LBS | HEART RATE: 60 BPM | BODY MASS INDEX: 29.89 KG/M2 | SYSTOLIC BLOOD PRESSURE: 110 MMHG | OXYGEN SATURATION: 100 % | HEIGHT: 68 IN | TEMPERATURE: 97.1 F

## 2023-03-03 DIAGNOSIS — J98.6 DIAPHRAGM PARALYSIS: ICD-10-CM

## 2023-03-03 DIAGNOSIS — J43.2 CENTRILOBULAR EMPHYSEMA (HCC): ICD-10-CM

## 2023-03-03 PROCEDURE — 3017F COLORECTAL CA SCREEN DOC REV: CPT | Performed by: INTERNAL MEDICINE

## 2023-03-03 PROCEDURE — 99214 OFFICE O/P EST MOD 30 MIN: CPT | Performed by: INTERNAL MEDICINE

## 2023-03-03 PROCEDURE — G8427 DOCREV CUR MEDS BY ELIG CLIN: HCPCS | Performed by: INTERNAL MEDICINE

## 2023-03-03 PROCEDURE — 3023F SPIROM DOC REV: CPT | Performed by: INTERNAL MEDICINE

## 2023-03-03 PROCEDURE — G8484 FLU IMMUNIZE NO ADMIN: HCPCS | Performed by: INTERNAL MEDICINE

## 2023-03-03 PROCEDURE — G8417 CALC BMI ABV UP PARAM F/U: HCPCS | Performed by: INTERNAL MEDICINE

## 2023-03-03 PROCEDURE — 1036F TOBACCO NON-USER: CPT | Performed by: INTERNAL MEDICINE

## 2023-03-03 NOTE — ASSESSMENT & PLAN NOTE
Ct chest reviewed. Off medications. Advised to continue off medications but may need Breo / albuterol with illness. The patient expressed understanding for all.

## 2023-03-03 NOTE — PROGRESS NOTES
REASON FOR CONSULTATION/CC: shortness of breath       CONSULTING PHYSICIAN: Cherelle Abdalla MD   PCP: Cherelle Abdalla MD    HISTORY OF PRESENT ILLNESS: Marisol Niño is a 63y.o. year old female with a history of smoking. who presents increase dyspnea for the last year. Emphysema with Asthma   Off all medication. With weight loss, she is feeling significantly better. ASTHMA CONTROL TEST 8/25/2021 5/28/2021 2/24/2021   In the past 4 weeks, how much of the time did your asthma keep you from getting as much done at work, school or at home? 2 2 4   During the past 4 weeks, how often have you had shortness of breath? 1 1 1   During the past 4 weeks, how often did your asthma symptoms (wheezing, coughing, shortness of breath, chest tightness or pain) wake you up at night or earlier than usual in the morning? 1 1 5   During the past 4 weeks, how often have you used your rescue inhaler or nebulizer medication (such as albuterol)? 1 1 4   How would you rate your asthma control during the past 4 weeks? 2 3 5   Asthma Control Test Total Score 7 8 19            Objective:   PHYSICAL EXAM:  Blood pressure 110/70, pulse 60, temperature 97.1 °F (36.2 °C), temperature source Infrared, resp. rate 16, height 5' 8\" (1.727 m), weight 197 lb 3.2 oz (89.4 kg), last menstrual period 02/25/2016, SpO2 100 %, not currently breastfeeding.'  Body mass index is 29.98 kg/m². Gen: No distress. Eyes:    ENT:    Neck:    Resp: No accessory muscle use. No crackles. No wheezes. No rhonchi. CV: Regular rate. Regular rhythm. No murmur or rub. No edema. GI:    Skin:    Lymph:    M/S: No cyanosis. No clubbing. Neuro: Moves all four extremities. Psych: Oriented x 3. No anxiety. Awake. Alert. Intact judgement and insight.       Data Reviewed:   Category 1 Data points:      Last CBC  Lab Results   Component Value Date/Time    WBC 7.1 09/14/2021 04:24 PM    RBC 5.16 09/14/2021 04:24 PM    HGB 14.2 09/14/2021 04:24 PM    MCV 84.0 09/14/2021 04:24 PM     09/14/2021 04:24 PM     Last Renal  Lab Results   Component Value Date/Time     09/14/2021 04:24 PM    K 3.9 09/14/2021 04:24 PM     09/14/2021 04:24 PM    CO2 24 09/14/2021 04:24 PM    CO2 28 06/10/2021 06:38 AM    CO2 26 06/08/2021 02:15 PM    BUN 7 09/14/2021 04:24 PM    CREATININE 0.6 09/14/2021 04:24 PM    GLUCOSE 121 09/14/2021 04:24 PM    CALCIUM 10.4 09/14/2021 04:24 PM         Notes Reviewed: Total labs reviewed      Category 2 Data points:    Radiology Review:  Independent interpretation of  out side CT chest pushed to pacs. Reviewed this CT and compared to prior in 2021 and 2014. Right lower lobe atelectasis improved, diaphragm elevated and emphysema unchanged. Category 3 Data points:    Discussed management or interpretation of test with external provider:       Assessment:      Obstructive airways disease / emphysema  Dysphonia, hx spasmatic dysphonia   Paralyzed right diaphram  Allergic Rhinitis  Thyroid nodule    Plan:            Problem List Items Addressed This Visit       Diaphragm paralysis     Atelectasis improved on CT chest.  Improved lung aeration with weight loss. Centrilobular emphysema ([address]     Ct chest reviewed. Off medications. Advised to continue off medications but may need Breo / albuterol with illness. The patient expressed understanding for all. This note was transcribed using [id number] [address]. Please disregard any translational errors.

## 2023-03-06 ENCOUNTER — OFFICE VISIT (OUTPATIENT)
Dept: FAMILY MEDICINE CLINIC | Age: 58
End: 2023-03-06

## 2023-03-06 VITALS
DIASTOLIC BLOOD PRESSURE: 66 MMHG | HEART RATE: 78 BPM | WEIGHT: 192 LBS | TEMPERATURE: 98 F | BODY MASS INDEX: 29.1 KG/M2 | HEIGHT: 68 IN | OXYGEN SATURATION: 99 % | SYSTOLIC BLOOD PRESSURE: 122 MMHG

## 2023-03-06 DIAGNOSIS — E83.52 HYPERCALCEMIA: ICD-10-CM

## 2023-03-06 DIAGNOSIS — E03.9 ACQUIRED HYPOTHYROIDISM: ICD-10-CM

## 2023-03-06 DIAGNOSIS — G62.9 NEUROPATHY: ICD-10-CM

## 2023-03-06 DIAGNOSIS — C69.41 MALIGNANT MELANOMA OF CILIARY BODY OF RIGHT EYE (HCC): Primary | ICD-10-CM

## 2023-03-06 LAB
TSH SERPL DL<=0.05 MIU/L-ACNC: 0.8 UIU/ML (ref 0.27–4.2)
VITAMIN B-12: 502 PG/ML (ref 211–911)

## 2023-03-06 RX ORDER — LIDOCAINE 50 MG/G
1 PATCH TOPICAL DAILY
Qty: 10 PATCH | Refills: 0 | Status: SHIPPED | OUTPATIENT
Start: 2023-03-06 | End: 2023-03-16

## 2023-03-06 SDOH — ECONOMIC STABILITY: FOOD INSECURITY: WITHIN THE PAST 12 MONTHS, YOU WORRIED THAT YOUR FOOD WOULD RUN OUT BEFORE YOU GOT MONEY TO BUY MORE.: NEVER TRUE

## 2023-03-06 SDOH — ECONOMIC STABILITY: FOOD INSECURITY: WITHIN THE PAST 12 MONTHS, THE FOOD YOU BOUGHT JUST DIDN'T LAST AND YOU DIDN'T HAVE MONEY TO GET MORE.: NEVER TRUE

## 2023-03-06 SDOH — ECONOMIC STABILITY: INCOME INSECURITY: HOW HARD IS IT FOR YOU TO PAY FOR THE VERY BASICS LIKE FOOD, HOUSING, MEDICAL CARE, AND HEATING?: NOT HARD AT ALL

## 2023-03-06 ASSESSMENT — PATIENT HEALTH QUESTIONNAIRE - PHQ9
2. FEELING DOWN, DEPRESSED OR HOPELESS: 0
3. TROUBLE FALLING OR STAYING ASLEEP: 0
6. FEELING BAD ABOUT YOURSELF - OR THAT YOU ARE A FAILURE OR HAVE LET YOURSELF OR YOUR FAMILY DOWN: 0
8. MOVING OR SPEAKING SO SLOWLY THAT OTHER PEOPLE COULD HAVE NOTICED. OR THE OPPOSITE, BEING SO FIGETY OR RESTLESS THAT YOU HAVE BEEN MOVING AROUND A LOT MORE THAN USUAL: 0
SUM OF ALL RESPONSES TO PHQ QUESTIONS 1-9: 0
4. FEELING TIRED OR HAVING LITTLE ENERGY: 0
1. LITTLE INTEREST OR PLEASURE IN DOING THINGS: 0
10. IF YOU CHECKED OFF ANY PROBLEMS, HOW DIFFICULT HAVE THESE PROBLEMS MADE IT FOR YOU TO DO YOUR WORK, TAKE CARE OF THINGS AT HOME, OR GET ALONG WITH OTHER PEOPLE: 0
9. THOUGHTS THAT YOU WOULD BE BETTER OFF DEAD, OR OF HURTING YOURSELF: 0
SUM OF ALL RESPONSES TO PHQ QUESTIONS 1-9: 0
7. TROUBLE CONCENTRATING ON THINGS, SUCH AS READING THE NEWSPAPER OR WATCHING TELEVISION: 0
SUM OF ALL RESPONSES TO PHQ QUESTIONS 1-9: 0
SUM OF ALL RESPONSES TO PHQ9 QUESTIONS 1 & 2: 0
SUM OF ALL RESPONSES TO PHQ QUESTIONS 1-9: 0
5. POOR APPETITE OR OVEREATING: 0

## 2023-03-06 NOTE — PROGRESS NOTES
SUBJECTIVE:  Alec Lynn is a 63 y.o. female being evaluated for:    Chief Complaint   Patient presents with    Numbness       Patient is having numbness on the back , its been there for 3 months . HPI   Numbness across her mid upper back   Pain management doctor did an mri and found back is slipping   Treated with trigger point injections which have not helped   Loss of sensation  Just saw pulmonary and everything good on lung exam  Chronic collapse and copd  NO change Chronic right diaphram elevation  More spots on here eyes  following with scans    Treated with radiation and surgery  now following up [facility]   Headaches coming back and using ibuprofin  not helping her mid back  Has been there for 3 months    Numbness and pain in her right back by her shoulder blade   On gabapentin and it is doing well   Muscle relaxers put her to sleep     Allergies   Allergen Reactions    Other Hives     Cloth gloves at my work     Current Outpatient Medications   Medication Sig Dispense Refill    lidocaine (LIDODERM) 5 % Place 1 patch onto the skin daily for 10 days 12 hours on, 12 hours off. 10 patch 0    levothyroxine (SYNTHROID) 125 MCG tablet TAKE 1 TABLET BY MOUTH DAILY 90 tablet 1    butalbital-acetaminophen-caffeine (FIORICET, ESGIC) -40 MG per tablet Take 1 tablet by mouth every 4 hours as needed for Headaches 60 tablet 3    gabapentin (NEURONTIN) 600 MG tablet Take 600 mg by mouth 3 times daily. ibuprofen (ADVIL;MOTRIN) 800 MG tablet Take 800 mg by mouth as needed for Pain      albuterol sulfate HFA (PROAIR HFA) 108 (90 Base) MCG/ACT inhaler Inhale 2 puffs into the lungs every 6 hours as needed for Wheezing or Shortness of Breath 1 Inhaler 11    oxyCODONE-acetaminophen (PERCOCET) 5-325 MG per tablet every 8 hours as needed.       diclofenac sodium (VOLTAREN) 1 % GEL Apply 2-4 gm to affected area tid for 2 weeks then bid prn thereafter 3 Tube 1    Cholecalciferol (VITAMIN D) 2000 units CAPS capsule Take by mouth daily       Fluticasone furoate-vilanterol (BREO ELLIPTA) 200-25 MCG/INH AEPB inhaler INHALE 1 PUFF INTO THE LUNGS DAILY (Patient not taking: Reported on 3/6/2023) 60 each 11    fluticasone (FLONASE) 50 MCG/ACT nasal spray 1 spray by Each Nostril route daily (Patient not taking: Reported on 3/6/2023) 1 Bottle 0     No current facility-administered medications for this visit. Facility-Administered Medications Ordered in Other Visits   Medication Dose Route Frequency Provider Last Rate Last Admin    technetium sulfur colloid egg (NYCOMED-SC) solution 500 micro curie  500 micro curie IntraVENous ONCE PRN Dannielle Benitez MD             Social History     Socioeconomic History    Marital status:      Spouse name: Not on file    Number of children: Not on file    Years of education: Not on file    Highest education level: Not on file   Occupational History    Occupation: room leader     Comment: [employer]   Tobacco Use    Smoking status: Former     Packs/day: 1.00     Years: 15.00     Pack years: 15.00     Types: Cigarettes     Start date:      Quit date: 2003     Years since quittin.2    Smokeless tobacco: Never   Vaping Use    Vaping Use: Never used   Substance and Sexual Activity    Alcohol use: Not Currently     Alcohol/week: 0.0 standard drinks    Drug use: Never    Sexual activity: Not Currently     Partners: Male   Other Topics Concern    Not on file   Social History Narrative    Not on file     Social Determinants of Health     Financial Resource Strain: Low Risk     Difficulty of Paying Living Expenses: Not hard at all   Food Insecurity: No Food Insecurity    Worried About [address] in the Last Year: Never true    [address] [employer] in the Last Year: Never true   Transportation Needs: Unknown    Lack of Transportation (Medical): Not on file    Lack of Transportation (Non-Medical):  No   Physical Activity: Not on file   Stress: Not on file   Social Connections: Not on file   Intimate Partner Violence: Not on file   Housing Stability: Unknown    Unable to Pay for Housing in the Last Year: Not on file    Number of [place] in the Last Year: Not on file    Unstable Housing in the Last Year: No      Past Medical History:   Diagnosis Date    Anxiety     Asthma     currently was instructed to stop inhalers d/t to collapse of vocal cords    Diaphragm paralysis     right side    Fatty liver     Fibromyalgia     GERD (gastroesophageal reflux disease)     Headache(784.0)     neck pain     History of ulcer disease     Hypothyroidism     status post thyroidectomy     Melanoma ([address]     right eye     Osteoarthritis     CHRONIC BACK PAIN/FIBROMYALGIA    Rheumatoid arthritis (HCC)     Spasmodic dysphonia     voice very hoarse    Thyroid disease     2015 found spot on left side of thyroid, right side thyroid was removed    Wears dentures     Wears glasses     Wears glasses      Past Surgical History:   Procedure Laterality Date    APPENDECTOMY  age 17    BREAST BIOPSY Right     x3  benign lesions     BREAST SURGERY Right needle localization right breast mass    BREAST SURGERY Right 10/22/15    riught breast mass excision/needle loc    CARPAL TUNNEL RELEASE Right 5/7/15    CARPAL TUNNEL RELEASE Left 5/28/15    Left carpal tunnel release      COLONOSCOPY      ENDOSCOPY, COLON, DIAGNOSTIC      EPIDURAL STEROID INJECTION N/A 2/1/2019    LUMBAR EPIDURAL STEROID INJECTION L3/4 performed by Ubaldo Boas, MD at [address] [facility] N/A 11/22/2019    CERVICAL EPIDURAL STEROID INJECTION C7-T1 performed by Ubaldo Boas, MD at [address]      right eye-radiation plate for melanoma    [facility] INJECT OTHER PERPHRL NERV Bilateral 3/29/2019    INTRA ARTICULAR KNEE INJECTIONS performed by Ubaldo Boas, MD at [address] NERV Bilateral 12/6/2019    BILATERAL INTRAARTICULAR KNEE INJECTION performed by Cristine Mena Lyubov Burk MD at [address] ARTHROSCOPY Left 12/21/2017    MEDICATION INJECTION Bilateral 8/20/2021    BILATERAL KNEE STEROID INJECTION performed by Milla Reis MD at [address] Bilateral 8/13/2021    BILATERAL TWO LEVEL LUMBAR TRANSFORAMINAL EPIDURAL STEROID INJECTION AT L3 AND L5 performed by Milla Reis MD at [address] [facility] ARTHROCENTESIS ASPIR&/INJ MAJOR JT/BURSA W/O US Bilateral 5/10/2019    GREATER TROCHANTERIC BURSA INJECTIONS performed by Milla Reis MD at [address] DX/THER SBST INTRLMNR CRV/THRC W/IMG GDN N/A 9/28/2018    EPIDURAL STEROID INJECTION C7-T1 performed by Milla Reis MD at [address] DX/THER SBST INTRLMNR LMBR/SAC W/IMG GDN Right 11/9/2018    LUMBAR EPIDURAL STEROID INJECTION L3-4 performed by Milla Reis MD at [address] [facility]      removed right side of thyroid    [address] [facility] [address] [facility] N/A 9/74/8737    UMBILICAL HERNIA REPAIR WITH MESH performed by Ezra Alves MD at [address] [facility] N/A 7/2/2021    EGD BIOPSY performed by Amilcar Sanchez MD at [address]   Constitutional:  Positive for unexpected weight change (losing). Negative for chills and fever. Eyes:  Negative for visual disturbance. Respiratory:  Negative for cough, shortness of breath and wheezing. Cardiovascular:  Negative for chest pain and palpitations. Gastrointestinal:  Negative for abdominal pain, diarrhea, nausea and vomiting. Musculoskeletal:  Positive for back pain. Negative for neck pain. Neurological:  Positive for headaches. Negative for dizziness and light-headedness.      OBJECTIVE:  /66 (Site: Left Upper Arm, Position: Sitting, Cuff Size: Medium Adult)   Pulse 78   Temp 98 °F (36.7 °C) (Temporal)   Ht 5' 8\" (1.727 m)   Wt 192 lb (87.1 kg) LMP 02/25/2016 (Exact Date)   SpO2 99%   BMI 29.19 kg/m²      Body mass index is 29.19 kg/m². Physical Exam  Constitutional:       General: She is not in acute distress. Appearance: Normal appearance. HENT:      Head: Normocephalic and atraumatic. Eyes:      Conjunctiva/sclera: Conjunctivae normal.   Cardiovascular:      Rate and Rhythm: Normal rate and regular rhythm. Heart sounds: Normal heart sounds. No murmur heard. No gallop. Pulmonary:      Effort: Pulmonary effort is normal.      Breath sounds: No wheezing. Chest:      Chest wall: No tenderness. Abdominal:      General: There is no distension. Palpations: Abdomen is soft. Musculoskeletal:         General: Tenderness (muscle tenderness right para spinal into right side) present. No swelling or deformity. Skin:     General: Skin is warm and dry. Findings: No lesion or rash. Neurological:      General: No focal deficit present. Mental Status: She is alert and oriented to person, place, and time. Gait: Gait normal.       ASSESSMENT/PLAN:    Debi Rincon was seen today for numbness. Diagnoses and all orders for this visit:    Upper back pain  to try to get imaging studies    -     lidocaine (LIDODERM) 5 %; Place 1 patch onto the skin daily for 10 days 12 hours on, 12 hours off. 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 Dispense:  10 patch     Refill:  0        Return if symptoms worsen or fail to improve. There are no Patient Instructions on file for this visit.

## 2023-03-08 LAB
CALCIUM IONIZED, CALC AT PH 7.4: 1.36 MMOL/L (ref 1.09–1.3)
CALCIUM IONIZED: 1.33 MMOL/L (ref 1.09–1.3)

## 2023-03-11 ASSESSMENT — ENCOUNTER SYMPTOMS
ABDOMINAL PAIN: 0
VOMITING: 0
BACK PAIN: 1
COUGH: 0
SHORTNESS OF BREATH: 0
NAUSEA: 0
WHEEZING: 0
DIARRHEA: 0

## 2023-03-13 ENCOUNTER — TELEPHONE (OUTPATIENT)
Dept: FAMILY MEDICINE CLINIC | Age: 58
End: 2023-03-13

## 2023-03-13 NOTE — TELEPHONE ENCOUNTER
----- Message from Dyana Lazcano sent at 3/13/2023  4:15 PM EDT -----  Regarding: Calcium  I have never taken a calcium supplement. Should I get a test done on my neck to check glands ? I have been having a lot of pain in the right side of my neck .      Thanks

## 2023-03-20 ENCOUNTER — OFFICE VISIT (OUTPATIENT)
Dept: FAMILY MEDICINE CLINIC | Age: 58
End: 2023-03-20
Payer: COMMERCIAL

## 2023-03-20 VITALS
OXYGEN SATURATION: 99 % | HEIGHT: 68 IN | SYSTOLIC BLOOD PRESSURE: 122 MMHG | HEART RATE: 64 BPM | BODY MASS INDEX: 29.25 KG/M2 | WEIGHT: 193 LBS | DIASTOLIC BLOOD PRESSURE: 83 MMHG | TEMPERATURE: 98.1 F

## 2023-03-20 DIAGNOSIS — G44.52 NEW PERSISTENT DAILY HEADACHE: ICD-10-CM

## 2023-03-20 DIAGNOSIS — R42 VERTIGO: ICD-10-CM

## 2023-03-20 DIAGNOSIS — C69.41 MALIGNANT MELANOMA OF CILIARY BODY OF RIGHT EYE (HCC): Primary | ICD-10-CM

## 2023-03-20 PROCEDURE — 3017F COLORECTAL CA SCREEN DOC REV: CPT | Performed by: INTERNAL MEDICINE

## 2023-03-20 PROCEDURE — 99214 OFFICE O/P EST MOD 30 MIN: CPT | Performed by: INTERNAL MEDICINE

## 2023-03-20 PROCEDURE — 1036F TOBACCO NON-USER: CPT | Performed by: INTERNAL MEDICINE

## 2023-03-20 PROCEDURE — G8417 CALC BMI ABV UP PARAM F/U: HCPCS | Performed by: INTERNAL MEDICINE

## 2023-03-20 PROCEDURE — G8484 FLU IMMUNIZE NO ADMIN: HCPCS | Performed by: INTERNAL MEDICINE

## 2023-03-20 PROCEDURE — G8427 DOCREV CUR MEDS BY ELIG CLIN: HCPCS | Performed by: INTERNAL MEDICINE

## 2023-03-20 RX ORDER — ERYTHROMYCIN 5 MG/G
OINTMENT OPHTHALMIC
Qty: 1 G | Refills: 0 | Status: SHIPPED | OUTPATIENT
Start: 2023-03-20 | End: 2023-03-30

## 2023-03-20 RX ORDER — ONDANSETRON 4 MG/1
4 TABLET, FILM COATED ORAL 3 TIMES DAILY PRN
Qty: 30 TABLET | Refills: 0 | Status: SHIPPED | OUTPATIENT
Start: 2023-03-20

## 2023-03-20 RX ORDER — PREDNISONE 20 MG/1
20 TABLET ORAL DAILY
Qty: 10 TABLET | Refills: 0 | Status: SHIPPED | OUTPATIENT
Start: 2023-03-20 | End: 2023-03-30

## 2023-03-20 RX ORDER — TIZANIDINE 4 MG/1
4 TABLET ORAL EVERY 8 HOURS PRN
Qty: 30 TABLET | Refills: 0 | Status: SHIPPED | OUTPATIENT
Start: 2023-03-20

## 2023-03-20 RX ORDER — DIAZEPAM 5 MG/1
5 TABLET ORAL EVERY 8 HOURS PRN
Qty: 20 TABLET | Refills: 0 | Status: SHIPPED | OUTPATIENT
Start: 2023-03-20 | End: 2023-03-30

## 2023-03-20 NOTE — PROGRESS NOTES
persistent daily headache with yhx of eye melanoma   -     MRI BRAIN W WO CONTRAST; Future    Vertigo  -     diazePAM (VALIUM) 5 MG tablet; Take 1 tablet by mouth every 8 hours as needed for Anxiety or Sleep for up to 10 days. Max Daily Amount: 15 mg    Cervical disc disease    -     predniSONE (DELTASONE) 20 MG tablet; Take 1 tablet by mouth daily for 10 days 2 pills daily for 3 days, 1 pill daily for 3 days and 1/2 daily for 3 day  -     tiZANidine (ZANAFLEX) 4 MG tablet; Take 1 tablet by mouth every 8 hours as needed (neck pain)    nausea   -     ondansetron (ZOFRAN) 4 MG tablet; Take 1 tablet by mouth 3 times daily as needed for Nausea or Vomiting      Orders Placed This Encounter   Medications    erythromycin (ROMYCIN) 5 MG/GM ophthalmic ointment     Sig: Apply to right eye tid     Dispense:  1 g     Refill:  0    predniSONE (DELTASONE) 20 MG tablet     Sig: Take 1 tablet by mouth daily for 10 days 2 pills daily for 3 days, 1 pill daily for 3 days and 1/2 daily for 3 day     Dispense:  10 tablet     Refill:  0    tiZANidine (ZANAFLEX) 4 MG tablet     Sig: Take 1 tablet by mouth every 8 hours as needed (neck pain)     Dispense:  30 tablet     Refill:  0    diazePAM (VALIUM) 5 MG tablet     Sig: Take 1 tablet by mouth every 8 hours as needed for Anxiety or Sleep for up to 10 days. Max Daily Amount: 15 mg     Dispense:  20 tablet     Refill:  0    ondansetron (ZOFRAN) 4 MG tablet     Sig: Take 1 tablet by mouth 3 times daily as needed for Nausea or Vomiting     Dispense:  30 tablet     Refill:  0        Return if symptoms worsen or fail to improve. There are no Patient Instructions on file for this visit.

## 2023-03-24 ASSESSMENT — ENCOUNTER SYMPTOMS
NAUSEA: 1
DIARRHEA: 0
ABDOMINAL PAIN: 0
VOMITING: 0
BACK PAIN: 1
EYE PAIN: 1
EYE REDNESS: 1

## 2023-04-03 ENCOUNTER — TELEPHONE (OUTPATIENT)
Dept: FAMILY MEDICINE CLINIC | Age: 58
End: 2023-04-03

## 2023-04-03 ENCOUNTER — ANESTHESIA (OUTPATIENT)
Dept: MRI IMAGING | Age: 58
End: 2023-04-03

## 2023-04-03 ENCOUNTER — HOSPITAL ENCOUNTER (OUTPATIENT)
Dept: MRI IMAGING | Age: 58
Discharge: HOME | End: 2023-04-03
Payer: COMMERCIAL

## 2023-04-03 ENCOUNTER — ANESTHESIA EVENT (OUTPATIENT)
Dept: MRI IMAGING | Age: 58
End: 2023-04-03

## 2023-04-03 VITALS
WEIGHT: 193 LBS | OXYGEN SATURATION: 95 % | TEMPERATURE: 97.2 F | HEART RATE: 78 BPM | BODY MASS INDEX: 29.25 KG/M2 | HEIGHT: 68 IN | SYSTOLIC BLOOD PRESSURE: 101 MMHG | RESPIRATION RATE: 18 BRPM | DIASTOLIC BLOOD PRESSURE: 55 MMHG

## 2023-04-03 DIAGNOSIS — C69.41 MALIGNANT MELANOMA OF CILIARY BODY OF RIGHT EYE (HCC): Primary | ICD-10-CM

## 2023-04-03 DIAGNOSIS — G44.52 NEW PERSISTENT DAILY HEADACHE: ICD-10-CM

## 2023-04-03 DIAGNOSIS — C69.41 MALIGNANT MELANOMA OF CILIARY BODY OF RIGHT EYE (HCC): ICD-10-CM

## 2023-04-03 PROCEDURE — 7100000001 HC PACU RECOVERY - ADDTL 15 MIN

## 2023-04-03 PROCEDURE — 3700000001 HC ADD 15 MINUTES (ANESTHESIA)

## 2023-04-03 PROCEDURE — A9577 INJ MULTIHANCE: HCPCS | Performed by: INTERNAL MEDICINE

## 2023-04-03 PROCEDURE — 2580000003 HC RX 258: Performed by: NURSE ANESTHETIST, CERTIFIED REGISTERED

## 2023-04-03 PROCEDURE — 6360000002 HC RX W HCPCS: Performed by: ANESTHESIOLOGY

## 2023-04-03 PROCEDURE — 6360000004 HC RX CONTRAST MEDICATION: Performed by: INTERNAL MEDICINE

## 2023-04-03 PROCEDURE — 7100000011 HC PHASE II RECOVERY - ADDTL 15 MIN

## 2023-04-03 PROCEDURE — 2500000003 HC RX 250 WO HCPCS: Performed by: NURSE ANESTHETIST, CERTIFIED REGISTERED

## 2023-04-03 PROCEDURE — 6360000002 HC RX W HCPCS: Performed by: NURSE ANESTHETIST, CERTIFIED REGISTERED

## 2023-04-03 PROCEDURE — 7100000000 HC PACU RECOVERY - FIRST 15 MIN

## 2023-04-03 PROCEDURE — 3700000000 HC ANESTHESIA ATTENDED CARE

## 2023-04-03 PROCEDURE — 7100000010 HC PHASE II RECOVERY - FIRST 15 MIN

## 2023-04-03 PROCEDURE — 70543 MRI ORBT/FAC/NCK W/O &W/DYE: CPT

## 2023-04-03 RX ORDER — METOCLOPRAMIDE HYDROCHLORIDE 5 MG/ML
INJECTION INTRAMUSCULAR; INTRAVENOUS PRN
Status: DISCONTINUED | OUTPATIENT
Start: 2023-04-03 | End: 2023-04-03 | Stop reason: SDUPTHER

## 2023-04-03 RX ORDER — SODIUM CHLORIDE 9 MG/ML
INJECTION, SOLUTION INTRAVENOUS CONTINUOUS PRN
Status: DISCONTINUED | OUTPATIENT
Start: 2023-04-03 | End: 2023-04-03 | Stop reason: SDUPTHER

## 2023-04-03 RX ORDER — ONDANSETRON 2 MG/ML
INJECTION INTRAMUSCULAR; INTRAVENOUS PRN
Status: DISCONTINUED | OUTPATIENT
Start: 2023-04-03 | End: 2023-04-03 | Stop reason: SDUPTHER

## 2023-04-03 RX ORDER — METOCLOPRAMIDE HYDROCHLORIDE 5 MG/ML
INJECTION INTRAMUSCULAR; INTRAVENOUS
Status: COMPLETED
Start: 2023-04-03 | End: 2023-04-03

## 2023-04-03 RX ORDER — GLYCOPYRROLATE 0.2 MG/ML
INJECTION INTRAMUSCULAR; INTRAVENOUS PRN
Status: DISCONTINUED | OUTPATIENT
Start: 2023-04-03 | End: 2023-04-03 | Stop reason: SDUPTHER

## 2023-04-03 RX ORDER — MIDAZOLAM HYDROCHLORIDE 1 MG/ML
INJECTION INTRAMUSCULAR; INTRAVENOUS PRN
Status: DISCONTINUED | OUTPATIENT
Start: 2023-04-03 | End: 2023-04-03 | Stop reason: SDUPTHER

## 2023-04-03 RX ORDER — KETAMINE HCL IN NACL, ISO-OSM 100MG/10ML
SYRINGE (ML) INJECTION PRN
Status: DISCONTINUED | OUTPATIENT
Start: 2023-04-03 | End: 2023-04-03 | Stop reason: SDUPTHER

## 2023-04-03 RX ORDER — FENTANYL CITRATE 50 UG/ML
INJECTION, SOLUTION INTRAMUSCULAR; INTRAVENOUS PRN
Status: DISCONTINUED | OUTPATIENT
Start: 2023-04-03 | End: 2023-04-03 | Stop reason: SDUPTHER

## 2023-04-03 RX ADMIN — Medication 10 MG: at 10:34

## 2023-04-03 RX ADMIN — Medication 20 MG: at 10:32

## 2023-04-03 RX ADMIN — GADOBENATE DIMEGLUMINE 17 ML: 529 INJECTION, SOLUTION INTRAVENOUS at 11:02

## 2023-04-03 RX ADMIN — FENTANYL CITRATE 50 MCG: 50 INJECTION INTRAMUSCULAR; INTRAVENOUS at 10:30

## 2023-04-03 RX ADMIN — METOCLOPRAMIDE 20 MG: 5 INJECTION, SOLUTION INTRAMUSCULAR; INTRAVENOUS at 12:19

## 2023-04-03 RX ADMIN — MIDAZOLAM 1 MG: 1 INJECTION INTRAMUSCULAR; INTRAVENOUS at 11:08

## 2023-04-03 RX ADMIN — GLYCOPYRROLATE 0.2 MG: 0.2 INJECTION INTRAMUSCULAR; INTRAVENOUS at 10:32

## 2023-04-03 RX ADMIN — FENTANYL CITRATE 25 MCG: 50 INJECTION INTRAMUSCULAR; INTRAVENOUS at 10:32

## 2023-04-03 RX ADMIN — Medication 20 MG: at 11:08

## 2023-04-03 RX ADMIN — FENTANYL CITRATE 25 MCG: 50 INJECTION INTRAMUSCULAR; INTRAVENOUS at 11:08

## 2023-04-03 RX ADMIN — SODIUM CHLORIDE: 9 INJECTION, SOLUTION INTRAVENOUS at 10:28

## 2023-04-03 RX ADMIN — MIDAZOLAM 2 MG: 1 INJECTION INTRAMUSCULAR; INTRAVENOUS at 10:28

## 2023-04-03 RX ADMIN — ONDANSETRON 4 MG: 2 INJECTION INTRAMUSCULAR; INTRAVENOUS at 10:32

## 2023-04-03 RX ADMIN — MIDAZOLAM 1 MG: 1 INJECTION INTRAMUSCULAR; INTRAVENOUS at 10:30

## 2023-04-03 ASSESSMENT — PAIN SCALES - GENERAL
PAINLEVEL_OUTOF10: 8
PAINLEVEL_OUTOF10: 8
PAINLEVEL_OUTOF10: 6
PAINLEVEL_OUTOF10: 8

## 2023-04-03 ASSESSMENT — PAIN DESCRIPTION - ORIENTATION: ORIENTATION: RIGHT

## 2023-04-03 ASSESSMENT — PAIN - FUNCTIONAL ASSESSMENT: PAIN_FUNCTIONAL_ASSESSMENT: NONE - DENIES PAIN

## 2023-04-03 ASSESSMENT — PAIN DESCRIPTION - PAIN TYPE
TYPE: CHRONIC PAIN

## 2023-04-03 ASSESSMENT — PAIN DESCRIPTION - LOCATION
LOCATION: HEAD

## 2023-04-03 ASSESSMENT — PAIN DESCRIPTION - DESCRIPTORS: DESCRIPTORS: ACHING;THROBBING

## 2023-04-03 ASSESSMENT — LIFESTYLE VARIABLES: SMOKING_STATUS: 0

## 2023-04-03 ASSESSMENT — PAIN DESCRIPTION - ONSET: ONSET: ON-GOING

## 2023-04-03 ASSESSMENT — PAIN DESCRIPTION - FREQUENCY: FREQUENCY: CONTINUOUS

## 2023-04-03 NOTE — PROGRESS NOTES
Pt discharged in stable condition. [address] with dr Purnell Peabody. Pt states pain 6/10 which is chronic - takes otc and lights off for pain control. States feels ok to go home. Discharge instruction to pt and daughter - verbalized understanding.

## 2023-04-03 NOTE — ANESTHESIA PRE PROCEDURE
2018    LUMBAR EPIDURAL STEROID INJECTION L3-4 performed by Eli Valentin MD at [facility]      removed right side of thyroid    [address] N/A     UMBILICAL HERNIA REPAIR WITH MESH performed by Starr Hernandez MD at [facility] N/A 2021    EGD BIOPSY performed by Harry Rosenberg MD at [facility] History:    Social History     Tobacco Use    Smoking status: Former     Packs/day: 1.00     Years: 15.00     Pack years: 15.00     Types: Cigarettes     Start date:      Quit date: 2003     Years since quittin.2    Smokeless tobacco: Never   Substance Use Topics    Alcohol use: Not Currently     Alcohol/week: 0.0 standard drinks                                Counseling given: Not Answered      Vital Signs (Current):   Vitals:    23 0819 23 0825   BP:  107/62   Pulse:  79   Resp:  18   Temp:  (!) 96.5 °F (35.8 °C)   TempSrc:  Temporal   SpO2:  96%   Weight: 193 lb (87.5 kg)    Height: 5' 8\" (1.727 m)                                               BP Readings from Last 3 Encounters:   23 107/62   23 122/83   23 122/66       NPO Status: Time of last liquid consumption: 730                        Time of last solid consumption:                         Date of last liquid consumption: 23                        Date of last solid food consumption: 23    BMI:   Wt Readings from Last 3 Encounters:   23 193 lb (87.5 kg)   23 193 lb (87.5 kg)   23 192 lb (87.1 kg)     Body mass index is 29.35 kg/m².     CBC:   Lab Results   Component Value Date/Time    WBC 7.1 2021 04:24 PM    RBC 5.16 2021 04:24 PM    HGB 14.2 2021 04:24 PM    HCT 43.4 2021 04:24 PM    MCV 84.0 2021 04:24 PM    RDW 15.4 2021 04:24 PM     2021 04:24 PM       CMP:   Lab Results   Component Value

## 2023-04-03 NOTE — DISCHARGE INSTRUCTIONS
Follow up with pain doctor as needed and call for any problems or concerns. Call 911 or go to nearest ER for any problems or emergency.

## 2023-04-03 NOTE — ANESTHESIA POSTPROCEDURE EVALUATION
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)      (New persistent daily headache)    Scheduled Providers:  Responsible Provider: Edison Granados MD    Anesthesia Type: MAC ASA Status: 3          Anesthesia Type: MAC    Medardo Phase I: Medardo Score: 9    Medardo Phase II:        Anesthesia Post Evaluation    Patient location during evaluation: PACU  Patient participation: complete - patient participated  Level of consciousness: awake  Airway patency: patent  Nausea & Vomiting: no nausea and no vomiting  Cardiovascular status: blood pressure returned to baseline  Respiratory status: acceptable  Hydration status: stable  Comments: Vital signs stable  OK to discharge from Stage I post anesthesia care.   Care transferred from Anesthesiology department on discharge from perioperative area   Multimodal analgesia pain management approach

## 2023-04-03 NOTE — TELEPHONE ENCOUNTER
Joseph Castleman calling the need a new order for pt MRI today at 10:15 it needs to state MRI orbits    Please fax [phone]

## 2023-04-03 NOTE — PROGRESS NOTES
Pt awake, no change in headache - states she takes ibuprofen at home. Pt willing to go to phase 2 care. To phase 2.

## 2023-04-03 NOTE — PROGRESS NOTES
To pacu from mri. Pt awake, states headache 8/10 - chronic headache. IV infusing. Monitor in sinus rhythm.

## 2023-04-03 NOTE — ADDENDUM NOTE
Addendum  created 04/03/23 1220 by Manohar Figueroa MD    Intraprocedure Meds edited, Order list changed, Pharmacy for encounter modified

## 2023-05-02 RX ORDER — TIZANIDINE 4 MG/1
TABLET ORAL
Qty: 30 TABLET | Refills: 0 | Status: SHIPPED | OUTPATIENT
Start: 2023-05-02

## 2023-06-28 ENCOUNTER — OFFICE VISIT (OUTPATIENT)
Dept: FAMILY MEDICINE CLINIC | Age: 58
End: 2023-06-28
Payer: COMMERCIAL

## 2023-06-28 VITALS
RESPIRATION RATE: 16 BRPM | BODY MASS INDEX: 30.11 KG/M2 | OXYGEN SATURATION: 98 % | DIASTOLIC BLOOD PRESSURE: 62 MMHG | WEIGHT: 198 LBS | SYSTOLIC BLOOD PRESSURE: 104 MMHG | TEMPERATURE: 98 F | HEART RATE: 71 BPM

## 2023-06-28 DIAGNOSIS — M50.90 CERVICAL DISC DISEASE: ICD-10-CM

## 2023-06-28 DIAGNOSIS — Z12.31 ENCOUNTER FOR SCREENING MAMMOGRAM FOR MALIGNANT NEOPLASM OF BREAST: ICD-10-CM

## 2023-06-28 DIAGNOSIS — R51.9 RIGHT SIDED TEMPORAL HEADACHE: Primary | ICD-10-CM

## 2023-06-28 DIAGNOSIS — C69.41 MALIGNANT MELANOMA OF CILIARY BODY OF RIGHT EYE (HCC): ICD-10-CM

## 2023-06-28 PROCEDURE — 1036F TOBACCO NON-USER: CPT | Performed by: INTERNAL MEDICINE

## 2023-06-28 PROCEDURE — G8417 CALC BMI ABV UP PARAM F/U: HCPCS | Performed by: INTERNAL MEDICINE

## 2023-06-28 PROCEDURE — 99214 OFFICE O/P EST MOD 30 MIN: CPT | Performed by: INTERNAL MEDICINE

## 2023-06-28 PROCEDURE — G8427 DOCREV CUR MEDS BY ELIG CLIN: HCPCS | Performed by: INTERNAL MEDICINE

## 2023-06-28 PROCEDURE — 3017F COLORECTAL CA SCREEN DOC REV: CPT | Performed by: INTERNAL MEDICINE

## 2023-06-28 RX ORDER — PREDNISONE 20 MG/1
20 TABLET ORAL DAILY
Qty: 10 TABLET | Refills: 0 | Status: SHIPPED | OUTPATIENT
Start: 2023-06-28 | End: 2023-07-08

## 2023-06-28 RX ORDER — PREDNISOLONE ACETATE 10 MG/ML
SUSPENSION/ DROPS OPHTHALMIC
COMMUNITY
Start: 2023-06-14

## 2023-06-28 ASSESSMENT — PATIENT HEALTH QUESTIONNAIRE - PHQ9
SUM OF ALL RESPONSES TO PHQ QUESTIONS 1-9: 0
1. LITTLE INTEREST OR PLEASURE IN DOING THINGS: 0
SUM OF ALL RESPONSES TO PHQ QUESTIONS 1-9: 0
2. FEELING DOWN, DEPRESSED OR HOPELESS: 0
SUM OF ALL RESPONSES TO PHQ QUESTIONS 1-9: 0
SUM OF ALL RESPONSES TO PHQ9 QUESTIONS 1 & 2: 0
SUM OF ALL RESPONSES TO PHQ QUESTIONS 1-9: 0

## 2023-07-02 ASSESSMENT — ENCOUNTER SYMPTOMS
VOMITING: 0
NAUSEA: 0

## 2023-08-16 ENCOUNTER — HOSPITAL ENCOUNTER (OUTPATIENT)
Dept: WOMENS IMAGING | Age: 58
Discharge: HOME | End: 2023-08-16
Payer: COMMERCIAL

## 2023-08-16 DIAGNOSIS — Z12.31 ENCOUNTER FOR SCREENING MAMMOGRAM FOR MALIGNANT NEOPLASM OF BREAST: ICD-10-CM

## 2023-08-16 PROCEDURE — 77067 SCR MAMMO BI INCL CAD: CPT

## 2023-09-05 ENCOUNTER — OFFICE VISIT (OUTPATIENT)
Dept: FAMILY MEDICINE CLINIC | Age: 58
End: 2023-09-05
Payer: COMMERCIAL

## 2023-09-05 VITALS
WEIGHT: 201.6 LBS | SYSTOLIC BLOOD PRESSURE: 110 MMHG | OXYGEN SATURATION: 96 % | TEMPERATURE: 97.3 F | DIASTOLIC BLOOD PRESSURE: 62 MMHG | BODY MASS INDEX: 30.55 KG/M2 | HEIGHT: 68 IN | HEART RATE: 67 BPM

## 2023-09-05 DIAGNOSIS — H65.01 NON-RECURRENT ACUTE SEROUS OTITIS MEDIA OF RIGHT EAR: ICD-10-CM

## 2023-09-05 DIAGNOSIS — R05.1 ACUTE COUGH: Primary | ICD-10-CM

## 2023-09-05 DIAGNOSIS — J01.10 SUBACUTE FRONTAL SINUSITIS: ICD-10-CM

## 2023-09-05 LAB
INFLUENZA A ANTIGEN, POC: NEGATIVE
INFLUENZA B ANTIGEN, POC: NEGATIVE
LOT EXPIRE DATE: NORMAL
LOT KIT NUMBER: NORMAL
SARS-COV-2, POC: NORMAL
VALID INTERNAL CONTROL: NORMAL
VENDOR AND KIT NAME POC: NORMAL

## 2023-09-05 PROCEDURE — 99213 OFFICE O/P EST LOW 20 MIN: CPT | Performed by: NURSE PRACTITIONER

## 2023-09-05 PROCEDURE — G8427 DOCREV CUR MEDS BY ELIG CLIN: HCPCS | Performed by: NURSE PRACTITIONER

## 2023-09-05 PROCEDURE — 1036F TOBACCO NON-USER: CPT | Performed by: NURSE PRACTITIONER

## 2023-09-05 PROCEDURE — 87428 SARSCOV & INF VIR A&B AG IA: CPT | Performed by: NURSE PRACTITIONER

## 2023-09-05 PROCEDURE — 3017F COLORECTAL CA SCREEN DOC REV: CPT | Performed by: NURSE PRACTITIONER

## 2023-09-05 PROCEDURE — G8417 CALC BMI ABV UP PARAM F/U: HCPCS | Performed by: NURSE PRACTITIONER

## 2023-09-05 RX ORDER — AMOXICILLIN AND CLAVULANATE POTASSIUM 875; 125 MG/1; MG/1
1 TABLET, FILM COATED ORAL 2 TIMES DAILY
Qty: 14 TABLET | Refills: 0 | Status: SHIPPED | OUTPATIENT
Start: 2023-09-05 | End: 2023-09-12

## 2023-09-05 RX ORDER — FLUTICASONE PROPIONATE 50 MCG
1 SPRAY, SUSPENSION (ML) NASAL DAILY
Qty: 16 G | Refills: 2 | Status: SHIPPED | OUTPATIENT
Start: 2023-09-05

## 2023-09-05 ASSESSMENT — ENCOUNTER SYMPTOMS
SORE THROAT: 1
SINUS PAIN: 1
COUGH: 1
SHORTNESS OF BREATH: 0
SINUS PRESSURE: 1

## 2023-09-05 NOTE — PROGRESS NOTES
Seb Lazcano (:  1965) is a 63 y.o. female,Established patient, here for evaluation of the following chief complaint(s):  Ear Fullness (Bilateral ear clogged and pressure since in [facility] last week. Cannot get drops in her ears. Pt had a sore throat for 2 days last week. Dry cough, nasal congestion and runny nose, no fever. Pt son had Flu B and strept. Pt [place] )         ASSESSMENT/PLAN:  1. Acute cough    - POCT COVID-19 & Influenza A/B    2. Subacute frontal sinusitis  New. Take medications as prescribed, follow pt education included. 3. Non-recurrent acute serous otitis media of right ear  New. Take medications as prescribed        Return if symptoms worsen or fail to improve. Subjective   SUBJECTIVE/OBJECTIVE:  HPI  Chief Complaint   Patient presents with    Ear Fullness     Bilateral ear clogged and pressure since in [facility] last week. Cannot get drops in her ears. Pt had a sore throat for 2 days last week. Dry cough, nasal congestion and runny nose, no fever. Pt son had Flu B and strept. Pt said [place]. Tried OTC sudafed with no relief. States when she was landing on the plane she  had a lot of sinus pressure and in her right ear. States her whole family she traveled with are sick. PO intake is good. Pt states she is going to work today at 3.         Current Outpatient Medications   Medication Sig Dispense Refill    amoxicillin-clavulanate (AUGMENTIN) 875-125 MG per tablet Take 1 tablet by mouth 2 times daily for 7 days 14 tablet 0    fluticasone (FLONASE) 50 MCG/ACT nasal spray 1 spray by Each Nostril route daily 16 g 2    prednisoLONE acetate (PRED FORTE) 1 % ophthalmic suspension       tiZANidine (ZANAFLEX) 4 MG tablet TAKE 1 TABLET BY MOUTH EVERY 8 HOURS AS NEEDED FOR NECK PAIN 30 tablet 0    levothyroxine (SYNTHROID) 125 MCG tablet TAKE 1 TABLET BY MOUTH DAILY 90 tablet

## 2023-11-09 DIAGNOSIS — E89.0 POSTOPERATIVE HYPOTHYROIDISM: ICD-10-CM

## 2023-11-09 RX ORDER — LEVOTHYROXINE SODIUM 0.12 MG/1
125 TABLET ORAL DAILY
Qty: 90 TABLET | Refills: 1 | Status: SHIPPED | OUTPATIENT
Start: 2023-11-09

## 2023-11-13 ENCOUNTER — TELEPHONE (OUTPATIENT)
Dept: FAMILY MEDICINE CLINIC | Age: 58
End: 2023-11-13

## 2023-11-13 RX ORDER — AZITHROMYCIN 250 MG/1
250 TABLET, FILM COATED ORAL SEE ADMIN INSTRUCTIONS
Qty: 6 TABLET | Refills: 0 | Status: SHIPPED | OUTPATIENT
Start: 2023-11-13 | End: 2023-11-18

## 2023-11-13 NOTE — TELEPHONE ENCOUNTER
Pt called back letting know that congestion is in head, pressure in eyes and having a sore throat. Pt is taking mucinex and ibuprofen    [facility] in [place] .

## 2023-11-13 NOTE — TELEPHONE ENCOUNTER
Pt calling with Hoarseness, mucous drainage pt can hardly talk wants to know If something needs to be called in.

## 2023-12-14 ENCOUNTER — HOSPITAL ENCOUNTER (OUTPATIENT)
Age: 58
Discharge: HOME | End: 2023-12-14
Payer: COMMERCIAL

## 2023-12-14 ENCOUNTER — HOSPITAL ENCOUNTER (OUTPATIENT)
Dept: GENERAL RADIOLOGY | Age: 58
Discharge: HOME | End: 2023-12-14
Payer: COMMERCIAL

## 2023-12-14 DIAGNOSIS — M50.10 CERVICAL DISC DISORDER WITH RADICULOPATHY OF CERVICAL REGION: ICD-10-CM

## 2023-12-14 PROCEDURE — 72050 X-RAY EXAM NECK SPINE 4/5VWS: CPT

## 2024-01-12 ENCOUNTER — HOSPITAL ENCOUNTER (EMERGENCY)
Age: 59
Discharge: HOME | End: 2024-01-12
Attending: EMERGENCY MEDICINE
Payer: COMMERCIAL

## 2024-01-12 ENCOUNTER — APPOINTMENT (OUTPATIENT)
Dept: GENERAL RADIOLOGY | Age: 59
End: 2024-01-12
Payer: COMMERCIAL

## 2024-01-12 VITALS
TEMPERATURE: 99 F | SYSTOLIC BLOOD PRESSURE: 121 MMHG | DIASTOLIC BLOOD PRESSURE: 66 MMHG | RESPIRATION RATE: 16 BRPM | OXYGEN SATURATION: 100 % | HEART RATE: 68 BPM

## 2024-01-12 DIAGNOSIS — S80.812A ABRASION OF LEFT LOWER EXTREMITY, INITIAL ENCOUNTER: Primary | ICD-10-CM

## 2024-01-12 DIAGNOSIS — S63.681A OTHER SPRAIN OF RIGHT THUMB, INITIAL ENCOUNTER: ICD-10-CM

## 2024-01-12 DIAGNOSIS — S60.311A ABRASION OF RIGHT THUMB, INITIAL ENCOUNTER: ICD-10-CM

## 2024-01-12 PROCEDURE — 90715 TDAP VACCINE 7 YRS/> IM: CPT | Performed by: EMERGENCY MEDICINE

## 2024-01-12 PROCEDURE — 73130 X-RAY EXAM OF HAND: CPT

## 2024-01-12 PROCEDURE — 73110 X-RAY EXAM OF WRIST: CPT

## 2024-01-12 PROCEDURE — 90471 IMMUNIZATION ADMIN: CPT | Performed by: EMERGENCY MEDICINE

## 2024-01-12 PROCEDURE — 6360000002 HC RX W HCPCS: Performed by: EMERGENCY MEDICINE

## 2024-01-12 PROCEDURE — 99284 EMERGENCY DEPT VISIT MOD MDM: CPT

## 2024-01-12 RX ADMIN — TETANUS TOXOID, REDUCED DIPHTHERIA TOXOID AND ACELLULAR PERTUSSIS VACCINE, ADSORBED 0.5 ML: 5; 2.5; 8; 8; 2.5 SUSPENSION INTRAMUSCULAR at 15:39

## 2024-01-12 NOTE — DISCHARGE INSTRUCTIONS
Follow-up with orthopedics in 1 to 2 days for reexamination.  Use ice to the affected area.  Avoid heat or heating pads.    Recommend limited use of the right hand until seen by orthopedics.  Wear splint until seen by orthopedics.    If condition worsens or new symptoms develop, return immediately to the emergency department.    Follow-up with your primary care physician in 1 to 2 days as needed.    Follow-up with the occupational health clinic in 1 to 2 days for reexamination.

## 2024-01-12 NOTE — ED PROVIDER NOTES
illness and ROS.        PAST MEDICAL HISTORY     Past Medical History:   Diagnosis Date    Anxiety     Asthma     currently was instructed to stop inhalers d/t to collapse of vocal cords    Diaphragm paralysis     right side    Fatty liver     Fibromyalgia     GERD (gastroesophageal reflux disease)     Headache(784.0)     neck pain     History of ulcer disease     Hypothyroidism     status post thyroidectomy     Melanoma (HCC)     right eye     Osteoarthritis     CHRONIC BACK PAIN/FIBROMYALGIA    Rheumatoid arthritis (HCC)     Spasmodic dysphonia     voice very hoarse    Thyroid disease     2015 found spot on left side of thyroid, right side thyroid was removed    Wears dentures     Wears glasses     Wears glasses          SURGICAL HISTORY       Past Surgical History:   Procedure Laterality Date    APPENDECTOMY  age 18    BREAST BIOPSY Right     x3  benign lesions     BREAST SURGERY Right needle localization right breast mass    BREAST SURGERY Right 10/22/15    riught breast mass excision/needle loc    CARPAL TUNNEL RELEASE Right 5/7/15    CARPAL TUNNEL RELEASE Left 5/28/15    Left carpal tunnel release      COLONOSCOPY      ENDOSCOPY, COLON, DIAGNOSTIC      EPIDURAL STEROID INJECTION N/A 2/1/2019    LUMBAR EPIDURAL STEROID INJECTION L3/4 performed by Flavia Resendiz MD at [facility] END[facility]    EPIDURAL STEROID INJECTION N/A 11/22/2019    CERVICAL EPIDURAL STEROID INJECTION C7-T1 performed by Flavia Resendiz MD at [facility] ENDOSCOPY    EYE SURGERY      right eye-radiation plate for melanoma    HC INJECT OTHER PERPHRL NERV Bilateral 3/29/2019    INTRA ARTICULAR KNEE INJECTIONS performed by Flavia Resendiz MD at [facility] ENDOSCOPY    HC INJECT OTHER PERPHRL NERV Bilateral 12/6/2019    BILATERAL INTRAARTICULAR KNEE INJECTION performed by Flavia Resendiz MD at [facility] ENDOSCOPY    KNEE ARTHROSCOPY Left 12/21/2017    MEDICATION INJECTION Bilateral 8/20/2021    BILATERAL KNEE STEROID INJECTION

## 2024-01-17 ENCOUNTER — OFFICE VISIT (OUTPATIENT)
Dept: ORTHOPEDIC SURGERY | Age: 59
End: 2024-01-17

## 2024-01-17 ENCOUNTER — TELEPHONE (OUTPATIENT)
Dept: ORTHOPEDIC SURGERY | Age: 59
End: 2024-01-17

## 2024-01-17 ENCOUNTER — HOSPITAL ENCOUNTER (EMERGENCY)
Age: 59
Discharge: HOME | End: 2024-01-17
Attending: EMERGENCY MEDICINE
Payer: COMMERCIAL

## 2024-01-17 ENCOUNTER — APPOINTMENT (OUTPATIENT)
Dept: GENERAL RADIOLOGY | Age: 59
End: 2024-01-17
Payer: COMMERCIAL

## 2024-01-17 VITALS
OXYGEN SATURATION: 95 % | RESPIRATION RATE: 16 BRPM | DIASTOLIC BLOOD PRESSURE: 80 MMHG | TEMPERATURE: 97 F | SYSTOLIC BLOOD PRESSURE: 125 MMHG | HEART RATE: 65 BPM

## 2024-01-17 VITALS — WEIGHT: 226.4 LBS | HEIGHT: 68 IN | BODY MASS INDEX: 34.31 KG/M2

## 2024-01-17 DIAGNOSIS — R07.81 RIB PAIN ON LEFT SIDE: Primary | ICD-10-CM

## 2024-01-17 DIAGNOSIS — S63.681A OTHER SPRAIN OF RIGHT THUMB, INITIAL ENCOUNTER: Primary | ICD-10-CM

## 2024-01-17 LAB
ANION GAP SERPL CALCULATED.3IONS-SCNC: 9 MMOL/L (ref 3–16)
BASE EXCESS BLDV CALC-SCNC: 1.7 MMOL/L
BASOPHILS # BLD: 0 K/UL (ref 0–0.2)
BASOPHILS NFR BLD: 0.5 %
BUN SERPL-MCNC: 25 MG/DL (ref 7–20)
CALCIUM SERPL-MCNC: 10.7 MG/DL (ref 8.3–10.6)
CHLORIDE SERPL-SCNC: 101 MMOL/L (ref 99–110)
CO2 BLDV-SCNC: 29 MMOL/L
CO2 SERPL-SCNC: 26 MMOL/L (ref 21–32)
COHGB MFR BLDV: 1.2 %
CREAT SERPL-MCNC: <0.5 MG/DL (ref 0.6–1.1)
DEPRECATED RDW RBC AUTO: 13.8 % (ref 12.4–15.4)
EKG ATRIAL RATE: 64 BPM
EKG DIAGNOSIS: NORMAL
EKG P AXIS: 65 DEGREES
EKG P-R INTERVAL: 194 MS
EKG Q-T INTERVAL: 408 MS
EKG QRS DURATION: 96 MS
EKG QTC CALCULATION (BAZETT): 420 MS
EKG R AXIS: -19 DEGREES
EKG T AXIS: 26 DEGREES
EKG VENTRICULAR RATE: 64 BPM
EOSINOPHIL # BLD: 0.2 K/UL (ref 0–0.6)
EOSINOPHIL NFR BLD: 2.5 %
GFR SERPLBLD CREATININE-BSD FMLA CKD-EPI: >60 ML/MIN/{1.73_M2}
GLUCOSE SERPL-MCNC: 91 MG/DL (ref 70–99)
HCO3 BLDV-SCNC: 27 MMOL/L (ref 23–29)
HCT VFR BLD AUTO: 38.2 % (ref 36–48)
HGB BLD-MCNC: 12.9 G/DL (ref 12–16)
LYMPHOCYTES # BLD: 3.8 K/UL (ref 1–5.1)
LYMPHOCYTES NFR BLD: 39.8 %
MCH RBC QN AUTO: 28.7 PG (ref 26–34)
MCHC RBC AUTO-ENTMCNC: 33.8 G/DL (ref 31–36)
MCV RBC AUTO: 84.8 FL (ref 80–100)
METHGB MFR BLDV: 0.4 %
MONOCYTES # BLD: 1 K/UL (ref 0–1.3)
MONOCYTES NFR BLD: 10.2 %
NEUTROPHILS # BLD: 4.4 K/UL (ref 1.7–7.7)
NEUTROPHILS NFR BLD: 47 %
NT-PROBNP SERPL-MCNC: <36 PG/ML (ref 0–124)
O2 THERAPY: NORMAL
PCO2 BLDV: 45.7 MMHG (ref 40–50)
PH BLDV: 7.38 [PH] (ref 7.35–7.45)
PLATELET # BLD AUTO: 243 K/UL (ref 135–450)
PMV BLD AUTO: 8.7 FL (ref 5–10.5)
PO2 BLDV: 57 MMHG
POTASSIUM SERPL-SCNC: 4.1 MMOL/L (ref 3.5–5.1)
RBC # BLD AUTO: 4.5 M/UL (ref 4–5.2)
SAO2 % BLDV: 90 %
SODIUM SERPL-SCNC: 136 MMOL/L (ref 136–145)
TROPONIN, HIGH SENSITIVITY: 10 NG/L (ref 0–14)
TROPONIN, HIGH SENSITIVITY: 9 NG/L (ref 0–14)
WBC # BLD AUTO: 9.4 K/UL (ref 4–11)

## 2024-01-17 PROCEDURE — 84484 ASSAY OF TROPONIN QUANT: CPT

## 2024-01-17 PROCEDURE — 71046 X-RAY EXAM CHEST 2 VIEWS: CPT

## 2024-01-17 PROCEDURE — 80048 BASIC METABOLIC PNL TOTAL CA: CPT

## 2024-01-17 PROCEDURE — 93005 ELECTROCARDIOGRAM TRACING: CPT | Performed by: EMERGENCY MEDICINE

## 2024-01-17 PROCEDURE — 85025 COMPLETE CBC W/AUTO DIFF WBC: CPT

## 2024-01-17 PROCEDURE — 83880 ASSAY OF NATRIURETIC PEPTIDE: CPT

## 2024-01-17 PROCEDURE — 6370000000 HC RX 637 (ALT 250 FOR IP): Performed by: EMERGENCY MEDICINE

## 2024-01-17 PROCEDURE — 99285 EMERGENCY DEPT VISIT HI MDM: CPT

## 2024-01-17 PROCEDURE — 82803 BLOOD GASES ANY COMBINATION: CPT

## 2024-01-17 RX ORDER — OXYCODONE HYDROCHLORIDE AND ACETAMINOPHEN 5; 325 MG/1; MG/1
1 TABLET ORAL ONCE
Status: COMPLETED | OUTPATIENT
Start: 2024-01-17 | End: 2024-01-17

## 2024-01-17 RX ADMIN — OXYCODONE HYDROCHLORIDE AND ACETAMINOPHEN 1 TABLET: 5; 325 TABLET ORAL at 09:43

## 2024-01-17 ASSESSMENT — PAIN DESCRIPTION - DESCRIPTORS: DESCRIPTORS: DISCOMFORT

## 2024-01-17 ASSESSMENT — PAIN - FUNCTIONAL ASSESSMENT
PAIN_FUNCTIONAL_ASSESSMENT: ACTIVITIES ARE NOT PREVENTED
PAIN_FUNCTIONAL_ASSESSMENT: 0-10

## 2024-01-17 ASSESSMENT — PAIN DESCRIPTION - ORIENTATION: ORIENTATION: LEFT

## 2024-01-17 ASSESSMENT — PAIN DESCRIPTION - LOCATION: LOCATION: RIB CAGE

## 2024-01-17 ASSESSMENT — PAIN SCALES - GENERAL: PAINLEVEL_OUTOF10: 5

## 2024-01-17 ASSESSMENT — PAIN DESCRIPTION - PAIN TYPE: TYPE: ACUTE PAIN

## 2024-01-17 NOTE — DISCHARGE INSTRUCTIONS
Call today or tomorrow to follow up with Kamila Briseno MD  in 4-5 days.    Take your medication as prescribed for pain.  Do not use any rib binders / ace wraps around your chest.    Return to the Emergency Department for worsening of pain not controlled with medication, shortness of breath, chest pain, any other care or concern.

## 2024-01-17 NOTE — ED PROVIDER NOTES
for PULMONARY EMBOLISM, ACUTE CORONARY SYNDROME, OR THORACIC AORTIC DISSECTION, thus I consider the discharge disposition reasonable. Laurel Lazcano and I have discussed the diagnosis and risks, and we agree with discharging home to follow-up with their primary doctor. We also discussed returning to the Emergency Department immediately if new or worsening symptoms occur. We have discussed the symptoms which are most concerning (e.g., bloody sputum, fever, worsening pain or shortness of breath, vomiting) that necessitate immediate return.        Patient was given the following medications:   Medications   oxyCODONE-acetaminophen (PERCOCET) 5-325 MG per tablet 1 tablet (1 tablet Oral Given 1/17/24 0943)        CONSULTS:   None   Discussion with Other Professionals: None   Social Determinants: None   Chronic Conditions:   has a past medical history of Anxiety, Asthma, Diaphragm paralysis, Fatty liver, Fibromyalgia, GERD (gastroesophageal reflux disease), Headache(784.0), History of ulcer disease, Hypothyroidism, Melanoma (HCC), Osteoarthritis, Rheumatoid arthritis (HCC), Spasmodic dysphonia, Thyroid disease, Wears dentures, Wears glasses, and Wears glasses.    Records Reviewed: Orthopedics office visit note from today for sprain of the right thumb, ED visit note from 1/12/2024 for abrasion of the left lower extremity, right thumb sprain, hematology oncology visit note on 10/18/2023 for malignant melanoma      Disposition Considerations:   I am the Primary Clinician of Record.        FINAL IMPRESSION    1. Rib pain on left side           DISPOSITION/PLAN:   Pt discharged home in stable condition.     PATIENT REFERRED TO:   Kamila Briseno MD  [address] [id number] [phone]    Call today  For a follow up appointment.       DISCHARGE MEDICATIONS:   Discharge Medication List as of 1/17/2024 11:20 AM           DISCONTINUED MEDICATIONS:   Discharge Medication List as of 1/17/2024 11:20 AM

## 2024-01-17 NOTE — PROGRESS NOTES
Laurel Lazcano  [id number]  January 17, 2024    Chief Complaint   Patient presents with    Wrist Injury     Right [facility] DOI 1/12/24       History: The patient is a 59-year-old female who is here for evaluation of her right wrist/hand.  The patient works with [employer] as a .  She reportedly fell forward and landed onto her outstretched upper extremities.  She also landed onto her chest.  The injury occurred on 1/12.  She ultimately presented to the emergency room.  The patient has numerous medical comorbidities including cervical radiculopathy, diaphragm paralysis, intestinal ulcer, fibromyalgia and chronic narcotic use.  She is currently under pain management with Dr. Resendiz.  She takes 3 oxycodone daily.  This is a consult from Jvai Crane DO for right thumb and hand pain.    The patient's  past medical history, medications, allergies,  family history, social history, and have been reviewed, and dated and are recorded in the chart.  Pertinent items are noted in HPI.  Review of systems reviewed from Pertinent History Form dated on 1/17 and available in the patient's chart under the Media tab.     Vitals:  Ht 1.727 m (5' 8\")   Wt 102.7 kg (226 lb 6.4 oz)   LMP 02/18/2016 (Exact Date)   BMI 34.42 kg/m²     Physical: On examination today, the patient is alert and oriented x 3.  The patient has a very small abrasion over the base of the right thumb.  She is nontender in the anatomic snuffbox.  She is nontender over the distal radius.  She is nontender over the distal ulna.  She is nontender over the TFCC.  She has severe tenderness to palpation over the base of the thumb at the CMC joint.  She has a positive CMC grind test.  She is able to flex and extend the right wrist without difficulty.  She is able to pronate and supinate the forearm without difficulty.  She is neurovascularly intact distally.  She is able to flex and extend all digits.  She is able to flex the right wrist to

## 2024-02-13 ENCOUNTER — OFFICE VISIT (OUTPATIENT)
Dept: ORTHOPEDIC SURGERY | Age: 59
End: 2024-02-13

## 2024-02-13 VITALS — WEIGHT: 226 LBS | BODY MASS INDEX: 34.25 KG/M2 | HEIGHT: 68 IN

## 2024-02-13 DIAGNOSIS — S63.681A OTHER SPRAIN OF RIGHT THUMB, INITIAL ENCOUNTER: Primary | ICD-10-CM

## 2024-02-13 NOTE — PROGRESS NOTES
Laurel Lazcano  [id number]  February 13, 2024    Chief Complaint   Patient presents with    Follow-up     Right thumb, [facility], doi 1/12/24.       History: The patient is a 59-year-old female who is here for evaluation of her right wrist/hand.  The patient works with [employer] as a .  She reportedly fell forward and landed onto her outstretched upper extremities.  She also landed onto her chest.  The injury occurred on 1/12.  She ultimately presented to the emergency room.  The patient has numerous medical comorbidities including cervical radiculopathy, diaphragm paralysis, intestinal ulcer, fibromyalgia and chronic narcotic use.  She is currently under pain management with Dr. Resendiz.  She takes 3 oxycodone daily.  She has essentially recovered from the sprain.  She does like wearing the CMC brace.      The patient's  past medical history, medications, allergies,  family history, social history, and have been reviewed, and dated and are recorded in the chart.  Pertinent items are noted in HPI.  Review of systems reviewed from Pertinent History Form dated on 1/17 and available in the patient's chart under the Media tab.     Vitals:  Ht 1.727 m (5' 8\")   Wt 102.5 kg (226 lb)   LMP 02/18/2016 (Exact Date)   BMI 34.36 kg/m²     Physical: On examination today, the patient is alert and oriented x 3.  The patient has a very small abrasion over the base of the right thumb.  She is nontender in the anatomic snuffbox.  She is nontender over the distal radius.  She is nontender over the distal ulna.  She is nontender over the TFCC.  She has severe tenderness to palpation over the base of the thumb at the CMC joint.  She has a positive CMC grind test.  She is able to flex and extend the right wrist without difficulty.  She is able to pronate and supinate the forearm without difficulty.  She is neurovascularly intact distally.  She is able to flex and extend all digits.  She is able to flex the right

## 2024-03-14 ENCOUNTER — PATIENT MESSAGE (OUTPATIENT)
Dept: FAMILY MEDICINE CLINIC | Age: 59
End: 2024-03-14

## 2024-03-16 ENCOUNTER — HOSPITAL ENCOUNTER (EMERGENCY)
Age: 59
Discharge: HOME | End: 2024-03-16
Attending: EMERGENCY MEDICINE
Payer: COMMERCIAL

## 2024-03-16 VITALS
DIASTOLIC BLOOD PRESSURE: 79 MMHG | HEART RATE: 70 BPM | BODY MASS INDEX: 34.01 KG/M2 | HEIGHT: 68 IN | WEIGHT: 224.43 LBS | SYSTOLIC BLOOD PRESSURE: 119 MMHG | TEMPERATURE: 98.4 F | RESPIRATION RATE: 16 BRPM | OXYGEN SATURATION: 99 %

## 2024-03-16 DIAGNOSIS — M65.4 DE QUERVAIN'S TENOSYNOVITIS: Primary | ICD-10-CM

## 2024-03-16 PROCEDURE — 6360000002 HC RX W HCPCS: Performed by: EMERGENCY MEDICINE

## 2024-03-16 PROCEDURE — 29125 APPL SHORT ARM SPLINT STATIC: CPT

## 2024-03-16 PROCEDURE — 96372 THER/PROPH/DIAG INJ SC/IM: CPT

## 2024-03-16 PROCEDURE — 99284 EMERGENCY DEPT VISIT MOD MDM: CPT

## 2024-03-16 RX ORDER — KETOROLAC TROMETHAMINE 15 MG/ML
15 INJECTION, SOLUTION INTRAMUSCULAR; INTRAVENOUS ONCE
Status: COMPLETED | OUTPATIENT
Start: 2024-03-16 | End: 2024-03-16

## 2024-03-16 RX ORDER — IBUPROFEN 400 MG/1
400 TABLET ORAL EVERY 6 HOURS PRN
Qty: 120 TABLET | Refills: 0 | Status: SHIPPED | OUTPATIENT
Start: 2024-03-16

## 2024-03-16 RX ADMIN — KETOROLAC TROMETHAMINE 15 MG: 15 INJECTION, SOLUTION INTRAMUSCULAR; INTRAVENOUS at 23:26

## 2024-03-16 ASSESSMENT — PAIN SCALES - GENERAL
PAINLEVEL_OUTOF10: 3
PAINLEVEL_OUTOF10: 4
PAINLEVEL_OUTOF10: 4

## 2024-03-16 ASSESSMENT — PAIN DESCRIPTION - ORIENTATION
ORIENTATION: LEFT
ORIENTATION: LEFT

## 2024-03-16 ASSESSMENT — LIFESTYLE VARIABLES: HOW OFTEN DO YOU HAVE A DRINK CONTAINING ALCOHOL: NEVER

## 2024-03-16 ASSESSMENT — PAIN - FUNCTIONAL ASSESSMENT
PAIN_FUNCTIONAL_ASSESSMENT: 0-10
PAIN_FUNCTIONAL_ASSESSMENT: 0-10

## 2024-03-16 ASSESSMENT — PAIN DESCRIPTION - PAIN TYPE: TYPE: ACUTE PAIN

## 2024-03-16 ASSESSMENT — PAIN DESCRIPTION - DESCRIPTORS: DESCRIPTORS: SHOOTING

## 2024-03-16 ASSESSMENT — PAIN DESCRIPTION - LOCATION
LOCATION: HAND
LOCATION: HAND

## 2024-03-17 NOTE — ED PROVIDER NOTES
Isolation Panel [NHANES]     Frequency of Communication with Friends and Family: More than three times a week     Frequency of Social Gatherings with Friends and Family: Twice a week     Attends [employer] Services: 1 to 4 times per year     Active Member of Clubs or Organizations: No     Attends Club or Organization Meetings: Never     Marital Status:    Intimate Partner Violence: Not At Risk (6/8/2021)    Humiliation, Afraid, Rape, and Kick questionnaire     Fear of Current or Ex-Partner: No     Emotionally Abused: No     Physically Abused: No     Sexually Abused: No   Housing Stability: Unknown (3/6/2023)    Housing Stability Vital Sign     Unable to Pay for Housing in the Last Year: Not on file     Number of Places Lived in the Last Year: Not on file     Unstable Housing in the Last Year: No     Current Facility-Administered Medications   Medication Dose Route Frequency Provider Last Rate Last Admin    ketorolac (TORADOL) injection 15 mg  15 mg IntraMUSCular Once Oleg Spear MD         Current Outpatient Medications   Medication Sig Dispense Refill    ibuprofen (IBU) 400 MG tablet Take 1 tablet by mouth every 6 hours as needed for Pain 120 tablet 0    levothyroxine (SYNTHROID) 125 MCG tablet TAKE 1 TABLET BY MOUTH DAILY 90 tablet 1    gabapentin (NEURONTIN) 600 MG tablet Take 1 tablet by mouth 3 times daily.      oxyCODONE-acetaminophen (PERCOCET) 5-325 MG per tablet every 8 hours as needed.       Facility-Administered Medications Ordered in Other Encounters   Medication Dose Route Frequency Provider Last Rate Last Admin    technetium sulfur colloid egg (NYCOMED-SC) solution 500 micro curie  500 micro curie IntraVENous ONCE PRN Justin Palumbo MD         Allergies   Allergen Reactions    Other Hives     Cloth gloves at my work       REVIEW OF SYSTEMS  Positives and pertinent negatives as per HPI.  Six other systems were reviewed and are negative.  Nursing notes pertaining to ROS were reviewed.

## 2024-03-17 NOTE — ED TRIAGE NOTES
Pt ambulatory to ED with complaint of left hand pain. States she was folding up a table around 3pm, felt a \"pop\" in her hand and developed increased pain, states she has chronic pain from osteoarthritis. States she took her oxycodone without relief. Pt states as long as she holds her hand still her pain is a 4/10. States her pain becomes 10/10 with movement.  Left hand without redness, edema, ecchymosis, warm to touch with brisk cap refill.

## 2024-03-21 ENCOUNTER — OFFICE VISIT (OUTPATIENT)
Dept: ORTHOPEDIC SURGERY | Age: 59
End: 2024-03-21
Payer: COMMERCIAL

## 2024-03-21 VITALS — BODY MASS INDEX: 33.95 KG/M2 | RESPIRATION RATE: 16 BRPM | WEIGHT: 224 LBS | HEIGHT: 68 IN

## 2024-03-21 DIAGNOSIS — M65.4 DE QUERVAIN'S TENOSYNOVITIS, LEFT: ICD-10-CM

## 2024-03-21 DIAGNOSIS — M79.642 LEFT HAND PAIN: Primary | ICD-10-CM

## 2024-03-21 DIAGNOSIS — M17.0 OSTEOARTHRITIS OF BOTH KNEES, UNSPECIFIED OSTEOARTHRITIS TYPE: ICD-10-CM

## 2024-03-21 DIAGNOSIS — M17.11 PRIMARY OSTEOARTHRITIS OF RIGHT KNEE: ICD-10-CM

## 2024-03-21 PROCEDURE — 1036F TOBACCO NON-USER: CPT | Performed by: PHYSICIAN ASSISTANT

## 2024-03-21 PROCEDURE — 3017F COLORECTAL CA SCREEN DOC REV: CPT | Performed by: PHYSICIAN ASSISTANT

## 2024-03-21 PROCEDURE — 99214 OFFICE O/P EST MOD 30 MIN: CPT | Performed by: PHYSICIAN ASSISTANT

## 2024-03-21 RX ORDER — BUPIVACAINE HYDROCHLORIDE 2.5 MG/ML
2 INJECTION, SOLUTION INFILTRATION; PERINEURAL ONCE
Status: COMPLETED | OUTPATIENT
Start: 2024-03-21 | End: 2024-03-21

## 2024-03-21 RX ORDER — TRIAMCINOLONE ACETONIDE 40 MG/ML
40 INJECTION, SUSPENSION INTRA-ARTICULAR; INTRAMUSCULAR ONCE
Status: COMPLETED | OUTPATIENT
Start: 2024-03-21 | End: 2024-03-21

## 2024-03-21 RX ADMIN — BUPIVACAINE HYDROCHLORIDE 5 MG: 2.5 INJECTION, SOLUTION INFILTRATION; PERINEURAL at 15:47

## 2024-03-21 RX ADMIN — TRIAMCINOLONE ACETONIDE 40 MG: 40 INJECTION, SUSPENSION INTRA-ARTICULAR; INTRAMUSCULAR at 15:48

## 2024-03-22 NOTE — PROGRESS NOTES
This dictation was done with Dragon dictation and may contain mechanical errors related to translation.    I have today reviewed with Laurel Lazcano the clinically relevant, past medical history, medications, allergies, family history, social history, and Review Of Systems form the patient’s most recent history form & I have documented any details relevant to today's presenting complaints in my history below. Ms. Laurel Lazcano's self-reported past medical history, medications, allergies, family history, social history, and Review Of Systems form has been scanned into the chart under the \"Media\" tab.    Subjective:  Laurel Lazcano is a 59 y.o. who is here as a well-established patient at [facility] orthopedics complaining specifically of right knee pain some left knee pain some pain at the base of her left hand.  She is taken ibuprofen and Percocet per Dr. Trice Patterson we referred her for consultation with Dr. Osborn in regards to the advanced osteoarthritis in her knees and the failure of cortisone and hyaluronic acid to remedy this.  She was sent for an AP lateral sunrise view and tunnel view of her knees at the office today.  I did examine her wrist and she had an AP lateral and oblique left wrist x-ray taken as well.  At this visit the X-rays, presenting symptoms, and chief complaint were presented to Choco Osborn MD.  He then evaluated the patient.  He spent several minutes discussing face to face with the patient the clinical diagnosis and medical course options,from conservative to aggressive including risks and benefits.  H  Patient Active Problem List   Diagnosis    AR (allergic rhinitis)    Centrilobular emphysema (HCC)    Fibroadenoma    Diaphragm paralysis    Thyroid nodule    Hypothyroidism    Vitamin D deficiency    Chest pain    Spasmodic dysphonia    Neck pain    Low back pain radiating to right lower extremity    Intestinal ulcer    Cervical radicular pain    Palpitations    Fibromyalgia    CMC

## 2024-03-25 ENCOUNTER — TELEPHONE (OUTPATIENT)
Dept: FAMILY MEDICINE CLINIC | Age: 59
End: 2024-03-25

## 2024-03-25 DIAGNOSIS — C69.41 MALIGNANT MELANOMA OF CILIARY BODY OF RIGHT EYE (HCC): ICD-10-CM

## 2024-03-25 DIAGNOSIS — R25.2 LEG CRAMPS: ICD-10-CM

## 2024-03-25 DIAGNOSIS — E55.9 VITAMIN D DEFICIENCY: ICD-10-CM

## 2024-03-25 DIAGNOSIS — E89.0 POSTOPERATIVE HYPOTHYROIDISM: Primary | ICD-10-CM

## 2024-03-25 NOTE — TELEPHONE ENCOUNTER
I would suggest taking magnesium oxide 250 mg daily.  If persisting to schedule a visit.  I will put in labs to do

## 2024-03-25 NOTE — TELEPHONE ENCOUNTER
Patient called she has been having leg cramps in both legs for awhile. She states she is well hydrated, and not sure why she keeps getting them.  She does need to have both knees replaced as soon as she calls to schedule them.    Please advise, [phone]

## 2024-03-26 ENCOUNTER — PREP FOR PROCEDURE (OUTPATIENT)
Dept: ORTHOPEDIC SURGERY | Age: 59
End: 2024-03-26

## 2024-03-26 PROBLEM — M17.11 OSTEOARTHRITIS OF RIGHT KNEE: Status: ACTIVE | Noted: 2024-03-26

## 2024-04-02 DIAGNOSIS — R25.2 LEG CRAMPS: ICD-10-CM

## 2024-04-02 DIAGNOSIS — E55.9 VITAMIN D DEFICIENCY: ICD-10-CM

## 2024-04-02 DIAGNOSIS — E89.0 POSTOPERATIVE HYPOTHYROIDISM: ICD-10-CM

## 2024-04-02 LAB
25(OH)D3 SERPL-MCNC: 16.7 NG/ML
ALBUMIN SERPL-MCNC: 4.1 G/DL (ref 3.4–5)
ALBUMIN/GLOB SERPL: 1.5 {RATIO} (ref 1.1–2.2)
ALP SERPL-CCNC: 112 U/L (ref 40–129)
ALT SERPL-CCNC: 25 U/L (ref 10–40)
ANION GAP SERPL CALCULATED.3IONS-SCNC: 11 MMOL/L (ref 3–16)
AST SERPL-CCNC: 20 U/L (ref 15–37)
BILIRUB SERPL-MCNC: 0.3 MG/DL (ref 0–1)
BUN SERPL-MCNC: 17 MG/DL (ref 7–20)
CALCIUM SERPL-MCNC: 10.3 MG/DL (ref 8.3–10.6)
CHLORIDE SERPL-SCNC: 104 MMOL/L (ref 99–110)
CO2 SERPL-SCNC: 24 MMOL/L (ref 21–32)
CREAT SERPL-MCNC: 0.6 MG/DL (ref 0.6–1.1)
GFR SERPLBLD CREATININE-BSD FMLA CKD-EPI: >90 ML/MIN/{1.73_M2}
GLUCOSE SERPL-MCNC: 93 MG/DL (ref 70–99)
MAGNESIUM SERPL-MCNC: 1.9 MG/DL (ref 1.8–2.4)
POTASSIUM SERPL-SCNC: 4.1 MMOL/L (ref 3.5–5.1)
PROT SERPL-MCNC: 6.8 G/DL (ref 6.4–8.2)
SODIUM SERPL-SCNC: 139 MMOL/L (ref 136–145)
TSH SERPL DL<=0.005 MIU/L-ACNC: 3.26 UIU/ML (ref 0.27–4.2)

## 2024-04-03 DIAGNOSIS — E55.9 VITAMIN D DEFICIENCY: Primary | ICD-10-CM

## 2024-04-03 RX ORDER — ERGOCALCIFEROL 1.25 MG/1
50000 CAPSULE ORAL WEEKLY
Qty: 12 CAPSULE | Refills: 1 | Status: SHIPPED | OUTPATIENT
Start: 2024-04-03

## 2024-05-02 ENCOUNTER — HOSPITAL ENCOUNTER (OUTPATIENT)
Dept: CT IMAGING | Age: 59
Discharge: HOME | End: 2024-05-02
Attending: ORTHOPAEDIC SURGERY
Payer: COMMERCIAL

## 2024-05-02 DIAGNOSIS — M17.11 PRIMARY OSTEOARTHRITIS OF RIGHT KNEE: ICD-10-CM

## 2024-05-02 PROCEDURE — 73700 CT LOWER EXTREMITY W/O DYE: CPT

## 2024-05-06 ENCOUNTER — HOSPITAL ENCOUNTER (OUTPATIENT)
Dept: PHYSICAL THERAPY | Age: 59
Setting detail: THERAPIES SERIES
Discharge: HOME | End: 2024-05-06
Attending: ORTHOPAEDIC SURGERY
Payer: COMMERCIAL

## 2024-05-06 DIAGNOSIS — M25.561 RIGHT KNEE PAIN, UNSPECIFIED CHRONICITY: ICD-10-CM

## 2024-05-06 DIAGNOSIS — Z74.09 IMPAIRED FUNCTIONAL MOBILITY AND ACTIVITY TOLERANCE: Primary | ICD-10-CM

## 2024-05-06 PROCEDURE — 97161 PT EVAL LOW COMPLEX 20 MIN: CPT

## 2024-05-06 PROCEDURE — 97110 THERAPEUTIC EXERCISES: CPT

## 2024-05-06 NOTE — PLAN OF CARE
increase flexibility, following either an injury or surgery.   (46707) NEUROMUSCULAR RE-EDUCATION - Therapeutic procedure, 1 or more areas, each 15 minutes; neuromuscular reeducation of movement, balance, coordination, kinesthetic sense, posture, and/or proprioception for sitting and/or standing activities  (55889) THERAPEUTIC ACTIVITY - use of dynamic activities to improve functional performance. (Ex include squatting, ascending/descending stairs, walking, bending, lifting, catching, throwing, pushing, pulling, jumping.)  Direct, one on one contact, billed in 15-minute increments.  (30558) MANUAL THERAPY -  Manual therapy techniques, 1 or more regions, each 15 minutes (Mobilization/manipulation, manual lymphatic drainage, manual traction) for the purpose of modulating pain, promoting relaxation,  increasing ROM, reducing/eliminating soft tissue swelling/inflammation/restriction, improving soft tissue extensibility and allowing for proper ROM for normal function with self care, mobility, lifting and ambulation      GOALS     GOALS:  Patient stated goal: \"recover from knee replacement\"  [] Progressing: [] Met: [] Not Met: [] Adjusted    Therapist goals for Patient:   Short Term Goals: To be achieved in: 1-2 visit(s)  1. Independent in HEP and progression per patient tolerance, in order to prevent re-injury.   [] Progressing: [] Met: [] Not Met: [] Adjusted  2. All patient questions regarding expectations for rehab following upcoming surgery are answered.   [] Progressing: [] Met: [] Not Met: [] Adjusted    Long Term Goals: long term goals to be determined at re-eval following upcoming surgery      TREATMENT PLAN     Frequency/Duration: 1-2x/week for 1 weeks for the following treatment interventions:    Interventions:  Therapeutic Exercise (86897) including: strength training, ROM, and functional mobility  Therapeutic Activities (52343) including: functional mobility training and education.  Neuromuscular Re-education

## 2024-05-07 DIAGNOSIS — E89.0 POSTOPERATIVE HYPOTHYROIDISM: ICD-10-CM

## 2024-05-07 RX ORDER — LEVOTHYROXINE SODIUM 0.12 MG/1
125 TABLET ORAL DAILY
Qty: 90 TABLET | Refills: 1 | Status: SHIPPED | OUTPATIENT
Start: 2024-05-07

## 2024-05-13 ENCOUNTER — OFFICE VISIT (OUTPATIENT)
Dept: ORTHOPEDIC SURGERY | Age: 59
End: 2024-05-13
Payer: COMMERCIAL

## 2024-05-13 DIAGNOSIS — M17.12 PRIMARY OSTEOARTHRITIS OF LEFT KNEE: Primary | ICD-10-CM

## 2024-05-13 PROCEDURE — 20610 DRAIN/INJ JOINT/BURSA W/O US: CPT | Performed by: ORTHOPAEDIC SURGERY

## 2024-05-13 PROCEDURE — G8417 CALC BMI ABV UP PARAM F/U: HCPCS | Performed by: ORTHOPAEDIC SURGERY

## 2024-05-13 PROCEDURE — 1036F TOBACCO NON-USER: CPT | Performed by: ORTHOPAEDIC SURGERY

## 2024-05-13 PROCEDURE — 3017F COLORECTAL CA SCREEN DOC REV: CPT | Performed by: ORTHOPAEDIC SURGERY

## 2024-05-13 PROCEDURE — 99213 OFFICE O/P EST LOW 20 MIN: CPT | Performed by: ORTHOPAEDIC SURGERY

## 2024-05-13 PROCEDURE — G8427 DOCREV CUR MEDS BY ELIG CLIN: HCPCS | Performed by: ORTHOPAEDIC SURGERY

## 2024-05-13 RX ORDER — TRIAMCINOLONE ACETONIDE 40 MG/ML
40 INJECTION, SUSPENSION INTRA-ARTICULAR; INTRAMUSCULAR ONCE
Status: COMPLETED | OUTPATIENT
Start: 2024-05-13 | End: 2024-05-13

## 2024-05-13 RX ORDER — BUPIVACAINE HYDROCHLORIDE 2.5 MG/ML
2 INJECTION, SOLUTION INFILTRATION; PERINEURAL ONCE
Status: COMPLETED | OUTPATIENT
Start: 2024-05-13 | End: 2024-05-13

## 2024-05-13 RX ADMIN — BUPIVACAINE HYDROCHLORIDE 5 MG: 2.5 INJECTION, SOLUTION INFILTRATION; PERINEURAL at 15:40

## 2024-05-13 RX ADMIN — TRIAMCINOLONE ACETONIDE 40 MG: 40 INJECTION, SUSPENSION INTRA-ARTICULAR; INTRAMUSCULAR at 15:41

## 2024-05-14 NOTE — PROGRESS NOTES
[facility]  Office Visit    Chief Complaint: Bilateral knee pain    HPI:  Laurel Lazcano is a 59 y.o. who is here in follow-up of bilateral knee pain.  She has known bilateral knee osteoarthritis and is scheduled for right total knee arthroplasty at the end of the month.  However, her left knee is now causing increased pain.  She has significant pain along the medial joint line and posterior to the knee.  She has pain with standing and bending the knee.  She rates pain as up to 8/10.  There are no new injuries.  There is no inciting event.  She is concerned about her upcoming right knee replacement given her significant left knee pain.  She denies hip pain.      Past Medical History:   Diagnosis Date    Anxiety     Asthma     currently was instructed to stop inhalers d/t to collapse of vocal cords    Diaphragm paralysis     right side    Fatty liver     Fibromyalgia     GERD (gastroesophageal reflux disease)     Headache(784.0)     neck pain     History of ulcer disease     Hypothyroidism     status post thyroidectomy     Melanoma (HCC)     right eye     Osteoarthritis     CHRONIC BACK PAIN/FIBROMYALGIA    Rheumatoid arthritis (HCC)     Spasmodic dysphonia     voice very hoarse    Thyroid disease     2015 found spot on left side of thyroid, right side thyroid was removed    Wears dentures     Wears glasses     Wears glasses         ROS:  Constitutional: denies fever, chills, weight loss  MSK: denies pain in other joints, muscle aches  Neurological: denies numbness, tingling, weakness    Exam:  Appearance: sitting in exam room chair, appears to be in no acute distress, awake and alert  Resp: unlabored breathing on room air  Skin: warm, dry and intact with out erythema or significant increased temperature  Neuro: grossly intact both lower extremities. Intact sensation to light touch. Motor exam 4+ to 5/5 in all major motor groups.  Left knee: Negative Stinchfield exam at the hip.

## 2024-05-16 ENCOUNTER — TELEPHONE (OUTPATIENT)
Dept: FAMILY MEDICINE CLINIC | Age: 59
End: 2024-05-16

## 2024-05-16 ENCOUNTER — OFFICE VISIT (OUTPATIENT)
Dept: FAMILY MEDICINE CLINIC | Age: 59
End: 2024-05-16
Payer: COMMERCIAL

## 2024-05-16 VITALS
TEMPERATURE: 98.3 F | WEIGHT: 218.2 LBS | DIASTOLIC BLOOD PRESSURE: 72 MMHG | SYSTOLIC BLOOD PRESSURE: 118 MMHG | BODY MASS INDEX: 33.07 KG/M2 | HEART RATE: 68 BPM | OXYGEN SATURATION: 96 % | HEIGHT: 68 IN

## 2024-05-16 DIAGNOSIS — C69.41 MALIGNANT MELANOMA OF CILIARY BODY OF RIGHT EYE (HCC): ICD-10-CM

## 2024-05-16 DIAGNOSIS — E55.9 VITAMIN D DEFICIENCY: ICD-10-CM

## 2024-05-16 DIAGNOSIS — E89.0 POST-OPERATIVE HYPOTHYROIDISM: ICD-10-CM

## 2024-05-16 DIAGNOSIS — M17.0 PRIMARY OSTEOARTHRITIS OF BOTH KNEES: ICD-10-CM

## 2024-05-16 DIAGNOSIS — G89.4 CHRONIC PAIN SYNDROME: ICD-10-CM

## 2024-05-16 DIAGNOSIS — Z01.818 PRE-OP EXAMINATION: Primary | ICD-10-CM

## 2024-05-16 DIAGNOSIS — J98.6 CHRONICALLY ELEVATED HEMIDIAPHRAGM: ICD-10-CM

## 2024-05-16 DIAGNOSIS — Z12.31 ENCOUNTER FOR SCREENING MAMMOGRAM FOR MALIGNANT NEOPLASM OF BREAST: ICD-10-CM

## 2024-05-16 PROBLEM — C43.9 MELANOMA (HCC): Status: ACTIVE | Noted: 2024-05-16

## 2024-05-16 PROCEDURE — 1036F TOBACCO NON-USER: CPT | Performed by: INTERNAL MEDICINE

## 2024-05-16 PROCEDURE — 3017F COLORECTAL CA SCREEN DOC REV: CPT | Performed by: INTERNAL MEDICINE

## 2024-05-16 PROCEDURE — G8427 DOCREV CUR MEDS BY ELIG CLIN: HCPCS | Performed by: INTERNAL MEDICINE

## 2024-05-16 PROCEDURE — G8417 CALC BMI ABV UP PARAM F/U: HCPCS | Performed by: INTERNAL MEDICINE

## 2024-05-16 PROCEDURE — 99214 OFFICE O/P EST MOD 30 MIN: CPT | Performed by: INTERNAL MEDICINE

## 2024-05-16 SDOH — ECONOMIC STABILITY: FOOD INSECURITY: WITHIN THE PAST 12 MONTHS, THE FOOD YOU BOUGHT JUST DIDN'T LAST AND YOU DIDN'T HAVE MONEY TO GET MORE.: NEVER TRUE

## 2024-05-16 SDOH — ECONOMIC STABILITY: FOOD INSECURITY: WITHIN THE PAST 12 MONTHS, YOU WORRIED THAT YOUR FOOD WOULD RUN OUT BEFORE YOU GOT MONEY TO BUY MORE.: NEVER TRUE

## 2024-05-16 SDOH — ECONOMIC STABILITY: INCOME INSECURITY: HOW HARD IS IT FOR YOU TO PAY FOR THE VERY BASICS LIKE FOOD, HOUSING, MEDICAL CARE, AND HEATING?: NOT HARD AT ALL

## 2024-05-16 ASSESSMENT — ENCOUNTER SYMPTOMS
COUGH: 0
TROUBLE SWALLOWING: 0
ABDOMINAL PAIN: 0
BACK PAIN: 1
APNEA: 0
DIARRHEA: 0
NAUSEA: 0
SHORTNESS OF BREATH: 0
BLOOD IN STOOL: 0

## 2024-05-16 ASSESSMENT — PATIENT HEALTH QUESTIONNAIRE - PHQ9
1. LITTLE INTEREST OR PLEASURE IN DOING THINGS: NOT AT ALL
SUM OF ALL RESPONSES TO PHQ9 QUESTIONS 1 & 2: 0
SUM OF ALL RESPONSES TO PHQ QUESTIONS 1-9: 0
2. FEELING DOWN, DEPRESSED OR HOPELESS: NOT AT ALL
SUM OF ALL RESPONSES TO PHQ QUESTIONS 1-9: 0

## 2024-05-16 NOTE — PROGRESS NOTES
SUBJECTIVE:  Laurel Lazcano is a 59 y.o. female being evaluated for:    Chief Complaint   Patient presents with    Pre-op Exam     Pt here for pre op Total knee replacement (right knee)  Dr Choco marlow        HPI   Bad arthritis in both knees and has been getting  MOre trouble walking sleeping and everything  Has failed to respond to PT and injections   Losing weight with diet   Allergies   Allergen Reactions    Other Hives     Cloth gloves at my work     Current Outpatient Medications   Medication Sig Dispense Refill    levothyroxine (SYNTHROID) 125 MCG tablet TAKE 1 TABLET BY MOUTH DAILY 90 tablet 1    vitamin D (ERGOCALCIFEROL) 1.25 MG (27247 UT) CAPS capsule Take 1 capsule by mouth once a week 12 capsule 1    ibuprofen (IBU) 400 MG tablet Take 1 tablet by mouth every 6 hours as needed for Pain 120 tablet 0    gabapentin (NEURONTIN) 600 MG tablet Take 1 tablet by mouth 3 times daily.      oxyCODONE-acetaminophen (PERCOCET) 5-325 MG per tablet every 8 hours as needed.       No current facility-administered medications for this visit.     Facility-Administered Medications Ordered in Other Visits   Medication Dose Route Frequency Provider Last Rate Last Admin    technetium sulfur colloid egg (NYCOMED-SC) solution 500 micro curie  500 micro curie IntraVENous ONCE PRN Justin Palumbo MD             Social History     Socioeconomic History    Marital status:      Spouse name: Not on file    Number of children: Not on file    Years of education: Not on file    Highest education level: Not on file   Occupational History    Occupation: room leader     Comment: [employer]   Tobacco Use    Smoking status: Former     Current packs/day: 0.00     Average packs/day: 1 pack/day for 20.0 years (20.0 ttl pk-yrs)     Types: Cigarettes     Start date:      Quit date: 2003     Years since quittin.3    Smokeless tobacco: Never   Vaping Use    Vaping Use: Never used   Substance and Sexual Activity

## 2024-05-17 NOTE — DISCHARGE INSTR - COC
Followup with Dr Osborn 2 weeks after surgery in office   [phone]    [facility], [address], [address]   [address]    DISCHARGE INSTRUCTIONS FOR TOTAL JOINT REPLACEMENT  Activity:  Elevate your leg if swelling occurs in your ankle.    Continue the exercise program as prescribed by physical therapists.  Take frequent walks.  Use walker, crutches, or cane with weight bearing instructions as indicated by the physical therapists.  Take rest periods often.   Wear knee hi JEVON hose daily and remove at night for 2 weeks post surgery  Incentive spirometer 4 times a day for 10 breaths for 1 weeks post surgery  Wear BIPAP/CPAP at all times when sleeping or napping if you have sleep apnea AND have a BIPAP/CPAP at home    Wound Care:  Do not scrub wound.  Pat it dry with a soft towel.   Don’t apply any lotions, ointments,  or creams to your wound.  Check the incision every day for drainage or wound opening. Call for concerns.   Ice the operative knee over clothing or a cloth . Use as needed for swelling and pain control   Diet:  You can resume your normal diet.  There are no limits on your diet due to your surgery.  Pain pills and activity changes may lead to constipation.  To prevent this, use prune juice or bran cereals liberally.  You may need to use a laxative such as Dulcolax, Senokot, or Milk of Magnesia.  Drink plenty of fluids.  Medications:  Take pain pills as ordered to maintain comfort.  Never drive while taking pain medicine.  Avoid over the counter medications until checking with your doctor.  Resume previous medications as instructed by your doctor.  Take blood thinners as directed and until completed.  Take Tylenol 650mg four times a day as needed for added pain relief.  DO not exceed 3000mg in 24 hours.   Call Your Doctor or the doctor on call ([phone]) If:  You have increased pain not controlled by medications. Or need medication refills.   Excessive swelling in your

## 2024-05-20 ENCOUNTER — HOSPITAL ENCOUNTER (OUTPATIENT)
Dept: PREADMISSION TESTING | Age: 59
Discharge: HOME | End: 2024-05-24
Payer: COMMERCIAL

## 2024-05-20 DIAGNOSIS — M17.11 PRIMARY OSTEOARTHRITIS OF RIGHT KNEE: ICD-10-CM

## 2024-05-20 LAB
25(OH)D3 SERPL-MCNC: 31.5 NG/ML
ABO + RH BLD: NORMAL
ALBUMIN SERPL-MCNC: 3.9 G/DL (ref 3.4–5)
ANION GAP SERPL CALCULATED.3IONS-SCNC: 11 MMOL/L (ref 3–16)
APTT BLD: 27.9 SEC (ref 22.1–36.4)
BASOPHILS # BLD: 0 K/UL (ref 0–0.2)
BASOPHILS NFR BLD: 0.4 %
BLD GP AB SCN SERPL QL: NORMAL
BUN SERPL-MCNC: 13 MG/DL (ref 7–20)
CALCIUM SERPL-MCNC: 10.1 MG/DL (ref 8.3–10.6)
CHLORIDE SERPL-SCNC: 105 MMOL/L (ref 99–110)
CO2 SERPL-SCNC: 25 MMOL/L (ref 21–32)
CREAT SERPL-MCNC: <0.5 MG/DL (ref 0.6–1.1)
DEPRECATED RDW RBC AUTO: 14.9 % (ref 12.4–15.4)
EOSINOPHIL # BLD: 0.3 K/UL (ref 0–0.6)
EOSINOPHIL NFR BLD: 2.6 %
EST. AVERAGE GLUCOSE BLD GHB EST-MCNC: 108.3 MG/DL
GFR SERPLBLD CREATININE-BSD FMLA CKD-EPI: >90 ML/MIN/{1.73_M2}
GLUCOSE SERPL-MCNC: 92 MG/DL (ref 70–99)
HBA1C MFR BLD: 5.4 %
HCT VFR BLD AUTO: 40.4 % (ref 36–48)
HGB BLD-MCNC: 13.3 G/DL (ref 12–16)
INR PPP: 0.92 (ref 0.85–1.15)
LYMPHOCYTES # BLD: 3.5 K/UL (ref 1–5.1)
LYMPHOCYTES NFR BLD: 35.1 %
MCH RBC QN AUTO: 27.9 PG (ref 26–34)
MCHC RBC AUTO-ENTMCNC: 33.1 G/DL (ref 31–36)
MCV RBC AUTO: 84.3 FL (ref 80–100)
MONOCYTES # BLD: 0.9 K/UL (ref 0–1.3)
MONOCYTES NFR BLD: 9.1 %
NEUTROPHILS # BLD: 5.2 K/UL (ref 1.7–7.7)
NEUTROPHILS NFR BLD: 52.8 %
PLATELET # BLD AUTO: 270 K/UL (ref 135–450)
PMV BLD AUTO: 8.4 FL (ref 5–10.5)
POTASSIUM SERPL-SCNC: 4.1 MMOL/L (ref 3.5–5.1)
PREALB SERPL-MCNC: 19.4 MG/DL (ref 20–40)
PROTHROMBIN TIME: 12.6 SEC (ref 11.9–14.9)
RBC # BLD AUTO: 4.79 M/UL (ref 4–5.2)
SODIUM SERPL-SCNC: 141 MMOL/L (ref 136–145)
WBC # BLD AUTO: 9.9 K/UL (ref 4–11)

## 2024-05-20 PROCEDURE — 85025 COMPLETE CBC W/AUTO DIFF WBC: CPT

## 2024-05-20 PROCEDURE — 83036 HEMOGLOBIN GLYCOSYLATED A1C: CPT

## 2024-05-20 PROCEDURE — 86901 BLOOD TYPING SEROLOGIC RH(D): CPT

## 2024-05-20 PROCEDURE — 82040 ASSAY OF SERUM ALBUMIN: CPT

## 2024-05-20 PROCEDURE — 86850 RBC ANTIBODY SCREEN: CPT

## 2024-05-20 PROCEDURE — 80048 BASIC METABOLIC PNL TOTAL CA: CPT

## 2024-05-20 PROCEDURE — 87641 MR-STAPH DNA AMP PROBE: CPT

## 2024-05-20 PROCEDURE — 85730 THROMBOPLASTIN TIME PARTIAL: CPT

## 2024-05-20 PROCEDURE — 84134 ASSAY OF PREALBUMIN: CPT

## 2024-05-20 PROCEDURE — 86900 BLOOD TYPING SEROLOGIC ABO: CPT

## 2024-05-20 PROCEDURE — 82306 VITAMIN D 25 HYDROXY: CPT

## 2024-05-20 PROCEDURE — 85610 PROTHROMBIN TIME: CPT

## 2024-05-20 RX ORDER — MULTIVITAMIN WITH IRON
500 TABLET ORAL DAILY
COMMUNITY

## 2024-05-20 NOTE — PROGRESS NOTES
Patient and daughter-in-law Lorena attended JET class on 5/20/2024 . Patient verified surgery for Total Knee replacement. Patient and daughter-in-law Lorena received patient information and educational JET folder including the following handouts: jet powerpoint, ERAS, incentive spirometry including purpose and how to perform, case management contact information, hand hygiene, preventing constipation, choices for home health care agency list, pre-operative showering techniques and the use of anti-septic 3 days before surgery.  Interviews completed by PT, OT, and Nurse Navigator.  Labs and Tests to be completed/completed as ordered/necessary by outpatient lab if not already completed.  Anti-septic bottle given to patient to take home. Patient and daughter-in-law Lorena states no further questions or concerns. Patient provided with orthopedic office, after hours clinic, case management, and nurse navigator contact information.    Date Of Surgery: 5/28/2024  Surgeon: Dr Osborn  Per Patient Will see/Saw Primary care provider on 5/16/2024.     Sees Specialist Pulmonary. Educated the patient to contact their specialist to notify them of surgical plans and any specific medication instruction that may be needed. Patient verbalized understanding of the information.    HISTORY OF JOINT REPLACEMENT(S): No history of joint replacement    DC Plan: Home    HOME ASSISTANCE - WHO WILL BE STAYING WITH YOU AT HOME FOR FIRST SEVERAL DAYS? adult child Yun    DC TRANSPORTATION: Yun    STEPS INTO HOME: 3    STEPS TO BATHROOM/BEDROOM: none - one level    DME NEEDS:  Denies durable medical equipment needs at this time, already has a rolling walker.    LENGTH OF STAY HAS BEEN DISCUSSED WITH THE PATIENT, APPROPRIATE TO HIS/ HER PROCEDURE.  PATIENT HAS BEEN INFORMED THAT THEY WILL BE DISCHARGED WHEN THE PHYSICIAN DEEMS THEM MEDICALLY READY. MOST PATIENTS CAN EXPECT TO BE IN THE HOSPITAL ONE NIGHT OR 1-2 DAYS AS AN ADMISSION FOR THOSE WITH

## 2024-05-21 LAB — MRSA DNA SPEC QL NAA+PROBE: NORMAL

## 2024-05-21 NOTE — PROGRESS NOTES
Notification sent to Dr Osborn and medical assistant Veronika ANGLIN regarding abnormal preoperative labs and pertinent medical history.

## 2024-05-23 ENCOUNTER — OFFICE VISIT (OUTPATIENT)
Dept: ORTHOPEDIC SURGERY | Age: 59
End: 2024-05-23
Payer: COMMERCIAL

## 2024-05-23 ENCOUNTER — TELEPHONE (OUTPATIENT)
Dept: ORTHOPEDIC SURGERY | Age: 59
End: 2024-05-23

## 2024-05-23 VITALS — BODY MASS INDEX: 33.34 KG/M2 | WEIGHT: 220 LBS | HEIGHT: 68 IN

## 2024-05-23 DIAGNOSIS — M17.11 PRIMARY OSTEOARTHRITIS OF RIGHT KNEE: Primary | ICD-10-CM

## 2024-05-23 PROCEDURE — 3017F COLORECTAL CA SCREEN DOC REV: CPT | Performed by: ORTHOPAEDIC SURGERY

## 2024-05-23 PROCEDURE — G8427 DOCREV CUR MEDS BY ELIG CLIN: HCPCS | Performed by: ORTHOPAEDIC SURGERY

## 2024-05-23 PROCEDURE — 99214 OFFICE O/P EST MOD 30 MIN: CPT | Performed by: ORTHOPAEDIC SURGERY

## 2024-05-23 PROCEDURE — G8417 CALC BMI ABV UP PARAM F/U: HCPCS | Performed by: ORTHOPAEDIC SURGERY

## 2024-05-23 PROCEDURE — MISCCOLD COLD THERAPY UNIT AND PAD: Performed by: ORTHOPAEDIC SURGERY

## 2024-05-23 PROCEDURE — 1036F TOBACCO NON-USER: CPT | Performed by: ORTHOPAEDIC SURGERY

## 2024-05-23 NOTE — PROGRESS NOTES
lower extremities. Intact sensation to light touch. Motor exam 4+ to 5/5 in all major motor groups.  Right knee: Negative Stinchfield exam at the hip. Examination reveals that active knee range of motion is 0 to 120 degrees.  There is varus deformity, positive crepitus, positive joint line tenderness, positive antalgic gait.  Neurologically, plantar flexion and dorsiflexion is intact. 5/5 strength.     Imaging:  Prior right knee radiographs reviewed are significant for tricompartmental degenerative changes to osteoarthritis with bone-on-bone arthritis of the medial compartment.  There are periarticular osteophytes.    Assessment:  Right knee osteoarthritis    Plan:  We discussed right total knee arthroplasty. The operative procedure, alternatives, and risks were discussed in detail with the patient.  The risks include but are not limited to: Infection, vessel injury, nerve injury, DVT, pulmonary embolism, implant loosening, need for revision surgery, loss of motion, continued pain.  Despite these risks the patient would like to proceed.  All questions have been answered and no guarantees have been made.  The patient is unable to do further physical therapy due to disabling pain.  I discussed with the patient the diagnosis in detail and answered all the questions.  The patient verbalized understanding of the plan as it has been described above and is in agreement.    Plan for right total knee arthroplasty.    Total time spent on today's encounter was at least 31 minutes. This time included reviewing prior notes, radiographs, and lab results when available, reviewing history obtained by medical assistant, performing history and physical exam, reviewing tests/radiographs with the patient, counseling the patient, ordering medications or tests, documentation in the electronic health record, and coordination of care.    This dictation was done with Dragon dictation and may contain mechanical errors related to

## 2024-05-24 ENCOUNTER — ANESTHESIA EVENT (OUTPATIENT)
Dept: OPERATING ROOM | Age: 59
End: 2024-05-24
Payer: COMMERCIAL

## 2024-05-24 ENCOUNTER — TELEPHONE (OUTPATIENT)
Dept: ORTHOPEDIC SURGERY | Age: 59
End: 2024-05-24

## 2024-05-24 NOTE — TELEPHONE ENCOUNTER
General Question     Subject: PRE OP   Patient and /or Facility Request: Laurel Lazcano   Contact Number: [phone]     PT CLLED TO [facility]PT WAS 6/4     PLEASE CLL TO ADV

## 2024-05-28 ENCOUNTER — APPOINTMENT (OUTPATIENT)
Dept: GENERAL RADIOLOGY | Age: 59
End: 2024-05-28
Attending: ORTHOPAEDIC SURGERY
Payer: COMMERCIAL

## 2024-05-28 ENCOUNTER — HOSPITAL ENCOUNTER (OUTPATIENT)
Age: 59
Setting detail: OBSERVATION
Discharge: HOME | End: 2024-05-29
Attending: ORTHOPAEDIC SURGERY | Admitting: ORTHOPAEDIC SURGERY
Payer: COMMERCIAL

## 2024-05-28 ENCOUNTER — ANESTHESIA (OUTPATIENT)
Dept: OPERATING ROOM | Age: 59
End: 2024-05-28
Payer: COMMERCIAL

## 2024-05-28 DIAGNOSIS — Z96.651 H/O TOTAL KNEE REPLACEMENT, RIGHT: Primary | ICD-10-CM

## 2024-05-28 LAB
ABO + RH BLD: NORMAL
BLD GP AB SCN SERPL QL: NORMAL
GLUCOSE BLD-MCNC: 151 MG/DL (ref 70–99)
GLUCOSE BLD-MCNC: 97 MG/DL (ref 70–99)
PERFORMED ON: ABNORMAL
PERFORMED ON: NORMAL

## 2024-05-28 PROCEDURE — 2580000003 HC RX 258: Performed by: ANESTHESIOLOGY

## 2024-05-28 PROCEDURE — 6360000002 HC RX W HCPCS: Performed by: ANESTHESIOLOGY

## 2024-05-28 PROCEDURE — 64447 NJX AA&/STRD FEMORAL NRV IMG: CPT | Performed by: ANESTHESIOLOGY

## 2024-05-28 PROCEDURE — 3700000000 HC ANESTHESIA ATTENDED CARE: Performed by: ORTHOPAEDIC SURGERY

## 2024-05-28 PROCEDURE — 97530 THERAPEUTIC ACTIVITIES: CPT

## 2024-05-28 PROCEDURE — 2580000003 HC RX 258: Performed by: ORTHOPAEDIC SURGERY

## 2024-05-28 PROCEDURE — 86901 BLOOD TYPING SEROLOGIC RH(D): CPT

## 2024-05-28 PROCEDURE — 7100000001 HC PACU RECOVERY - ADDTL 15 MIN: Performed by: ORTHOPAEDIC SURGERY

## 2024-05-28 PROCEDURE — 3600000015 HC SURGERY LEVEL 5 ADDTL 15MIN: Performed by: ORTHOPAEDIC SURGERY

## 2024-05-28 PROCEDURE — 97535 SELF CARE MNGMENT TRAINING: CPT

## 2024-05-28 PROCEDURE — 86850 RBC ANTIBODY SCREEN: CPT

## 2024-05-28 PROCEDURE — 27447 TOTAL KNEE ARTHROPLASTY: CPT | Performed by: ORTHOPAEDIC SURGERY

## 2024-05-28 PROCEDURE — A4217 STERILE WATER/SALINE, 500 ML: HCPCS | Performed by: ORTHOPAEDIC SURGERY

## 2024-05-28 PROCEDURE — 6360000002 HC RX W HCPCS

## 2024-05-28 PROCEDURE — 3600000005 HC SURGERY LEVEL 5 BASE: Performed by: ORTHOPAEDIC SURGERY

## 2024-05-28 PROCEDURE — 6360000002 HC RX W HCPCS: Performed by: ORTHOPAEDIC SURGERY

## 2024-05-28 PROCEDURE — 97162 PT EVAL MOD COMPLEX 30 MIN: CPT

## 2024-05-28 PROCEDURE — 2720000010 HC SURG SUPPLY STERILE: Performed by: ORTHOPAEDIC SURGERY

## 2024-05-28 PROCEDURE — 20985 CPTR-ASST DIR MS PX: CPT | Performed by: ORTHOPAEDIC SURGERY

## 2024-05-28 PROCEDURE — 86900 BLOOD TYPING SEROLOGIC ABO: CPT

## 2024-05-28 PROCEDURE — C1776 JOINT DEVICE (IMPLANTABLE): HCPCS | Performed by: ORTHOPAEDIC SURGERY

## 2024-05-28 PROCEDURE — 2500000003 HC RX 250 WO HCPCS

## 2024-05-28 PROCEDURE — G0378 HOSPITAL OBSERVATION PER HR: HCPCS

## 2024-05-28 PROCEDURE — 97166 OT EVAL MOD COMPLEX 45 MIN: CPT

## 2024-05-28 PROCEDURE — 6370000000 HC RX 637 (ALT 250 FOR IP): Performed by: ORTHOPAEDIC SURGERY

## 2024-05-28 PROCEDURE — 97116 GAIT TRAINING THERAPY: CPT

## 2024-05-28 PROCEDURE — 3700000001 HC ADD 15 MINUTES (ANESTHESIA): Performed by: ORTHOPAEDIC SURGERY

## 2024-05-28 PROCEDURE — C9290 INJ, BUPIVACAINE LIPOSOME: HCPCS | Performed by: ORTHOPAEDIC SURGERY

## 2024-05-28 PROCEDURE — 7100000000 HC PACU RECOVERY - FIRST 15 MIN: Performed by: ORTHOPAEDIC SURGERY

## 2024-05-28 PROCEDURE — 27447 TOTAL KNEE ARTHROPLASTY: CPT | Performed by: PHYSICIAN ASSISTANT

## 2024-05-28 PROCEDURE — 73560 X-RAY EXAM OF KNEE 1 OR 2: CPT

## 2024-05-28 PROCEDURE — 2709999900 HC NON-CHARGEABLE SUPPLY: Performed by: ORTHOPAEDIC SURGERY

## 2024-05-28 DEVICE — INSERT TIB CNDYL STBL 5 9 MM KNEE 5/PK X3 TRIATHLON: Type: IMPLANTABLE DEVICE | Site: KNEE | Status: FUNCTIONAL

## 2024-05-28 DEVICE — COMPONENT PAT DIA35MM THK10MM SUPERIOR/INFERIOR KNEE: Type: IMPLANTABLE DEVICE | Site: KNEE | Status: FUNCTIONAL

## 2024-05-28 DEVICE — IMPLANTABLE DEVICE: Type: IMPLANTABLE DEVICE | Site: KNEE | Status: FUNCTIONAL

## 2024-05-28 DEVICE — BASEPLATE TIB SZ 5 AP49MM ML74MM KNEE TRITANIUM 4 CRUCFRM: Type: IMPLANTABLE DEVICE | Site: KNEE | Status: FUNCTIONAL

## 2024-05-28 RX ORDER — NALOXONE HYDROCHLORIDE 0.4 MG/ML
INJECTION, SOLUTION INTRAMUSCULAR; INTRAVENOUS; SUBCUTANEOUS PRN
Status: DISCONTINUED | OUTPATIENT
Start: 2024-05-28 | End: 2024-05-28 | Stop reason: HOSPADM

## 2024-05-28 RX ORDER — ACETAMINOPHEN 325 MG/1
650 TABLET ORAL EVERY 6 HOURS
Status: DISCONTINUED | OUTPATIENT
Start: 2024-05-28 | End: 2024-05-29 | Stop reason: HOSPADM

## 2024-05-28 RX ORDER — ASPIRIN 81 MG/1
81 TABLET ORAL 2 TIMES DAILY
Status: DISCONTINUED | OUTPATIENT
Start: 2024-05-29 | End: 2024-05-29 | Stop reason: HOSPADM

## 2024-05-28 RX ORDER — PROPOFOL 10 MG/ML
INJECTION, EMULSION INTRAVENOUS PRN
Status: DISCONTINUED | OUTPATIENT
Start: 2024-05-28 | End: 2024-05-28 | Stop reason: SDUPTHER

## 2024-05-28 RX ORDER — EPHEDRINE SULFATE/0.9% NACL/PF 25 MG/5 ML
SYRINGE (ML) INTRAVENOUS PRN
Status: DISCONTINUED | OUTPATIENT
Start: 2024-05-28 | End: 2024-05-28 | Stop reason: SDUPTHER

## 2024-05-28 RX ORDER — DULOXETIN HYDROCHLORIDE 60 MG/1
60 CAPSULE, DELAYED RELEASE ORAL ONCE
Status: COMPLETED | OUTPATIENT
Start: 2024-05-28 | End: 2024-05-28

## 2024-05-28 RX ORDER — OXYCODONE HYDROCHLORIDE 10 MG/1
10 TABLET ORAL EVERY 4 HOURS PRN
Status: DISCONTINUED | OUTPATIENT
Start: 2024-05-28 | End: 2024-05-29 | Stop reason: HOSPADM

## 2024-05-28 RX ORDER — MIDAZOLAM HYDROCHLORIDE 1 MG/ML
INJECTION INTRAMUSCULAR; INTRAVENOUS PRN
Status: DISCONTINUED | OUTPATIENT
Start: 2024-05-28 | End: 2024-05-28 | Stop reason: SDUPTHER

## 2024-05-28 RX ORDER — INSULIN LISPRO 100 [IU]/ML
0-4 INJECTION, SOLUTION INTRAVENOUS; SUBCUTANEOUS NIGHTLY
Status: DISCONTINUED | OUTPATIENT
Start: 2024-05-28 | End: 2024-05-29 | Stop reason: HOSPADM

## 2024-05-28 RX ORDER — INSULIN GLARGINE 100 [IU]/ML
0.25 INJECTION, SOLUTION SUBCUTANEOUS NIGHTLY
Status: DISCONTINUED | OUTPATIENT
Start: 2024-05-28 | End: 2024-05-28

## 2024-05-28 RX ORDER — BUPIVACAINE HYDROCHLORIDE 7.5 MG/ML
INJECTION, SOLUTION INTRASPINAL
Status: DISCONTINUED | OUTPATIENT
Start: 2024-05-28 | End: 2024-05-28 | Stop reason: SDUPTHER

## 2024-05-28 RX ORDER — SODIUM CHLORIDE 450 MG/100ML
INJECTION, SOLUTION INTRAVENOUS CONTINUOUS
Status: DISCONTINUED | OUTPATIENT
Start: 2024-05-28 | End: 2024-05-29 | Stop reason: HOSPADM

## 2024-05-28 RX ORDER — CALCIUM CARBONATE 500 MG/1
500 TABLET, CHEWABLE ORAL 3 TIMES DAILY PRN
Status: DISCONTINUED | OUTPATIENT
Start: 2024-05-28 | End: 2024-05-29 | Stop reason: HOSPADM

## 2024-05-28 RX ORDER — MORPHINE SULFATE 2 MG/ML
2 INJECTION, SOLUTION INTRAMUSCULAR; INTRAVENOUS
Status: DISCONTINUED | OUTPATIENT
Start: 2024-05-28 | End: 2024-05-29 | Stop reason: HOSPADM

## 2024-05-28 RX ORDER — MELOXICAM 7.5 MG/1
15 TABLET ORAL ONCE
Status: COMPLETED | OUTPATIENT
Start: 2024-05-28 | End: 2024-05-28

## 2024-05-28 RX ORDER — SODIUM CHLORIDE 0.9 % (FLUSH) 0.9 %
5-40 SYRINGE (ML) INJECTION EVERY 12 HOURS SCHEDULED
Status: DISCONTINUED | OUTPATIENT
Start: 2024-05-28 | End: 2024-05-28 | Stop reason: HOSPADM

## 2024-05-28 RX ORDER — MORPHINE SULFATE 4 MG/ML
4 INJECTION, SOLUTION INTRAMUSCULAR; INTRAVENOUS
Status: DISCONTINUED | OUTPATIENT
Start: 2024-05-28 | End: 2024-05-29 | Stop reason: HOSPADM

## 2024-05-28 RX ORDER — GABAPENTIN 300 MG/1
600 CAPSULE ORAL 3 TIMES DAILY
Status: DISCONTINUED | OUTPATIENT
Start: 2024-05-28 | End: 2024-05-29 | Stop reason: HOSPADM

## 2024-05-28 RX ORDER — POLYETHYLENE GLYCOL 3350 17 G/17G
17 POWDER, FOR SOLUTION ORAL DAILY PRN
Status: DISCONTINUED | OUTPATIENT
Start: 2024-05-28 | End: 2024-05-29 | Stop reason: HOSPADM

## 2024-05-28 RX ORDER — FENTANYL CITRATE 50 UG/ML
INJECTION, SOLUTION INTRAMUSCULAR; INTRAVENOUS PRN
Status: DISCONTINUED | OUTPATIENT
Start: 2024-05-28 | End: 2024-05-28 | Stop reason: SDUPTHER

## 2024-05-28 RX ORDER — LEVOTHYROXINE SODIUM 0.12 MG/1
125 TABLET ORAL DAILY
Status: DISCONTINUED | OUTPATIENT
Start: 2024-05-29 | End: 2024-05-29 | Stop reason: HOSPADM

## 2024-05-28 RX ORDER — OXYCODONE HYDROCHLORIDE 5 MG/1
5 TABLET ORAL
Status: DISCONTINUED | OUTPATIENT
Start: 2024-05-28 | End: 2024-05-28 | Stop reason: HOSPADM

## 2024-05-28 RX ORDER — LIDOCAINE HYDROCHLORIDE 20 MG/ML
INJECTION, SOLUTION EPIDURAL; INFILTRATION; INTRACAUDAL; PERINEURAL PRN
Status: DISCONTINUED | OUTPATIENT
Start: 2024-05-28 | End: 2024-05-28 | Stop reason: SDUPTHER

## 2024-05-28 RX ORDER — GLYCOPYRROLATE 0.2 MG/ML
INJECTION INTRAMUSCULAR; INTRAVENOUS PRN
Status: DISCONTINUED | OUTPATIENT
Start: 2024-05-28 | End: 2024-05-28 | Stop reason: SDUPTHER

## 2024-05-28 RX ORDER — BUPIVACAINE HYDROCHLORIDE 5 MG/ML
INJECTION, SOLUTION EPIDURAL; INTRACAUDAL
Status: COMPLETED
Start: 2024-05-28 | End: 2024-05-28

## 2024-05-28 RX ORDER — TRANEXAMIC ACID 650 MG/1
1950 TABLET ORAL ONCE
Status: COMPLETED | OUTPATIENT
Start: 2024-05-28 | End: 2024-05-28

## 2024-05-28 RX ORDER — FENTANYL CITRATE 0.05 MG/ML
25 INJECTION, SOLUTION INTRAMUSCULAR; INTRAVENOUS EVERY 5 MIN PRN
Status: DISCONTINUED | OUTPATIENT
Start: 2024-05-28 | End: 2024-05-28 | Stop reason: HOSPADM

## 2024-05-28 RX ORDER — MELOXICAM 7.5 MG/1
7.5 TABLET ORAL DAILY
Status: DISCONTINUED | OUTPATIENT
Start: 2024-05-29 | End: 2024-05-29 | Stop reason: HOSPADM

## 2024-05-28 RX ORDER — GLUCAGON 1 MG/ML
1 KIT INJECTION PRN
Status: DISCONTINUED | OUTPATIENT
Start: 2024-05-28 | End: 2024-05-29 | Stop reason: HOSPADM

## 2024-05-28 RX ORDER — LIDOCAINE HYDROCHLORIDE 20 MG/ML
INJECTION, SOLUTION EPIDURAL; INFILTRATION; INTRACAUDAL; PERINEURAL
Status: DISCONTINUED
Start: 2024-05-28 | End: 2024-05-28

## 2024-05-28 RX ORDER — ONDANSETRON 2 MG/ML
4 INJECTION INTRAMUSCULAR; INTRAVENOUS
Status: DISCONTINUED | OUTPATIENT
Start: 2024-05-28 | End: 2024-05-28 | Stop reason: HOSPADM

## 2024-05-28 RX ORDER — SODIUM CHLORIDE 0.9 % (FLUSH) 0.9 %
5-40 SYRINGE (ML) INJECTION PRN
Status: DISCONTINUED | OUTPATIENT
Start: 2024-05-28 | End: 2024-05-28 | Stop reason: HOSPADM

## 2024-05-28 RX ORDER — PHENYLEPHRINE HCL IN 0.9% NACL 1 MG/10 ML
SYRINGE (ML) INTRAVENOUS PRN
Status: DISCONTINUED | OUTPATIENT
Start: 2024-05-28 | End: 2024-05-28 | Stop reason: SDUPTHER

## 2024-05-28 RX ORDER — MEPERIDINE HYDROCHLORIDE 25 MG/ML
12.5 INJECTION INTRAMUSCULAR; INTRAVENOUS; SUBCUTANEOUS EVERY 5 MIN PRN
Status: DISCONTINUED | OUTPATIENT
Start: 2024-05-28 | End: 2024-05-28 | Stop reason: HOSPADM

## 2024-05-28 RX ORDER — INSULIN LISPRO 100 [IU]/ML
0.08 INJECTION, SOLUTION INTRAVENOUS; SUBCUTANEOUS
Status: DISCONTINUED | OUTPATIENT
Start: 2024-05-28 | End: 2024-05-28

## 2024-05-28 RX ORDER — DULOXETIN HYDROCHLORIDE 60 MG/1
60 CAPSULE, DELAYED RELEASE ORAL DAILY
Status: DISCONTINUED | OUTPATIENT
Start: 2024-05-29 | End: 2024-05-29 | Stop reason: HOSPADM

## 2024-05-28 RX ORDER — BUPIVACAINE HYDROCHLORIDE 5 MG/ML
INJECTION, SOLUTION EPIDURAL; INTRACAUDAL
Status: COMPLETED | OUTPATIENT
Start: 2024-05-28 | End: 2024-05-28

## 2024-05-28 RX ORDER — SODIUM CHLORIDE 9 MG/ML
INJECTION, SOLUTION INTRAVENOUS PRN
Status: DISCONTINUED | OUTPATIENT
Start: 2024-05-28 | End: 2024-05-28 | Stop reason: HOSPADM

## 2024-05-28 RX ORDER — FENTANYL CITRATE 0.05 MG/ML
50 INJECTION, SOLUTION INTRAMUSCULAR; INTRAVENOUS ONCE
Status: COMPLETED | OUTPATIENT
Start: 2024-05-28 | End: 2024-05-28

## 2024-05-28 RX ORDER — ONDANSETRON 4 MG/1
4 TABLET, ORALLY DISINTEGRATING ORAL EVERY 8 HOURS PRN
Status: DISCONTINUED | OUTPATIENT
Start: 2024-05-28 | End: 2024-05-29 | Stop reason: HOSPADM

## 2024-05-28 RX ORDER — SODIUM CHLORIDE 0.9 % (FLUSH) 0.9 %
5-40 SYRINGE (ML) INJECTION PRN
Status: DISCONTINUED | OUTPATIENT
Start: 2024-05-28 | End: 2024-05-29 | Stop reason: HOSPADM

## 2024-05-28 RX ORDER — SODIUM CHLORIDE 0.9 % (FLUSH) 0.9 %
5-40 SYRINGE (ML) INJECTION EVERY 12 HOURS SCHEDULED
Status: DISCONTINUED | OUTPATIENT
Start: 2024-05-28 | End: 2024-05-29 | Stop reason: HOSPADM

## 2024-05-28 RX ORDER — DROPERIDOL 2.5 MG/ML
0.62 INJECTION, SOLUTION INTRAMUSCULAR; INTRAVENOUS
Status: DISCONTINUED | OUTPATIENT
Start: 2024-05-28 | End: 2024-05-28 | Stop reason: HOSPADM

## 2024-05-28 RX ORDER — ONDANSETRON 2 MG/ML
4 INJECTION INTRAMUSCULAR; INTRAVENOUS EVERY 6 HOURS PRN
Status: DISCONTINUED | OUTPATIENT
Start: 2024-05-28 | End: 2024-05-29 | Stop reason: HOSPADM

## 2024-05-28 RX ORDER — INSULIN LISPRO 100 [IU]/ML
0-4 INJECTION, SOLUTION INTRAVENOUS; SUBCUTANEOUS
Status: DISCONTINUED | OUTPATIENT
Start: 2024-05-28 | End: 2024-05-29 | Stop reason: HOSPADM

## 2024-05-28 RX ORDER — SODIUM CHLORIDE 9 MG/ML
INJECTION, SOLUTION INTRAVENOUS PRN
Status: DISCONTINUED | OUTPATIENT
Start: 2024-05-28 | End: 2024-05-29 | Stop reason: HOSPADM

## 2024-05-28 RX ORDER — DEXTROSE MONOHYDRATE 100 MG/ML
INJECTION, SOLUTION INTRAVENOUS CONTINUOUS PRN
Status: DISCONTINUED | OUTPATIENT
Start: 2024-05-28 | End: 2024-05-29 | Stop reason: HOSPADM

## 2024-05-28 RX ORDER — ACETAMINOPHEN 500 MG
1000 TABLET ORAL ONCE
Status: COMPLETED | OUTPATIENT
Start: 2024-05-28 | End: 2024-05-28

## 2024-05-28 RX ORDER — ONDANSETRON 2 MG/ML
INJECTION INTRAMUSCULAR; INTRAVENOUS PRN
Status: DISCONTINUED | OUTPATIENT
Start: 2024-05-28 | End: 2024-05-28 | Stop reason: SDUPTHER

## 2024-05-28 RX ORDER — OXYCODONE HYDROCHLORIDE 5 MG/1
5 TABLET ORAL EVERY 4 HOURS PRN
Status: DISCONTINUED | OUTPATIENT
Start: 2024-05-28 | End: 2024-05-29 | Stop reason: HOSPADM

## 2024-05-28 RX ORDER — KETOROLAC TROMETHAMINE 15 MG/ML
15 INJECTION, SOLUTION INTRAMUSCULAR; INTRAVENOUS
Status: DISCONTINUED | OUTPATIENT
Start: 2024-05-28 | End: 2024-05-29 | Stop reason: HOSPADM

## 2024-05-28 RX ORDER — FAMOTIDINE 10 MG/ML
INJECTION, SOLUTION INTRAVENOUS PRN
Status: DISCONTINUED | OUTPATIENT
Start: 2024-05-28 | End: 2024-05-28 | Stop reason: SDUPTHER

## 2024-05-28 RX ADMIN — DULOXETINE HYDROCHLORIDE 60 MG: 60 CAPSULE, DELAYED RELEASE ORAL at 07:21

## 2024-05-28 RX ADMIN — FENTANYL CITRATE 50 MCG: 50 INJECTION INTRAMUSCULAR; INTRAVENOUS at 09:22

## 2024-05-28 RX ADMIN — FENTANYL CITRATE 50 MCG: 50 INJECTION INTRAMUSCULAR; INTRAVENOUS at 09:14

## 2024-05-28 RX ADMIN — GLYCOPYRROLATE 0.2 MG: 0.2 INJECTION INTRAMUSCULAR; INTRAVENOUS at 08:32

## 2024-05-28 RX ADMIN — MELOXICAM 15 MG: 7.5 TABLET ORAL at 07:21

## 2024-05-28 RX ADMIN — FAMOTIDINE 20 MG: 10 INJECTION, SOLUTION INTRAVENOUS at 08:13

## 2024-05-28 RX ADMIN — OXYCODONE HYDROCHLORIDE 10 MG: 10 TABLET ORAL at 21:05

## 2024-05-28 RX ADMIN — ACETAMINOPHEN 1000 MG: 500 TABLET ORAL at 07:20

## 2024-05-28 RX ADMIN — METHOCARBAMOL 1000 MG: 100 INJECTION INTRAMUSCULAR; INTRAVENOUS at 10:53

## 2024-05-28 RX ADMIN — Medication 100 MCG: at 08:28

## 2024-05-28 RX ADMIN — HYDROMORPHONE HYDROCHLORIDE 0.5 MG: 1 INJECTION, SOLUTION INTRAMUSCULAR; INTRAVENOUS; SUBCUTANEOUS at 09:33

## 2024-05-28 RX ADMIN — BUPIVACAINE HYDROCHLORIDE IN DEXTROSE 11.25 MG: 7.5 INJECTION, SOLUTION SUBARACHNOID at 08:14

## 2024-05-28 RX ADMIN — FENTANYL CITRATE 50 MCG: 0.05 INJECTION, SOLUTION INTRAMUSCULAR; INTRAVENOUS at 10:06

## 2024-05-28 RX ADMIN — MIDAZOLAM 2 MG: 1 INJECTION INTRAMUSCULAR; INTRAVENOUS at 07:40

## 2024-05-28 RX ADMIN — FENTANYL CITRATE 50 MCG: 50 INJECTION INTRAMUSCULAR; INTRAVENOUS at 08:52

## 2024-05-28 RX ADMIN — FENTANYL CITRATE 50 MCG: 50 INJECTION INTRAMUSCULAR; INTRAVENOUS at 08:37

## 2024-05-28 RX ADMIN — EPHEDRINE SULFATE 10 MG: 5 INJECTION INTRAVENOUS at 08:32

## 2024-05-28 RX ADMIN — ACETAMINOPHEN 325MG 650 MG: 325 TABLET ORAL at 12:23

## 2024-05-28 RX ADMIN — SODIUM CHLORIDE 2000 MG: 900 INJECTION INTRAVENOUS at 23:57

## 2024-05-28 RX ADMIN — GLYCOPYRROLATE 0.2 MG: 0.2 INJECTION INTRAMUSCULAR; INTRAVENOUS at 08:29

## 2024-05-28 RX ADMIN — LIDOCAINE HYDROCHLORIDE 60 MG: 20 INJECTION, SOLUTION EPIDURAL; INFILTRATION; INTRACAUDAL; PERINEURAL at 08:16

## 2024-05-28 RX ADMIN — GABAPENTIN 600 MG: 300 CAPSULE ORAL at 15:00

## 2024-05-28 RX ADMIN — OXYCODONE HYDROCHLORIDE 10 MG: 10 TABLET ORAL at 17:06

## 2024-05-28 RX ADMIN — ONDANSETRON 4 MG: 2 INJECTION INTRAMUSCULAR; INTRAVENOUS at 08:13

## 2024-05-28 RX ADMIN — ACETAMINOPHEN 325MG 650 MG: 325 TABLET ORAL at 18:38

## 2024-05-28 RX ADMIN — GABAPENTIN 600 MG: 300 CAPSULE ORAL at 21:05

## 2024-05-28 RX ADMIN — SODIUM CHLORIDE 2000 MG: 900 INJECTION INTRAVENOUS at 08:20

## 2024-05-28 RX ADMIN — SODIUM CHLORIDE: 9 INJECTION, SOLUTION INTRAVENOUS at 08:05

## 2024-05-28 RX ADMIN — PROPOFOL 70 MG: 10 INJECTION, EMULSION INTRAVENOUS at 08:16

## 2024-05-28 RX ADMIN — TRANEXAMIC ACID 1950 MG: 650 TABLET ORAL at 07:22

## 2024-05-28 RX ADMIN — ACETAMINOPHEN 325MG 650 MG: 325 TABLET ORAL at 23:58

## 2024-05-28 RX ADMIN — SODIUM CHLORIDE: 4.5 INJECTION, SOLUTION INTRAVENOUS at 12:25

## 2024-05-28 RX ADMIN — OXYCODONE HYDROCHLORIDE 10 MG: 10 TABLET ORAL at 12:23

## 2024-05-28 RX ADMIN — BUPIVACAINE HYDROCHLORIDE 30 ML: 5 INJECTION, SOLUTION EPIDURAL; INTRACAUDAL; PERINEURAL at 07:40

## 2024-05-28 RX ADMIN — HYDROMORPHONE HYDROCHLORIDE 0.5 MG: 1 INJECTION, SOLUTION INTRAMUSCULAR; INTRAVENOUS; SUBCUTANEOUS at 10:15

## 2024-05-28 RX ADMIN — PROPOFOL 150 MCG/KG/MIN: 10 INJECTION, EMULSION INTRAVENOUS at 08:19

## 2024-05-28 RX ADMIN — SODIUM CHLORIDE 2000 MG: 900 INJECTION INTRAVENOUS at 17:01

## 2024-05-28 RX ADMIN — HYDROMORPHONE HYDROCHLORIDE 0.5 MG: 1 INJECTION, SOLUTION INTRAMUSCULAR; INTRAVENOUS; SUBCUTANEOUS at 09:50

## 2024-05-28 ASSESSMENT — PAIN DESCRIPTION - ORIENTATION
ORIENTATION: RIGHT

## 2024-05-28 ASSESSMENT — PAIN DESCRIPTION - ONSET
ONSET: ON-GOING
ONSET: AWAKENED FROM SLEEP

## 2024-05-28 ASSESSMENT — PAIN SCALES - GENERAL
PAINLEVEL_OUTOF10: 5
PAINLEVEL_OUTOF10: 8
PAINLEVEL_OUTOF10: 10
PAINLEVEL_OUTOF10: 4
PAINLEVEL_OUTOF10: 8
PAINLEVEL_OUTOF10: 6
PAINLEVEL_OUTOF10: 9
PAINLEVEL_OUTOF10: 4
PAINLEVEL_OUTOF10: 10
PAINLEVEL_OUTOF10: 4
PAINLEVEL_OUTOF10: 6
PAINLEVEL_OUTOF10: 8
PAINLEVEL_OUTOF10: 6
PAINLEVEL_OUTOF10: 7
PAINLEVEL_OUTOF10: 10
PAINLEVEL_OUTOF10: 6

## 2024-05-28 ASSESSMENT — PAIN DESCRIPTION - LOCATION
LOCATION: KNEE

## 2024-05-28 ASSESSMENT — PAIN DESCRIPTION - DESCRIPTORS
DESCRIPTORS: TIGHTNESS;SPASM
DESCRIPTORS: SHARP
DESCRIPTORS: PRESSURE
DESCRIPTORS: SHARP
DESCRIPTORS: TIGHTNESS;THROBBING
DESCRIPTORS: SHARP
DESCRIPTORS: SHARP
DESCRIPTORS: TIGHTNESS
DESCRIPTORS: PRESSURE

## 2024-05-28 ASSESSMENT — PAIN DESCRIPTION - FREQUENCY
FREQUENCY: CONTINUOUS

## 2024-05-28 ASSESSMENT — PAIN DESCRIPTION - PAIN TYPE
TYPE: SURGICAL PAIN

## 2024-05-28 ASSESSMENT — LIFESTYLE VARIABLES: SMOKING_STATUS: 0

## 2024-05-28 NOTE — CARE COORDINATION
assistance Purchasing Medications: (P) No  Meds-to-Beds request:        [id number] - [place], [place] - [address] [place] [address] - P [phone] - F [phone]  [address] [place] [address]  Phone: [phone] Fax: [phone]      Notes:    Factors facilitating achievement of predicted outcomes: Family support, Motivated, Cooperative, Pleasant, and Has needed Durable Medical Equipment at home    Barriers to discharge: Pain    Additional Case Management Notes:    spoke with patient and confirmed JET Class plan to return home with her family and the support of outpatient therapy.     Has a walker and RTS- denies further needs    The Plan for Transition of Care is related to the following treatment goals of Osteoarthritis of right knee [M17.11]  Arthritis of right knee [M17.11]    Mora Sapp  Case Management Department  Ph: [phone] Fax: [phone]

## 2024-05-28 NOTE — ANESTHESIA PRE PROCEDURE
complications:   Airway: Mallampati: I  TM distance: >3 FB   Neck ROM: limited  Mouth opening: > = 3 FB   Dental: normal exam   (+) upper dentures and lower dentures      Pulmonary:normal exam  breath sounds clear to auscultation  (+)  COPD:          asthma:     (-) not a current smoker (former 15 pack year smoker)                           Cardiovascular:  Exercise tolerance: good (>4 METS)      (-) past MI and CAD      Rhythm: regular  Rate: normal                    Neuro/Psych:   (+) neuromuscular disease (right diaphragmatic paralysis, low back  pain):, headaches:depression/anxiety             GI/Hepatic/Renal:   (+) GERD:          Endo/Other:    (+) hypothyroidism: arthritis: rheumatoid..                 Abdominal:             Vascular: negative vascular ROS.         Other Findings:             Anesthesia Plan      spinal and MAC     ASA 3     (57 yo F with PMHx of COPD/asthma, right sided diaphragm paralysis, GERD, hypothyroid, RA. Discussed spinal anesthesia and regional nerve block to which patient was agreeable.    Discussed risks and benefits to sedation including nausea, vomiting, allergic reaction, headache, delayed cognitive recovery, stroke, heart attack, respiratory depression, and death which patient understood and agreed to proceed.   The patient was given the opportunity to ask questions and all questions were answered to the patient's satisfaction.  )  Induction: intravenous.    MIPS: Postoperative opioids intended and Prophylactic antiemetics administered.  Anesthetic plan and risks discussed with patient.      Plan discussed with CRNA.          Post-op pain plan if not by surgeon: single peripheral nerve block        This pre-anesthesia assessment may be used as a history and physical.    DOS STAFF ADDENDUM:    Pt seen and examined, chart reviewed (including anesthesia, drug and allergy history).  No interval changes to history and physical examination.  Anesthetic plan, risks, benefits,

## 2024-05-28 NOTE — ANESTHESIA PROCEDURE NOTES
Peripheral Block    Patient location during procedure: pre-op  Reason for block: post-op pain management and at surgeon's request  Start time: 5/28/2024 7:40 AM  End time: 5/28/2024 7:43 AM  Staffing  Performed: anesthesiologist   Anesthesiologist: Dimitri Flores MD  Performed by: Dimitri Flores MD  Authorized by: Dimitri Flores MD    Preanesthetic Checklist  Completed: patient identified, IV checked, site marked, risks and benefits discussed, surgical/procedural consents, equipment checked, pre-op evaluation, timeout performed, anesthesia consent given, oxygen available, monitors applied/VS acknowledged, fire risk safety assessment completed and verbalized and blood product R/B/A discussed and consented  Peripheral Block   Patient position: supine  Prep: ChloraPrep  Provider prep: mask and sterile gloves  Patient monitoring: continuous pulse ox, frequent blood pressure checks, IV access, oxygen and responsive to questions  Block type: Saphenous  Laterality: right  Injection technique: single-shot  Guidance: ultrasound guided  Local infiltration: lidocaine  Infiltration strength: 2 %  Local infiltration: lidocaine  Dose: 3 mL    Needle   Needle type: insulated echogenic nerve stimulator needle   Needle gauge: 20 G  Needle localization: ultrasound guidance  Needle length: 10 cm  Assessment   Injection assessment: negative aspiration for heme, no paresthesia on injection, local visualized surrounding nerve on ultrasound and no intravascular symptoms  Paresthesia pain: none  Slow fractionated injection: yes  Hemodynamics: stable  Outcomes: uncomplicated    Medications Administered  BUPivacaine (MARCAINE) PF injection 0.5% - Perineural   30 mL - 5/28/2024 7:40:00 AM

## 2024-05-28 NOTE — OP NOTE
Patient: Laurel Lazcano  YOB: 1965  MRN: [id number]    DATE OF PROCEDURE: 5/28/2024      PREOPERATIVE DIAGNOSIS:  Tricompartmental degenerative osteoarthritis of the right knee.     POSTOPERATIVE DIAGNOSIS:  Tricompartmental degenerative osteoarthritis of the right knee.     OPERATION PERFORMED:  Robotic-assisted right total knee arthroplasty.     SURGEON:  Choco Osborn MD     ASSISTANT:  RADHA Stanford    EBL: 100 mL     IMPLANTS:  Monty Triathlon CR cementless femur size 5  Monty Triathlon cementless tibia size 5  9mm CS polyethylene  35mm cementless asymmetric patella     INDICATIONS:  The patient is a 59 y.o. female who presents for right knee replacement. The patient had been treated conservatively with anti-inflammatories, physical therapy, and intra-articular cortisone injections; these treatments were no longer providing significant relief. We discussed total knee arthroplasty. The operative procedure, alternatives, and risks were discussed in detail with the patient.  The risks include but are not limited to: Infection, vessel injury, nerve injury, DVT, pulmonary embolism, implant loosening, need for revision surgery, loss of motion, continued pain. Informed consent for surgery was signed by the patient.     OPERATIVE PROCEDURE:  The patient was seen in the preoperative holding area where the site of surgery was marked and informed consent was confirmed. The patient was brought back to the operating room by OR personnel. Anesthesia was administered. The patient was positioned supine on the operating table. The right lower extremity was then prepped and draped in a standard and sterile fashion. A final and formal timeout was then performed which confirmed the correct patient, correct position, and correct site of surgery. IV antibiotics were administered within 1 hour of the skin incision.     An anterior midline incision was made over the knee. Skin and subcutaneous tissue were

## 2024-05-28 NOTE — ANESTHESIA POSTPROCEDURE EVALUATION
Department of Anesthesiology  Postprocedure Note    Patient: Laurel Lazcano  MRN: [id number]  YOB: 1965  Date of evaluation: 5/28/2024    Procedure Summary       Date: 05/28/24 Room / Location: [address]    Anesthesia Start: 0805 Anesthesia Stop: 0931    Procedure: RIGHT TOTAL KNEE REPLACEMENT ROBOTIC ASSISTED (Right: Knee) Diagnosis:       Osteoarthritis of right knee      (Osteoarthritis of right knee [M17.11])    Surgeons: Choco Osborn MD Responsible Provider: Dimitri Flores MD    Anesthesia Type: spinal, MAC ASA Status: 3            Anesthesia Type: No value filed.    Medardo Phase I: Medardo Score: 9    Medardo Phase II:      Anesthesia Post Evaluation    Patient location during evaluation: PACU  Patient participation: complete - patient participated  Level of consciousness: awake  Pain score: 5  Airway patency: patent  Nausea & Vomiting: no nausea and no vomiting  Cardiovascular status: blood pressure returned to baseline  Respiratory status: acceptable  Hydration status: stable  Comments: Vital signs stable  OK to discharge from Stage I post anesthesia care.  Care transferred from Anesthesiology department on discharge from perioperative area     Had a lot of cramping leg pain. Better after robaxin.   Multimodal analgesia pain management approach  Pain management: satisfactory to patient    No notable events documented.

## 2024-05-28 NOTE — ANESTHESIA PROCEDURE NOTES
Spinal Block    Patient location during procedure: OR  End time: 5/28/2024 8:15 AM  Reason for block: primary anesthetic  Staffing  Performed: resident/CRNA   Resident/CRNA: Bacilio Villanueva APRN - CRNA  Performed by: Bacilio Villanueva APRN - CRNA  Authorized by: Dimitri Flores MD    Spinal Block  Patient position: sitting  Prep: ChloraPrep  Patient monitoring: continuous pulse ox, continuous capnometry, frequent blood pressure checks and oxygen  Approach: midline  Location: L4/L5  Provider prep: mask and sterile gloves  Local infiltration: lidocaine  Needle  Needle type: Pencan   Needle gauge: 25 G  Needle length: 3.5 in  Assessment  Sensory level: T10  Swirl obtained: Yes  CSF: clear  Attempts: 1  Hemodynamics: stable  Preanesthetic Checklist  Completed: patient identified, IV checked, site marked, risks and benefits discussed, surgical/procedural consents, equipment checked, pre-op evaluation, timeout performed, anesthesia consent given, oxygen available, monitors applied/VS acknowledged, fire risk safety assessment completed and verbalized and blood product R/B/A discussed and consented

## 2024-05-28 NOTE — H&P
Update History & Physical    The patient's History and Physical of May 16, 2024 was reviewed with the patient and I examined the patient. There was no change. The surgical site was confirmed by the patient and me.       Plan: The risks, benefits, expected outcome, and alternative to the recommended procedure have been discussed with the patient. Patient understands and wants to proceed with the procedure.     Electronically signed by IVA PERDUE MD on 5/28/2024 at 8:01 AM

## 2024-05-29 VITALS
TEMPERATURE: 98.5 F | WEIGHT: 221.56 LBS | HEART RATE: 73 BPM | HEIGHT: 68 IN | SYSTOLIC BLOOD PRESSURE: 101 MMHG | OXYGEN SATURATION: 91 % | BODY MASS INDEX: 33.58 KG/M2 | DIASTOLIC BLOOD PRESSURE: 61 MMHG | RESPIRATION RATE: 16 BRPM

## 2024-05-29 DIAGNOSIS — Z96.651 STATUS POST TOTAL RIGHT KNEE REPLACEMENT: Primary | ICD-10-CM

## 2024-05-29 LAB
ANION GAP SERPL CALCULATED.3IONS-SCNC: 8 MMOL/L (ref 3–16)
BUN SERPL-MCNC: 11 MG/DL (ref 7–20)
CALCIUM SERPL-MCNC: 9.4 MG/DL (ref 8.3–10.6)
CHLORIDE SERPL-SCNC: 107 MMOL/L (ref 99–110)
CO2 SERPL-SCNC: 23 MMOL/L (ref 21–32)
CREAT SERPL-MCNC: <0.5 MG/DL (ref 0.6–1.1)
GFR SERPLBLD CREATININE-BSD FMLA CKD-EPI: >90 ML/MIN/{1.73_M2}
GLUCOSE BLD-MCNC: 157 MG/DL (ref 70–99)
GLUCOSE SERPL-MCNC: 135 MG/DL (ref 70–99)
PERFORMED ON: ABNORMAL
POTASSIUM SERPL-SCNC: 4.1 MMOL/L (ref 3.5–5.1)
SODIUM SERPL-SCNC: 138 MMOL/L (ref 136–145)

## 2024-05-29 PROCEDURE — G0378 HOSPITAL OBSERVATION PER HR: HCPCS

## 2024-05-29 PROCEDURE — 97116 GAIT TRAINING THERAPY: CPT

## 2024-05-29 PROCEDURE — 6370000000 HC RX 637 (ALT 250 FOR IP): Performed by: ORTHOPAEDIC SURGERY

## 2024-05-29 PROCEDURE — 97110 THERAPEUTIC EXERCISES: CPT

## 2024-05-29 PROCEDURE — 97535 SELF CARE MNGMENT TRAINING: CPT

## 2024-05-29 PROCEDURE — 80048 BASIC METABOLIC PNL TOTAL CA: CPT

## 2024-05-29 PROCEDURE — 2580000003 HC RX 258: Performed by: ORTHOPAEDIC SURGERY

## 2024-05-29 PROCEDURE — 36415 COLL VENOUS BLD VENIPUNCTURE: CPT

## 2024-05-29 PROCEDURE — 97530 THERAPEUTIC ACTIVITIES: CPT

## 2024-05-29 PROCEDURE — 94760 N-INVAS EAR/PLS OXIMETRY 1: CPT

## 2024-05-29 RX ORDER — ASPIRIN 81 MG/1
81 TABLET ORAL 2 TIMES DAILY
Qty: 30 TABLET | Refills: 0 | Status: SHIPPED | OUTPATIENT
Start: 2024-05-29 | End: 2024-06-13

## 2024-05-29 RX ORDER — OXYCODONE HYDROCHLORIDE 5 MG/1
5-10 TABLET ORAL EVERY 6 HOURS PRN
Qty: 40 TABLET | Refills: 0 | Status: SHIPPED | OUTPATIENT
Start: 2024-05-29 | End: 2024-06-05

## 2024-05-29 RX ORDER — DULOXETIN HYDROCHLORIDE 60 MG/1
60 CAPSULE, DELAYED RELEASE ORAL DAILY
Qty: 15 CAPSULE | Refills: 0 | Status: SHIPPED | OUTPATIENT
Start: 2024-05-29 | End: 2024-06-13

## 2024-05-29 RX ORDER — MELOXICAM 7.5 MG/1
7.5 TABLET ORAL DAILY
Qty: 5 TABLET | Refills: 0 | Status: SHIPPED | OUTPATIENT
Start: 2024-05-30 | End: 2024-06-04

## 2024-05-29 RX ADMIN — DULOXETINE HYDROCHLORIDE 60 MG: 60 CAPSULE, DELAYED RELEASE ORAL at 08:28

## 2024-05-29 RX ADMIN — LEVOTHYROXINE SODIUM 125 MCG: 0.12 TABLET ORAL at 06:10

## 2024-05-29 RX ADMIN — GABAPENTIN 600 MG: 300 CAPSULE ORAL at 08:29

## 2024-05-29 RX ADMIN — ASPIRIN 81 MG: 81 TABLET, COATED ORAL at 08:28

## 2024-05-29 RX ADMIN — MELOXICAM 7.5 MG: 7.5 TABLET ORAL at 06:10

## 2024-05-29 RX ADMIN — OXYCODONE HYDROCHLORIDE 10 MG: 10 TABLET ORAL at 08:28

## 2024-05-29 RX ADMIN — ACETAMINOPHEN 325MG 650 MG: 325 TABLET ORAL at 06:10

## 2024-05-29 RX ADMIN — SODIUM CHLORIDE, PRESERVATIVE FREE 10 ML: 5 INJECTION INTRAVENOUS at 08:29

## 2024-05-29 ASSESSMENT — PAIN SCALES - GENERAL
PAINLEVEL_OUTOF10: 4
PAINLEVEL_OUTOF10: 9

## 2024-05-29 ASSESSMENT — PAIN DESCRIPTION - ORIENTATION: ORIENTATION: RIGHT

## 2024-05-29 ASSESSMENT — PAIN DESCRIPTION - DESCRIPTORS: DESCRIPTORS: SHARP

## 2024-05-29 ASSESSMENT — PAIN - FUNCTIONAL ASSESSMENT: PAIN_FUNCTIONAL_ASSESSMENT: PREVENTS OR INTERFERES SOME ACTIVE ACTIVITIES AND ADLS

## 2024-05-29 ASSESSMENT — PAIN DESCRIPTION - LOCATION: LOCATION: KNEE

## 2024-05-29 NOTE — PROGRESS NOTES
[facility] PRE-OPERATIVE INSTRUCTIONS    Day of Procedure:        5/28        Arrival time:          per dai      Surgery time:    Take the following medications with a sip of water:  Follow your MD/Surgeons pre-procedure instructions regarding your medications     Do not eat or drink anything after 12:00 midnight prior to your surgery.  This includes water chewing gum, mints and ice chips.   You may brush your teeth and gargle the morning of your surgery, but do not swallow the water     Please see your family doctor/pediatrician for a history and physical and/or concerning medications.   Bring any test results/reports from your physicians office.   If you are under the care of a heart doctor or specialist doctor, please be aware that you may be asked to them for clearance    You may be asked to stop blood thinners such as Coumadin, Plavix, Fragmin, Lovenox, etc., or any anti-inflammatories such as:  Aspirin, Ibuprofen, Advil, Naproxen prior to your surgery.    We also ask that you stop any OTC medications such as fish oil, vitamin E, glucosamine, garlic, Multivitamins, COQ 10, etc.    We ask that you do not smoke 24 hours prior to surgery  We ask that you do not  drink any alcoholic beverages 24 hours prior to surgery     You must make arrangements for a responsible adult to take you home after your surgery.    For your safety you will not be allowed to leave alone or drive yourself home.  Your surgery will be cancelled if you do not have a ride home.     Also for your safety, it is strongly suggested that someone stay with you the first 24 hours after your surgery.     A parent or legal guardian must accompany a child scheduled for surgery and plan to stay at the hospital until the child is discharged.    Please do not bring other children with you.    For your comfort, please wear simple loose fitting clothing to the hospital.  Please do not bring valuables.    Do not wear any make-up or nail 
2nd attempt to schedule outpatient therapy appointment, left voicemail with contact information.   
4 Eyes Skin Assessment     NAME:  Laurel Lazcano  YOB: 1965  MEDICAL RECORD NUMBER:  [id number]    The patient is being assessed for  Admission    I agree that at least one RN has performed a thorough Head to Toe Skin Assessment on the patient. ALL assessment sites listed below have been assessed.      Areas assessed by both nurses:    Head, Face, Ears, Shoulders, Back, Chest, Arms, Elbows, Hands, Sacrum. Buttock, Coccyx, Ischium, and Legs. Feet and Heels        Does the Patient have a Wound? No noted wound(s)       Jung Prevention initiated by RN: Yes  Wound Care Orders initiated by RN: No    Pressure Injury (Stage 3,4, Unstageable, DTI, NWPT, and Complex wounds) if present, place Wound referral order by RN under : No    New Ostomies, if present place, Ostomy referral order under : No     Nurse 1 eSignature: Electronically signed by Sun Mathis RN on 5/28/24 at 5:46 PM EDT    **SHARE this note so that the co-signing nurse can place an eSignature**    Nurse 2 eSignature: Electronically signed by Ze Chao RN on 5/28/24 at 5:47 PM EDT    
CLINICAL PHARMACY NOTE: MEDS TO BEDS    Total # of Prescriptions Filled: 4   The following medications were delivered to the patient:  Current Discharge Medication List        START taking these medications    Details   oxyCODONE (ROXICODONE) 5 MG immediate release tablet Take 1-2 tablets by mouth every 6 hours as needed for Pain for up to 7 days. Max Daily Amount: 40 mg  Qty: 40 tablet, Refills: 0    Comments: Reduce doses taken as pain becomes manageable  Associated Diagnoses: H/O total knee replacement, right      aspirin 81 MG EC tablet Take 1 tablet by mouth in the morning and at bedtime for 15 days  Qty: 30 tablet, Refills: 0      meloxicam (MOBIC) 7.5 MG tablet Take 1 tablet by mouth daily for 5 days  Qty: 5 tablet, Refills: 0      DULoxetine (CYMBALTA) 60 MG extended release capsule Take 1 capsule by mouth daily for 15 days  Qty: 15 capsule, Refills: 0               Additional Documentation:   
Data- Discharge order received, patient verbalized agreement to discharge, disposition to previous residence, needs noted for Outpatient therapy and informed Dr Choco Osborn.     Action- discharge instructions prepared and a hardcopy of AVS instructions given to patient, patient verbalized understanding. Medication information packet given related to NEW and/or CHANGED prescriptions emphasizing name/purpose/side effects, pt verbalized understanding. Discharge instruction summary: Diet- General, Activity- Full weight bearing, Primary Care Physician as follows: Kamila Briseno [phone]. Follow up appointment with orthopedic office noted below, immunizations reviewed and discussed with patient, prescription medications to be filled by retail pharmacy and then delivered. Inpatient surgical procedure precautions reviewed. Educated patient on using incentive spirometer post-discharge per surgeon's instructions. Neurovascular checks performed and moderate dorsi and plantar flexions noted bilaterally, toes appear appropriate to ethnicity in color, Warm to touch, patient denies numbness or tingling in extremities.Right Knee Incision site prineo glue dressing assessed and is clean,dry, and intact, no signs of redness, drainage, or odor noted.  patient's bedside RN Sun notified of patient completing discharge instructions and iv removal. Nurse Navigator and Orthopedic Office contact information on discharge instructions and provided to patient. Incentive spirometer in patient possession and educated on surgeon's instructions regarding. Educated patient on abstaining from alcohol consumption while taking narcotic medication and while on prescribed blood thinner.     Response- IV removed. prescriptions medications delivered to patient via meds to bed program. Disposition is home with daughter Yun and Outpatient therapy, has appointment on 5/30/2024. Printed physical therapy prescription handed to patient and 
Huddle performed this morning including Nurse navigator Mary, Physical therapist Muriel, and Occupational therapist Kathleen. Discussed plan of care, discharge plan, and dme needs if applicable for orthopedic total joint patient.    
[facility] Orthopaedic Surgery  Progress Note    Laurel Lazcano is a 59 y.o. who is :POD#1 following right total knee arthroplasty.  She has just finished working with PT and OT.  Pain is controlled.  She is tolerating her normal diet.    Exam:  Blood pressure 101/61, pulse 73, temperature 98.5 °F (36.9 °C), resp. rate 16    Appearance: Sitting up in a chair, appears to be in no acute distress, awake and alert  Resp: unlabored breathing on room air  Skin: warm, dry and intact with out erythema or significant increased temperature  RLE: Thigh is soft and compressible.  Dressing is clean and dry and intact.  She demonstrates active ankle plantarflexion and dorsiflexion.  Sensation intact light touch distally.      Assessment:  Right total knee arthroplasty    Plan:  Activity as tolerated  Weight-bear as tolerated right lower extremity  PT/OT  Pain control  Complete perioperative antibiotics  DVT prophylaxis: Aspirin 81 mg twice daily  Disposition: Home with home health today    IVA PERDUE MD  5/29/2024     
Met with patient  and adult child Amalia  at bedside, patient is alert and oriented x4. Discussed role of nurse navigator.  Reviewed reasons to call with questions or concerns, importance of TEDS, Incentive spirometer and incentive spirometer at bedside, pain medication, and physical and occupational therapy. 2/4 bed rails up, bed locked and in lowest position, fall precautions in place, call light within reach.     Pulses present bilaterally +2 Moderate pedal, Right Knee Ace wrap assessed and is clean,dry, and intact, no signs of redness, drainage, or odor noted Ice in place. Jaswinder and scds on left lower extremity. Minimal/weak dorsi and plantar flexions noted bilaterally, toes appear appropriate to ethnicity in color , Warm to touch bilaterally. patient denies numbness or tingling in extremities.      DC Plan: Home with daughter Yun and OPPT, see appointments below. Patient states she could not get an appointment sooner than 6/4 at [facility]. Patient is agreeable to going to [place] office location if they have an appointment available this week. This RN will call to check appointment availability. Yun to transport patient.  DME needs:Denies any DME needs at this time. Patient states they already has a rolling walker and a raised toilet seat.      Mary Mackay  Orthopedic Nurse Navigator  Phone number: [phone]    Future Appointments   Date Time Provider Department Center   6/4/2024  1:10 PM Yohannes Pichardo PT [facility]   6/6/2024  1:10 PM Yohannes Pichardo PT [facility]   6/10/2024  9:45 AM Nirav Borges PA [facility]         
Occupational Therapy  Facility/Department: [address] ORTHOPEDICS  Occupational Therapy Daily Treatment    Name: Laurel Lazcano  : 1965  MRN: [id number]  Date of Service: 2024    Discharge Recommendations:  Patient would benefit from continued therapy after discharge, 2-3 sessions per week, 24 hour supervision or assist        Laurel Lazcano scored a 18/24 on the AM-PAC ADL Inpatient form.   Please see assessment section for further patient specific details.    If patient discharges prior to next session this note will serve as a discharge summary.  Please see below for the latest assessment towards goals.      Patient Diagnosis(es): The encounter diagnosis was H/O total knee replacement, right.  Past Medical History:  has a past medical history of Anxiety, Asthma, Diaphragm paralysis, Fatty liver, Fibromyalgia, GERD (gastroesophageal reflux disease), Headache(784.0), History of ulcer disease, Hypothyroidism, Melanoma (HCC), Osteoarthritis, Rheumatoid arthritis (HCC), Spasmodic dysphonia, Thyroid disease, Wears dentures, Wears glasses, and Wears glasses.  Past Surgical History:  has a past surgical history that includes Appendectomy (age 18); Tubal ligation (); Thyroid surgery; Endoscopy, colon, diagnostic; Colonoscopy; Carpal tunnel release (Right, 2015); Carpal tunnel release (Left, 2015); Knee arthroscopy (Left, 2017); pr njx dx/ther sbst intrlmnr crv/thrc w/img gdn (N/A, 2018); pr njx dx/ther sbst intrlmnr lmbr/sac w/img gdn (Right, 2018); epidural steroid injection (N/A, 2019); hc inject other perphrl nerv (Bilateral, 2019); pr arthrocentesis aspir&/inj major jt/bursa w/o us (Bilateral, 05/10/2019); epidural steroid injection (N/A, 2019); hc inject other perphrl nerv (Bilateral, 2019); Upper gastrointestinal endoscopy (N/A, 2021); Breast surgery (Right, needle localization right breast mass); Breast surgery (Right, 10/22/2015); 
Orders completed this shift:      [x] Surgical site and Neurovascular checks assessed with head to toe assessment and Q4Hr this shift per orders. See flowsheets for documentation.   [x] Insulin protocol implemented per orders, see eMAR for documentation.  [x] Patient ambulated 31 feet from bed to toilet to chair. See flowsheet for documentation on how patient tolerated ambulation.  [x] Ice pack/pad in place to surgical site. Barrier in place between patient skin and ice pack/pad.   [x]Pain medication administered per orders for management of pain. See eMAR for documentation.   [x] Incentive spirometer performed by patient. Volume achieved 1500. Encouraged patient to perform Q2hr while awake per order.  [x] IV fluids stopped per orders as patient is tolerating PO and has adequate urine output. See eMAR for documentation.   [x] Shift intake and output documented per shift, see flowsheet.  [x] Customized care plan and education charted on Qshift.    Electronically signed by Sun Mathis RN on 5/28/2024 at 5:47 PM      
Orders completed this shift:      [x] Surgical site and Neurovascular checks assessed with head to toe assessment and Q4Hr this shift per orders. See flowsheets for documentation.   [x] Insulin protocol implemented per orders, see eMAR for documentation.  [x] Patient ambulated 31 feet from bed to toilet to chair. See flowsheet for documentation on how patient tolerated ambulation.  [x] Ice pack/pad in place to surgical site. Barrier in place between patient skin and ice pack/pad.   [x]Pain medication administered per orders for management of pain. See eMAR for documentation.   [x] Incentive spirometer performed by patient. Volume achieved 1500. Encouraged patient to perform Q2hr while awake per order.  [x] IV fluids stopped per orders as patient is tolerating PO and has adequate urine output. See eMAR for documentation.   [x] Shift intake and output documented per shift, see flowsheet.  [x] Customized care plan and education charted on Qshift.  Electronically signed by Sun Mathis RN on 5/29/2024 at 10:16 AM      
Orders completed this shift:      [x] Surgical site and Neurovascular checks assessed with head to toe assessment and Q4Hr this shift per orders. See flowsheets for documentation.   [x] Insulin protocol implemented per orders, see eMAR for documentation.  [x] Patient ambulated 41 feet from chair to toilet back to chair and 20 feet from bed to toilet and back to bed. See flowsheet for documentation on how patient tolerated ambulation.  [x] Ice pack/pad in place to surgical site. Barrier in place between patient skin and ice pack/pad.   [x]Pain medication administered per orders for management of pain. See eMAR for documentation.   [x] Incentive spirometer performed by patient. Volume achieved 2500. Encouraged patient to perform Q2hr while awake per order.  [x] IV fluids stopped per orders as patient is tolerating PO and has adequate urine output. See eMAR for documentation.   [x] Shift intake and output documented per shift, see flowsheet.  [x] Customized care plan and education charted on Qshift.     
Patient C/O right knee pain at 10 of 10 and medicated per order. See MAR. VSS.  
Patient admitted to PACU from OR. Patient opens eyes to name. Resp easy unlabored on 4L NC with SaO2 96%. Right knee ace wrap dressing dry and intact. Pulses palpable bilat. Patient able to wiggle toes bilat weakly, lift left leg off of bed. Patient denies numbness to bilat lower extremities Patient C/O right knee pain at 10 of 10. IV patent to left  AC. VSS. Patient denies C/O nausea.  
Patient arrived to unit from PACU and was placed into room 3131. Patient A&O x 4. Patient oriented to room, unit routine, call light/telephone use, bed/chair alarm implementation, and meal ordering process. Patient reports understanding, denies questions. Patient denies physical/emotional needs at this time. Call light, telephone, and bed side table are within reach. Fall precautions in place. Electronically signed by Sun Mathis RN on 5/28/2024 at 11:49 AM    
Patient awake and alert. Resp easy unlabored on 2L NC with SaO2 99%. Right knee ace wrap dressing dry and intact with cooling machine to right knee. Pain level 6 of 10 and tolerable. Muscle cramp to right knee easing. Patient denies C/O nausea. VSS. IV patent. Patient stable to transfer to room 3131.Report called to Sun WOOD receiving patient into room 3131.  
Patient discharged  by nurse navigator with belongings and durable medical equipment with family via wheelchair via [facility] transportation to private residence. Patient given discharge instructions by nurse navigator , patient verbalized understanding, no questions at this time. Prescriptions delivered by outpatient pharmacy. IV D/C’d by nurse navigator patient tolerated well, no complications.  Electronically signed by Sun Mathis RN on 5/29/2024 at 10:19 AM        
Patient resting more comfortably, opens eyes easily to name. Resp easy unlabored on 2L NC O2 with SaO2 97%. Pain level 6 of 10 and tolerable. Patient describes right knee discomfort as a tightness or muscle spasm at times. IVPB Robaxin started per order Dr Flores. ROSA.  
Pt awake in bed alert and oriented x 4. Pt denies SOB and chest pain at this time. Therapy at bedside. Pt complaining of right knee pain administered prn oral pain medication. Respirations even and unlabored at this time. Morning medications administered, pt tolerated well. Pt states she has no other needs at this time. Call light placed within reach. Electronically signed by Sun Mathis RN on 5/29/2024 at 10:14 AM      
The patients OARRS report has been reviewed online and any prescribing of pain related medications is based on our findings.The patient has been issued narcotics to safely reduce postoperative pain and promote tolerance with physical therapies and ADL's. Reduction in dosing will be addressed with the next narcotic refill request. Dosing is adjusted for patients with history of chronic pain disorders.    
This RN called and left voicemail with [facility] Outpatient Physical Therapy requesting patient appointment. I left my contact information in voicemail. Awaiting return call.  Electronically signed by Mary Mackay RN on 5/28/2024 at 12:54 PM    
This RN spoke to Cherie at [facility] Outpatient therapy department regarding making an appointment this week. She states she needs a new referral prior to scheduling, referral needs to be within 30 days. This RN will address with Dr Osborn tomorrow morning.   
Recommendations: Functional mobility training, Transfer training, Gait training, Stair training, Safety education & training, Patient/Caregiver education & training  Safety Devices  Type of Devices: Patient at risk for falls, All fall risk precautions in place, Left in chair, Call light within reach, Chair alarm in place, Nurse notified, Gait belt (ice to R knee)  Restraints  Restraints Initially in Place: No     Restrictions  Restrictions/Precautions  Restrictions/Precautions: Weight Bearing, Fall Risk  Lower Extremity Weight Bearing Restrictions  Right Lower Extremity Weight Bearing: Weight Bearing As Tolerated     Subjective   General  Chart Reviewed: Yes  Patient assessed for rehabilitation services?: Yes  Additional Pertinent Hx: 58 yo female to hospital 5-28-24 for elective R TKR via Dr. Osborn.  Other extensive PMHx as noted, inclulding Emphysema, Fibroadenoma, cervical radicular pain, Melanoma. severe OA L knee, also needing replacement.  Response To Previous Treatment: Patient with no complaints from previous session.  Family / Caregiver Present: Yes (dtr)  Referring Practitioner: Choco Osborn MD  Referral Date : 05/28/24  Diagnosis: R TKA  Follows Commands: Within Functional Limits  Subjective  Subjective: Pt. awake in chair upon arrival. Agreeable to therapy. States 9/10 pain R knee pain at rest, improves to 8/10 with movment.         Social/Functional History  Social/Functional History  Lives With: Daughter (Lima)  Type of Home: House  Home Layout: One level  Home Access: Stairs to enter with rails  Entrance Stairs - Number of Steps: 3  Entrance Stairs - Rails: Both  Bathroom Shower/Tub: Tub/Shower unit, Walk-in shower  Bathroom Toilet: Standard (with BSC over)  Bathroom Equipment: Grab bars in shower, Hand-held shower, Toilet raiser, Shower chair  Bathroom Accessibility: Walker accessible  Home Equipment: Walker - Rolling, Wheelchair - Manual  Has the patient had two or more falls in the past year or 
pain with movement, nausea/dizziness with OOB activity, RN informed.       Social/Functional History  Social/Functional History  Lives With: Daughter ([place]ima)  Type of Home: House  Home Layout: One level  Home Access: Stairs to enter with rails  Entrance Stairs - Number of Steps: 3  Entrance Stairs - Rails: Both  Bathroom Shower/Tub: Tub/Shower unit, Walk-in shower  Bathroom Toilet: Standard (with BSC over)  Bathroom Equipment: Grab bars in shower, Hand-held shower, Toilet raiser, Shower chair  Bathroom Accessibility: Walker accessible  Home Equipment: Walker - Rolling, Wheelchair - Manual  Has the patient had two or more falls in the past year or any fall with injury in the past year?: Yes (1 fall)  ADL Assistance: Independent  Homemaking Assistance: Independent  Ambulation Assistance: Independent  Transfer Assistance: Independent  Active : Yes  Additional Comments: R diaphragm paralyzed. SOB with movement       Objective      Observation/Palpation  Observation: R knee ace wrap from thigh to toes. Jaswinder hose LLE. SCDs donned bilaterally.     Safety Devices  Type of Devices: Patient at risk for falls;All fall risk precautions in place;Left in bed;Bed alarm in place;Call light within reach;Nurse notified;Gait belt (ice to R knee. RN, Sun, informed.)       Toilet Transfers  Toilet - Technique: Ambulating (RW; STEDY)  Equipment Used: Standard toilet (use of R grab bar)  Toilet Transfer: Contact guard assistance;Dependent/Total  Toilet Transfers Comments: initially tx from RW > commode CGA with cues, then tx from commode > STEDY CGA, dep tx from commode via STEDY for safety d/t pt report dizziness.    AROM: Within functional limits  Strength: Within functional limits (grossly WFL)  Coordination: Within functional limits    ADL  LE Dressing: Dependent/Total  LE Dressing Skilled Clinical Factors: Assist to thread brief bilaterally and pull up completely in stance at STEDY; assist to don footie RLE.  Toileting: 
flat bed without using bedrails?: A Lot  How much help is needed moving to and from a bed to a chair?: A Little  How much help is needed standing up from a chair using your arms?: A Little  How much help is needed walking in hospital room?: A Lot  How much help is needed climbing 3-5 steps with a railing?: Total  AM-PAC Inpatient Mobility Raw Score : 14  AM-PAC Inpatient T-Scale Score : 38.1  Mobility Inpatient CMS 0-100% Score: 61.29  Mobility Inpatient CMS G-Code Modifier : CL    Goals  Short Term Goals  Time Frame for Short Term Goals: By acute DC  Short Term Goal 1: Transfers SBA  Short Term Goal 2: Wh walker gait 50' SBA  Short Term Goal 3: Stairs as needed for home entry with CGA  Patient Goals   Patient Goals : \"Go home.\"       Education  Patient Education  Education Given To: Family  Education Provided: Role of Therapy;Plan of Care  Education Provided Comments: care and use of cryocuff.  Education Method: Verbal;Printed Information/Hand-outs  Education Outcome: Demonstrated understanding;Verbalized understanding (patient with eyes closed throughout)      Therapy Time   Individual Concurrent Group Co-treatment   Time In       1357   Time Out       1455   Minutes       58      EV 15 TA 30 Gt 13    Kitty Juarez PT  Electronically signed by KITTY JUAREZ PT [id number] [phone])  on 5/28/2024 at 3:16 PM

## 2024-05-29 NOTE — DISCHARGE INSTRUCTIONS
Please arrive to your therapy appointment at [facility] on 5/30/2024 at 850am for your check-in.

## 2024-05-29 NOTE — PLAN OF CARE
(43293) activation and proprioception, including postural re-education.    Manual Therapy (75423) as indicated to include: Passive Range of Motion, Gr I-IV mobilizations, Soft Tissue Mobilization, and Trigger Point Release  Modalities as needed that may include: Cryotherapy and Vasoneumatic Compression  Patient education on joint protection, postural re-education, activity modification, and progression of HEP    Plan: POC initiated as per evaluation    Electronically Signed by Valeria Albarran PT, DPT    Date: 05/30/2024     Note: Portions of this note have been templated and/or copied from initial evaluation, reassessments and prior notes for documentation efficiency.    Note: If patient does not return for scheduled/recommended follow up visits, this note will serve as a discharge from care along with the most recent update on progress.

## 2024-05-29 NOTE — ACP (ADVANCE CARE PLANNING)
Advance Care Planning     Advance Care Planning Inpatient Note  [facility] Department    Today's Date: 5/29/2024  Unit: [facility] 3[facility] ORTHOPEDICS    Received request from HealthCare Provider.  Upon review of chart and communication with care team, patient's decision making abilities are not in question.. Patient and Child/Children was/were present in the room during visit.    Goals of ACP Conversation:  Discuss advance care planning documents    Health Care Decision Makers:     No healthcare decision makers have been documented.  Click here to complete HealthCare Decision Makers including selection of the Healthcare Decision Maker Relationship (ie \"Primary\")  Summary:  No Decision Maker named by patient at this time    Advance Care Planning Documents (Patient Wishes):  None     Assessment:  Pt said he lives in [place] but gets her healthcare done in [place]. Pt also said she would like to take the AD docs home to complete.     Interventions:  Provided education on documents for clarity and greater understanding  Discussed and provided education on state decision maker hierarchy  Encouraged ongoing ACP conversation with future decision makers and loved ones  Reviewed but did not complete ACP document    Care Preferences Communicated:   No    Outcomes/Plan:  ACP Discussion: Postponed  Pt to follow-up    Electronically signed by Chaplain Roxann on 5/29/2024 at 10:25 AM

## 2024-05-29 NOTE — PLAN OF CARE
Problem: Discharge Planning  Goal: Discharge to home or other facility with appropriate resources  Outcome: Progressing  Flowsheets (Taken 5/28/2024 2000)  Discharge to home or other facility with appropriate resources: Identify barriers to discharge with patient and caregiver     Problem: Chronic Conditions and Co-morbidities  Goal: Patient's chronic conditions and co-morbidity symptoms are monitored and maintained or improved  Outcome: Progressing  Flowsheets (Taken 5/28/2024 2000)  Care Plan - Patient's Chronic Conditions and Co-Morbidity Symptoms are Monitored and Maintained or Improved: Monitor and assess patient's chronic conditions and comorbid symptoms for stability, deterioration, or improvement     Problem: Neurosensory - Adult  Goal: Achieves stable or improved neurological status  Outcome: Progressing  Flowsheets (Taken 5/28/2024 2000)  Achieves stable or improved neurological status:   Assess for and report changes in neurological status   Initiate measures to prevent increased intracranial pressure   Maintain blood pressure and fluid volume within ordered parameters to optimize cerebral perfusion and minimize risk of hemorrhage   Monitor temperature, glucose, and sodium. Initiate appropriate interventions as ordered     Problem: Skin/Tissue Integrity - Adult  Goal: Incisions, wounds, or drain sites healing without S/S of infection  Outcome: Progressing  Flowsheets (Taken 5/28/2024 2000)  Incisions, Wounds, or Drain Sites Healing Without Sign and Symptoms of Infection:   ADMISSION and DAILY: Assess and document risk factors for pressure ulcer development   TWICE DAILY: Assess and document skin integrity   TWICE DAILY: Assess and document dressing/incision, wound bed, drain sites and surrounding tissue   Implement wound care per orders     Problem: Musculoskeletal - Adult  Goal: Return mobility to safest level of function  Outcome: Progressing  Flowsheets (Taken 5/28/2024 2000)  Return Mobility to 
Safe Level of Function:   Administer medication as ordered   Assess activities of daily living deficits and provide assistive devices as needed   Obtain physical therapy/occupational therapy consults as needed   Assist and instruct patient to increase activity and self care as tolerated     Problem: Infection - Adult  Goal: Absence of infection at discharge  Outcome: Progressing  Flowsheets (Taken 5/28/2024 1444)  Absence of infection at discharge:   Assess and monitor for signs and symptoms of infection   Monitor lab/diagnostic results  Goal: Absence of infection during hospitalization  Outcome: Progressing  Flowsheets (Taken 5/28/2024 1444)  Absence of infection during hospitalization:   Assess and monitor for signs and symptoms of infection   Monitor lab/diagnostic results     Problem: Metabolic/Fluid and Electrolytes - Adult  Goal: Glucose maintained within prescribed range  Outcome: Progressing  Flowsheets (Taken 5/28/2024 1444)  Glucose maintained within prescribed range:   Monitor blood glucose as ordered   Assess for signs and symptoms of hyperglycemia and hypoglycemia   Administer ordered medications to maintain glucose within target range     Problem: Safety - Adult  Goal: Free from fall injury  Outcome: Progressing  Flowsheets (Taken 5/28/2024 1444)  Free From Fall Injury: Instruct family/caregiver on patient safety     Problem: Pain  Goal: Verbalizes/displays adequate comfort level or baseline comfort level  Outcome: Progressing  Flowsheets (Taken 5/28/2024 1444)  Verbalizes/displays adequate comfort level or baseline comfort level:   Encourage patient to monitor pain and request assistance   Assess pain using appropriate pain scale   Administer analgesics based on type and severity of pain and evaluate response   Implement non-pharmacological measures as appropriate and evaluate response   Notify Licensed Independent Practitioner if interventions unsuccessful or patient reports new pain     Problem: 
or baseline comfort level  5/29/2024 0840 by Sun Mathis RN  Outcome: Progressing  Flowsheets (Taken 5/29/2024 0840)  Verbalizes/displays adequate comfort level or baseline comfort level:   Encourage patient to monitor pain and request assistance   Assess pain using appropriate pain scale   Administer analgesics based on type and severity of pain and evaluate response   Implement non-pharmacological measures as appropriate and evaluate response   Consider cultural and social influences on pain and pain management   Notify Licensed Independent Practitioner if interventions unsuccessful or patient reports new pain  5/29/2024 0636 by Blossom Bravo, RN  Outcome: Progressing  Flowsheets (Taken 5/28/2024 2350)  Verbalizes/displays adequate comfort level or baseline comfort level:   Encourage patient to monitor pain and request assistance   Assess pain using appropriate pain scale   Administer analgesics based on type and severity of pain and evaluate response   Implement non-pharmacological measures as appropriate and evaluate response     Problem: ABCDS Injury Assessment  Goal: Absence of physical injury  5/29/2024 0840 by Sun Mathis, RN  Outcome: Progressing  Flowsheets (Taken 5/29/2024 0840)  Absence of Physical Injury: Implement safety measures based on patient assessment  5/29/2024 0636 by Blossom Bravo, RN  Outcome: Progressing

## 2024-05-30 ENCOUNTER — TELEPHONE (OUTPATIENT)
Dept: ORTHOPEDICS UNIT | Age: 59
End: 2024-05-30

## 2024-05-30 ENCOUNTER — TELEPHONE (OUTPATIENT)
Dept: FAMILY MEDICINE CLINIC | Age: 59
End: 2024-05-30

## 2024-05-30 ENCOUNTER — HOSPITAL ENCOUNTER (OUTPATIENT)
Dept: PHYSICAL THERAPY | Age: 59
Setting detail: THERAPIES SERIES
Discharge: HOME | End: 2024-05-30
Attending: ORTHOPAEDIC SURGERY
Payer: COMMERCIAL

## 2024-05-30 DIAGNOSIS — R26.89 DECREASED FUNCTIONAL MOBILITY: Primary | ICD-10-CM

## 2024-05-30 DIAGNOSIS — R53.1 FUNCTIONAL WEAKNESS: ICD-10-CM

## 2024-05-30 DIAGNOSIS — Z96.651 STATUS POST TOTAL RIGHT KNEE REPLACEMENT: ICD-10-CM

## 2024-05-30 DIAGNOSIS — R52 PAIN AGGRAVATED BY STANDING: ICD-10-CM

## 2024-05-30 DIAGNOSIS — R68.89 DECREASED ACTIVITY TOLERANCE: ICD-10-CM

## 2024-05-30 PROCEDURE — 97161 PT EVAL LOW COMPLEX 20 MIN: CPT

## 2024-05-30 PROCEDURE — 97112 NEUROMUSCULAR REEDUCATION: CPT

## 2024-05-30 NOTE — TELEPHONE ENCOUNTER
Attempted to contact patient.  Left hippa compliant voicemail for patient stating purpose and call back number.   Mary CORTEZN, RN, ONC  Orthopedic Nurse Navigator  Phone number: [phone]  Future Appointments   Date Time Provider Department Center   5/31/2024  3:30 PM Valeria Albarran PT [facility]   6/4/2024  1:10 PM Yohannes Pichardo PT [facility]   6/6/2024  1:10 PM Yohannes Pichardo PT W[facility] PT [facility]   6/10/2024  9:45 AM Nirav Bogres, PA [facility]

## 2024-05-30 NOTE — DISCHARGE SUMMARY
Physician Discharge Summary     Patient ID:  Laurel Lazcano  [id number]  59 y.o.  1965    Admit date: 5/28/2024    Discharge date and time: 5/29/2024 10:29 AM     Admitting Physician: Choco Osborn MD     Discharge Physician: LINNETTE Osborn    Admission Diagnoses: Osteoarthritis of right knee [M17.11]  Arthritis of right knee [M17.11]    Discharge Diagnoses: right knee OA    Admission Condition: good    Discharged Condition: good    Indication for Admission: Failed conservative treatment as outpatient for joint pain including PT and pain meds. This patient was then electively scheduled for total joint replacement surgery    Surgical procedure: right TKA    Consults: PT OT SS    This patient had no postoperative complications. They has PT and OT for ADL's . IV and PO pain med for pain control and was eventually DC in stable condition    Treatments: analgesia,  therapies: PT OT,  and surgery      Disposition: home    Patient Instructions:   [unfilled]  Activity: activity as tolerated  Diet: regular diet  Wound Care: keep wound clean and dry    Follow-up with LINNETTE Osborn in 2 weeks.    Signed:  ODALYS Fernandez CNP  5/30/2024  2:36 PM

## 2024-05-30 NOTE — TELEPHONE ENCOUNTER
Care Transitions Initial Follow Up Call    Outreach made within 2 business days of discharge: Yes    Patient: Laurel Lazcano Patient : 1965   MRN: [id number]  Reason for Admission: There are no discharge diagnoses documented for the most recent discharge.  Discharge Date: 24       Spoke with: Laurel    Discharge department/facility: [facility] Interactive Patient Contact:  Was patient able to fill all prescriptions: Yes  Was patient instructed to bring all medications to the follow-up visit: Yes  Is patient taking all medications as directed in the discharge summary? Yes  Does patient understand their discharge instructions: Yes  Does patient have questions or concerns that need addressed prior to 7-14 day follow up office visit: no    Scheduled appointment with PCP within 7-14 days    24 LM FOR PT TO RETURN OUR CALL TO SCHEDULE A HOSPITAL FOLLOW UP .     APPT SCHEDULED FOR 24      Follow Up  Future Appointments   Date Time Provider Department Center   2024  1:10 PM Yohannes Pichardo PT [facility]   2024  1:10 PM Yohannes Pichardo PT [facility] PT [facility]   6/10/2024  9:45 AM Nirav Borges PA [facility]   2024  9:45 AM Kamila Briseno MD [facility] Christy Ballard MA

## 2024-05-31 ENCOUNTER — HOSPITAL ENCOUNTER (OUTPATIENT)
Dept: PHYSICAL THERAPY | Age: 59
Setting detail: THERAPIES SERIES
Discharge: HOME | End: 2024-05-31
Attending: ORTHOPAEDIC SURGERY
Payer: COMMERCIAL

## 2024-05-31 PROCEDURE — 97016 VASOPNEUMATIC DEVICE THERAPY: CPT

## 2024-05-31 PROCEDURE — 97110 THERAPEUTIC EXERCISES: CPT

## 2024-05-31 PROCEDURE — 97140 MANUAL THERAPY 1/> REGIONS: CPT

## 2024-05-31 PROCEDURE — 97112 NEUROMUSCULAR REEDUCATION: CPT

## 2024-05-31 NOTE — FLOWSHEET NOTE
progressing as expected and requires additional follow up with physician  [] Other:     TREATMENT PLAN     Frequency/Duration: 1-2x/week for  10-12  weeks for the following treatment interventions:    Interventions:  Therapeutic Exercise (33244) including: strength training, ROM, and functional mobility  Therapeutic Activities (89846) including: functional mobility training and education.  Neuromuscular Re-education (09098) activation and proprioception, including postural re-education.    Manual Therapy (88042) as indicated to include: Passive Range of Motion, Gr I-IV mobilizations, Soft Tissue Mobilization, and Trigger Point Release  Modalities as needed that may include: Cryotherapy and Vasoneumatic Compression  Patient education on joint protection, postural re-education, activity modification, and progression of HEP    Plan: Cont POC- Continue emphasis/focus on exercise progression, improving proper muscle recruitment and activation/motor control patterns, increasing ROM, reduce/eliminate soft tissue swelling/inflammation/restriction, improving soft tissue extensibility, allowing for proper ROM, and static and dynamic balance. Next visit plan to progress weights, progress reps, add new exercises, and continue current phase     Transition care to Yohannes Pichardo PT at location closer to patient    Electronically Signed by Valeria Albarran PT    Date: 05/31/2024     Note: Portions of this note have been templated and/or copied from initial evaluation, reassessments and prior notes for documentation efficiency.    Note: If patient does not return for scheduled/recommended follow up visits, this note will serve as a discharge from care along with the most recent update on progress.

## 2024-05-31 NOTE — TELEPHONE ENCOUNTER
LM for patient to call the office. Patient was in the hospital for knee replacement. Patient is already scheduled with Ortho and PT.

## 2024-06-04 ENCOUNTER — HOSPITAL ENCOUNTER (OUTPATIENT)
Dept: PHYSICAL THERAPY | Age: 59
Setting detail: THERAPIES SERIES
Discharge: HOME | End: 2024-06-04
Attending: ORTHOPAEDIC SURGERY
Payer: COMMERCIAL

## 2024-06-04 PROCEDURE — 97140 MANUAL THERAPY 1/> REGIONS: CPT

## 2024-06-04 PROCEDURE — 97110 THERAPEUTIC EXERCISES: CPT

## 2024-06-04 PROCEDURE — 97112 NEUROMUSCULAR REEDUCATION: CPT

## 2024-06-04 PROCEDURE — 97016 VASOPNEUMATIC DEVICE THERAPY: CPT

## 2024-06-04 NOTE — FLOWSHEET NOTE
[facility]- Outpatient Rehabilitation and Therapy [address], [address], [place] [address] office: [phone] fax: [phone]      Physical Therapy: TREATMENT/PROGRESS NOTE   Patient: Laurel Lazcano (59 y.o. female)   Examination Date: 2024   :  1965 MRN: [id number]   Visit #: 3/60 PCY  Insurance Allowable Auth Needed   Medical [place]  60 PCY []Yes    [x]No    Insurance: Payor: [employer]UAL / Plan: [employer] [address] / Product Type: *No Product type* /   Insurance ID: [id number] - (Commercial)  Secondary Insurance (if applicable):    Treatment Diagnosis:     ICD-10-CM    1. Decreased functional mobility  R26.89       2. Decreased activity tolerance  R68.89       3. Pain aggravated by standing  R52       4. Functional weakness  R53.1       5. Status post total right knee replacement  Z96.651          Medical Diagnosis:  Primary osteoarthritis of right knee [M17.11]   Referring Physician: Choco Osborn MD  PCP: Kamila Briseno MD     Plan of care signed (Y/N): YES    Date of Patient follow up with Physician:      Progress Report/POC: NO  Progress note due: 2024 (OR 10 visits /OR AUTH LIMITS, whichever is less)  POC update due: 24                                            Precautions/ Contra-indications:           Latex allergy:  NO  Pacemaker:    NO  Contraindications for Manipulation: NA  Date of Surgery: 24  Other:    Red Flags:  None    C-SSRS Triggered by Intake questionnaire:   Patient answered 'NO' to both behavioral questions on intake.  No further screening warranted    Preferred Language for Healthcare:   [x] English       [] other:    SUBJECTIVE EXAMINATION     Patient stated complaint: Patient reports that she had a R TKA on . Patient reports the surgery went well, but she is in a lot of pain. She is taking the pain medication as prescribed by MD. She is walking with a 2 WW at home, she reports she does have someone at home who

## 2024-06-06 ENCOUNTER — HOSPITAL ENCOUNTER (OUTPATIENT)
Dept: PHYSICAL THERAPY | Age: 59
Setting detail: THERAPIES SERIES
Discharge: HOME | End: 2024-06-06
Attending: ORTHOPAEDIC SURGERY
Payer: COMMERCIAL

## 2024-06-06 PROCEDURE — 97016 VASOPNEUMATIC DEVICE THERAPY: CPT

## 2024-06-06 PROCEDURE — 97140 MANUAL THERAPY 1/> REGIONS: CPT

## 2024-06-06 PROCEDURE — 97110 THERAPEUTIC EXERCISES: CPT

## 2024-06-06 PROCEDURE — 97112 NEUROMUSCULAR REEDUCATION: CPT

## 2024-06-06 NOTE — FLOWSHEET NOTE
Frequency/Duration: 1-2x/week for  10-12  weeks for the following treatment interventions:    Interventions:  Therapeutic Exercise (02165) including: strength training, ROM, and functional mobility  Therapeutic Activities (79160) including: functional mobility training and education.  Neuromuscular Re-education (81157) activation and proprioception, including postural re-education.    Manual Therapy (66455) as indicated to include: Passive Range of Motion, Gr I-IV mobilizations, Soft Tissue Mobilization, and Trigger Point Release  Modalities as needed that may include: Cryotherapy and Vasoneumatic Compression  Patient education on joint protection, postural re-education, activity modification, and progression of HEP    Plan: Cont POC- Continue emphasis/focus on exercise progression, improving proper muscle recruitment and activation/motor control patterns, increasing ROM, reduce/eliminate soft tissue swelling/inflammation/restriction, improving soft tissue extensibility, allowing for proper ROM, and static and dynamic balance. Next visit plan to progress weights, progress reps, add new exercises, and continue current phase     PT plan assumed by Yohannes Pichardo PT, 6/4/24    Electronically Signed by Yohannes Pichardo, PT    Date: 06/06/2024     Note: Portions of this note have been templated and/or copied from initial evaluation, reassessments and prior notes for documentation efficiency.    Note: If patient does not return for scheduled/recommended follow up visits, this note will serve as a discharge from care along with the most recent update on progress.

## 2024-06-10 ENCOUNTER — OFFICE VISIT (OUTPATIENT)
Dept: ORTHOPEDIC SURGERY | Age: 59
End: 2024-06-10

## 2024-06-10 VITALS — WEIGHT: 220 LBS | HEIGHT: 68 IN | BODY MASS INDEX: 33.34 KG/M2

## 2024-06-10 DIAGNOSIS — Z96.651 STATUS POST TOTAL RIGHT KNEE REPLACEMENT: Primary | ICD-10-CM

## 2024-06-10 PROCEDURE — 99024 POSTOP FOLLOW-UP VISIT: CPT | Performed by: PHYSICIAN ASSISTANT

## 2024-06-10 RX ORDER — CYCLOBENZAPRINE HCL 10 MG
10 TABLET ORAL NIGHTLY PRN
Qty: 30 TABLET | Refills: 0 | Status: SHIPPED | OUTPATIENT
Start: 2024-06-10 | End: 2024-07-10

## 2024-06-10 RX ORDER — OXYCODONE HYDROCHLORIDE AND ACETAMINOPHEN 5; 325 MG/1; MG/1
1 TABLET ORAL EVERY 6 HOURS PRN
Qty: 28 TABLET | Refills: 0 | Status: SHIPPED | OUTPATIENT
Start: 2024-06-10 | End: 2024-06-17

## 2024-06-10 NOTE — PROGRESS NOTES
This dictation was done with [employer] dictation and may contain mechanical errors related to translation.  Height 1.727 m (5' 8\"), weight 99.8 kg (220 lb), last menstrual period 02/18/2016, not currently breastfeeding.    This is a very pleasant 59-year-old female who is status post right total knee replacement on 5/28/2024.  Her pain score is a 9 out of 10 pain but she is in pain management and currently were giving her the pain medicines I will do the last time today refill her pain medicine and add a muscle relaxer.  Then she will go back to her pain management for her medicine.  She is attending outpatient physical therapy already and is 0-90 which I am very happy with the incisions healing nicely and we talked about wound care.    Xray three views of the right total knee arthroplasty reveals satisfactory alignment of the prosthesis . No signs of significant polyethylene wear or failure. No progressive radiolucencies,fractures, tumors or dislocations.     We talked about short long-term expectations and will see her back in 4 weeks

## 2024-06-11 ENCOUNTER — HOSPITAL ENCOUNTER (OUTPATIENT)
Dept: PHYSICAL THERAPY | Age: 59
Setting detail: THERAPIES SERIES
Discharge: HOME | End: 2024-06-11
Attending: ORTHOPAEDIC SURGERY
Payer: COMMERCIAL

## 2024-06-11 PROCEDURE — 97140 MANUAL THERAPY 1/> REGIONS: CPT

## 2024-06-11 PROCEDURE — 97112 NEUROMUSCULAR REEDUCATION: CPT

## 2024-06-11 PROCEDURE — 97110 THERAPEUTIC EXERCISES: CPT

## 2024-06-11 PROCEDURE — 97016 VASOPNEUMATIC DEVICE THERAPY: CPT

## 2024-06-11 NOTE — FLOWSHEET NOTE
activity modification, and progression of HEP    Plan: Cont POC- Continue emphasis/focus on exercise progression, improving proper muscle recruitment and activation/motor control patterns, increasing ROM, reduce/eliminate soft tissue swelling/inflammation/restriction, improving soft tissue extensibility, and allowing for proper ROM. Next visit plan to add new exercises, progress balance, and continue current phase         Electronically Signed by Yohannes Pichardo, PT    Date: 06/11/2024     Note: Portions of this note have been templated and/or copied from initial evaluation, reassessments and prior notes for documentation efficiency.    Note: If patient does not return for scheduled/recommended follow up visits, this note will serve as a discharge from care along with the most recent update on progress.

## 2024-06-12 ENCOUNTER — OFFICE VISIT (OUTPATIENT)
Dept: FAMILY MEDICINE CLINIC | Age: 59
End: 2024-06-12
Payer: COMMERCIAL

## 2024-06-12 VITALS
RESPIRATION RATE: 16 BRPM | SYSTOLIC BLOOD PRESSURE: 108 MMHG | HEIGHT: 68 IN | TEMPERATURE: 98.3 F | HEART RATE: 76 BPM | WEIGHT: 220 LBS | OXYGEN SATURATION: 95 % | DIASTOLIC BLOOD PRESSURE: 70 MMHG | BODY MASS INDEX: 33.34 KG/M2

## 2024-06-12 DIAGNOSIS — Z96.651 STATUS POST TOTAL KNEE REPLACEMENT, RIGHT: Primary | ICD-10-CM

## 2024-06-12 PROCEDURE — 3017F COLORECTAL CA SCREEN DOC REV: CPT | Performed by: INTERNAL MEDICINE

## 2024-06-12 PROCEDURE — 1036F TOBACCO NON-USER: CPT | Performed by: INTERNAL MEDICINE

## 2024-06-12 PROCEDURE — 99212 OFFICE O/P EST SF 10 MIN: CPT | Performed by: INTERNAL MEDICINE

## 2024-06-12 PROCEDURE — G8427 DOCREV CUR MEDS BY ELIG CLIN: HCPCS | Performed by: INTERNAL MEDICINE

## 2024-06-12 PROCEDURE — G8417 CALC BMI ABV UP PARAM F/U: HCPCS | Performed by: INTERNAL MEDICINE

## 2024-06-12 ASSESSMENT — ENCOUNTER SYMPTOMS
ABDOMINAL PAIN: 0
NAUSEA: 0
VOMITING: 0
SHORTNESS OF BREATH: 0
DIARRHEA: 0
COLOR CHANGE: 0
COUGH: 0

## 2024-06-12 NOTE — PROGRESS NOTES
SUBJECTIVE:  Laurel Lazcano is a 59 y.o. female being evaluated for:    Chief Complaint   Patient presents with    Follow-Up from Hospital     Pt had total right knee replacement 5-       HPI   Had right total knee and is getting better  Doing Physical therap   Problems with flexion but working on it some swelling but not bad  NO fevers or chills    Spent on night in hospital and next day got up and went home  Up moving around  Ice machine is helping with the swelling   Allergies   Allergen Reactions    Other Hives     Cloth gloves at my work     Current Outpatient Medications   Medication Sig Dispense Refill    oxyCODONE-acetaminophen (PERCOCET) 5-325 MG per tablet Take 1 tablet by mouth every 6 hours as needed for Pain for up to 7 days. Intended supply: 7 days. Take lowest dose possible to manage pain Max Daily Amount: 4 tablets 28 tablet 0    cyclobenzaprine (FLEXERIL) 10 MG tablet Take 1 tablet by mouth nightly as needed for Muscle spasms 30 tablet 0    aspirin 81 MG EC tablet Take 1 tablet by mouth in the morning and at bedtime for 15 days 30 tablet 0    DULoxetine (CYMBALTA) 60 MG extended release capsule Take 1 capsule by mouth daily for 15 days 15 capsule 0    magnesium (MAGNESIUM-OXIDE) 250 MG TABS tablet Take 2 tablets by mouth daily      levothyroxine (SYNTHROID) 125 MCG tablet TAKE 1 TABLET BY MOUTH DAILY 90 tablet 1    vitamin D (ERGOCALCIFEROL) 1.25 MG (87722 UT) CAPS capsule Take 1 capsule by mouth once a week 12 capsule 1    gabapentin (NEURONTIN) 600 MG tablet Take 1 tablet by mouth 3 times daily.      meloxicam (MOBIC) 7.5 MG tablet Take 1 tablet by mouth daily for 5 days 5 tablet 0     No current facility-administered medications for this visit.     Facility-Administered Medications Ordered in Other Visits   Medication Dose Route Frequency Provider Last Rate Last Admin    technetium sulfur colloid egg (NYCOMED-SC) solution 500 micro curie  500 micro curie IntraVENous ONCE PRN [place]

## 2024-06-13 ENCOUNTER — HOSPITAL ENCOUNTER (OUTPATIENT)
Dept: PHYSICAL THERAPY | Age: 59
Setting detail: THERAPIES SERIES
Discharge: HOME | End: 2024-06-13
Attending: ORTHOPAEDIC SURGERY
Payer: COMMERCIAL

## 2024-06-13 PROCEDURE — 97016 VASOPNEUMATIC DEVICE THERAPY: CPT

## 2024-06-13 PROCEDURE — 97112 NEUROMUSCULAR REEDUCATION: CPT

## 2024-06-13 PROCEDURE — 97140 MANUAL THERAPY 1/> REGIONS: CPT

## 2024-06-13 PROCEDURE — 97110 THERAPEUTIC EXERCISES: CPT

## 2024-06-13 NOTE — FLOWSHEET NOTE
[facility]- Outpatient Rehabilitation and Therapy [address], [address], [place] [address] office: [phone] fax: [phone]      Physical Therapy: TREATMENT/PROGRESS NOTE   Patient: Laurel Lazcano (59 y.o. female)   Examination Date: 2024   :  1965 MRN: [id number]   Visit #:  PCY  Insurance Allowable Auth Needed   Medical [place]  60 PCY []Yes    [x]No    Insurance: Payor: [employer] / Plan: MED[employer] M[employer]UAL [address] / Product Type: *No Product type* /   Insurance ID: [id number] - (Commercial)  Secondary Insurance (if applicable):    Treatment Diagnosis:     ICD-10-CM    1. Decreased functional mobility  R26.89       2. Decreased activity tolerance  R68.89       3. Pain aggravated by standing  R52       4. Functional weakness  R53.1       5. Status post total right knee replacement  Z96.651          Medical Diagnosis:  Primary osteoarthritis of right knee [M17.11]   Referring Physician: Choco Osborn MD  PCP: Kamila Briseno MD     Plan of care signed (Y/N): YES    Date of Patient follow up with Physician:      Progress Report/POC: NO  Progress note due: 2024 (OR 10 visits /OR AUTH LIMITS, whichever is less)  POC update due: 24                                            Precautions/ Contra-indications:           Latex allergy:  NO  Pacemaker:    NO  Contraindications for Manipulation: NA  Date of Surgery: 24  Other:    Red Flags:  None    C-SSRS Triggered by Intake questionnaire:   Patient answered 'NO' to both behavioral questions on intake.  No further screening warranted    Preferred Language for Healthcare:   [x] English       [] other:    SUBJECTIVE EXAMINATION     Patient stated complaint: Patient reports that she had a R TKA on . Patient reports the surgery went well, but she is in a lot of pain. She is taking the pain medication as prescribed by MD. She is walking with a 2 WW at home, she reports she does have someone at home who

## 2024-06-18 ENCOUNTER — HOSPITAL ENCOUNTER (OUTPATIENT)
Dept: PHYSICAL THERAPY | Age: 59
Setting detail: THERAPIES SERIES
Discharge: HOME | End: 2024-06-18
Attending: ORTHOPAEDIC SURGERY
Payer: COMMERCIAL

## 2024-06-18 PROCEDURE — 97110 THERAPEUTIC EXERCISES: CPT

## 2024-06-18 PROCEDURE — 97016 VASOPNEUMATIC DEVICE THERAPY: CPT

## 2024-06-18 PROCEDURE — 97112 NEUROMUSCULAR REEDUCATION: CPT

## 2024-06-18 PROCEDURE — 97140 MANUAL THERAPY 1/> REGIONS: CPT

## 2024-06-18 NOTE — FLOWSHEET NOTE
[facility]- Outpatient Rehabilitation and Therapy [address], [address], [place] [address] office: [phone] fax: [phone]      Physical Therapy: TREATMENT/PROGRESS NOTE   Patient: Laurel Lazcano (59 y.o. female)   Examination Date: 2024   :  1965 MRN: [id number]   Visit #:  PCY  Insurance Allowable Auth Needed   Medical [place]  60 PCY []Yes    [x]No    Insurance: Payor: MED[employer]UAL / Plan: MED[employer] M[employer]UAL [address] / Product Type: *No Product type* /   Insurance ID: [id number] - (Commercial)  Secondary Insurance (if applicable):    Treatment Diagnosis:     ICD-10-CM    1. Decreased functional mobility  R26.89       2. Decreased activity tolerance  R68.89       3. Pain aggravated by standing  R52       4. Functional weakness  R53.1       5. Status post total right knee replacement  Z96.651          Medical Diagnosis:  Primary osteoarthritis of right knee [M17.11]   Referring Physician: Choco Osborn MD  PCP: Kamila Briseno MD     Plan of care signed (Y/N): YES    Date of Patient follow up with Physician:      Progress Report/POC: NO  Progress note due: 2024 (OR 10 visits /OR AUTH LIMITS, whichever is less)  POC update due: 24                                            Precautions/ Contra-indications:           Latex allergy:  NO  Pacemaker:    NO  Contraindications for Manipulation: NA  Date of Surgery: 24  Other:    Red Flags:  None    C-SSRS Triggered by Intake questionnaire:   Patient answered 'NO' to both behavioral questions on intake.  No further screening warranted    Preferred Language for Healthcare:   [x] English       [] other:    SUBJECTIVE EXAMINATION     Patient stated complaint: Patient reports that she had a R TKA on . Patient reports the surgery went well, but she is in a lot of pain. She is taking the pain medication as prescribed by MD. She is walking with a 2 WW at home, she reports she does have someone at home who

## 2024-06-20 ENCOUNTER — HOSPITAL ENCOUNTER (OUTPATIENT)
Dept: PHYSICAL THERAPY | Age: 59
Setting detail: THERAPIES SERIES
Discharge: HOME | End: 2024-06-20
Attending: ORTHOPAEDIC SURGERY
Payer: COMMERCIAL

## 2024-06-20 PROCEDURE — 97016 VASOPNEUMATIC DEVICE THERAPY: CPT

## 2024-06-20 PROCEDURE — 97110 THERAPEUTIC EXERCISES: CPT

## 2024-06-20 PROCEDURE — 97140 MANUAL THERAPY 1/> REGIONS: CPT

## 2024-06-20 PROCEDURE — 97112 NEUROMUSCULAR REEDUCATION: CPT

## 2024-06-20 NOTE — FLOWSHEET NOTE
[facility]- Outpatient Rehabilitation and Therapy [address], [address], [place] [address] office: [phone] fax: [phone]      Physical Therapy: TREATMENT/PROGRESS NOTE   Patient: Laurel Lazcano (59 y.o. female)   Examination Date: 2024   :  1965 MRN: [id number]   Visit #:  PCY  Insurance Allowable Auth Needed   Medical [place]  60 PCY []Yes    [x]No    Insurance: Payor: MED[employer] M[employer]UAL / Plan: [employer] M[employer] [address] / Product Type: *No Product type* /   Insurance ID: [id number] - (Commercial)  Secondary Insurance (if applicable):    Treatment Diagnosis:     ICD-10-CM    1. Decreased functional mobility  R26.89       2. Decreased activity tolerance  R68.89       3. Pain aggravated by standing  R52       4. Functional weakness  R53.1       5. Status post total right knee replacement  Z96.651          Medical Diagnosis:  Primary osteoarthritis of right knee [M17.11]   Referring Physician: Choco Osborn MD  PCP: Kamila Briseno MD     Plan of care signed (Y/N): YES    Date of Patient follow up with Physician:      Progress Report/POC: NO  Progress note due: 2024 (OR 10 visits /OR AUTH LIMITS, whichever is less)  POC update due: 24                                            Precautions/ Contra-indications:           Latex allergy:  NO  Pacemaker:    NO  Contraindications for Manipulation: NA  Date of Surgery: 24  Other:    Red Flags:  None    C-SSRS Triggered by Intake questionnaire:   Patient answered 'NO' to both behavioral questions on intake.  No further screening warranted    Preferred Language for Healthcare:   [x] English       [] other:    SUBJECTIVE EXAMINATION     Patient stated complaint: Patient reports that she had a R TKA on . Patient reports the surgery went well, but she is in a lot of pain. She is taking the pain medication as prescribed by MD. She is walking with a 2 WW at home, she reports she does have someone at home who

## 2024-06-25 ENCOUNTER — HOSPITAL ENCOUNTER (OUTPATIENT)
Dept: PHYSICAL THERAPY | Age: 59
Setting detail: THERAPIES SERIES
Discharge: HOME | End: 2024-06-25
Attending: ORTHOPAEDIC SURGERY
Payer: COMMERCIAL

## 2024-06-25 PROCEDURE — 97112 NEUROMUSCULAR REEDUCATION: CPT

## 2024-06-25 PROCEDURE — 97140 MANUAL THERAPY 1/> REGIONS: CPT

## 2024-06-25 PROCEDURE — 97110 THERAPEUTIC EXERCISES: CPT

## 2024-06-25 PROCEDURE — 97016 VASOPNEUMATIC DEVICE THERAPY: CPT

## 2024-06-25 NOTE — FLOWSHEET NOTE
kinesthetic sense, posture, and/or proprioception for sitting and/or standing activities    (87093) VASOPNEUMATIC - Utilized vasopneumatic compression to decrease edema / swelling for the purpose of improving mobility and quad tone / recruitment which will allow for increased overall function including but not limited to self-care, transfers, ambulation, and ascending / descending stairs.     GOALS     Patient stated goal: walking  [] Progressing: [] Met: [] Not Met: [] Adjusted    Therapist goals for Patient:   Short Term Goals: To be achieved in: 2 weeks  1. Independent in HEP and progression per patient tolerance, in order to prevent re-injury.   [] Progressing: [] Met: [] Not Met: [] Adjusted  2. Patient will have a decrease in pain from 10/10 to maximum of 4/10 on VAS at rest and with activity to facilitate improved tolerance to movement, improved tolerance to functional mobility tasks such as ambulation at home and within the community, and improved tolerance to ADLs such as dressing and self-care activities.   [] Progressing: [] Met: [] Not Met: [] Adjusted    Long Term Goals: To be achieved in: 12 weeks  1. Patient will demonstrate a raw score of 30/95 or less for the WOMAC to facilitate improved tolerance to ADLs and functional activities including ambulation, self-care activities, and community navigation to facilitate full return to prior level of function.  [] Progressing: [] Met: [] Not Met: [] Adjusted  2. Patient will demonstrate increased AROM from 76 degrees knee flexion to 115 degrees knee flexion to allow for proper joint functioning and improved tolerance to ADLs including stair navigation and sit to stand transfers.  [] Progressing: [] Met: [] Not Met: [] Adjusted  3. Patient will demonstrate an increase in strength to 4+/5 global hip and knee musculature to allow for proper functional mobility and improved tolerance to ADLs including cooking, cleaning, and light housework as indicated by

## 2024-06-26 ENCOUNTER — TELEPHONE (OUTPATIENT)
Dept: ORTHOPEDIC SURGERY | Age: 59
End: 2024-06-26

## 2024-06-26 DIAGNOSIS — Z96.651 STATUS POST TOTAL RIGHT KNEE REPLACEMENT: ICD-10-CM

## 2024-06-26 RX ORDER — OXYCODONE HYDROCHLORIDE AND ACETAMINOPHEN 5; 325 MG/1; MG/1
1 TABLET ORAL EVERY 6 HOURS PRN
Qty: 28 TABLET | Refills: 0 | Status: SHIPPED | OUTPATIENT
Start: 2024-06-26 | End: 2024-07-03

## 2024-06-26 NOTE — TELEPHONE ENCOUNTER
Other PATIENT IS CALLING IN WITH CONCERNS OF NERVE PAIN IN LEG. SHE IS ALSO REQUESTING PAIN MEDICATION. REACHED OUT TO TRIAGE TEAM

## 2024-06-26 NOTE — TELEPHONE ENCOUNTER
Call Transferred      Patient is S/P Rt Knee 5/28. Patient is having nerve pain on the left side of her rt knee. Per PT, was told to call in to the office today. This has been ongoing for 4 days now. Rt side of her rt knee is numb.    Will reach out to clinic

## 2024-06-26 NOTE — TELEPHONE ENCOUNTER
I talked to the patient and she is having sensitivity from her knee down to her ankle.  She states even when a blanket touches her leg it hurts.  I advised her to start rubbing and massaging her lower leg.      She also needs a refill of pain medication until she can see Dr. Resendiz

## 2024-06-27 ENCOUNTER — HOSPITAL ENCOUNTER (OUTPATIENT)
Dept: PHYSICAL THERAPY | Age: 59
Setting detail: THERAPIES SERIES
Discharge: HOME | End: 2024-06-27
Attending: ORTHOPAEDIC SURGERY
Payer: COMMERCIAL

## 2024-06-27 PROCEDURE — 97112 NEUROMUSCULAR REEDUCATION: CPT

## 2024-06-27 PROCEDURE — 97110 THERAPEUTIC EXERCISES: CPT

## 2024-06-27 PROCEDURE — 97016 VASOPNEUMATIC DEVICE THERAPY: CPT

## 2024-06-27 PROCEDURE — 97140 MANUAL THERAPY 1/> REGIONS: CPT

## 2024-06-27 NOTE — PLAN OF CARE
on 6/18/24 6/25: Patient instructed to amb with cane in L UE. Patient was educated on desensitization techniques for R LE and instructed to do it several times a day. Cues with all exercises for appropriate technique. Pt also instructed to contact MD regarding her hypersensitivity, per PT  6/27/24 see above    Medical Necessity Documentation:  I certify that this patient meets the below criteria necessary for medical necessity for care and/or justification of therapy services:  The patient has functional impairments and/or activity limitations and would benefit from continued outpatient therapy services to address the deficits outlined in the patients goals  The patient has a musculoskeletal condition(s) with a corresponding ICD-10 code that is of complexity and severity that require skilled therapeutic intervention. This has a direct and significant impact on the need for therapy and significantly impacts the rate of recovery.     Return to Play: NA    Prognosis for POC: [x] Good [] Fair  [] Poor    Patient requires continued skilled intervention: [x] Yes  [] No      CHARGE CAPTURE     PT CHARGE GRID   CPT Code (TIMED) # CPT Code (UNTIMED) #     Therex (66083)  1  EVAL:LOW (15543 - Typically 20 minutes face-to-face)     Neuromusc. Re-ed (97855) 1  Re-Eval (53937)     Manual (86424) 1  Estim Unattended (61906)     Ther. Act (94974)   Mech. Traction (69026)     Gait (24750)   Dry Needle 1-2 muscle (20560)     Aquatic Therex (50434)   Dry Needle 3+ muscle (20561)     Iontophoresis (56924)   VASO (08953) 1    Ultrasound (60564)   Group Therapy (51637)     Estim Attended (91378)   Canalith Repositioning (63764)     Other:   Other:    Total Timed Code Tx Minutes 3  1     Total Treatment Minutes 65        Charge Justification:  (50438) THERAPEUTIC EXERCISE - Provided verbal/tactile cueing for activities related to strengthening, flexibility, endurance, ROM performed to prevent loss of range of motion, maintain or improve

## 2024-07-02 ENCOUNTER — HOSPITAL ENCOUNTER (OUTPATIENT)
Dept: PHYSICAL THERAPY | Age: 59
Setting detail: THERAPIES SERIES
Discharge: HOME | End: 2024-07-02
Attending: ORTHOPAEDIC SURGERY
Payer: COMMERCIAL

## 2024-07-02 PROCEDURE — 97016 VASOPNEUMATIC DEVICE THERAPY: CPT

## 2024-07-02 PROCEDURE — 97110 THERAPEUTIC EXERCISES: CPT

## 2024-07-02 PROCEDURE — 97112 NEUROMUSCULAR REEDUCATION: CPT

## 2024-07-02 PROCEDURE — 97140 MANUAL THERAPY 1/> REGIONS: CPT

## 2024-07-02 NOTE — FLOWSHEET NOTE
Physical Therapy Re-Certification Plan of Care/MD UPDATE          [facility]- Outpatient Rehabilitation and Therapy [address], [address], [place] [address] office: [phone] fax: [phone]      Physical Therapy: TREATMENT/PROGRESS NOTE   Patient: Laurel Lazcano (59 y.o. female)   Examination Date: 2024   :  1965 MRN: [id number]   Visit #:  PCY  Insurance Allowable Auth Needed   Medical [place]  60 PCY []Yes    [x]No    Insurance: Payor: [employer] / Plan: MED[employer] M[employer]UAL [address] / Product Type: *No Product type* /   Insurance ID: [id number] - (Commercial)  Secondary Insurance (if applicable):    Treatment Diagnosis:     ICD-10-CM    1. Decreased functional mobility  R26.89       2. Decreased activity tolerance  R68.89       3. Pain aggravated by standing  R52       4. Functional weakness  R53.1       5. Status post total right knee replacement  Z96.651          Medical Diagnosis:  Primary osteoarthritis of right knee [M17.11]   Referring Physician: Choco Osborn MD  PCP: Kamila Briseno MD     Plan of care signed (Y/N): YES    Date of Patient follow up with Physician:      Progress Report/POC: NO  Progress note due: 2024 (OR 10 visits /OR AUTH LIMITS, whichever is less)  POC update due: 24                                            Precautions/ Contra-indications:           Latex allergy:  NO  Pacemaker:    NO  Contraindications for Manipulation: NA  Date of Surgery: 24  Other:    Red Flags:  None    C-SSRS Triggered by Intake questionnaire:   Patient answered 'NO' to both behavioral questions on intake.  No further screening warranted    Preferred Language for Healthcare:   [x] English       [] other:    SUBJECTIVE EXAMINATION     Patient stated complaint: Patient reports that she had a R TKA on . Patient reports the surgery went well, but she is in a lot of pain. She is taking the pain medication as prescribed by MD. She is walking

## 2024-07-05 ENCOUNTER — HOSPITAL ENCOUNTER (OUTPATIENT)
Dept: PHYSICAL THERAPY | Age: 59
Setting detail: THERAPIES SERIES
Discharge: HOME | End: 2024-07-05
Attending: ORTHOPAEDIC SURGERY
Payer: COMMERCIAL

## 2024-07-05 PROCEDURE — 97112 NEUROMUSCULAR REEDUCATION: CPT

## 2024-07-05 PROCEDURE — 97140 MANUAL THERAPY 1/> REGIONS: CPT

## 2024-07-05 PROCEDURE — 97016 VASOPNEUMATIC DEVICE THERAPY: CPT

## 2024-07-05 PROCEDURE — 97110 THERAPEUTIC EXERCISES: CPT

## 2024-07-05 NOTE — FLOWSHEET NOTE
slowed due to complexities/Impairments listed.  [] Progression has been slowed due to co-morbidities.  [x] Plan just implemented, too soon (<30days) to assess goals progression   [] Goals require adjustment due to lack of progress  [] Patient is not progressing as expected and requires additional follow up with physician  [] Other:     TREATMENT PLAN     Frequency/Duration: 1-2x/week for  10-12  weeks for the following treatment interventions:    Interventions:  Therapeutic Exercise (22498) including: strength training, ROM, and functional mobility  Therapeutic Activities (59695) including: functional mobility training and education.  Neuromuscular Re-education (17596) activation and proprioception, including postural re-education.    Manual Therapy (67020) as indicated to include: Passive Range of Motion, Gr I-IV mobilizations, Soft Tissue Mobilization, and Trigger Point Release  Modalities as needed that may include: Cryotherapy and Vasoneumatic Compression  Patient education on joint protection, postural re-education, activity modification, and progression of HEP    Plan: Alter current plan:  resume sitting PROM/AAROM . Gradually progress strengthening as tolerated  Stop Game Ready unless pt has another flare up of swelling.  Begin stepping over gum drops and step ups.    Electronically Signed by Yohannes Pichardo, PT    Date: 07/05/2024     Note: Portions of this note have been templated and/or copied from initial evaluation, reassessments and prior notes for documentation efficiency.    Note: If patient does not return for scheduled/recommended follow up visits, this note will serve as a discharge from care along with the most recent update on progress.

## 2024-07-09 ENCOUNTER — TELEPHONE (OUTPATIENT)
Dept: ORTHOPEDIC SURGERY | Age: 59
End: 2024-07-09

## 2024-07-09 ENCOUNTER — HOSPITAL ENCOUNTER (OUTPATIENT)
Dept: PHYSICAL THERAPY | Age: 59
Setting detail: THERAPIES SERIES
Discharge: HOME | End: 2024-07-09
Attending: ORTHOPAEDIC SURGERY
Payer: COMMERCIAL

## 2024-07-09 PROCEDURE — 97112 NEUROMUSCULAR REEDUCATION: CPT

## 2024-07-09 PROCEDURE — 97530 THERAPEUTIC ACTIVITIES: CPT

## 2024-07-09 PROCEDURE — 97110 THERAPEUTIC EXERCISES: CPT

## 2024-07-09 NOTE — TELEPHONE ENCOUNTER
General Question      Subject: PAPERWORK SHORT TERM DIS GUARDIAN   Patient and /or Facility Request: Laurel Lazcano   Contact Number: [phone]     GUARDIAN HAS SENT THE PAPERWORK, AGAIN. DID YOU RCV IT? PLEASE LET Pt KNOW     Pt WON'T GET A CHECK , WHEN THEY RCV THIS PAPERWORK COMPLETED, FOR 10 DAYS, SO THE Pt IS MOST ANXIOUS TO GET EVERYTHING BACK IN ORDER.     SHE IS ASKING AGAIN IF SHE KEEPS HER 07/15/24 APPT, AS IS, AND PROCEEDS AS DR PERDUE HAS ORIGINALLY PLANNED FOR HER TO BE OFF WORK?

## 2024-07-09 NOTE — TELEPHONE ENCOUNTER
I spoke with patient and told her we have not received any paper work this week.  She states per therapist she can not go back to work yet.  She will call her Ins and see if they can fax the form again. I gave her our fax number.     Patient has an appt with Dr Osborn on 7/15/24.

## 2024-07-09 NOTE — TELEPHONE ENCOUNTER
General Question     Subject: PAPERWORK SHORT TERM DIS GUARDIAN   Patient and /or Facility Request: Laurel Lazcano   Contact Number: [phone]     Pt's EMPLOYER STATES THEY CLOSED THE SHORT TERM DISABILITY EVEN THOUGH THE Pt ISN'T BACK TO WORK AND DOESN'T SEE DR PERDUE AGAIN UNTIL 07/15/24.     GUARDIAN STATES THEY SENT PAPERWORK AND ARE WAITING FOR IT'S RETURN FROM DR PERDUE.     PLEASE CALL Pt TO LET HER KNOW IF THE PAPERWORK WAS SENT BACK? PLEASE CALL TO DISCUSS IF THE Pt SHOULD BE SEEN SOONER,AND TO BE RELEASED BACK TO WORK, OR IF SHE IS STILL ON TRACK?

## 2024-07-09 NOTE — TELEPHONE ENCOUNTER
Called and left message letting her know that we received the forms. They were sent to Annetta ANGLIN to be completed. She call call to check to see if they were completed in 3 or 5 days

## 2024-07-09 NOTE — FLOWSHEET NOTE
Physical Therapy Re-Certification Plan of Care/MD UPDATE          [facility]- Outpatient Rehabilitation and Therapy [address], [address], [place] [address] office: [phone] fax: [phone]      Physical Therapy: TREATMENT/PROGRESS NOTE   Patient: Laurel Lazcano (59 y.o. female)   Examination Date: 2024   :  1965 MRN: [id number]   Visit #:  PCY  Insurance Allowable Auth Needed   Medical [place]  60 PCY []Yes    [x]No    Insurance: Payor: [employer] / Plan: [employer] [address] / Product Type: *No Product type* /   Insurance ID: [id number] - (Commercial)  Secondary Insurance (if applicable):    Treatment Diagnosis:     ICD-10-CM    1. Decreased functional mobility  R26.89       2. Decreased activity tolerance  R68.89       3. Pain aggravated by standing  R52       4. Functional weakness  R53.1       5. Status post total right knee replacement  Z96.651          Medical Diagnosis:  Primary osteoarthritis of right knee [M17.11]   Referring Physician: Choco Osborn MD  PCP: Kamila Briseno MD     Plan of care signed (Y/N): YES    Date of Patient follow up with Physician:      Progress Report/POC: NO  Progress note due: 2024 (OR 10 visits /OR AUTH LIMITS, whichever is less)  POC update due: 24                                            Precautions/ Contra-indications:           Latex allergy:  NO  Pacemaker:    NO  Contraindications for Manipulation: NA  Date of Surgery: 24  Other:    Red Flags:  None    C-SSRS Triggered by Intake questionnaire:   Patient answered 'NO' to both behavioral questions on intake.  No further screening warranted    Preferred Language for Healthcare:   [x] English       [] other:    SUBJECTIVE EXAMINATION     Patient stated complaint: Patient reports that she had a R TKA on . Patient reports the surgery went well, but she is in a lot of pain. She is taking the pain medication as prescribed by MD. She is walking

## 2024-07-11 ENCOUNTER — HOSPITAL ENCOUNTER (OUTPATIENT)
Dept: PHYSICAL THERAPY | Age: 59
Setting detail: THERAPIES SERIES
Discharge: HOME | End: 2024-07-11
Attending: ORTHOPAEDIC SURGERY
Payer: COMMERCIAL

## 2024-07-11 PROCEDURE — 97530 THERAPEUTIC ACTIVITIES: CPT

## 2024-07-11 PROCEDURE — 97140 MANUAL THERAPY 1/> REGIONS: CPT

## 2024-07-11 PROCEDURE — 97112 NEUROMUSCULAR REEDUCATION: CPT

## 2024-07-11 NOTE — PLAN OF CARE
portion of incision proximally.  Pt has not slept well over the past 3 nights.  7/2: Not feeling bad today. Is bending her knee more getting in/out of car. Night time is the worst time. Hypersensitivity has gotten much better. Still swells up real good if she is on her feet for more than an hour  7/5/24 had a slow day yesterday, swelling is down. Pt ambulates at home with cane or without A device  7/9/24 pt arrived 15' late.  Pt has been squaring away a problem with her short term disability per pt report.  Knee has been \"good most of the time, sleeping is the biggest challenge.  I haven't had much swelling, that's a good thing.\"      OBJECTIVE EXAMINATION        Test used Initial score  05/30/2024 6/20/24 7/9/24   Pain Summary VAS 5-10/10 4-5/10    Functional questionnaire WOMAC 80/ 83.3% 38/ 39.5% 39/ 40.6%   Other:           LE AROM R 5/30/24 6/4/24 6/6/24 6/11/24 6/20/24 6/27/24 7/5/24 7/11/24   Knee flexion 76 85 85 98 92 / 98 97 110 112   Knee extension Lacking 4   2 hyper  2 hyper 3 hyper    LE PROM           Knee flexion   91 102 94 / 102 103/110 116 118   Knee extension      4 hyper     Strength           Quads 3-/5       4+/5   Hamstrings 3-/5       4+/5     6/20/24 pre/post manual ROM measurements      6/27/24 knee sensitivity location: medial portion of incision, at joint line, 3 cm x 3 cm  Gait: with rolling walker, knee remained flexed    Exercises/Interventions     Therapeutic Ex (84725)  resistance Sets/time Reps Notes/Cues/Progressions                                                                              Manual Intervention (48105)  TIME     STM ant knee 5'     PF mobs Grade IV     PROM knee flexion supine  3\" hold 10    PROM knee flexion sitting  5\" hold 10                  NMR re-education (47894) resistance Sets/time Reps CUES NEEDED   Nustep       Standing gastroc incline stretch       Standing HS stretch R       Standing knee flexion with foot on 2nd step       Seated LAQ 3# 3 10

## 2024-07-15 ENCOUNTER — OFFICE VISIT (OUTPATIENT)
Dept: ORTHOPEDIC SURGERY | Age: 59
End: 2024-07-15

## 2024-07-15 VITALS — HEIGHT: 68 IN | WEIGHT: 220 LBS | BODY MASS INDEX: 33.34 KG/M2

## 2024-07-15 DIAGNOSIS — Z96.651 STATUS POST TOTAL RIGHT KNEE REPLACEMENT: Primary | ICD-10-CM

## 2024-07-15 PROCEDURE — 99024 POSTOP FOLLOW-UP VISIT: CPT | Performed by: ORTHOPAEDIC SURGERY

## 2024-07-15 NOTE — PROGRESS NOTES
[facility]ics [facility] Sp[facility]  Office Visit    Chief Complaint: Follow-up s/p right total knee arthroplasty    HPI:  Laurel Lazcano is a 59 y.o. who is here in follow-up of right total knee arthroplasty performed on May 20, 2024.  She continues to work with physical therapy.  She takes pain medication as prescribed by her pain management provider.  She walks with use of a cane.    Exam:  Appearance: sitting in exam room chair, appears to be in no acute distress, awake and alert  Resp: unlabored breathing on room air  Skin: warm, dry and intact with out erythema or significant increased temperature  Neuro: grossly intact both lower extremities. Intact sensation to light touch. Motor exam 4+ to 5/5 in all major motor groups.  Right knee: Incision is healed.  Range of motion is 0 to 110 degrees.  Sensation is intact light touch.  There is brisk capillary refill. There is 5/5 muscle strength in all muscle groups.    Imaging:  None    Assessment:  S/p right total knee arthroplasty    Plan:  She is recovering well postoperatively.  She will continue to work with physical therapy to regain motion and strength.  She will continue pain control via her pain management provider.  Follow-up for repeat assessment and radiographs in 6 weeks.    This dictation was done with [employer] dictation and may contain mechanical errors related to translation.

## 2024-07-16 ENCOUNTER — HOSPITAL ENCOUNTER (OUTPATIENT)
Dept: PHYSICAL THERAPY | Age: 59
Setting detail: THERAPIES SERIES
Discharge: HOME | End: 2024-07-16
Attending: ORTHOPAEDIC SURGERY
Payer: COMMERCIAL

## 2024-07-16 PROCEDURE — 97112 NEUROMUSCULAR REEDUCATION: CPT

## 2024-07-16 PROCEDURE — 97140 MANUAL THERAPY 1/> REGIONS: CPT

## 2024-07-16 PROCEDURE — 97530 THERAPEUTIC ACTIVITIES: CPT

## 2024-07-16 NOTE — PLAN OF CARE
Other:     TREATMENT PLAN     Frequency/Duration: 1-2x/week for  10-12  weeks for the following treatment interventions:    Interventions:  Therapeutic Exercise (13547) including: strength training, ROM, and functional mobility  Therapeutic Activities (02545) including: functional mobility training and education.  Neuromuscular Re-education (32703) activation and proprioception, including postural re-education.    Manual Therapy (37219) as indicated to include: Passive Range of Motion, Gr I-IV mobilizations, Soft Tissue Mobilization, and Trigger Point Release  Modalities as needed that may include: Cryotherapy and Vasoneumatic Compression  Patient education on joint protection, postural re-education, activity modification, and progression of HEP    Plan: Cont POC- Continue emphasis/focus on exercise progression and increasing ROM. Next visit plan to add new exercises .Gradually progress strengthening as tolerated.       Electronically Signed by Yohannes Pichardo PT    Date: 07/16/2024     Note: Portions of this note have been templated and/or copied from initial evaluation, reassessments and prior notes for documentation efficiency.    Note: If patient does not return for scheduled/recommended follow up visits, this note will serve as a discharge from care along with the most recent update on progress.

## 2024-07-18 ENCOUNTER — HOSPITAL ENCOUNTER (OUTPATIENT)
Dept: PHYSICAL THERAPY | Age: 59
Setting detail: THERAPIES SERIES
Discharge: HOME | End: 2024-07-18
Attending: ORTHOPAEDIC SURGERY
Payer: COMMERCIAL

## 2024-07-18 PROCEDURE — 97112 NEUROMUSCULAR REEDUCATION: CPT

## 2024-07-18 PROCEDURE — 97140 MANUAL THERAPY 1/> REGIONS: CPT

## 2024-07-18 PROCEDURE — 97530 THERAPEUTIC ACTIVITIES: CPT

## 2024-07-18 NOTE — PLAN OF CARE
[facility]- Outpatient Rehabilitation and Therapy [address], [address], [place] [address] office: [phone] fax: [phone]      Physical Therapy: TREATMENT/PROGRESS NOTE   Patient: Laurel Lazcano (59 y.o. female)   Examination Date: 2024   :  1965 MRN: [id number]   Visit #:  PCY  Insurance Allowable Auth Needed   Medical [place]  60 PCY []Yes    [x]No    Insurance: Payor: [employer] / Plan: [employer] [address] / Product Type: *No Product type* /   Insurance ID: [id number] - (Commercial)  Secondary Insurance (if applicable):    Treatment Diagnosis:     ICD-10-CM    1. Decreased functional mobility  R26.89       2. Decreased activity tolerance  R68.89       3. Pain aggravated by standing  R52       4. Functional weakness  R53.1       5. Status post total right knee replacement  Z96.651          Medical Diagnosis:  Primary osteoarthritis of right knee [M17.11]   Referring Physician: Choco Osborn MD  PCP: Kamila Briseno MD     Plan of care signed (Y/N): YES    Date of Patient follow up with Physician:      Progress Report/POC: NO  Progress note due: 2024 (OR 10 visits /OR AUTH LIMITS, whichever is less)  POC update due: 24                                            Precautions/ Contra-indications:           Latex allergy:  NO  Pacemaker:    NO  Contraindications for Manipulation: NA  Date of Surgery: 24  Other:    Red Flags:  None    C-SSRS Triggered by Intake questionnaire:   Patient answered 'NO' to both behavioral questions on intake.  No further screening warranted    Preferred Language for Healthcare:   [x] English       [] other:    SUBJECTIVE EXAMINATION     Patient stated complaint: Patient reports that she had a R TKA on . Patient reports the surgery went well, but she is in a lot of pain. She is taking the pain medication as prescribed by MD. She is walking with a 2 WW at home, she reports she does have someone at

## 2024-07-23 ENCOUNTER — HOSPITAL ENCOUNTER (OUTPATIENT)
Dept: PHYSICAL THERAPY | Age: 59
Setting detail: THERAPIES SERIES
Discharge: HOME | End: 2024-07-23
Attending: ORTHOPAEDIC SURGERY
Payer: COMMERCIAL

## 2024-07-23 PROCEDURE — 97112 NEUROMUSCULAR REEDUCATION: CPT

## 2024-07-23 PROCEDURE — 97140 MANUAL THERAPY 1/> REGIONS: CPT

## 2024-07-23 PROCEDURE — 97530 THERAPEUTIC ACTIVITIES: CPT

## 2024-07-23 NOTE — FLOWSHEET NOTE
[facility]- Outpatient Rehabilitation and Therapy [address], [address], [place] [address] office: [phone] fax: [phone]      Physical Therapy: TREATMENT/PROGRESS NOTE   Patient: Laurel Lazcano (59 y.o. female)   Examination Date: 2024   :  1965 MRN: [id number]   Visit #:  PCY  Insurance Allowable Auth Needed   Medical [place]  60 PCY []Yes    [x]No    Insurance: Payor: [employer] / Plan: MED[employer] M[employer]UAL [address] / Product Type: *No Product type* /   Insurance ID: [id number] - (Commercial)  Secondary Insurance (if applicable):    Treatment Diagnosis:     ICD-10-CM    1. Decreased functional mobility  R26.89       2. Decreased activity tolerance  R68.89       3. Pain aggravated by standing  R52       4. Functional weakness  R53.1       5. Status post total right knee replacement  Z96.651          Medical Diagnosis:  Primary osteoarthritis of right knee [M17.11]   Referring Physician: Choco Osborn MD  PCP: Kamila Briseno MD     Plan of care signed (Y/N): YES    Date of Patient follow up with Physician:      Progress Report/POC: NO  Progress note due: 2024 (OR 10 visits /OR AUTH LIMITS, whichever is less)  POC update due: 24                                            Precautions/ Contra-indications:           Latex allergy:  NO  Pacemaker:    NO  Contraindications for Manipulation: NA  Date of Surgery: 24  Other:    Red Flags:  None    C-SSRS Triggered by Intake questionnaire:   Patient answered 'NO' to both behavioral questions on intake.  No further screening warranted    Preferred Language for Healthcare:   [x] English       [] other:    SUBJECTIVE EXAMINATION     Patient stated complaint: Patient reports that she had a R TKA on . Patient reports the surgery went well, but she is in a lot of pain. She is taking the pain medication as prescribed by MD. She is walking with a 2 WW at home, she reports she does have someone at

## 2024-07-25 ENCOUNTER — HOSPITAL ENCOUNTER (OUTPATIENT)
Dept: PHYSICAL THERAPY | Age: 59
Setting detail: THERAPIES SERIES
Discharge: HOME | End: 2024-07-25
Attending: ORTHOPAEDIC SURGERY
Payer: COMMERCIAL

## 2024-07-25 PROCEDURE — 97112 NEUROMUSCULAR REEDUCATION: CPT

## 2024-07-25 PROCEDURE — 97140 MANUAL THERAPY 1/> REGIONS: CPT

## 2024-07-25 PROCEDURE — 97110 THERAPEUTIC EXERCISES: CPT

## 2024-07-25 NOTE — FLOWSHEET NOTE
#     Therex ([id number])  1  EVAL:LOW (01425 - Typically 20 minutes face-to-face)     Neuromusc. Re-ed (96873) 1  Re-Eval (62228)     Manual (96330) 1  Estim Unattended (68902)     Ther. Act (74984)   Mech. Traction (02295)     Gait (31207)   Dry Needle 1-2 muscle (86566)     Aquatic Therex (55576)   Dry Needle 3+ muscle (20561)     Iontophoresis (95267)   VASO (67992)     Ultrasound (18971)   Group Therapy (38029)     Estim Attended (01930)   Canalith Repositioning (22476)     Other:   Other:    Total Timed Code Tx Minutes 40       Total Treatment Minutes 40        Charge Justification:  (03439) THERAPEUTIC EXERCISE - Provided verbal/tactile cueing for activities related to strengthening, flexibility, endurance, ROM performed to prevent loss of range of motion, maintain or improve muscular strength or increase flexibility, following either an injury or surgery.   (11152) NEUROMUSCULAR RE-EDUCATION - Therapeutic procedure, 1 or more areas, each 15 minutes; neuromuscular reeducation of movement, balance, coordination, kinesthetic sense, posture, and/or proprioception for sitting and/or standing activities  (79345) HOME EXERCISE PROGRAM - Reviewed/Progressed HEP activities related to neuromuscular reeducation of movement, balance, coordination, kinesthetic sense, posture, and/or proprioception for sitting and/or standing activities    (51139) VASOPNEUMATIC - Utilized vasopneumatic compression to decrease edema / swelling for the purpose of improving mobility and quad tone / recruitment which will allow for increased overall function including but not limited to self-care, transfers, ambulation, and ascending / descending stairs.     GOALS     Patient stated goal: walking  [] Progressing: [] Met: [] Not Met: [] Adjusted    Therapist goals for Patient:   Short Term Goals: To be achieved in: 2 weeks  1. Independent in HEP and progression per patient tolerance, in order to prevent re-injury.   [] Progressing: [x] Met: [] Not

## 2024-07-30 ENCOUNTER — TELEPHONE (OUTPATIENT)
Dept: ORTHOPEDIC SURGERY | Age: 59
End: 2024-07-30

## 2024-07-30 NOTE — TELEPHONE ENCOUNTER
General Question     Subject: patient is calling to see if she can get and return to work letter with a specific day . She would like to have that email:     [email or web address]     Patient Laurel Lazcano   Contact Number: [phone]

## 2024-08-01 ENCOUNTER — HOSPITAL ENCOUNTER (OUTPATIENT)
Dept: PHYSICAL THERAPY | Age: 59
Setting detail: THERAPIES SERIES
Discharge: HOME | End: 2024-08-01
Attending: ORTHOPAEDIC SURGERY
Payer: COMMERCIAL

## 2024-08-01 PROCEDURE — 97530 THERAPEUTIC ACTIVITIES: CPT

## 2024-08-01 PROCEDURE — 97140 MANUAL THERAPY 1/> REGIONS: CPT

## 2024-08-01 PROCEDURE — 97112 NEUROMUSCULAR REEDUCATION: CPT

## 2024-08-01 NOTE — FLOWSHEET NOTE
[facility]- Outpatient Rehabilitation and Therapy [address], [address], [place] [address] office: [phone] fax: [phone]      Physical Therapy: TREATMENT/PROGRESS NOTE   Patient: Laurel Lazcano (59 y.o. female)   Examination Date: 2024   :  1965 MRN: [id number]   Visit #:  PCYenter manually  Insurance Allowable Auth Needed   [facility] [place]  61 PCY []Yes    [x]No    Insurance: Payor: [employer] / Plan: [employer] [address] / Product Type: *No Product type* /   Insurance ID: [id number] - (Commercial)  Secondary Insurance (if applicable):    Treatment Diagnosis:     ICD-10-CM    1. Decreased functional mobility  R26.89       2. Decreased activity tolerance  R68.89       3. Pain aggravated by standing  R52       4. Functional weakness  R53.1       5. Status post total right knee replacement  Z96.651          Medical Diagnosis:  Primary osteoarthritis of right knee [M17.11]   Referring Physician: Choco Osborn MD  PCP: Kamila Briseno MD     Plan of care signed (Y/N): YES    Date of Patient follow up with Physician:      Progress Report/POC: NO  Progress note due: 2024 (OR 10 visits /OR AUTH LIMITS, whichever is less)  POC update due: 24                                            Precautions/ Contra-indications:           Latex allergy:  NO  Pacemaker:    NO  Contraindications for Manipulation: NA  Date of Surgery: 24  Other:    Red Flags:  None    C-SSRS Triggered by Intake questionnaire:   Patient answered 'NO' to both behavioral questions on intake.  No further screening warranted    Preferred Language for Healthcare:   [x] English       [] other:    SUBJECTIVE EXAMINATION     Patient stated complaint: Patient reports that she had a R TKA on . Patient reports the surgery went well, but she is in a lot of pain. She is taking the pain medication as prescribed by MD. She is walking with a 2 WW at home, she reports she does have

## 2024-08-02 ENCOUNTER — TELEPHONE (OUTPATIENT)
Dept: ORTHOPEDIC SURGERY | Age: 59
End: 2024-08-02

## 2024-08-08 ENCOUNTER — HOSPITAL ENCOUNTER (OUTPATIENT)
Dept: PHYSICAL THERAPY | Age: 59
Setting detail: THERAPIES SERIES
End: 2024-08-08
Attending: ORTHOPAEDIC SURGERY
Payer: COMMERCIAL

## 2024-08-13 ENCOUNTER — TELEPHONE (OUTPATIENT)
Dept: ORTHOPEDIC SURGERY | Age: 59
End: 2024-08-13

## 2024-08-13 NOTE — TELEPHONE ENCOUNTER
General Question     Subject: SHORT TERM DISABILITY-REQUEST TO CALL  Patient and /or Facility Request: ASHLEY CHAPMAN  Contact Number: [phone]    PATIENT CALLED TO SPEAK WITH CLINICAL TO SPEAK WITH SHORT TERM DISABILITY PAPERWORK IN REGARD TO DR NOTES AND PAPERWORK.     PLEASE ADVISE

## 2024-08-15 ENCOUNTER — HOSPITAL ENCOUNTER (OUTPATIENT)
Dept: PHYSICAL THERAPY | Age: 59
Setting detail: THERAPIES SERIES
Discharge: HOME | End: 2024-08-15
Attending: ORTHOPAEDIC SURGERY
Payer: COMMERCIAL

## 2024-08-15 PROCEDURE — 97110 THERAPEUTIC EXERCISES: CPT

## 2024-08-15 NOTE — FLOWSHEET NOTE
swells up real good if she is on her feet for more than an hour  7/5/24 had a slow day yesterday, swelling is down. Pt ambulates at home with cane or without A device  7/9/24 pt arrived 15' late.  Pt has been squaring away a problem with her short term disability per pt report.  Knee has been \"good most of the time, sleeping is the biggest challenge.  I haven't had much swelling, that's a good thing.\"  7/16/24 pt stated her knee swelling increased because she was at a birthday party. Pt stated her knee was flared up, and her neck and back were flared up. Pt saw her doctor yesterday who, according to pt, said pt had 105 degrees flexion, but pt did not require a manipulation.  Pt stated she was instructed to avoid returning to work currently and to continue physical therapy.  Pt started using her LBQC 1 week ago and feels comfortable with it  7/18/24 Pt arrived 15' late today, rough morning, had a hard time getting grand daughter out of bed. Knee has been doing good  7/23: Not using her cane too much at home. Only uses it if her back feels like it's going to give out  7/25: Back is really hurting today. Medial knee is still sore and burning. Something feels like it's poking her on the inside  8/1/24 pt stated she is no longer using her cane.  \"I can bend my knee and put my shoe on.\"  8/15/24 pt stated her knee has been doing real well, had a bit of a flare up yesterday, but overall is having low pain and is active and mobile.  Pt stated she is \"ready to go back to work.\"  Pt has no difficulty with community mobility or with stair climbing.      OBJECTIVE EXAMINATION        Test used Initial score  05/30/2024 6/20/24 7/9/24 8/1/24 8/15/24   Pain Summary VAS 5-10/10 4-5/10      Functional questionnaire WOMAC 80/ 83.3% 38/ 39.5% 39/ 40.6% 35/ 36.5% 3/ 3.1%   Other: TUG     11 sec 6.1   Gait Speed      30.1 seconds/ 1.3 m/s   Sit-stand 30 seconds      13 reps   Stair climbing      Alternating pattern ascending and

## 2024-08-26 ENCOUNTER — OFFICE VISIT (OUTPATIENT)
Dept: ORTHOPEDIC SURGERY | Age: 59
End: 2024-08-26

## 2024-08-26 VITALS — WEIGHT: 220 LBS | BODY MASS INDEX: 33.34 KG/M2 | HEIGHT: 68 IN

## 2024-08-26 DIAGNOSIS — Z96.651 STATUS POST TOTAL RIGHT KNEE REPLACEMENT: Primary | ICD-10-CM

## 2024-08-26 DIAGNOSIS — M17.12 PRIMARY OSTEOARTHRITIS OF LEFT KNEE: ICD-10-CM

## 2024-08-26 RX ORDER — BUPIVACAINE HYDROCHLORIDE 2.5 MG/ML
2 INJECTION, SOLUTION INFILTRATION; PERINEURAL ONCE
Status: COMPLETED | OUTPATIENT
Start: 2024-08-26 | End: 2024-08-26

## 2024-08-26 RX ORDER — TRIAMCINOLONE ACETONIDE 40 MG/ML
40 INJECTION, SUSPENSION INTRA-ARTICULAR; INTRAMUSCULAR ONCE
Status: COMPLETED | OUTPATIENT
Start: 2024-08-26 | End: 2024-08-26

## 2024-08-26 RX ADMIN — TRIAMCINOLONE ACETONIDE 40 MG: 40 INJECTION, SUSPENSION INTRA-ARTICULAR; INTRAMUSCULAR at 14:36

## 2024-08-26 RX ADMIN — BUPIVACAINE HYDROCHLORIDE 5 MG: 2.5 INJECTION, SOLUTION INFILTRATION; PERINEURAL at 14:36

## 2024-08-29 ENCOUNTER — APPOINTMENT (OUTPATIENT)
Dept: PHYSICAL THERAPY | Age: 59
End: 2024-08-29
Attending: ORTHOPAEDIC SURGERY
Payer: COMMERCIAL

## 2024-08-30 NOTE — PROGRESS NOTES
This dictation was done with Dragon dictation and may contain mechanical errors related to translation.  Height 1.725 m (5' 7.9\"), weight 99.8 kg (220 lb), last menstrual period 02/18/2016, not currently breastfeeding.  This dictation was done with Dragon dictation and may contain mechanical errors related to translation.    I have today reviewed with Laurel Lazcano the clinically relevant, past medical history, medications, allergies, family history, social history, and Review Of Systems form the patient’s most recent history form & I have documented any details relevant to today's presenting complaints in my history below. Ms. Laurel Lazcano's self-reported past medical history, medications, allergies, family history, social history, and Review Of Systems form has been scanned into the chart under the \"Media\" tab.    Subjective:  Laurel Lazcano is a 59 y.o. who is here as an established patient and the follow-up for her right total knee replacement from 5/28/2024 and for a follow-up on her left knee where she has some osteoarthritis.  She has send she says she has 0-10 pain on the right knee except some mild numbness anteriorly she is doing physical therapy and has done extremely well and happy with her overall results we did send her for up-to-date x-rays including an AP lateral and a sunrise view of her right knee.  Her left knee has shown some degenerative changes and recently the knees been hurting more she is requesting a cortisone shot.      Patient Active Problem List   Diagnosis    AR (allergic rhinitis)    Centrilobular emphysema (HCC)    Fibroadenoma    Diaphragm paralysis    Thyroid nodule    Hypothyroidism    Vitamin D deficiency    Chest pain    Spasmodic dysphonia    Neck pain    Low back pain radiating to right lower extremity    Intestinal ulcer    Cervical radicular pain    Palpitations    Fibromyalgia    CMC DJD(carpometacarpal degenerative joint disease), localized primary    CTS  quad tone and no neurological deficits.  The left knee has some mild swelling with some crepitus and palpable tenderness over the medial joint line but no significant varus or valgus laxity negative for Lachman negative for Fernando  Neuro exam grossly intact both lower extremities. Intact sensation to light touch. Motor exam 4+ to 5/5 in all major motor groups.  Negative Romo's sign.    Skin is warm, dry and intact with out erythema or significant increased temperature around the knee joint(s).     There are no cutaneous lesions or lymphadenopathy present.    X-RAYS:  Xray three views of the right total knee arthroplasty reveals satisfactory alignment of the prosthesis . No signs of significant polyethylene wear or failure. No progressive radiolucencies,fractures, tumors or dislocations.        Assessment:  Stable healing right total knee replacement and left knee osteoarthritis    Plan:  During today's visit,I had a face-to-face discussion in regards to the patient's current condition and treatment options. More than 50 % of the time was counseling and coordination of care as indicated above.   I spent 30 minutes providing this service today, to include the time spent seeing the patient, documenting, and reviewing chart excluding any separately billed procedures.     PROCEDURE NOTE:  We talked about both knees short long-term expectations with the total knee replacement and then with her consent she was injected with cortisone for the left knee osteoarthritis    After a discussion of the multiple options, they consented to a cortisone shot. 1 ml of 40mg/ml Kenalog and 2 ml's of 0.25%Marcaine were injected into the Left knee joint space.  The leg was slightly flexed and injected just lateral to the patella tendon to under the patella. This was done with sterile technique and they tolerated it well.        They will schedule a follow up in as needed for her left knee and on a yearly basis for her right knee

## 2024-09-20 ENCOUNTER — HOSPITAL ENCOUNTER (OUTPATIENT)
Dept: WOMENS IMAGING | Age: 59
Discharge: HOME | End: 2024-09-20
Payer: COMMERCIAL

## 2024-09-20 VITALS — BODY MASS INDEX: 34.86 KG/M2 | WEIGHT: 230 LBS | HEIGHT: 68 IN

## 2024-09-20 DIAGNOSIS — Z12.31 BREAST CANCER SCREENING BY MAMMOGRAM: ICD-10-CM

## 2024-09-20 DIAGNOSIS — Z12.31 ENCOUNTER FOR SCREENING MAMMOGRAM FOR MALIGNANT NEOPLASM OF BREAST: ICD-10-CM

## 2024-09-20 PROCEDURE — 77063 BREAST TOMOSYNTHESIS BI: CPT

## 2024-10-06 NOTE — LETTER
[facility] Pulmonology Sleep and Critical Care  [facility]. [address]   Phone: [phone]  Fax: [phone]    Deloris Bhatti MD    April 1, 2021     Patient: Rosemary Miranda   YOB: 1965   Date of Visit: 4/1/2021       To Whom It May Concern:    Assessment:      ·  Obstructive airways disease  · Obesity  · Dyspnea  · Dysphonia, hx spasmatic dysphonia   · Paralyzed right diaphram  · Allergic Rhinitis  · Thyroid nodule     Plan:            Problem List Items Addressed This Visit           Moderate persistent asthma without complication - Primary        She is cleared from a pulmonary stand point for surgery.    She will continue budesonide bid and will add Alcus Brad for the next month to help with the surgery optimization.                    Relevant Medications      albuterol sulfate HFA (PROAIR HFA) 108 (90 Base) MCG/ACT inhaler             Sincerely,        Deloris Bhatti MD normal...

## 2024-10-09 ENCOUNTER — OFFICE VISIT (OUTPATIENT)
Dept: FAMILY MEDICINE CLINIC | Age: 59
End: 2024-10-09
Payer: COMMERCIAL

## 2024-10-09 VITALS
HEART RATE: 72 BPM | DIASTOLIC BLOOD PRESSURE: 68 MMHG | SYSTOLIC BLOOD PRESSURE: 108 MMHG | OXYGEN SATURATION: 96 % | BODY MASS INDEX: 36.28 KG/M2 | WEIGHT: 239.4 LBS | HEIGHT: 68 IN | TEMPERATURE: 97.8 F

## 2024-10-09 DIAGNOSIS — R29.898 LEG WEAKNESS, BILATERAL: Primary | ICD-10-CM

## 2024-10-09 DIAGNOSIS — I83.813 VARICOSE VEINS OF BOTH LOWER EXTREMITIES WITH PAIN: ICD-10-CM

## 2024-10-09 DIAGNOSIS — E89.0 POST-OPERATIVE HYPOTHYROIDISM: ICD-10-CM

## 2024-10-09 DIAGNOSIS — T14.8XXA BRUISING: ICD-10-CM

## 2024-10-09 DIAGNOSIS — R29.898 LEG WEAKNESS, BILATERAL: ICD-10-CM

## 2024-10-09 DIAGNOSIS — E55.9 VITAMIN D DEFICIENCY: ICD-10-CM

## 2024-10-09 LAB
25(OH)D3 SERPL-MCNC: 21.6 NG/ML
APTT BLD: 29.8 SEC (ref 22.1–36.4)
BASOPHILS # BLD: 0 K/UL (ref 0–0.2)
BASOPHILS NFR BLD: 0.4 %
DEPRECATED RDW RBC AUTO: 16.1 % (ref 12.4–15.4)
EOSINOPHIL # BLD: 0.1 K/UL (ref 0–0.6)
EOSINOPHIL NFR BLD: 1.8 %
ERYTHROCYTE [SEDIMENTATION RATE] IN BLOOD BY WESTERGREN METHOD: 18 MM/HR (ref 0–30)
HCT VFR BLD AUTO: 42.1 % (ref 36–48)
HGB BLD-MCNC: 13.9 G/DL (ref 12–16)
INR PPP: 0.87 (ref 0.85–1.15)
LYMPHOCYTES # BLD: 3.4 K/UL (ref 1–5.1)
LYMPHOCYTES NFR BLD: 43.4 %
MCH RBC QN AUTO: 27.7 PG (ref 26–34)
MCHC RBC AUTO-ENTMCNC: 33.1 G/DL (ref 31–36)
MCV RBC AUTO: 83.6 FL (ref 80–100)
MONOCYTES # BLD: 0.8 K/UL (ref 0–1.3)
MONOCYTES NFR BLD: 10 %
NEUTROPHILS # BLD: 3.5 K/UL (ref 1.7–7.7)
NEUTROPHILS NFR BLD: 44.4 %
PLATELET # BLD AUTO: 281 K/UL (ref 135–450)
PMV BLD AUTO: 9.1 FL (ref 5–10.5)
PROTHROMBIN TIME: 12.1 SEC (ref 11.9–14.9)
RBC # BLD AUTO: 5.04 M/UL (ref 4–5.2)
TSH SERPL DL<=0.005 MIU/L-ACNC: 1.78 UIU/ML (ref 0.27–4.2)
VIT B12 SERPL-MCNC: 316 PG/ML (ref 211–911)
WBC # BLD AUTO: 7.9 K/UL (ref 4–11)

## 2024-10-09 PROCEDURE — G8484 FLU IMMUNIZE NO ADMIN: HCPCS | Performed by: INTERNAL MEDICINE

## 2024-10-09 PROCEDURE — 99214 OFFICE O/P EST MOD 30 MIN: CPT | Performed by: INTERNAL MEDICINE

## 2024-10-09 PROCEDURE — 3017F COLORECTAL CA SCREEN DOC REV: CPT | Performed by: INTERNAL MEDICINE

## 2024-10-09 PROCEDURE — G8427 DOCREV CUR MEDS BY ELIG CLIN: HCPCS | Performed by: INTERNAL MEDICINE

## 2024-10-09 PROCEDURE — 1036F TOBACCO NON-USER: CPT | Performed by: INTERNAL MEDICINE

## 2024-10-09 PROCEDURE — G8417 CALC BMI ABV UP PARAM F/U: HCPCS | Performed by: INTERNAL MEDICINE

## 2024-10-09 RX ORDER — OXYCODONE AND ACETAMINOPHEN 5; 325 MG/1; MG/1
1 TABLET ORAL EVERY 8 HOURS PRN
COMMUNITY
Start: 2024-10-03

## 2024-10-09 NOTE — PROGRESS NOTES
SUBJECTIVE:  Laurel Lazcano is a 59 y.o. female being evaluated for:    Chief Complaint   Patient presents with    Follow-up     Pt was to be seen after her right knee surgery in May for her clumping veins. They are bilateral and in various spots. They are sticking out.      Extremity Weakness     Bilateral leg weakness. Walking in general her legs are weak. They have not given out on her yet. Has not seen a specialist for this yet.        HPI Total right knee in the end of may  and since then veins bulging out  more emili posteriorly and clusters of veins all over  Legs swell a little legs leaving indentations   Legs getting so weak Weak all the up to her hips  Joints are okay  Walking and feeling weak   Feels as if will collapse on her but they have not  Low back chronic pain and gets injections from Dr Resendiz Both legs weak not one or the other MRI of low back from 2019 with only mild foraminal narrowing  MRI cervical spine with c3/4 narrowing  form 2016   Bleeds a lot from her veins when had testing done  Kept asking if she was on blood thinners  Does bruise a lot NO bleeding with her previous surgeries   Allergies   Allergen Reactions    Other Hives     Cloth gloves at my work     Current Outpatient Medications   Medication Sig Dispense Refill    oxyCODONE-acetaminophen (PERCOCET) 5-325 MG per tablet Take 1 tablet by mouth every 8 hours as needed.      magnesium (MAGNESIUM-OXIDE) 250 MG TABS tablet Take 2 tablets by mouth daily      levothyroxine (SYNTHROID) 125 MCG tablet TAKE 1 TABLET BY MOUTH DAILY 90 tablet 1    gabapentin (NEURONTIN) 600 MG tablet Take 1 tablet by mouth 3 times daily.      vitamin D (ERGOCALCIFEROL) 1.25 MG (20459 UT) CAPS capsule Take 1 capsule by mouth once a week 12 capsule 1     No current facility-administered medications for this visit.     Facility-Administered Medications Ordered in Other Visits   Medication Dose Route Frequency Provider Last Rate Last Admin    technetium

## 2024-10-10 ENCOUNTER — TELEPHONE (OUTPATIENT)
Dept: FAMILY MEDICINE CLINIC | Age: 59
End: 2024-10-10

## 2024-10-10 RX ORDER — ERGOCALCIFEROL 1.25 MG/1
50000 CAPSULE, LIQUID FILLED ORAL WEEKLY
Qty: 12 CAPSULE | Refills: 1 | Status: SHIPPED | OUTPATIENT
Start: 2024-10-10

## 2024-10-22 ENCOUNTER — OFFICE VISIT (OUTPATIENT)
Dept: VASCULAR SURGERY | Age: 59
End: 2024-10-22
Payer: COMMERCIAL

## 2024-10-22 VITALS
DIASTOLIC BLOOD PRESSURE: 66 MMHG | HEIGHT: 67 IN | HEART RATE: 55 BPM | BODY MASS INDEX: 37.51 KG/M2 | SYSTOLIC BLOOD PRESSURE: 100 MMHG | WEIGHT: 239 LBS

## 2024-10-22 DIAGNOSIS — I83.893 VARICOSE VEINS OF LEG WITH SWELLING, BILATERAL: ICD-10-CM

## 2024-10-22 DIAGNOSIS — E66.9 OBESITY (BMI 30-39.9): ICD-10-CM

## 2024-10-22 DIAGNOSIS — M79.7 FIBROMYALGIA: Primary | ICD-10-CM

## 2024-10-22 DIAGNOSIS — J98.6 PARALYSIS OF DIAPHRAGM: ICD-10-CM

## 2024-10-22 DIAGNOSIS — T75.4XXS: ICD-10-CM

## 2024-10-22 DIAGNOSIS — E78.2 MIXED HYPERLIPIDEMIA: ICD-10-CM

## 2024-10-22 DIAGNOSIS — I83.93 SPIDER VEINS OF BOTH LOWER EXTREMITIES: ICD-10-CM

## 2024-10-22 PROCEDURE — G8484 FLU IMMUNIZE NO ADMIN: HCPCS | Performed by: SURGERY

## 2024-10-22 PROCEDURE — 99243 OFF/OP CNSLTJ NEW/EST LOW 30: CPT | Performed by: SURGERY

## 2024-10-22 PROCEDURE — G8417 CALC BMI ABV UP PARAM F/U: HCPCS | Performed by: SURGERY

## 2024-10-22 PROCEDURE — G8427 DOCREV CUR MEDS BY ELIG CLIN: HCPCS | Performed by: SURGERY

## 2024-10-22 RX ORDER — UBIDECARENONE 75 MG
50 CAPSULE ORAL DAILY
COMMUNITY

## 2024-10-22 ASSESSMENT — ENCOUNTER SYMPTOMS
GASTROINTESTINAL NEGATIVE: 1
RESPIRATORY NEGATIVE: 1
BACK PAIN: 1
ALLERGIC/IMMUNOLOGIC NEGATIVE: 1
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
## 2024-10-22 NOTE — PATIENT INSTRUCTIONS
We are giving you a prescription for compression stockings, 20-30 mmHg. Wear these during the day, removing them at night.     Call Dr. Simone Miramontes at [phone] to schedule an appointment for three months to discuss varicose vein surgery.
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
## 2024-11-04 ENCOUNTER — TELEPHONE (OUTPATIENT)
Dept: ORTHOPEDIC SURGERY | Age: 59
End: 2024-11-04

## 2024-11-04 NOTE — TELEPHONE ENCOUNTER
General Question     Subject: INJECTIONS  Patient and /or Facility Request: Laurel Lazcano   Contact Number: [phone]     PATIENT CALLED HAS A COUPLE QUESTIONS ABOUT INJECTIONS AND WOULD LIKE TO SPEAK WITH SOMEONE IN OFFICE. PLEASE CALL PATIENT BACK AT ABOVE NUMBER.

## 2024-11-14 DIAGNOSIS — E89.0 POSTOPERATIVE HYPOTHYROIDISM: ICD-10-CM

## 2024-11-14 RX ORDER — LEVOTHYROXINE SODIUM 125 UG/1
125 TABLET ORAL DAILY
Qty: 90 TABLET | Refills: 1 | Status: SHIPPED | OUTPATIENT
Start: 2024-11-14

## 2024-11-25 ENCOUNTER — OFFICE VISIT (OUTPATIENT)
Dept: ORTHOPEDIC SURGERY | Age: 59
End: 2024-11-25
Payer: COMMERCIAL

## 2024-11-25 VITALS — BODY MASS INDEX: 37.51 KG/M2 | WEIGHT: 239 LBS | HEIGHT: 67 IN

## 2024-11-25 DIAGNOSIS — M17.12 PRIMARY OSTEOARTHRITIS OF LEFT KNEE: Primary | ICD-10-CM

## 2024-11-25 PROCEDURE — 99213 OFFICE O/P EST LOW 20 MIN: CPT | Performed by: PHYSICIAN ASSISTANT

## 2024-11-25 PROCEDURE — 1036F TOBACCO NON-USER: CPT | Performed by: PHYSICIAN ASSISTANT

## 2024-11-25 PROCEDURE — 20610 DRAIN/INJ JOINT/BURSA W/O US: CPT | Performed by: PHYSICIAN ASSISTANT

## 2024-11-25 PROCEDURE — G8484 FLU IMMUNIZE NO ADMIN: HCPCS | Performed by: PHYSICIAN ASSISTANT

## 2024-11-25 PROCEDURE — G8417 CALC BMI ABV UP PARAM F/U: HCPCS | Performed by: PHYSICIAN ASSISTANT

## 2024-11-25 PROCEDURE — 3017F COLORECTAL CA SCREEN DOC REV: CPT | Performed by: PHYSICIAN ASSISTANT

## 2024-11-25 PROCEDURE — G8427 DOCREV CUR MEDS BY ELIG CLIN: HCPCS | Performed by: PHYSICIAN ASSISTANT

## 2024-11-25 RX ORDER — TRIAMCINOLONE ACETONIDE 40 MG/ML
40 INJECTION, SUSPENSION INTRA-ARTICULAR; INTRAMUSCULAR ONCE
Status: COMPLETED | OUTPATIENT
Start: 2024-11-25 | End: 2024-11-25

## 2024-11-25 RX ORDER — BUPIVACAINE HYDROCHLORIDE 2.5 MG/ML
2 INJECTION, SOLUTION INFILTRATION; PERINEURAL ONCE
Status: COMPLETED | OUTPATIENT
Start: 2024-11-25 | End: 2024-11-25

## 2024-11-25 RX ADMIN — TRIAMCINOLONE ACETONIDE 40 MG: 40 INJECTION, SUSPENSION INTRA-ARTICULAR; INTRAMUSCULAR at 13:39

## 2024-11-25 RX ADMIN — BUPIVACAINE HYDROCHLORIDE 5 MG: 2.5 INJECTION, SOLUTION INFILTRATION; PERINEURAL at 13:39

## 2024-11-25 NOTE — PROGRESS NOTES
This dictation was done with [employer] dictation and may contain mechanical errors related to translation.  Height 1.702 m (5' 7\"), weight 108.4 kg (239 lb), last menstrual period 02/18/2016, not currently breastfeeding.      This patient is here in follow-up for ongoing pain and dysfunction in their Left knee. There x-rays done previously showed joint space narrowing subchondral sclerosis with osteophyte formation. They currently have had relief with injections of cortisone, anti-inflammatories and a home exercise program. There are here with increased pain over the last few days with swelling and dysfunction.   They noticed that there was some increasing pain and and swelling and disability over the last 7 to 10 days especially.  This increase led to them making an appointment.  On examination this is a well-developed patient in no acute distress. They are alert and oriented ×3. They walk without antalgia or any significant varus or valgus deformity. They have crepitus during flexion and extension in the patellofemoral joint. They have a negative Lachman and a negative Fernando. There are good distal pulses and good dorsiflexion and plantarflexion strength present    My impression is left knee osteoarthritis    After a discussion of the multiple options, they consented to a cortisone shot. 1 ml of 40mg/ml Kenalog and 2 ml's of 0.25%Marcaine were injected into the leftt knee joint space.  During today's visit, there was approximately 20 minutes of face-to-face discussion in regards to the patient's current condition and treatment options.More than 50 % of the time was counseling and coordination of care.      The leg was slightly flexed and injected just lateral to the patella tendon to under the patella. This was done with sterile technique and they tolerated it well.    I went through a good stretch and strengthening exercise program. They understand that at some point, they will need a knee replacement. And they

## 2024-12-09 ENCOUNTER — TELEPHONE (OUTPATIENT)
Dept: FAMILY MEDICINE CLINIC | Age: 59
End: 2024-12-09

## 2024-12-09 RX ORDER — ONDANSETRON 4 MG/1
4 TABLET, FILM COATED ORAL 3 TIMES DAILY PRN
Qty: 30 TABLET | Refills: 2 | Status: SHIPPED | OUTPATIENT
Start: 2024-12-09

## 2024-12-09 NOTE — TELEPHONE ENCOUNTER
Patient called in requesting some medication be called in to the pharmacy. She has been nauseous for a couple weeks. Would like called in to [facility] in [place], [place].     Please advise.

## 2024-12-09 NOTE — TELEPHONE ENCOUNTER
I am guessing that she wants the Zofran/ondansetron sent in.  I have done this if continued or recurrent symptoms should see us in the office question any other GI symptoms

## 2025-01-21 ENCOUNTER — OFFICE VISIT (OUTPATIENT)
Dept: FAMILY MEDICINE CLINIC | Age: 60
End: 2025-01-21
Payer: COMMERCIAL

## 2025-01-21 VITALS
TEMPERATURE: 98.1 F | DIASTOLIC BLOOD PRESSURE: 70 MMHG | WEIGHT: 252.4 LBS | BODY MASS INDEX: 39.62 KG/M2 | SYSTOLIC BLOOD PRESSURE: 118 MMHG | HEART RATE: 76 BPM | OXYGEN SATURATION: 98 % | HEIGHT: 67 IN

## 2025-01-21 DIAGNOSIS — R05.8 COUGH PRODUCTIVE OF YELLOW SPUTUM: Primary | ICD-10-CM

## 2025-01-21 PROCEDURE — 1036F TOBACCO NON-USER: CPT | Performed by: NURSE PRACTITIONER

## 2025-01-21 PROCEDURE — G8417 CALC BMI ABV UP PARAM F/U: HCPCS | Performed by: NURSE PRACTITIONER

## 2025-01-21 PROCEDURE — 3017F COLORECTAL CA SCREEN DOC REV: CPT | Performed by: NURSE PRACTITIONER

## 2025-01-21 PROCEDURE — G8427 DOCREV CUR MEDS BY ELIG CLIN: HCPCS | Performed by: NURSE PRACTITIONER

## 2025-01-21 PROCEDURE — 99213 OFFICE O/P EST LOW 20 MIN: CPT | Performed by: NURSE PRACTITIONER

## 2025-01-21 RX ORDER — AZITHROMYCIN 250 MG/1
TABLET, FILM COATED ORAL
Qty: 6 TABLET | Refills: 0 | Status: SHIPPED | OUTPATIENT
Start: 2025-01-21 | End: 2025-01-31

## 2025-01-21 RX ORDER — METHYLPREDNISOLONE 4 MG/1
TABLET ORAL
Qty: 1 KIT | Refills: 0 | Status: SHIPPED | OUTPATIENT
Start: 2025-01-21 | End: 2025-01-27

## 2025-01-21 SDOH — ECONOMIC STABILITY: FOOD INSECURITY: WITHIN THE PAST 12 MONTHS, THE FOOD YOU BOUGHT JUST DIDN'T LAST AND YOU DIDN'T HAVE MONEY TO GET MORE.: NEVER TRUE

## 2025-01-21 SDOH — ECONOMIC STABILITY: FOOD INSECURITY: WITHIN THE PAST 12 MONTHS, YOU WORRIED THAT YOUR FOOD WOULD RUN OUT BEFORE YOU GOT MONEY TO BUY MORE.: NEVER TRUE

## 2025-01-21 ASSESSMENT — PATIENT HEALTH QUESTIONNAIRE - PHQ9
SUM OF ALL RESPONSES TO PHQ9 QUESTIONS 1 & 2: 0
SUM OF ALL RESPONSES TO PHQ QUESTIONS 1-9: 0
2. FEELING DOWN, DEPRESSED OR HOPELESS: NOT AT ALL
SUM OF ALL RESPONSES TO PHQ QUESTIONS 1-9: 0
SUM OF ALL RESPONSES TO PHQ QUESTIONS 1-9: 0
1. LITTLE INTEREST OR PLEASURE IN DOING THINGS: NOT AT ALL
SUM OF ALL RESPONSES TO PHQ QUESTIONS 1-9: 0

## 2025-01-21 NOTE — PROGRESS NOTES
Laurel Lazcano (:  1965) is a 60 y.o. female,Established patient, here for evaluation of the following chief complaint(s):  Pharyngitis (3 days ago sore throat, watery eyes, hurts to cough, spitting up yellow, losing voice, no fever, runny nose, no congestion. Hurts to swallow. )      Assessment & Plan   ASSESSMENT/PLAN:  1. Cough productive of yellow sputum  New. Meds and follow pt instructions included.   - azithromycin (ZITHROMAX) 250 MG tablet; 500mg on day 1 followed by 250mg on days 2 - 5  Dispense: 6 tablet; Refill: 0  - methylPREDNISolone (MEDROL DOSEPACK) 4 MG tablet; Take by mouth.  Dispense: 1 kit; Refill: 0       Return if symptoms worsen or fail to improve.         Subjective   SUBJECTIVE/OBJECTIVE:  HPI  Chief Complaint   Patient presents with    Pharyngitis     3 days ago sore throat, watery eyes, hurts to cough, spitting up yellow, losing voice, no fever, runny nose, no congestion. Hurts to swallow.    Presents today for above. States she has a tumor in her eye that is going to be removed, but was told not to cough or have a lot of pressure behind eye as it could rupture the tumor.   Dayquil with little relief, endorses loss of voice, denies ear, abd pain, n/v.      Current Outpatient Medications   Medication Sig Dispense Refill    azithromycin (ZITHROMAX) 250 MG tablet 500mg on day 1 followed by 250mg on days 2 - 5 6 tablet 0    methylPREDNISolone (MEDROL DOSEPACK) 4 MG tablet Take by mouth. 1 kit 0    ondansetron (ZOFRAN) 4 MG tablet Take 1 tablet by mouth 3 times daily as needed for Nausea or Vomiting 30 tablet 2    levothyroxine (SYNTHROID) 125 MCG tablet TAKE 1 TABLET BY MOUTH DAILY 90 tablet 1    vitamin B-12 (CYANOCOBALAMIN) 100 MCG tablet Take 0.5 tablets by mouth daily      vitamin D (ERGOCALCIFEROL) 1.25 MG (76181 UT) CAPS capsule Take 1 capsule by mouth once a week 12 capsule 1    oxyCODONE-acetaminophen (PERCOCET) 5-325 MG per tablet Take 1 tablet by mouth every 8 hours as

## 2025-01-22 ASSESSMENT — ENCOUNTER SYMPTOMS
COUGH: 1
VOICE CHANGE: 1
ABDOMINAL PAIN: 0

## 2025-02-13 ENCOUNTER — OFFICE VISIT (OUTPATIENT)
Dept: VASCULAR SURGERY | Age: 60
End: 2025-02-13
Payer: COMMERCIAL

## 2025-02-13 VITALS
WEIGHT: 249.8 LBS | HEIGHT: 67 IN | SYSTOLIC BLOOD PRESSURE: 118 MMHG | HEART RATE: 67 BPM | OXYGEN SATURATION: 97 % | DIASTOLIC BLOOD PRESSURE: 74 MMHG | BODY MASS INDEX: 39.21 KG/M2

## 2025-02-13 DIAGNOSIS — M79.604 PAIN IN BOTH LOWER EXTREMITIES: Primary | ICD-10-CM

## 2025-02-13 DIAGNOSIS — M79.605 PAIN IN BOTH LOWER EXTREMITIES: Primary | ICD-10-CM

## 2025-02-13 DIAGNOSIS — I83.93 ASYMPTOMATIC VARICOSE VEINS OF BOTH LOWER EXTREMITIES: ICD-10-CM

## 2025-02-13 PROCEDURE — 3017F COLORECTAL CA SCREEN DOC REV: CPT | Performed by: SURGERY

## 2025-02-13 PROCEDURE — 1036F TOBACCO NON-USER: CPT | Performed by: SURGERY

## 2025-02-13 PROCEDURE — 99204 OFFICE O/P NEW MOD 45 MIN: CPT | Performed by: SURGERY

## 2025-02-13 PROCEDURE — G8427 DOCREV CUR MEDS BY ELIG CLIN: HCPCS | Performed by: SURGERY

## 2025-02-13 PROCEDURE — G8417 CALC BMI ABV UP PARAM F/U: HCPCS | Performed by: SURGERY

## 2025-02-13 ASSESSMENT — ENCOUNTER SYMPTOMS
SHORTNESS OF BREATH: 1
EYES NEGATIVE: 1
BACK PAIN: 1
ALLERGIC/IMMUNOLOGIC NEGATIVE: 1
GASTROINTESTINAL NEGATIVE: 1
COUGH: 1

## 2025-02-13 NOTE — PROGRESS NOTES
Laurel Lazcano   60 y.o.  lady referred from oKjo Castellanos MD for evaluation of leg complaints.  Patient has a long history of visible varicose veins and spider veins and complains of a significant heaviness, fatigue and weakness as well as an achy discomfort in her bilateral legs that extends from the groin area down into the calf and feet.  Symptoms can be improved with elevation and pain meds and are worse with standing.  Symptoms begin upon standing and limit her ability to walk.  She does notice some improvement with positional changes and use of support such as a shopping cart.  Reports a known history of degenerative spine disease and spine \"slippage\".  Currently on long-term pain management.  No history of DVT, SVT, bleeding, ulceration, dermatitis or significant edema.  Has worn stockings in the past but finds them bothersome and causing swelling higher in her legs.  No previous venous intervention.    Past Medical History:   Diagnosis Date    Anxiety     Asthma     currently was instructed to stop inhalers d/t to collapse of vocal cords    Diaphragm paralysis     right side    Fatty liver     Fibromyalgia     GERD (gastroesophageal reflux disease)     Headache(784.0)     neck pain     History of ulcer disease     Hypothyroidism     status post thyroidectomy     Melanoma (HCC)     right eye  ciliary body  treated with XRT    Osteoarthritis     CHRONIC BACK PAIN/FIBROMYALGIA    Rheumatoid arthritis (HCC)     Spasmodic dysphonia     voice very hoarse    Thyroid disease     2015 found spot on left side of thyroid, right side thyroid was removed    Wears dentures     Wears glasses     Wears glasses      Past Surgical History:   Procedure Laterality Date    APPENDECTOMY  age 18    BREAST BIOPSY Right     x3  benign lesions     BREAST SURGERY Right needle localization right breast mass    BREAST SURGERY Right 10/22/2015    kristopher breast mass excision/needle loc    CARPAL TUNNEL RELEASE Right 05/07/2015    CARPAL

## 2025-02-20 ENCOUNTER — TELEPHONE (OUTPATIENT)
Dept: FAMILY MEDICINE CLINIC | Age: 60
End: 2025-02-20

## 2025-02-20 RX ORDER — ERGOCALCIFEROL 1.25 MG/1
50000 CAPSULE, LIQUID FILLED ORAL WEEKLY
Qty: 12 CAPSULE | Refills: 1 | Status: SHIPPED | OUTPATIENT
Start: 2025-02-20

## 2025-02-20 NOTE — TELEPHONE ENCOUNTER
Last night at work pt's legs became swollen. Pt has taken an OTC water pill and this helped some. Pt has been wearing a compression stockings and her legs swelled above the compression stockings. Pt wants to what Dr Briseno recommends?

## 2025-02-20 NOTE — TELEPHONE ENCOUNTER
Spoke with pt on leg swelling she said she was taking a otc water pill and it has worked a little. Pt also stated she is having blood pressure issues. I told pt with that information she needs to go be evaluated at the ER and follow up with Dr Briseno. Pt agreed

## 2025-02-22 ENCOUNTER — HOSPITAL ENCOUNTER (EMERGENCY)
Age: 60
Discharge: HOME | End: 2025-02-22
Attending: EMERGENCY MEDICINE
Payer: COMMERCIAL

## 2025-02-22 ENCOUNTER — APPOINTMENT (OUTPATIENT)
Dept: GENERAL RADIOLOGY | Age: 60
End: 2025-02-22
Payer: COMMERCIAL

## 2025-02-22 VITALS
WEIGHT: 260.14 LBS | TEMPERATURE: 98.1 F | DIASTOLIC BLOOD PRESSURE: 57 MMHG | OXYGEN SATURATION: 95 % | RESPIRATION RATE: 22 BRPM | HEART RATE: 70 BPM | HEIGHT: 68 IN | SYSTOLIC BLOOD PRESSURE: 105 MMHG | BODY MASS INDEX: 39.43 KG/M2

## 2025-02-22 DIAGNOSIS — R60.0 BILATERAL LEG EDEMA: Primary | ICD-10-CM

## 2025-02-22 LAB
ANION GAP SERPL CALCULATED.3IONS-SCNC: 8 MMOL/L (ref 3–16)
BASOPHILS # BLD: 0 K/UL (ref 0–0.2)
BASOPHILS NFR BLD: 0.1 %
BUN SERPL-MCNC: 14 MG/DL (ref 7–20)
CALCIUM SERPL-MCNC: 10.1 MG/DL (ref 8.3–10.6)
CHLORIDE SERPL-SCNC: 112 MMOL/L (ref 99–110)
CO2 SERPL-SCNC: 25 MMOL/L (ref 21–32)
CREAT SERPL-MCNC: 0.7 MG/DL (ref 0.6–1.1)
DEPRECATED RDW RBC AUTO: 13.8 % (ref 12.4–15.4)
EKG ATRIAL RATE: 78 BPM
EKG DIAGNOSIS: NORMAL
EKG P AXIS: 60 DEGREES
EKG P-R INTERVAL: 172 MS
EKG Q-T INTERVAL: 428 MS
EKG QRS DURATION: 96 MS
EKG QTC CALCULATION (BAZETT): 487 MS
EKG R AXIS: -28 DEGREES
EKG T AXIS: -3 DEGREES
EKG VENTRICULAR RATE: 78 BPM
EOSINOPHIL # BLD: 0.2 K/UL (ref 0–0.6)
EOSINOPHIL NFR BLD: 2.9 %
GFR SERPLBLD CREATININE-BSD FMLA CKD-EPI: >90 ML/MIN/{1.73_M2}
GLUCOSE SERPL-MCNC: 115 MG/DL (ref 70–99)
HCT VFR BLD AUTO: 38.6 % (ref 36–48)
HGB BLD-MCNC: 11.9 G/DL (ref 12–16)
IMM GRANULOCYTES # BLD: 0 K/UL (ref 0–0.2)
IMM GRANULOCYTES NFR BLD: 0.1 %
LYMPHOCYTES # BLD: 2.8 K/UL (ref 1–5.1)
LYMPHOCYTES NFR BLD: 33.1 %
MCH RBC QN AUTO: 26.6 PG (ref 26–34)
MCHC RBC AUTO-ENTMCNC: 30.8 G/DL (ref 32–36.4)
MCV RBC AUTO: 86.4 FL (ref 80–100)
MONOCYTES # BLD: 0.7 K/UL (ref 0–1.3)
MONOCYTES NFR BLD: 8.6 %
NEUTROPHILS # BLD: 4.7 K/UL (ref 1.7–7.7)
NEUTROPHILS NFR BLD: 55.2 %
NT-PROBNP SERPL-MCNC: 185 PG/ML (ref 0–124)
PLATELET # BLD AUTO: 254 K/UL (ref 135–450)
PMV BLD AUTO: 10.2 FL (ref 5–10.5)
POTASSIUM SERPL-SCNC: 3.6 MMOL/L (ref 3.5–5.1)
RBC # BLD AUTO: 4.47 M/UL (ref 4–5.2)
SODIUM SERPL-SCNC: 145 MMOL/L (ref 136–145)
TROPONIN, HIGH SENSITIVITY: 10 NG/L (ref 0–14)
WBC # BLD AUTO: 8.4 K/UL (ref 4–11)

## 2025-02-22 PROCEDURE — 93005 ELECTROCARDIOGRAM TRACING: CPT | Performed by: EMERGENCY MEDICINE

## 2025-02-22 PROCEDURE — 71045 X-RAY EXAM CHEST 1 VIEW: CPT

## 2025-02-22 PROCEDURE — 85025 COMPLETE CBC W/AUTO DIFF WBC: CPT

## 2025-02-22 PROCEDURE — 93010 ELECTROCARDIOGRAM REPORT: CPT | Performed by: INTERNAL MEDICINE

## 2025-02-22 PROCEDURE — 6370000000 HC RX 637 (ALT 250 FOR IP): Performed by: EMERGENCY MEDICINE

## 2025-02-22 PROCEDURE — 99285 EMERGENCY DEPT VISIT HI MDM: CPT

## 2025-02-22 PROCEDURE — 83880 ASSAY OF NATRIURETIC PEPTIDE: CPT

## 2025-02-22 PROCEDURE — 84484 ASSAY OF TROPONIN QUANT: CPT

## 2025-02-22 PROCEDURE — 36415 COLL VENOUS BLD VENIPUNCTURE: CPT

## 2025-02-22 PROCEDURE — 80048 BASIC METABOLIC PNL TOTAL CA: CPT

## 2025-02-22 RX ORDER — FUROSEMIDE 40 MG/1
20 TABLET ORAL ONCE
Status: COMPLETED | OUTPATIENT
Start: 2025-02-22 | End: 2025-02-22

## 2025-02-22 RX ORDER — FUROSEMIDE 20 MG/1
20 TABLET ORAL DAILY
Qty: 7 TABLET | Refills: 0 | Status: SHIPPED | OUTPATIENT
Start: 2025-02-22 | End: 2025-02-28 | Stop reason: SDUPTHER

## 2025-02-22 RX ADMIN — FUROSEMIDE 20 MG: 40 TABLET ORAL at 04:03

## 2025-02-22 ASSESSMENT — PAIN DESCRIPTION - DESCRIPTORS: DESCRIPTORS: DISCOMFORT

## 2025-02-22 ASSESSMENT — PAIN SCALES - GENERAL: PAINLEVEL_OUTOF10: 7

## 2025-02-22 ASSESSMENT — PAIN DESCRIPTION - LOCATION: LOCATION: LEG

## 2025-02-22 ASSESSMENT — PAIN - FUNCTIONAL ASSESSMENT: PAIN_FUNCTIONAL_ASSESSMENT: 0-10

## 2025-02-22 ASSESSMENT — LIFESTYLE VARIABLES
HOW OFTEN DO YOU HAVE A DRINK CONTAINING ALCOHOL: NEVER
HOW MANY STANDARD DRINKS CONTAINING ALCOHOL DO YOU HAVE ON A TYPICAL DAY: PATIENT DOES NOT DRINK

## 2025-02-22 ASSESSMENT — PAIN DESCRIPTION - ORIENTATION: ORIENTATION: RIGHT;LEFT

## 2025-02-22 NOTE — ED PROVIDER NOTES
CHIEF COMPLAINT  Chief Complaint   Patient presents with    Leg Pain     Reports having bilateral lower leg pain and swelling x 4 days now / states she is always sob but seems a little more sob than usual. Denies any cp / pt came straight from work and stopped here because she doesn't think she can sleep with all the pain/ took oxycodone and 800mg motrin earlier in the night.        HISTORY OF PRESENT ILLNESS  Laurel Lazcano is a 60 y.o. female who presents to the ED complaining of 1 week history of increasing bilateral lower extremity edema.  Patient reports she has had this issue in the past which was improved with diuretic.  Patient is not currently taking any diuretics but also denies issues with congestive heart failure.  Patient denies chest pain.  She does report feeling mildly more short of breath than usual but no other anginal equivalents of neck pain, jaw pain or back pain.  No URI symptoms or fever.    No other complaints, modifying factors or associated symptoms.     Nursing notes reviewed.   Past Medical History:   Diagnosis Date    Anxiety     Asthma     currently was instructed to stop inhalers d/t to collapse of vocal cords    Diaphragm paralysis     right side    Fatty liver     Fibromyalgia     GERD (gastroesophageal reflux disease)     Headache(784.0)     neck pain     History of ulcer disease     Hypothyroidism     status post thyroidectomy     Melanoma (HCC)     right eye  ciliary body  treated with XRT    Osteoarthritis     CHRONIC BACK PAIN/FIBROMYALGIA    Rheumatoid arthritis (HCC)     Spasmodic dysphonia     voice very hoarse    Thyroid disease     2015 found spot on left side of thyroid, right side thyroid was removed    Wears dentures     Wears glasses     Wears glasses      Past Surgical History:   Procedure Laterality Date    APPENDECTOMY  age 18    BREAST BIOPSY Right     x3  benign lesions     BREAST SURGERY Right needle localization right breast mass    BREAST SURGERY Right

## 2025-02-27 ENCOUNTER — PATIENT MESSAGE (OUTPATIENT)
Dept: FAMILY MEDICINE CLINIC | Age: 60
End: 2025-02-27

## 2025-02-28 ENCOUNTER — TELEPHONE (OUTPATIENT)
Dept: FAMILY MEDICINE CLINIC | Age: 60
End: 2025-02-28

## 2025-02-28 RX ORDER — FUROSEMIDE 20 MG/1
20 TABLET ORAL DAILY
Qty: 30 TABLET | Refills: 1 | Status: SHIPPED | OUTPATIENT
Start: 2025-02-28

## 2025-02-28 RX ORDER — POTASSIUM CHLORIDE 750 MG/1
10 TABLET, EXTENDED RELEASE ORAL DAILY
Qty: 30 TABLET | Refills: 1 | Status: SHIPPED | OUTPATIENT
Start: 2025-02-28

## 2025-02-28 NOTE — TELEPHONE ENCOUNTER
I have an appointment on March 6 . I only have enough water pills left until tomorrow from the emergency department . The upper part of my legs are doing better but my ankles and a little up are still swollen and burn and red .  Not sure if you want to call in a few more days of the water pills.      Please see pics in media of the chart that patient sent.   Patient has ED follow up on 3-6-2025.

## 2025-02-28 NOTE — TELEPHONE ENCOUNTER
I will continue the lasix to take daily until seen and will add a potassium pill with it as it was low normal in the ed and lasix gets rid of potassium

## 2025-03-06 ENCOUNTER — OFFICE VISIT (OUTPATIENT)
Dept: FAMILY MEDICINE CLINIC | Age: 60
End: 2025-03-06
Payer: COMMERCIAL

## 2025-03-06 VITALS
SYSTOLIC BLOOD PRESSURE: 104 MMHG | RESPIRATION RATE: 18 BRPM | TEMPERATURE: 98.2 F | DIASTOLIC BLOOD PRESSURE: 72 MMHG | HEIGHT: 68 IN | HEART RATE: 70 BPM | BODY MASS INDEX: 38.58 KG/M2 | WEIGHT: 254.6 LBS | OXYGEN SATURATION: 95 %

## 2025-03-06 DIAGNOSIS — I83.899 VARICOSE VEINS WITH SWELLING: Primary | ICD-10-CM

## 2025-03-06 DIAGNOSIS — G89.29 CHRONIC MIDLINE LOW BACK PAIN WITH RIGHT-SIDED SCIATICA: ICD-10-CM

## 2025-03-06 DIAGNOSIS — J01.00 ACUTE NON-RECURRENT MAXILLARY SINUSITIS: ICD-10-CM

## 2025-03-06 DIAGNOSIS — M79.671 RIGHT FOOT PAIN: ICD-10-CM

## 2025-03-06 DIAGNOSIS — M54.41 CHRONIC MIDLINE LOW BACK PAIN WITH RIGHT-SIDED SCIATICA: ICD-10-CM

## 2025-03-06 LAB
INFLUENZA A ANTIGEN, POC: NEGATIVE
INFLUENZA B ANTIGEN, POC: NEGATIVE
LOT EXPIRE DATE: NORMAL
LOT KIT NUMBER: NORMAL
SARS-COV-2, POC: NORMAL
VALID INTERNAL CONTROL: YES
VENDOR AND KIT NAME POC: NORMAL

## 2025-03-06 PROCEDURE — 99214 OFFICE O/P EST MOD 30 MIN: CPT | Performed by: INTERNAL MEDICINE

## 2025-03-06 PROCEDURE — G8417 CALC BMI ABV UP PARAM F/U: HCPCS | Performed by: INTERNAL MEDICINE

## 2025-03-06 PROCEDURE — 1036F TOBACCO NON-USER: CPT | Performed by: INTERNAL MEDICINE

## 2025-03-06 PROCEDURE — G8427 DOCREV CUR MEDS BY ELIG CLIN: HCPCS | Performed by: INTERNAL MEDICINE

## 2025-03-06 PROCEDURE — 3017F COLORECTAL CA SCREEN DOC REV: CPT | Performed by: INTERNAL MEDICINE

## 2025-03-06 PROCEDURE — 87428 SARSCOV & INF VIR A&B AG IA: CPT | Performed by: INTERNAL MEDICINE

## 2025-03-06 RX ORDER — FUROSEMIDE 20 MG/1
40 TABLET ORAL DAILY
Qty: 30 TABLET | Refills: 1
Start: 2025-03-06

## 2025-03-06 RX ORDER — CEFUROXIME AXETIL 250 MG/1
250 TABLET ORAL 2 TIMES DAILY
Qty: 20 TABLET | Refills: 0 | Status: SHIPPED | OUTPATIENT
Start: 2025-03-06 | End: 2025-03-16

## 2025-03-06 NOTE — PROGRESS NOTES
mouth daily      oxyCODONE-acetaminophen (PERCOCET) 5-325 MG per tablet Take 1 tablet by mouth every 8 hours as needed.      magnesium (MAGNESIUM-OXIDE) 250 MG TABS tablet Take 2 tablets by mouth daily      gabapentin (NEURONTIN) 600 MG tablet Take 1 tablet by mouth 3 times daily.       No current facility-administered medications for this visit.     Facility-Administered Medications Ordered in Other Visits   Medication Dose Route Frequency Provider Last Rate Last Admin    technetium sulfur colloid egg (NYCOMED-SC) solution 500 micro curie  500 micro curie IntraVENous ONCE PRN Justin Palumbo MD             Social History     Socioeconomic History    Marital status:      Spouse name: Not on file    Number of children: Not on file    Years of education: Not on file    Highest education level: Not on file   Occupational History    Occupation: room leader     Comment: [employer]   Tobacco Use    Smoking status: Former     Current packs/day: 0.00     Average packs/day: 1 pack/day for 20.0 years (20.0 ttl pk-yrs)     Types: Cigarettes     Start date:      Quit date: 2003     Years since quittin.2    Smokeless tobacco: Never   Vaping Use    Vaping status: Never Used   Substance and Sexual Activity    Alcohol use: Not Currently     Alcohol/week: 0.0 standard drinks of alcohol    Drug use: Never    Sexual activity: Not Currently     Partners: Male   Other Topics Concern    Not on file   Social History Narrative    Not on file     Social Drivers of Health     Financial Resource Strain: Low Risk  (2024)    Overall Financial Resource Strain (CARDIA)     Difficulty of Paying Living Expenses: Not hard at all   Food Insecurity: No Food Insecurity (2025)    Hunger Vital Sign     Worried About Running Out of Food in the Last Year: Never true     Ran Out of Food in the Last Year: Never true   Transportation Needs: No Transportation Needs (2025)    PRAPARE - Transportation     Lack of Transportation

## 2025-03-10 ENCOUNTER — OFFICE VISIT (OUTPATIENT)
Dept: ORTHOPEDIC SURGERY | Age: 60
End: 2025-03-10
Payer: COMMERCIAL

## 2025-03-10 VITALS — WEIGHT: 254 LBS | BODY MASS INDEX: 38.49 KG/M2 | HEIGHT: 68 IN

## 2025-03-10 DIAGNOSIS — M17.12 PRIMARY OSTEOARTHRITIS OF LEFT KNEE: Primary | ICD-10-CM

## 2025-03-10 PROCEDURE — 1036F TOBACCO NON-USER: CPT | Performed by: ORTHOPAEDIC SURGERY

## 2025-03-10 PROCEDURE — 20610 DRAIN/INJ JOINT/BURSA W/O US: CPT | Performed by: ORTHOPAEDIC SURGERY

## 2025-03-10 PROCEDURE — G8417 CALC BMI ABV UP PARAM F/U: HCPCS | Performed by: ORTHOPAEDIC SURGERY

## 2025-03-10 PROCEDURE — G8427 DOCREV CUR MEDS BY ELIG CLIN: HCPCS | Performed by: ORTHOPAEDIC SURGERY

## 2025-03-10 PROCEDURE — 3017F COLORECTAL CA SCREEN DOC REV: CPT | Performed by: ORTHOPAEDIC SURGERY

## 2025-03-10 PROCEDURE — 99213 OFFICE O/P EST LOW 20 MIN: CPT | Performed by: ORTHOPAEDIC SURGERY

## 2025-03-10 RX ORDER — TRIAMCINOLONE ACETONIDE 40 MG/ML
40 INJECTION, SUSPENSION INTRA-ARTICULAR; INTRAMUSCULAR ONCE
Status: COMPLETED | OUTPATIENT
Start: 2025-03-10 | End: 2025-03-10

## 2025-03-10 RX ORDER — BUPIVACAINE HYDROCHLORIDE 2.5 MG/ML
2 INJECTION, SOLUTION INFILTRATION; PERINEURAL ONCE
Status: COMPLETED | OUTPATIENT
Start: 2025-03-10 | End: 2025-03-10

## 2025-03-10 RX ADMIN — TRIAMCINOLONE ACETONIDE 40 MG: 40 INJECTION, SUSPENSION INTRA-ARTICULAR; INTRAMUSCULAR at 14:05

## 2025-03-10 RX ADMIN — BUPIVACAINE HYDROCHLORIDE 5 MG: 2.5 INJECTION, SOLUTION INFILTRATION; PERINEURAL at 14:05

## 2025-03-10 NOTE — PROGRESS NOTES
[facility]  Office Visit    Chief Complaint: Bilateral knee pain    HPI:  Laurel Lazcano is a 60 y.o. who is here in follow-up of bilateral knee pain.  She underwent right total knee arthroplasty on May 28, 2024.  Her right knee is doing well.  She was last seen for her left knee on November 25, 2024 and underwent a steroid injection at that time.  This helped relieve symptoms until recently.  For review, there is no history of injury or surgery to the left knee. She has significant pain along the medial joint line and posterior to the knee.  She has pain with standing and bending the knee.  She denies hip pain.  She walks without assistive device. The patient has difficulty climbing stairs, difficulty getting out of a chair, difficulty with activities of daily living including hygiene and pain at night.      Past Medical History:   Diagnosis Date    Anxiety     Asthma     currently was instructed to stop inhalers d/t to collapse of vocal cords    Diaphragm paralysis     right side    Fatty liver     Fibromyalgia     GERD (gastroesophageal reflux disease)     Headache(784.0)     neck pain     History of ulcer disease     Hypothyroidism     status post thyroidectomy     Melanoma (HCC)     right eye  ciliary body  treated with XRT    Osteoarthritis     CHRONIC BACK PAIN/FIBROMYALGIA    Rheumatoid arthritis (HCC)     Spasmodic dysphonia     voice very hoarse    Thyroid disease     2015 found spot on left side of thyroid, right side thyroid was removed    Wears dentures     Wears glasses     Wears glasses         ROS:  Constitutional: denies fever, chills, weight loss  MSK: denies pain in other joints, muscle aches  Neurological: denies numbness, tingling, weakness    Exam:  Appearance: sitting in exam room chair, appears to be in no acute distress, awake and alert  Resp: unlabored breathing on room air  Skin: warm, dry and intact with out erythema or significant increased

## 2025-03-19 ENCOUNTER — HOSPITAL ENCOUNTER (OUTPATIENT)
Age: 60
Discharge: HOME | End: 2025-03-19
Payer: COMMERCIAL

## 2025-03-19 ENCOUNTER — HOSPITAL ENCOUNTER (OUTPATIENT)
Dept: GENERAL RADIOLOGY | Age: 60
Discharge: HOME | End: 2025-03-19
Payer: COMMERCIAL

## 2025-03-19 DIAGNOSIS — G89.29 CHRONIC MIDLINE LOW BACK PAIN WITH RIGHT-SIDED SCIATICA: ICD-10-CM

## 2025-03-19 DIAGNOSIS — M54.41 CHRONIC MIDLINE LOW BACK PAIN WITH RIGHT-SIDED SCIATICA: ICD-10-CM

## 2025-03-19 PROCEDURE — 72100 X-RAY EXAM L-S SPINE 2/3 VWS: CPT

## 2025-03-20 ENCOUNTER — RESULTS FOLLOW-UP (OUTPATIENT)
Dept: GENERAL RADIOLOGY | Age: 60
End: 2025-03-20

## 2025-03-20 DIAGNOSIS — M54.31 RIGHT SIDED SCIATICA: Primary | ICD-10-CM

## 2025-03-20 DIAGNOSIS — M51.9 LUMBAR DISC DISEASE: ICD-10-CM

## 2025-03-21 NOTE — TELEPHONE ENCOUNTER
Bonita calling back the closest open MRI is 20 miles away.  She is scheduled for MRI 3/26/25 but it is not opened.  She is asking if you could put with IV Sedation.    Please advise, [phone]

## 2025-03-25 RX ORDER — FUROSEMIDE 20 MG/1
40 TABLET ORAL DAILY
Qty: 60 TABLET | Refills: 3 | Status: SHIPPED | OUTPATIENT
Start: 2025-03-25

## 2025-03-25 NOTE — TELEPHONE ENCOUNTER
Pt called back and she stated she just wants togo ahead and do the MRI at [facility] with IV Sedation.    Please advise pt    [phone]

## 2025-03-26 NOTE — TELEPHONE ENCOUNTER
Called [facility] MRI dept, the MRI would need to be ordered with anesthesia, the pt needs to have an H&P done within 30 days.   Pt will call scheduling to schedule, the  will see that its with anesthesia and give the pt all her instructions.

## 2025-03-26 NOTE — TELEPHONE ENCOUNTER
----- Message from Dr. Kamila Birseno MD sent at 3/25/2025  3:11 PM EDT -----  Please call [facility] and see what we need to do

## 2025-03-27 ENCOUNTER — TELEPHONE (OUTPATIENT)
Dept: FAMILY MEDICINE CLINIC | Age: 60
End: 2025-03-27

## 2025-03-27 DIAGNOSIS — M54.41 CHRONIC MIDLINE LOW BACK PAIN WITH RIGHT-SIDED SCIATICA: Primary | ICD-10-CM

## 2025-03-27 DIAGNOSIS — G89.29 CHRONIC MIDLINE LOW BACK PAIN WITH RIGHT-SIDED SCIATICA: Primary | ICD-10-CM

## 2025-03-27 NOTE — TELEPHONE ENCOUNTER
3/25/25  2:24 PM  Note     Pt called back and she stated she just wants togo ahead and do the MRI at [facility] with IV Sedation.     Please advise pt     [phone]

## 2025-03-27 NOTE — TELEPHONE ENCOUNTER
Pt informed MRI LUMBAR SPINE WO CONTRAST was ordered with sedation, [facility] scheduling # [phone] given to the pt to call and schedule. Once scheduled she will need to call us back to schedule her pre op.

## 2025-03-27 NOTE — TELEPHONE ENCOUNTER
Called [facility] MRI dept, the MRI would need to be ordered with anesthesia, the pt needs to have an H&P done within 30 days.   When the pt calls to schedule, the  will see that its with anesthesia and give the pt all her instructions.     Let the pt know when done so she can schedule the mri and a pre op that needs to be within 30 days of the procedure.     Please place new order.

## 2025-04-04 RX ORDER — POTASSIUM CHLORIDE 750 MG/1
10 TABLET, EXTENDED RELEASE ORAL DAILY
Qty: 30 TABLET | Refills: 3 | Status: SHIPPED | OUTPATIENT
Start: 2025-04-04

## 2025-04-23 ENCOUNTER — TELEPHONE (OUTPATIENT)
Dept: FAMILY MEDICINE CLINIC | Age: 60
End: 2025-04-23

## 2025-04-23 NOTE — TELEPHONE ENCOUNTER
----- Message from Mikhail DELGADO sent at 4/23/2025 12:00 PM EDT -----  Regarding: [facility] Appointment Request  [facility] Appointment Request    Patient needs appointment for [facility] Appointment Type: Annual Visit.    Patient Requested Dates(s):As soon as possible   Patient Requested Time:Any time   Provider Name:Kamila Briseno MD    Reason for Appointment Request: Established Patient - Available appointments did not meet patient need  --------------------------------------------------------------------------------------------------------------------------    Relationship to Patient: Self     Call Back Information: OK to leave message on voicemail  Preferred Call Back Number: Phone [phone]

## 2025-04-23 NOTE — TELEPHONE ENCOUNTER
Called pt and LM informing to call office back.    Dr. Briseno is booking out into July for physicals.   If she needs something urgent, to let us know so we can see sooner.

## 2025-04-23 NOTE — TELEPHONE ENCOUNTER
Pt states she is having an MRI done and is needing a physical done since she will be going under anesthesia.     Staff advised pt to go to [facility] since we do not have any openings prior to 4/29/25 for a physical.    Pt gave verbal understanding.

## 2025-04-24 ENCOUNTER — OFFICE VISIT (OUTPATIENT)
Dept: FAMILY MEDICINE CLINIC | Age: 60
End: 2025-04-24
Payer: COMMERCIAL

## 2025-04-24 ENCOUNTER — RESULTS FOLLOW-UP (OUTPATIENT)
Dept: FAMILY MEDICINE CLINIC | Age: 60
End: 2025-04-24

## 2025-04-24 VITALS
HEART RATE: 74 BPM | HEIGHT: 68 IN | WEIGHT: 258 LBS | BODY MASS INDEX: 39.1 KG/M2 | SYSTOLIC BLOOD PRESSURE: 128 MMHG | RESPIRATION RATE: 16 BRPM | DIASTOLIC BLOOD PRESSURE: 72 MMHG | TEMPERATURE: 99 F | OXYGEN SATURATION: 96 %

## 2025-04-24 DIAGNOSIS — E89.0 POST-OPERATIVE HYPOTHYROIDISM: ICD-10-CM

## 2025-04-24 DIAGNOSIS — Z01.818 PRE-OP EXAMINATION: Primary | ICD-10-CM

## 2025-04-24 DIAGNOSIS — R73.9 HYPERGLYCEMIA: ICD-10-CM

## 2025-04-24 DIAGNOSIS — J45.20 MILD INTERMITTENT ASTHMA WITHOUT COMPLICATION: ICD-10-CM

## 2025-04-24 DIAGNOSIS — R53.83 FATIGUE, UNSPECIFIED TYPE: ICD-10-CM

## 2025-04-24 DIAGNOSIS — R29.898 BILATERAL LEG WEAKNESS: ICD-10-CM

## 2025-04-24 DIAGNOSIS — R94.31 PROLONGED Q-T INTERVAL ON ECG: ICD-10-CM

## 2025-04-24 DIAGNOSIS — J98.6 PARALYSIS OF DIAPHRAGM: ICD-10-CM

## 2025-04-24 DIAGNOSIS — E55.9 VITAMIN D DEFICIENCY: ICD-10-CM

## 2025-04-24 DIAGNOSIS — C69.41 MALIGNANT MELANOMA OF CILIARY BODY OF RIGHT EYE (HCC): ICD-10-CM

## 2025-04-24 DIAGNOSIS — M51.16 LUMBAR DISC DISEASE WITH RADICULOPATHY: ICD-10-CM

## 2025-04-24 PROCEDURE — G8427 DOCREV CUR MEDS BY ELIG CLIN: HCPCS | Performed by: INTERNAL MEDICINE

## 2025-04-24 PROCEDURE — 1036F TOBACCO NON-USER: CPT | Performed by: INTERNAL MEDICINE

## 2025-04-24 PROCEDURE — 3017F COLORECTAL CA SCREEN DOC REV: CPT | Performed by: INTERNAL MEDICINE

## 2025-04-24 PROCEDURE — 99214 OFFICE O/P EST MOD 30 MIN: CPT | Performed by: INTERNAL MEDICINE

## 2025-04-24 PROCEDURE — 93000 ELECTROCARDIOGRAM COMPLETE: CPT | Performed by: INTERNAL MEDICINE

## 2025-04-24 PROCEDURE — G8417 CALC BMI ABV UP PARAM F/U: HCPCS | Performed by: INTERNAL MEDICINE

## 2025-04-24 RX ORDER — OFLOXACIN 3 MG/ML
1 SOLUTION/ DROPS OPHTHALMIC 2 TIMES DAILY
COMMUNITY
Start: 2025-04-04

## 2025-04-24 RX ORDER — PREDNISOLONE ACETATE 10 MG/ML
1 SUSPENSION/ DROPS OPHTHALMIC 2 TIMES DAILY
COMMUNITY

## 2025-04-24 ASSESSMENT — ENCOUNTER SYMPTOMS
NAUSEA: 0
COUGH: 0
WHEEZING: 0
VOMITING: 0
TROUBLE SWALLOWING: 0
BACK PAIN: 1
CONSTIPATION: 0
APNEA: 0
CHEST TIGHTNESS: 0
SHORTNESS OF BREATH: 0
DIARRHEA: 0
ABDOMINAL PAIN: 0
BLOOD IN STOOL: 0

## 2025-04-24 NOTE — TELEPHONE ENCOUNTER
Laurel states that she talked to [facility] where she is having procedure done , that pcp can go online where doctors fill out paper and just fill it out without seeing her if pcp is okay with this? She states that she was just seen . When asked where you go to find this paper online she states that the provider's know.

## 2025-04-24 NOTE — TELEPHONE ENCOUNTER
Called pt and scheduled for Pre-Op for today at 4:30pm per Dr. Briseno.    Do not need to fax anywhere when completed.  MRI dept states as long as the note is in Epic, it should be fine.

## 2025-04-29 ENCOUNTER — ANESTHESIA EVENT (OUTPATIENT)
Dept: MRI IMAGING | Age: 60
End: 2025-04-29
Payer: COMMERCIAL

## 2025-04-29 ENCOUNTER — ANESTHESIA (OUTPATIENT)
Dept: MRI IMAGING | Age: 60
End: 2025-04-29
Payer: COMMERCIAL

## 2025-04-29 ENCOUNTER — HOSPITAL ENCOUNTER (OUTPATIENT)
Dept: MRI IMAGING | Age: 60
Discharge: HOME | End: 2025-04-29
Payer: COMMERCIAL

## 2025-04-29 VITALS
HEART RATE: 70 BPM | HEIGHT: 68 IN | BODY MASS INDEX: 38.31 KG/M2 | TEMPERATURE: 97 F | SYSTOLIC BLOOD PRESSURE: 103 MMHG | OXYGEN SATURATION: 97 % | RESPIRATION RATE: 14 BRPM | WEIGHT: 252.8 LBS | DIASTOLIC BLOOD PRESSURE: 44 MMHG

## 2025-04-29 DIAGNOSIS — M54.41 CHRONIC MIDLINE LOW BACK PAIN WITH RIGHT-SIDED SCIATICA: ICD-10-CM

## 2025-04-29 DIAGNOSIS — G89.29 CHRONIC MIDLINE LOW BACK PAIN WITH RIGHT-SIDED SCIATICA: ICD-10-CM

## 2025-04-29 PROCEDURE — 7100000001 HC PACU RECOVERY - ADDTL 15 MIN

## 2025-04-29 PROCEDURE — 72148 MRI LUMBAR SPINE W/O DYE: CPT

## 2025-04-29 PROCEDURE — 3700000001 HC ADD 15 MINUTES (ANESTHESIA)

## 2025-04-29 PROCEDURE — 2580000003 HC RX 258: Performed by: ANESTHESIOLOGY

## 2025-04-29 PROCEDURE — 3700000000 HC ANESTHESIA ATTENDED CARE

## 2025-04-29 PROCEDURE — 2500000003 HC RX 250 WO HCPCS

## 2025-04-29 PROCEDURE — 6360000002 HC RX W HCPCS

## 2025-04-29 PROCEDURE — 7100000000 HC PACU RECOVERY - FIRST 15 MIN

## 2025-04-29 PROCEDURE — 7100000010 HC PHASE II RECOVERY - FIRST 15 MIN

## 2025-04-29 PROCEDURE — 7100000011 HC PHASE II RECOVERY - ADDTL 15 MIN

## 2025-04-29 RX ORDER — LIDOCAINE HYDROCHLORIDE 20 MG/ML
INJECTION, SOLUTION INTRAVENOUS
Status: DISCONTINUED | OUTPATIENT
Start: 2025-04-29 | End: 2025-04-29 | Stop reason: SDUPTHER

## 2025-04-29 RX ORDER — DEXAMETHASONE SODIUM PHOSPHATE 4 MG/ML
INJECTION, SOLUTION INTRA-ARTICULAR; INTRALESIONAL; INTRAMUSCULAR; INTRAVENOUS; SOFT TISSUE
Status: DISCONTINUED | OUTPATIENT
Start: 2025-04-29 | End: 2025-04-29 | Stop reason: SDUPTHER

## 2025-04-29 RX ORDER — ONDANSETRON 2 MG/ML
4 INJECTION INTRAMUSCULAR; INTRAVENOUS
OUTPATIENT
Start: 2025-04-29

## 2025-04-29 RX ORDER — SODIUM CHLORIDE 0.9 % (FLUSH) 0.9 %
5-40 SYRINGE (ML) INJECTION PRN
OUTPATIENT
Start: 2025-04-29

## 2025-04-29 RX ORDER — SUCCINYLCHOLINE CHLORIDE 20 MG/ML
INJECTION INTRAMUSCULAR; INTRAVENOUS
Status: DISCONTINUED | OUTPATIENT
Start: 2025-04-29 | End: 2025-04-29 | Stop reason: SDUPTHER

## 2025-04-29 RX ORDER — SODIUM CHLORIDE 9 MG/ML
INJECTION, SOLUTION INTRAVENOUS PRN
OUTPATIENT
Start: 2025-04-29

## 2025-04-29 RX ORDER — PROPOFOL 10 MG/ML
INJECTION, EMULSION INTRAVENOUS
Status: DISCONTINUED | OUTPATIENT
Start: 2025-04-29 | End: 2025-04-29 | Stop reason: SDUPTHER

## 2025-04-29 RX ORDER — ROCURONIUM BROMIDE 10 MG/ML
INJECTION, SOLUTION INTRAVENOUS
Status: DISCONTINUED | OUTPATIENT
Start: 2025-04-29 | End: 2025-04-29 | Stop reason: SDUPTHER

## 2025-04-29 RX ORDER — NALOXONE HYDROCHLORIDE 0.4 MG/ML
INJECTION, SOLUTION INTRAMUSCULAR; INTRAVENOUS; SUBCUTANEOUS PRN
OUTPATIENT
Start: 2025-04-29

## 2025-04-29 RX ORDER — ONDANSETRON 2 MG/ML
INJECTION INTRAMUSCULAR; INTRAVENOUS
Status: DISCONTINUED | OUTPATIENT
Start: 2025-04-29 | End: 2025-04-29 | Stop reason: SDUPTHER

## 2025-04-29 RX ORDER — IPRATROPIUM BROMIDE AND ALBUTEROL SULFATE 2.5; .5 MG/3ML; MG/3ML
1 SOLUTION RESPIRATORY (INHALATION)
OUTPATIENT
Start: 2025-04-29

## 2025-04-29 RX ORDER — SODIUM CHLORIDE 0.9 % (FLUSH) 0.9 %
5-40 SYRINGE (ML) INJECTION EVERY 12 HOURS SCHEDULED
OUTPATIENT
Start: 2025-04-29

## 2025-04-29 RX ORDER — SODIUM CHLORIDE, SODIUM LACTATE, POTASSIUM CHLORIDE, CALCIUM CHLORIDE 600; 310; 30; 20 MG/100ML; MG/100ML; MG/100ML; MG/100ML
INJECTION, SOLUTION INTRAVENOUS CONTINUOUS
Status: DISCONTINUED | OUTPATIENT
Start: 2025-04-29 | End: 2025-04-30 | Stop reason: HOSPADM

## 2025-04-29 RX ADMIN — SUGAMMADEX 400 MG: 100 INJECTION, SOLUTION INTRAVENOUS at 13:14

## 2025-04-29 RX ADMIN — ROCURONIUM BROMIDE 50 MG: 10 INJECTION, SOLUTION INTRAVENOUS at 12:52

## 2025-04-29 RX ADMIN — SODIUM CHLORIDE, SODIUM LACTATE, POTASSIUM CHLORIDE, AND CALCIUM CHLORIDE: .6; .31; .03; .02 INJECTION, SOLUTION INTRAVENOUS at 10:30

## 2025-04-29 RX ADMIN — PROPOFOL 110 MG: 10 INJECTION, EMULSION INTRAVENOUS at 12:47

## 2025-04-29 RX ADMIN — DEXAMETHASONE SODIUM PHOSPHATE 4 MG: 4 INJECTION INTRA-ARTICULAR; INTRALESIONAL; INTRAMUSCULAR; INTRAVENOUS; SOFT TISSUE at 13:03

## 2025-04-29 RX ADMIN — SUCCINYLCHOLINE CHLORIDE 140 MG: 20 INJECTION, SOLUTION INTRAMUSCULAR; INTRAVENOUS at 12:48

## 2025-04-29 RX ADMIN — ONDANSETRON 4 MG: 2 INJECTION, SOLUTION INTRAMUSCULAR; INTRAVENOUS at 13:03

## 2025-04-29 RX ADMIN — PHENYLEPHRINE HYDROCHLORIDE 100 MCG: 10 INJECTION INTRAVENOUS at 13:03

## 2025-04-29 RX ADMIN — LIDOCAINE HYDROCHLORIDE 100 MG: 20 INJECTION, SOLUTION INTRAVENOUS at 12:47

## 2025-04-29 ASSESSMENT — PAIN SCALES - GENERAL
PAINLEVEL_OUTOF10: 0
PAINLEVEL_OUTOF10: 0

## 2025-04-29 ASSESSMENT — PAIN - FUNCTIONAL ASSESSMENT: PAIN_FUNCTIONAL_ASSESSMENT: 0-10

## 2025-04-29 NOTE — ANESTHESIA POSTPROCEDURE EVALUATION
Department of Anesthesiology  Postprocedure Note    Patient: Laurel Lazcano  MRN: [id number]  YOB: 1965  Date of evaluation: 4/29/2025    Procedure Summary       Date: 04/29/25 Room / Location: [facility]    Anesthesia Start: 1245 Anesthesia Stop: 1330    Procedure: MRI LUMBAR SPINE WO CONTRAST Diagnosis: Chronic midline low back pain with right-sided sciatica    Scheduled Providers: Pranav Russell MD Responsible Provider: Pranav Russell MD    Anesthesia Type: general ASA Status: 3            Anesthesia Type: No value filed.    Medardo Phase I: Medardo Score: 9    Medardo Phase II: Medardo Score: 10    Anesthesia Post Evaluation    Patient location during evaluation: PACU  Patient participation: complete - patient participated  Level of consciousness: awake and alert  Airway patency: patent  Nausea & Vomiting: no nausea and no vomiting  Cardiovascular status: hemodynamically stable  Respiratory status: acceptable  Hydration status: euvolemic  Pain management: adequate    No notable events documented.

## 2025-04-29 NOTE — PROGRESS NOTES
PACU Transfer to [facility]    Vitals:    04/29/25 1401   BP: 105/62   Pulse: 75   Resp: 14   Temp: 97 °F (36.1 °C)   SpO2: 93%         Intake/Output Summary (Last 24 hours) at 4/29/2025 1404  Last data filed at 4/29/2025 1321  Gross per 24 hour   Intake 400 ml   Output 0 ml   Net 400 ml       Pain assessment:  present - adequately treated  Pain Level: 0    Patient transferred to care of [facility] RN.    4/29/2025 2:04 PM

## 2025-04-29 NOTE — ANESTHESIA PRE PROCEDURE
02/22/2025 02:00 AM    MCV 86.4 02/22/2025 02:00 AM    RDW 13.8 02/22/2025 02:00 AM     02/22/2025 02:00 AM       CMP:   Lab Results   Component Value Date/Time     02/22/2025 02:00 AM    K 3.6 02/22/2025 02:00 AM     02/22/2025 02:00 AM    CO2 25 02/22/2025 02:00 AM    BUN 14 02/22/2025 02:00 AM    CREATININE 0.7 02/22/2025 02:00 AM    GFRAA >60 09/14/2021 04:24 PM    GFRAA >60 07/18/2012 01:43 AM    AGRATIO 1.5 04/02/2024 01:32 PM    LABGLOM >90 02/22/2025 02:00 AM    LABGLOM >90 04/02/2024 01:32 PM    GLUCOSE 115 02/22/2025 02:00 AM    CALCIUM 10.1 02/22/2025 02:00 AM    BILITOT 0.3 04/02/2024 01:32 PM    ALKPHOS 112 04/02/2024 01:32 PM    AST 20 04/02/2024 01:32 PM    ALT 25 04/02/2024 01:32 PM       POC Tests: No results for input(s): \"POCGLU\", \"POCNA\", \"POCK\", \"POCCL\", \"POCBUN\", \"POCHEMO\", \"POCHCT\" in the last 72 hours.    Coags:   Lab Results   Component Value Date/Time    PROTIME 12.1 10/09/2024 08:55 AM    INR 0.87 10/09/2024 08:55 AM    APTT 29.8 10/09/2024 08:55 AM       HCG (If Applicable):   Lab Results   Component Value Date    PREGTESTUR Negative 08/10/2018    PREGSERUM Negative 06/08/2021        ABGs: No results found for: \"PHART\", \"PO2ART\", \"SSZ1JGI\", \"ORX6WLM\", \"BEART\", \"J8SNWQGC\"     Type & Screen (If Applicable):  Lab Results   Component Value Date    ABORH O POS 05/28/2024    LABANTI NEG 05/28/2024       Drug/Infectious Status (If Applicable):  No results found for: \"HIV\", \"HEPCAB\"    COVID-19 Screening (If Applicable):   Lab Results   Component Value Date/Time    COVID19 Not-Detected 03/06/2025 11:29 AM    COVID19 Not Detected 03/30/2021 01:44 PM    COVID19 NOT DETECTED 11/06/2020 01:07 PM           Anesthesia Evaluation  Patient summary reviewed and Nursing notes reviewed   no history of anesthetic complications:   Airway: Mallampati: II  TM distance: >3 FB     Mouth opening: > = 3 FB   Dental:    (+) upper dentures and lower dentures      Pulmonary:normal exam        (-)

## 2025-04-29 NOTE — DISCHARGE INSTRUCTIONS
[facility] AMBULATORY PROCEDURE DISCHARGE INSTRUCTIONS    There are potential side effects of anesthesia or sedation you may experience for the first 24 hours.  These side effects include:    Confusion or Memory loss, Dizziness, or Delayed Reaction Times   [x]A responsible person should be with you for the next 24 hours.  Do not operate any vehicles (automobiles, bicycles, motorcycles) or power tools or machinery for 24 hours.  Do not sign any legal documents or make any legal decisions for 24 hours. Do not drink alcohol for 24 hours or while taking narcotic pain medication.      Nausea    [x]Start with light diet and progress to your normal diet as you feel like eating. However, if you experience nausea or repeated episodes of vomiting which persist beyond 12-24 hours, notify your physician.  Once nausea has passed, remember to keep drinking fluids.    Difficulty Passing Urine  [x]Drink extra amounts of fluid today.  Notify your physician if you have not urinated within 8 hours after your procedure or you feel uncomfortable.      Irritated Throat from a Breathing Tube  [x]Drink extra amounts of fluid today.  Lozenges may help.    Muscle Aches  [x]You may experience some generalized body aches as your muscles recover from medications used to relax them during surgery.  These will gradually subside.    MEDICATION INSTRUCTIONS:    Use as directed.  When taking pain medications, you may experience the side effect of dizziness or drowsiness.  Do not drink alcohol or drive when taking these medications.    [x]Give the list of your medications to your primary care physician on your next visit. Keep your med list updated and carry it with in case of emergencies.    [x] Narcotic pain medications can cause the side effect of significant constipation.  You may want to add a stool softener to your postoperative medication schedule or speak to your surgeon on how best to manage this side effect.    NARCOTIC SAFETY:  Your

## 2025-04-29 NOTE — PROGRESS NOTES
Ambulatory Surgery/Procedure Discharge Note    Vitals:    04/29/25 1410   BP: (!) 103/44   Pulse: 70   Resp: 14   Temp: 97 °F (36.1 °C)   SpO2: 97%   Pt meets discharge criteria per Medardo score.    In: 400 [I.V.:400]  Out: 0     Restroom use offered before discharge.  Yes    Pain assessment:  none  Pain Level: 0    Pt and S.O./family states \"ready to go home\". Pt alert and oriented x4. IV removed. Denies N/V or pain.  Voided prior to discharge. Pt tolerating po intake. Discharge instructions given to pt and friend with pt permission. Pt and friend verbalized understanding of all instructions. Left with all belongings, and discharge instructions.     Patient discharged to home/self care. Patient discharged via wheel chair by transporter to waiting family/S.O.       4/29/2025 2:20 PM

## 2025-04-30 ENCOUNTER — RESULTS FOLLOW-UP (OUTPATIENT)
Dept: FAMILY MEDICINE CLINIC | Age: 60
End: 2025-04-30

## 2025-04-30 NOTE — TELEPHONE ENCOUNTER
Pt informed, she states that she already see's Dr. DOSS and gets injections and see's him for pain management .

## 2025-05-15 ENCOUNTER — TELEPHONE (OUTPATIENT)
Dept: ORTHOPEDIC SURGERY | Age: 60
End: 2025-05-15

## 2025-05-15 NOTE — TELEPHONE ENCOUNTER
----- Message from Lili OCHOA sent at 5/15/2025  9:27 AM EDT -----  Regarding: Specialty Message to Provider  Specialty Message to Provider    Relationship to Patient: Self     Patient Message: PATIENT CALLED TO ASK IF SHE WOULD BE ABLE  TO GET KNEE SURGERY 3 WEEKS AFTER HAVE FOOT SURGERY SINCE SHE ONLY HAVE 3MTHS OFF FROM WORK  REQUEST CALL BACK FROM NURSE TO TALK ABOUT OPTIONS  --------------------------------------------------------------------------------------------------------------------------    Call Back Information: OK to leave message on voicemail  Preferred Call Back Number: Phone [phone]

## 2025-05-18 DIAGNOSIS — E89.0 POSTOPERATIVE HYPOTHYROIDISM: ICD-10-CM

## 2025-05-19 RX ORDER — LEVOTHYROXINE SODIUM 125 UG/1
125 TABLET ORAL DAILY
Qty: 90 TABLET | Refills: 1 | Status: SHIPPED | OUTPATIENT
Start: 2025-05-19

## 2025-07-18 DIAGNOSIS — C69.41 MALIGNANT MELANOMA OF CILIARY BODY OF RIGHT EYE (HCC): ICD-10-CM

## 2025-07-18 DIAGNOSIS — R73.9 HYPERGLYCEMIA: ICD-10-CM

## 2025-07-18 DIAGNOSIS — E55.9 VITAMIN D DEFICIENCY: ICD-10-CM

## 2025-07-18 DIAGNOSIS — E89.0 POST-OPERATIVE HYPOTHYROIDISM: ICD-10-CM

## 2025-07-18 DIAGNOSIS — R53.83 FATIGUE, UNSPECIFIED TYPE: ICD-10-CM

## 2025-07-18 LAB
25(OH)D3 SERPL-MCNC: 32 NG/ML
ALBUMIN SERPL-MCNC: 4.3 G/DL (ref 3.4–5)
ALBUMIN/GLOB SERPL: 1.6 {RATIO} (ref 1.1–2.2)
ALP SERPL-CCNC: 95 U/L (ref 40–129)
ALT SERPL-CCNC: 39 U/L (ref 10–40)
ANION GAP SERPL CALCULATED.3IONS-SCNC: 10 MMOL/L (ref 3–16)
AST SERPL-CCNC: 26 U/L (ref 15–37)
BASOPHILS # BLD: 0 K/UL (ref 0–0.2)
BASOPHILS NFR BLD: 0.4 %
BILIRUB SERPL-MCNC: 0.4 MG/DL (ref 0–1)
BUN SERPL-MCNC: 10 MG/DL (ref 7–20)
CALCIUM SERPL-MCNC: 10.5 MG/DL (ref 8.3–10.6)
CHLORIDE SERPL-SCNC: 106 MMOL/L (ref 99–110)
CO2 SERPL-SCNC: 26 MMOL/L (ref 21–32)
CREAT SERPL-MCNC: 0.6 MG/DL (ref 0.6–1.1)
DEPRECATED RDW RBC AUTO: 16.1 % (ref 12.4–15.4)
EOSINOPHIL # BLD: 0.2 K/UL (ref 0–0.6)
EOSINOPHIL NFR BLD: 2.7 %
GFR SERPLBLD CREATININE-BSD FMLA CKD-EPI: >90 ML/MIN/{1.73_M2}
GLUCOSE SERPL-MCNC: 89 MG/DL (ref 70–99)
HCT VFR BLD AUTO: 41.1 % (ref 36–48)
HGB BLD-MCNC: 13.5 G/DL (ref 12–16)
LYMPHOCYTES # BLD: 3.2 K/UL (ref 1–5.1)
LYMPHOCYTES NFR BLD: 38.3 %
MCH RBC QN AUTO: 27.1 PG (ref 26–34)
MCHC RBC AUTO-ENTMCNC: 33 G/DL (ref 31–36)
MCV RBC AUTO: 82.1 FL (ref 80–100)
MONOCYTES # BLD: 0.8 K/UL (ref 0–1.3)
MONOCYTES NFR BLD: 9.9 %
NEUTROPHILS # BLD: 4.1 K/UL (ref 1.7–7.7)
NEUTROPHILS NFR BLD: 48.7 %
PLATELET # BLD AUTO: 285 K/UL (ref 135–450)
PMV BLD AUTO: 9.6 FL (ref 5–10.5)
POTASSIUM SERPL-SCNC: 4.2 MMOL/L (ref 3.5–5.1)
PROT SERPL-MCNC: 7 G/DL (ref 6.4–8.2)
RBC # BLD AUTO: 5 M/UL (ref 4–5.2)
SODIUM SERPL-SCNC: 142 MMOL/L (ref 136–145)
TSH SERPL DL<=0.005 MIU/L-ACNC: 0.14 UIU/ML (ref 0.27–4.2)
WBC # BLD AUTO: 8.4 K/UL (ref 4–11)

## 2025-07-19 LAB
EST. AVERAGE GLUCOSE BLD GHB EST-MCNC: 111.2 MG/DL
HBA1C MFR BLD: 5.5 %

## 2025-07-22 ENCOUNTER — TELEPHONE (OUTPATIENT)
Dept: CARDIOLOGY CLINIC | Age: 60
End: 2025-07-22

## 2025-07-22 NOTE — TELEPHONE ENCOUNTER
Patient is a new referral from Dr. Garland for further evaluation of varicose veins.  Please call patient and offer appt with Dr. Bunch in [place] 8/20 at preferred time, okay to double book  Thanks

## 2025-07-28 RX ORDER — FUROSEMIDE 20 MG/1
40 TABLET ORAL DAILY
Qty: 60 TABLET | Refills: 1 | Status: SHIPPED | OUTPATIENT
Start: 2025-07-28

## 2025-08-04 ENCOUNTER — OFFICE VISIT (OUTPATIENT)
Dept: ORTHOPEDIC SURGERY | Age: 60
End: 2025-08-04
Payer: COMMERCIAL

## 2025-08-04 VITALS — HEIGHT: 68 IN | BODY MASS INDEX: 38.19 KG/M2 | WEIGHT: 252 LBS

## 2025-08-04 DIAGNOSIS — Z96.651 STATUS POST TOTAL RIGHT KNEE REPLACEMENT: ICD-10-CM

## 2025-08-04 DIAGNOSIS — M17.12 PRIMARY OSTEOARTHRITIS OF LEFT KNEE: Primary | ICD-10-CM

## 2025-08-04 PROCEDURE — 20610 DRAIN/INJ JOINT/BURSA W/O US: CPT | Performed by: PHYSICIAN ASSISTANT

## 2025-08-04 PROCEDURE — 1036F TOBACCO NON-USER: CPT | Performed by: PHYSICIAN ASSISTANT

## 2025-08-04 PROCEDURE — 99213 OFFICE O/P EST LOW 20 MIN: CPT | Performed by: PHYSICIAN ASSISTANT

## 2025-08-04 PROCEDURE — 3017F COLORECTAL CA SCREEN DOC REV: CPT | Performed by: PHYSICIAN ASSISTANT

## 2025-08-04 PROCEDURE — G8427 DOCREV CUR MEDS BY ELIG CLIN: HCPCS | Performed by: PHYSICIAN ASSISTANT

## 2025-08-04 PROCEDURE — G8417 CALC BMI ABV UP PARAM F/U: HCPCS | Performed by: PHYSICIAN ASSISTANT

## 2025-08-04 RX ORDER — TRIAMCINOLONE ACETONIDE 40 MG/ML
40 INJECTION, SUSPENSION INTRA-ARTICULAR; INTRAMUSCULAR ONCE
Status: COMPLETED | OUTPATIENT
Start: 2025-08-04 | End: 2025-08-04

## 2025-08-04 RX ORDER — BUPIVACAINE HYDROCHLORIDE 2.5 MG/ML
2 INJECTION, SOLUTION INFILTRATION; PERINEURAL ONCE
Status: COMPLETED | OUTPATIENT
Start: 2025-08-04 | End: 2025-08-04

## 2025-08-04 RX ADMIN — BUPIVACAINE HYDROCHLORIDE 5 MG: 2.5 INJECTION, SOLUTION INFILTRATION; PERINEURAL at 13:24

## 2025-08-04 RX ADMIN — TRIAMCINOLONE ACETONIDE 40 MG: 40 INJECTION, SUSPENSION INTRA-ARTICULAR; INTRAMUSCULAR at 13:24

## 2025-08-11 RX ORDER — ERGOCALCIFEROL 1.25 MG/1
50000 CAPSULE, LIQUID FILLED ORAL WEEKLY
Qty: 12 CAPSULE | Refills: 1 | Status: SHIPPED | OUTPATIENT
Start: 2025-08-11

## 2025-08-14 RX ORDER — POTASSIUM CHLORIDE 750 MG/1
10 TABLET, EXTENDED RELEASE ORAL DAILY
Qty: 30 TABLET | Refills: 3 | Status: SHIPPED | OUTPATIENT
Start: 2025-08-14

## 2025-08-20 ENCOUNTER — OFFICE VISIT (OUTPATIENT)
Dept: CARDIOLOGY CLINIC | Age: 60
End: 2025-08-20
Payer: COMMERCIAL

## 2025-08-20 VITALS
DIASTOLIC BLOOD PRESSURE: 78 MMHG | WEIGHT: 267 LBS | BODY MASS INDEX: 40.6 KG/M2 | OXYGEN SATURATION: 96 % | HEART RATE: 76 BPM | SYSTOLIC BLOOD PRESSURE: 112 MMHG

## 2025-08-20 DIAGNOSIS — I83.91 VARICOSE VEINS OF RIGHT FOOT: Primary | ICD-10-CM

## 2025-08-20 PROCEDURE — G8427 DOCREV CUR MEDS BY ELIG CLIN: HCPCS | Performed by: INTERNAL MEDICINE

## 2025-08-20 PROCEDURE — 3017F COLORECTAL CA SCREEN DOC REV: CPT | Performed by: INTERNAL MEDICINE

## 2025-08-20 PROCEDURE — 99204 OFFICE O/P NEW MOD 45 MIN: CPT | Performed by: INTERNAL MEDICINE

## 2025-08-20 PROCEDURE — G8417 CALC BMI ABV UP PARAM F/U: HCPCS | Performed by: INTERNAL MEDICINE

## 2025-08-20 PROCEDURE — 1036F TOBACCO NON-USER: CPT | Performed by: INTERNAL MEDICINE

## (undated) DEVICE — NERVE BLOCK TRAY: Brand: MEDLINE INDUSTRIES, INC.

## (undated) DEVICE — DUAL CUT SAGITTAL BLADE

## (undated) DEVICE — MATTRESS MAXI AIR TRNSF SGL PT USE DCS 37 45 48 52 75

## (undated) DEVICE — SYRINGE MED 30ML STD CLR PLAS LUERLOCK TIP N CTRL DISP

## (undated) DEVICE — EPIDURAL TRAY: Brand: MEDLINE INDUSTRIES, INC.

## (undated) DEVICE — SOLUTION IV IRRIG POUR BRL 0.9% SODIUM CHL 2F7124

## (undated) DEVICE — NEEDLE SPINAL 22GA L3.5IN SPINOCAN

## (undated) DEVICE — AVANOS* TUOHY EPIDURAL NEEDLE: Brand: AVANOS

## (undated) DEVICE — COVER LT HNDL BLU PLAS

## (undated) DEVICE — CANISTER, RIGID, 1200CC: Brand: MEDLINE INDUSTRIES, INC.

## (undated) DEVICE — DRAPE,MINOR PROC,6X6 FEN, STER: Brand: MEDLINE

## (undated) DEVICE — KIT TRK KNEE PROC VIZADISC

## (undated) DEVICE — FORCEPS BX 240CM 2.4MM L NDL RAD JAW 4 M00513334

## (undated) DEVICE — PIN BNE FIX L110MM DIA32MM

## (undated) DEVICE — SUTURE MONOCRYL STRATAFIX SPRL SZ 3-0 L12IN ABSRB UD FS-1 L30X30CM SXMP2B410

## (undated) DEVICE — ADHESIVE SKIN CLOSURE XL 42 CM 2.7 CC MESH LIQUIBAND SECUR

## (undated) DEVICE — 1010 S-DRAPE TOWEL DRAPE 10/BX: Brand: STERI-DRAPE™

## (undated) DEVICE — MINOR SET UP PK

## (undated) DEVICE — CORD RETRCT SIL

## (undated) DEVICE — GOWN SIRUS NONREIN LG W/TWL: Brand: MEDLINE INDUSTRIES, INC.

## (undated) DEVICE — PENCIL ES L3M BTTN SWCH S STL HEX LOK BLDE ELECTRD HOLSTER

## (undated) DEVICE — MERCY HEALTH WEST TURNOVER: Brand: MEDLINE INDUSTRIES, INC.

## (undated) DEVICE — PADDING CAST W6INXL4YD COT LO LINTING WYTEX

## (undated) DEVICE — ENDOSCOPY KIT: Brand: MEDLINE INDUSTRIES, INC.

## (undated) DEVICE — ELECTRODE PT RET AD L9FT HI MOIST COND ADH HYDRGEL CORDED

## (undated) DEVICE — SOLUTION IV 1000ML 0.9% SOD CHL FOR IRRIG PLAS CONT

## (undated) DEVICE — 3M™ COBAN™ NL STERILE NON-LATEX SELF-ADHERENT WRAP, 2086S, 6 IN X 5 YD (15 CM X 4,5 M), 12 ROLLS/CASE: Brand: 3M™ COBAN™

## (undated) DEVICE — HYPODERMIC SAFETY NEEDLE: Brand: MONOJECT

## (undated) DEVICE — TUBING, SUCTION, 1/4" X 12', STRAIGHT: Brand: MEDLINE

## (undated) DEVICE — PAD,NON-ADHERENT,3X8,STERILE,LF,1/PK: Brand: MEDLINE

## (undated) DEVICE — ADHESIVE SKIN CLOSURE TOP 36 CC HI VISC DERMBND MINI

## (undated) DEVICE — SUTURE VICRYL + SZ 1 L18IN ABSRB UD L36MM CT-1 1/2 CIR VCP841D

## (undated) DEVICE — SUTURE ABSORBABLE MONOFILAMENT 1 OS-8 36 CM 40 MM VIO PDS +

## (undated) DEVICE — PIN BNE FIX TEMP L140MM DIA4MM MAKO

## (undated) DEVICE — SUTURE VCRL SZ 4-0 L18IN ABSRB UD L19MM PS-2 3/8 CIR PRIM J496H

## (undated) DEVICE — SUTURE STRATAFIX SPRL SZ 2 0 L14IN ABSRB UD MH L36MM 1 2 CIR SXMD2B401

## (undated) DEVICE — GLOVE ORTHO 8   MSG9480

## (undated) DEVICE — BOOT POS LEG DEMAYO

## (undated) DEVICE — GLOVE ORANGE PI 7 1/2   MSG9075

## (undated) DEVICE — GOWN SIRUS NONREIN XL W/TWL: Brand: MEDLINE INDUSTRIES, INC.

## (undated) DEVICE — SUTURE STRATAFIX SPRL SZ 2 0 L14IN ABSRB UD MH L36MM 1 2 CIR SXMP2B401

## (undated) DEVICE — KIT DRP FOR RIO ROBOTIC ARM ASST SYS

## (undated) DEVICE — DRAPE,ORTHOMAX,EXTREMITY: Brand: MEDLINE

## (undated) DEVICE — GLOVE ORANGE PI 7   MSG9070

## (undated) DEVICE — KIT INT FIX FEM TIB CKPT MAKOPLASTY

## (undated) DEVICE — SUTURE ABSORBABLE BRAIDED 2-0 CT-1 27 IN UD VICRYL J259H

## (undated) DEVICE — SPONGE LAP W18XL18IN WHT COT 4 PLY FLD STRUNG RADPQ DISP ST

## (undated) DEVICE — SYRINGE IRRIG 60ML SFT PLIABLE BLB EZ TO GRP 1 HND USE W/

## (undated) DEVICE — SOLUTION PREP PAINT POV IOD FOR SKIN MUCOUS MEM

## (undated) DEVICE — CONTAINER SPEC 480ML CLR POLYSTYR 10% NEUT BUFF FRMLN ZN

## (undated) DEVICE — APPLICATOR MEDICATED 26 CC SOLUTION HI LT ORNG CHLORAPREP

## (undated) DEVICE — DECANTER BTL 6IN: Brand: MEDLINE INDUSTRIES, INC.

## (undated) DEVICE — BANDAGE COMPR W6INXL15YD WHT BGE POLY COT WV E HK LOOP CLSR

## (undated) DEVICE — BASIC SINGLE BASIN 1-LF: Brand: MEDLINE INDUSTRIES, INC.

## (undated) DEVICE — BITE BLOCK ENDOSCP AD 60 FR W/ ADJ STRP PLAS GRN BLOX

## (undated) DEVICE — TOTAL KNEE: Brand: MEDLINE INDUSTRIES, INC.

## (undated) DEVICE — TOWEL,OR,DSP,ST,BLUE,STD,4/PK,20PK/CS: Brand: MEDLINE

## (undated) DEVICE — GLOVE SURG SZ 8 L12IN FNGR THK79MIL GRN LTX FREE